# Patient Record
Sex: FEMALE | HISPANIC OR LATINO | Employment: FULL TIME | URBAN - METROPOLITAN AREA
[De-identification: names, ages, dates, MRNs, and addresses within clinical notes are randomized per-mention and may not be internally consistent; named-entity substitution may affect disease eponyms.]

---

## 2017-09-07 ENCOUNTER — HOSPITAL ENCOUNTER (EMERGENCY)
Facility: CLINIC | Age: 27
Discharge: HOME | End: 2017-09-07
Attending: EMERGENCY MEDICINE | Admitting: EMERGENCY MEDICINE
Payer: COMMERCIAL

## 2017-09-07 ENCOUNTER — APPOINTMENT (OUTPATIENT)
Dept: GENERAL RADIOLOGY | Facility: CLINIC | Age: 27
End: 2017-09-07
Attending: EMERGENCY MEDICINE
Payer: COMMERCIAL

## 2017-09-07 VITALS
SYSTOLIC BLOOD PRESSURE: 125 MMHG | DIASTOLIC BLOOD PRESSURE: 55 MMHG | HEART RATE: 87 BPM | WEIGHT: 212 LBS | HEIGHT: 57 IN | RESPIRATION RATE: 16 BRPM | BODY MASS INDEX: 45.74 KG/M2 | OXYGEN SATURATION: 98 % | TEMPERATURE: 98 F

## 2017-09-07 DIAGNOSIS — M54.2 NECK PAIN: ICD-10-CM

## 2017-09-07 PROCEDURE — 25000132 ZZH RX MED GY IP 250 OP 250 PS 637: Performed by: EMERGENCY MEDICINE

## 2017-09-07 PROCEDURE — 72040 X-RAY EXAM NECK SPINE 2-3 VW: CPT

## 2017-09-07 PROCEDURE — 99283 EMERGENCY DEPT VISIT LOW MDM: CPT | Performed by: EMERGENCY MEDICINE

## 2017-09-07 PROCEDURE — 99284 EMERGENCY DEPT VISIT MOD MDM: CPT | Mod: Z6 | Performed by: EMERGENCY MEDICINE

## 2017-09-07 RX ORDER — IBUPROFEN 600 MG/1
600 TABLET, FILM COATED ORAL ONCE
Status: COMPLETED | OUTPATIENT
Start: 2017-09-07 | End: 2017-09-07

## 2017-09-07 RX ORDER — IBUPROFEN 600 MG/1
600 TABLET, FILM COATED ORAL EVERY 6 HOURS PRN
Qty: 30 TABLET | Refills: 0 | Status: SHIPPED | OUTPATIENT
Start: 2017-09-07 | End: 2019-07-31

## 2017-09-07 RX ORDER — HYDROCODONE BITARTRATE AND ACETAMINOPHEN 5; 325 MG/1; MG/1
2 TABLET ORAL ONCE
Status: COMPLETED | OUTPATIENT
Start: 2017-09-07 | End: 2017-09-07

## 2017-09-07 RX ORDER — METHOCARBAMOL 500 MG/1
500-1000 TABLET, FILM COATED ORAL 3 TIMES DAILY PRN
Qty: 20 TABLET | Refills: 0 | Status: SHIPPED | OUTPATIENT
Start: 2017-09-07 | End: 2017-09-27

## 2017-09-07 RX ADMIN — HYDROCODONE BITARTRATE AND ACETAMINOPHEN 2 TABLET: 5; 325 TABLET ORAL at 14:47

## 2017-09-07 RX ADMIN — IBUPROFEN 600 MG: 600 TABLET ORAL at 14:47

## 2017-09-07 ASSESSMENT — ENCOUNTER SYMPTOMS
CONFUSION: 0
EYE REDNESS: 0
COLOR CHANGE: 0
SHORTNESS OF BREATH: 0
NECK PAIN: 1
ABDOMINAL PAIN: 0
BACK PAIN: 0
HEADACHES: 0
ARTHRALGIAS: 0
FEVER: 0
DIFFICULTY URINATING: 0

## 2017-09-07 NOTE — ED PROVIDER NOTES
History     Chief Complaint   Patient presents with     Neck Pain     Pt states that she was getting ready for work this am and felt neck crack. C/O pain and swelling in back of neck     HPI  Joie Callejas is a 26 year old female who presents to the Emergency Department today for neck pain. The patient states that she was ok this morning when she woke up. While she was in the shower this morning she reports that she felt a crack in her neck when she raised her hands over her head to wash her hair. After that she experienced a sharp pain on the left side of her neck. She reports that she did not make any sudden movements with her head to cause the pain. She did try some Tylenol earlier but it did not have any relief of her pain. The patient reports that her pain has progressed. She reports that she is unable to turn her head to the left now and is starting to develop pain in the right side of her neck that she blames on over compensating. The pain does not radiate. Patient has never had a pain like this before.  The patient has not received chiropractic care nor manipulation in over 4 months.  She denies any associated weakness or numbness.  She denies headache.  No throat pain.  No dental pain.  No fever.    I have reviewed the Medications, Allergies, Past Medical and Surgical History, and Social History in the [employer] system.  History reviewed. No pertinent past medical history.    History reviewed. No pertinent surgical history.    No family history on file.    Social History   Substance Use Topics     Smoking status: Never Smoker     Smokeless tobacco: Never Used     Alcohol use No       No current facility-administered medications for this encounter.      Current Outpatient Prescriptions   Medication     methocarbamol (ROBAXIN) 500 MG tablet     ibuprofen (ADVIL/MOTRIN) 600 MG tablet     pseudoePHEDrine (SUDAFED) 30 MG tablet     albuterol (ALBUTEROL) 108 (90 BASE) MCG/ACT inhaler     pseudoePHEDrine  "(SUDAFED) 30 MG tablet     guaiFENesin-codeine (ROBITUSSIN AC) 100-10 MG/5ML SOLN     HYDROcodone-acetaminophen (NORCO) 5-325 MG per tablet     calcium carbonate (OS-LAYLA 500 MG [place]. [place]) 500 MG tablet     VITAMIN E NATURAL PO      No Known Allergies    Review of Systems   Constitutional: Negative for fever.   HENT: Negative for congestion and hearing loss.    Eyes: Negative for redness and visual disturbance.   Respiratory: Negative for shortness of breath.    Cardiovascular: Negative for chest pain.   Gastrointestinal: Negative for abdominal pain.   Genitourinary: Negative for difficulty urinating.   Musculoskeletal: Positive for neck pain (Left side). Negative for arthralgias and back pain.   Skin: Negative for color change.   Neurological: Negative for headaches.   Psychiatric/Behavioral: Negative for confusion.       Physical Exam   BP: 119/49  Heart Rate: 81  Temp: 98  F (36.7  C)  Resp: 16  Height: 144.8 cm (4' 9\")  Weight: 96.2 kg (212 lb)  SpO2: 96 %  Physical Exam   Constitutional: She is oriented to person, place, and time. She appears well-developed and well-nourished.   HENT:   Head: Normocephalic and atraumatic.   Right Ear: External ear normal.   Left Ear: External ear normal.   Mouth/Throat: Oropharynx is clear and moist.   Eyes: Pupils are equal, round, and reactive to light.   Neck: Normal carotid pulses present. Muscular tenderness present. No spinous process tenderness present. Carotid bruit is not present. No rigidity. Decreased range of motion present.       Cardiovascular: Normal rate and regular rhythm.    Pulmonary/Chest: No respiratory distress.   Neurological: She is alert and oriented to person, place, and time. She has normal strength. No cranial nerve deficit or sensory deficit. Coordination normal. GCS eye subscore is 4. GCS verbal subscore is 5. GCS motor subscore is 6.   Nursing note and vitals reviewed.      ED Course   2:34 PM  The patient was seen and examined by Dr. Elder in Room " 03.     ED Course     Procedures            Critical Care time:    Results for orders placed or performed during the hospital encounter of 09/07/17 (from the past 24 hour(s))   Cervical spine XR, 2-3 views    Narrative    XR CERVICAL SPINE 2/3 VWS 9/7/2017 4:24 PM    HISTORY: Pain.    COMPARISON: None.    FINDINGS: Cervical alignment is anatomic. Vertebral body heights and  disc spaces are preserved. Prevertebral soft tissues are normal.      Impression    IMPRESSION: No acute osseous abnormality.    RAS JONES MD         Medications   HYDROcodone-acetaminophen (NORCO) 5-325 MG per tablet 2 tablet (2 tablets Oral Given 9/7/17 1447)   ibuprofen (ADVIL/MOTRIN) tablet 600 mg (600 mg Oral Given 9/7/17 1447)           Assessments & Plan (with Medical Decision Making)   26 year old in the emergency department with acute onset of left paravertebral neck pain while lifting her hands overhead to wash her hair this morning in the shower.  She has localized and palpable area of pain in her left mid lateral neck adjacent to her vertebra.  The patient did not have any forceful injury or exertion at the time of symptom onset immediately preceding.  She has not had a recent chiropractic care nor manipulation.  She has not had a headache or any abnormality on her neurologic exam.  As such, I do not suspect vertebral or carotid dissection.  The patient did have a radiograph obtained of her cervical spine which appears normal.  I suspect her symptoms are musculoskeletal in etiology.  She does not have any abnormality on her neurologic exam.  She has decreased rotation to the left on her exam.  The patient was provided ibuprofen and Norco in the emergency department with improvement in her symptoms.  She will be discharged home on ibuprofen and Robaxin.  Ice recommended.  Primary care follow-up recommended in 4-5 days.    I have reviewed the nursing notes.    I have reviewed the findings, diagnosis, plan and need for follow up with  the patient.    New Prescriptions    IBUPROFEN (ADVIL/MOTRIN) 600 MG TABLET    Take 1 tablet (600 mg) by mouth every 6 hours as needed for moderate pain    METHOCARBAMOL (ROBAXIN) 500 MG TABLET    Take 1-2 tablets (500-1,000 mg) by mouth 3 times daily as needed for muscle spasms       Final diagnoses:   Neck pain   IAsher, am serving as a trained medical scribe to document services personally performed by Jerald Elder MD, based on the provider's statements to me.   IJerald MD, was physically present and have reviewed and verified the accuracy of this note documented by Asher Alvarez.      9/7/2017   [facility], [place], EMERGENCY DEPARTMENT     Jerald Elder MD  09/07/17 2909       Jerald Elder MD  09/07/17 6765

## 2017-09-07 NOTE — ED AVS SNAPSHOT
[facility], Emergency Department    [address]    [place] [address]    Phone:  [phone]    Fax:  [phone]                                       Joie Callejas   MRN: [id number]    Department:  [facility], Emergency Department   Date of Visit:  9/7/2017           Patient Information     Date Of Birth          1990        Your diagnoses for this visit were:     Neck pain        You were seen by Jerald Elder MD.        Discharge Instructions       Take ibuprofen as needed for pain.  Take Robaxin as needed for spasm.  Ice for 20 minutes, 3-4 times per day.    Return to the ED if weakness, fever, or other concerns.    Please make an appointment to follow up with Your Primary Care Provider in 4-5 days.     Discharge References/Attachments     BACK AND NECK PAIN, GENERAL (ENGLISH)      24 Hour Appointment Hotline       To make an appointment at any [facility], call [phone] ([phone]). If you don't have a family doctor or clinic, we will help you find one. [facility] clinics are conveniently located to serve the needs of you and your family.             Review of your medicines      START taking        Dose / Directions Last dose taken    methocarbamol 500 MG tablet   Commonly known as:  ROBAXIN   Dose:  500-1000 mg   Quantity:  20 tablet        Take 1-2 tablets (500-1,000 mg) by mouth 3 times daily as needed for muscle spasms   Refills:  0          CONTINUE these medicines which may have CHANGED, or have new prescriptions. If we are uncertain of the size of tablets/capsules you have at home, strength may be listed as something that might have changed.        Dose / Directions Last dose taken    ibuprofen 600 MG tablet   Commonly known as:  ADVIL/MOTRIN   Dose:  600 mg   What changed:  when to take this   Quantity:  30 tablet        Take 1 tablet (600 mg) by mouth every 6 hours as needed for moderate pain   Refills:  0          Our records show that you are taking the  medicines listed below. If these are incorrect, please call your family doctor or clinic.        Dose / Directions Last dose taken    albuterol 108 (90 BASE) MCG/ACT Inhaler   Commonly known as:  PROAIR HFA   Dose:  2 puff   Quantity:  1 Inhaler        Inhale 2 puffs into the lungs every 4 hours as needed for shortness of breath / dyspnea   Refills:  0        calcium carbonate 1250 MG tablet   Commonly known as:  OS-LAYLA 500 mg [place]. [place]   Dose:  500 mg        Take 500 mg by mouth 2 times daily   Refills:  0        guaiFENesin-codeine 100-10 MG/5ML Soln solution   Commonly known as:  ROBITUSSIN AC   Dose:  2 tsp.   Quantity:  120 mL        Take 10 mLs by mouth every 4 hours as needed for cough   Refills:  0        HYDROcodone-acetaminophen 5-325 MG per tablet   Commonly known as:  NORCO   Dose:  1-2 tablet   Quantity:  15 tablet        Take 1-2 tablets by mouth every 4 hours as needed for moderate to severe pain   Refills:  0        * pseudoePHEDrine 30 MG tablet   Commonly known as:  SUDAFED   Dose:  60 mg   Quantity:  20 tablet        Take 2 tablets (60 mg) by mouth every 6 hours as needed for congestion   Refills:  0        * pseudoePHEDrine 30 MG tablet   Commonly known as:  SUDAFED   Dose:  60 mg   Quantity:  112 tablet        Take 2 tablets (60 mg) by mouth every 6 hours as needed for congestion   Refills:  0        VITAMIN E NATURAL PO        Refills:  0        * Notice:  This list has 2 medication(s) that are the same as other medications prescribed for you. Read the directions carefully, and ask your doctor or other care provider to review them with you.            Prescriptions were sent or printed at these locations (2 Prescriptions)                   Other Prescriptions                Printed at Department/Unit printer (2 of 2)         methocarbamol (ROBAXIN) 500 MG tablet               ibuprofen (ADVIL/MOTRIN) 600 MG tablet                Procedures and tests performed during your visit     Cervical spine  "XR, 2-3 views      Orders Needing Specimen Collection     None      Pending Results     No orders found from 2017 to 2017.            Pending Culture Results     No orders found from 2017 to 2017.            Pending Results Instructions     If you had any lab results that were not finalized at the time of your Discharge, you can call the ED Lab Result RN at [phone]. You will be contacted by this team for any positive Lab results or changes in treatment. The nurses are available 7 days a week from 10A to 6:30P.  You can leave a message 24 hours per day and they will return your call.        Thank you for choosing [facility]       Thank you for choosing [facility] for your care. Our goal is always to provide you with excellent care. Hearing back from our patients is one way we can continue to improve our services. Please take a few minutes to complete the written survey that you may receive in the mail after you visit with us. Thank you!        [employer] Information     [facility] lets you send messages to your doctor, view your test results, renew your prescriptions, schedule appointments and more. To sign up, go to [email or web address] . Click on \"Log in\" on the left side of the screen, which will take you to the Welcome page. Then click on \"Sign up Now\" on the right side of the page.     You will be asked to enter the access code listed below, as well as some personal information. Please follow the directions to create your username and password.     Your access code is: [email or web address]  Expires: 2017  5:06 PM     Your access code will  in 90 days. If you need help or a new code, please call your [facility] clinic or [phone].        Care EveryWhere ID     This is your Care EveryWhere ID. This could be used by other organizations to access your [facility] medical records  [id number]        Equal Access to Services     BRANDIE MILLS: sadie Wooten qaybta " raiza donohue ah. So [facility] [phone].    ATENCIÓN: Si habla español, tiene a perez disposición servicios gratuitos de asistencia lingüística. Llame al [phone].    We comply with applicable federal civil rights laws and [place] laws. We do not discriminate on the basis of race, color, national origin, age, disability sex, sexual orientation or gender identity.            After Visit Summary       This is your record. Keep this with you and show to your community pharmacist(s) and doctor(s) at your next visit.

## 2017-09-07 NOTE — DISCHARGE INSTRUCTIONS
Take ibuprofen as needed for pain.  Take Robaxin as needed for spasm.  Ice for 20 minutes, 3-4 times per day.    Return to the ED if weakness, fever, or other concerns.    Please make an appointment to follow up with Your Primary Care Provider in 4-5 days.

## 2017-09-07 NOTE — LETTER
To Whom it may concern:      Joie Callejas was seen in our Emergency Department today, 09/07/17. She may return to work 09/11/17.      Sincerely,      GA BANUELOS MD, MD

## 2017-09-07 NOTE — ED AVS SNAPSHOT
[facility], [place], Emergency Department    [address]    [place] [address]    Phone:  [phone]    Fax:  [phone]                                       Joie Callejas   MRN: [id number]    Department:  [facility], Emergency Department   Date of Visit:  9/7/2017           After Visit Summary Signature Page     I have received my discharge instructions, and my questions have been answered. I have discussed any challenges I see with this plan with the nurse or doctor.    ..........................................................................................................................................  Patient/Patient Representative Signature      ..........................................................................................................................................  Patient Representative Print Name and Relationship to Patient    ..................................................               ................................................  Date                                            Time    ..........................................................................................................................................  Reviewed by Signature/Title    ...................................................              ..............................................  Date                                                            Time

## 2017-09-27 ENCOUNTER — HOSPITAL ENCOUNTER (EMERGENCY)
Facility: CLINIC | Age: 27
Discharge: HOME | End: 2017-09-27
Attending: EMERGENCY MEDICINE | Admitting: EMERGENCY MEDICINE
Payer: COMMERCIAL

## 2017-09-27 VITALS
BODY MASS INDEX: 47.91 KG/M2 | HEART RATE: 76 BPM | WEIGHT: 221.38 LBS | TEMPERATURE: 98.8 F | OXYGEN SATURATION: 99 % | SYSTOLIC BLOOD PRESSURE: 109 MMHG | RESPIRATION RATE: 16 BRPM | DIASTOLIC BLOOD PRESSURE: 96 MMHG

## 2017-09-27 DIAGNOSIS — B86 SCABIES: ICD-10-CM

## 2017-09-27 PROCEDURE — 25000132 ZZH RX MED GY IP 250 OP 250 PS 637: Performed by: STUDENT IN AN ORGANIZED HEALTH CARE EDUCATION/TRAINING PROGRAM

## 2017-09-27 PROCEDURE — 99283 EMERGENCY DEPT VISIT LOW MDM: CPT | Mod: GC | Performed by: EMERGENCY MEDICINE

## 2017-09-27 PROCEDURE — 25000125 ZZHC RX 250: Performed by: STUDENT IN AN ORGANIZED HEALTH CARE EDUCATION/TRAINING PROGRAM

## 2017-09-27 PROCEDURE — 99283 EMERGENCY DEPT VISIT LOW MDM: CPT | Performed by: EMERGENCY MEDICINE

## 2017-09-27 RX ORDER — PREDNISONE 20 MG/1
TABLET ORAL
Qty: 2 TABLET | Refills: 0 | Status: SHIPPED | OUTPATIENT
Start: 2017-09-27 | End: 2017-10-04

## 2017-09-27 RX ORDER — DIPHENHYDRAMINE HCL 50 MG
50 CAPSULE ORAL ONCE
Status: COMPLETED | OUTPATIENT
Start: 2017-09-27 | End: 2017-09-27

## 2017-09-27 RX ORDER — DIPHENHYDRAMINE HCL 25 MG
50 TABLET ORAL EVERY 8 HOURS PRN
Qty: 56 TABLET | Refills: 0 | Status: SHIPPED | OUTPATIENT
Start: 2017-09-27 | End: 2019-01-10

## 2017-09-27 RX ORDER — PERMETHRIN 50 MG/G
CREAM TOPICAL ONCE
COMMUNITY
End: 2019-01-10

## 2017-09-27 RX ORDER — PREDNISONE 20 MG/1
20 TABLET ORAL ONCE
Status: COMPLETED | OUTPATIENT
Start: 2017-09-27 | End: 2017-09-27

## 2017-09-27 RX ADMIN — DIPHENHYDRAMINE HYDROCHLORIDE 50 MG: 50 CAPSULE ORAL at 16:07

## 2017-09-27 RX ADMIN — PREDNISONE 20 MG: 20 TABLET ORAL at 16:07

## 2017-09-27 ASSESSMENT — ENCOUNTER SYMPTOMS
ABDOMINAL DISTENTION: 0
EYE DISCHARGE: 0
SLEEP DISTURBANCE: 1
FEVER: 0
JOINT SWELLING: 0
SHORTNESS OF BREATH: 0
ABDOMINAL PAIN: 0
HEADACHES: 0
MYALGIAS: 0
SINUS PAIN: 0
NECK PAIN: 0
COUGH: 0
SORE THROAT: 0
EYE PAIN: 0
NECK STIFFNESS: 0
ARTHRALGIAS: 0
FATIGUE: 0
EYE ITCHING: 0

## 2017-09-27 NOTE — ED NOTES
Patient c/o itchy rash to bilateral hands and right upper thigh.  Patient reports the rash on her right thigh appears similar to the initial rash's appearance on her hands.  Patient reports she was treated with permetherin cream recently and has been taking hydroxyzine for her rash.  Rash to hands has linear appearance and is raised.

## 2017-09-27 NOTE — DISCHARGE INSTRUCTIONS
Please repeat permethrin lotion use tomorrow.  May use prednisone once daily for 2 more days if itching continues. May use benadryl as needed for itching as well.    Please make an appointment to follow up with Your Primary Care Provider in 5-7 days if not improving.        Scabies  Scabies is a skin infection. It is caused by a tiny parasitic insect, or mite, that is too small to see directly. It can be seen under a microscope, but it is usually recognized only by the rash and symptoms it causes. This can make it hard to diagnose since the signs and symptoms can be similar to other diseases.  The scabies mite tunnels under the skin. It creates a small burrow, where it leaves its eggs. These eggs villarreal and grow into adults. They then create new burrows over the next 1 to 2 weeks. The mites die in about 4 to 6 weeks. The rash and itching are caused by an allergic reaction to the scabies saliva or feces.  Scabies is highly contagious. It is spread by direct skin contact. It is easily spread by close personal contact, sexual contact, or by sharing bed linens or clothing used by an infected person.  It may take 4 to 6 weeks for symptoms to appear after being exposed. Everyone living in the house with you, as well as your sexual partners, should be treated at the same time. After the first treatment, you will no longer be contagious. You may return to work, school or .  Home care    Machine wash in hot water all sheets, towels, pillowcases, underwear, pajamas, and any other clothing you have worn lately. Use the hot cycle of a dryer or use a hot iron to sterilize.    Seal anything that is hard to wash in a plastic trash bag for 4 days. This includes coats, jackets, blankets, and bedspreads. (The insects die after 3 days off the human body.)  Medicines  Scabicides  Medicines used to treat scabies are called scabicides. These are creams that kill the scabies mites. A prescription is needed. When using these  medicines:    Always follow instructions provided by your healthcare provider and pharmacist. Also follow the printed instructions that come with the medicine.    Talk with your provider about precautions to take when using these medicines.    Use the cream on your body when your skin is cool and dry. Don t use it after a hot shower or bath.    Usually the cream is put on your whole body. This means from your chin all the way down to your toes. Scabies does not usually affect an adult s head. So cream is not needed there. For children, discuss this with your child s provider.    Leave the cream or lotion on for the recommended amount of time. This is usually 8 to 12 hours.    Don t leave cream or lotion on your skin longer than directed. Don t use more than recommended.    Clean clothes should be worn after the treatment.    If you wash your hands after using the cream, you will need to reapply the cream to your hands.    If you are breastfeeding, wash off your nipples before feeding. Then reapply the cream after breastfeeding.    For babies or infants, put mittens on their hands. This will stop them from licking the cream or lotion. It will also stop them from scratching themselves because of the itching.  Other medicines    An oral medicine called ivermectin may be prescribed for severe cases. It may also be used if you can t apply creams.    Itching may cause the most discomfort. If large areas of your skin are affected, over-the-counter antihistamines may be used to reduce itching. Or you may be given a prescription antihistamine. Some of these medicines may make you sleepy. They are best used at bedtime. Antihistamines that don t make you sleepy can be used during the day. Note: Don t use medicine that has diphenhydramine if you have glaucoma, or if you are a man who has trouble urinating due to an enlarged prostate.    If you were given antibiotics due to a bacterial infection, take them until they are  finished. It is important to finish the antibiotics even if the wound looks better. This is to make sure the infection has cleared.  Follow-up care  Follow up with your healthcare provider, or as advised. Call your provider if your symptoms don t improve after 1 week, or if new burrows or rashes appear.  When to seek medical advice  Call your healthcare provider right away if any of these occur:    Yellow-brown crusts or drainage from the sores    Other signs of infection, including increasing redness, swelling, pain, or pus    Fever of 100.4 F (38 C) or higher, or as directed by your provider  Date Last Reviewed: 8/1/2016 2000-2017 [employer]. [address] [place] [address], [place] [address]. All rights reserved. This information is not intended as a substitute for professional medical care. Always follow your healthcare professional's instructions.

## 2017-09-27 NOTE — ED PROVIDER NOTES
History     Chief Complaint   Patient presents with     Rash     Rash on bilateral palm of hands and on right hip.  Given Rx at clinic but is getting worse.     HPI  Joie Callejas is a 26 year old female who presents with a rash on her hands and right lateral thigh. Joie first noticed the rash 3 days ago - it started as red circles, and progressed to itching and burning pain. She was seen yesterday in clinic and was given permethrin cream, which she used last night, and hydroxyzine to control the itching. She states the hydroxyzine helped calm the itching for 10-15 minutes, but the itching continued after and has interfered with her sleep. She feels the rash has gotten worse - more red bumps and worsening itching/pain, and now she has small red bumps on her right leg. No one else in her home has similar symptoms, her  who shares a bed with her has no similar symptoms. She denies any fevers, no recent medication changes, no outdoor exposures, no recent insect bites, no changes in soap or detergent. She denies having symptoms like this in the past.    PERSONAL MEDICAL HISTORY  History reviewed. No pertinent past medical history.  PAST SURGICAL HISTORY  History reviewed. No pertinent surgical history.  FAMILY HISTORY  No family history on file.  SOCIAL HISTORY  Social History   Substance Use Topics     Smoking status: Never Smoker     Smokeless tobacco: Never Used     Alcohol use No     MEDICATIONS  Current Facility-Administered Medications   Medication     diphenhydrAMINE (BENADRYL) capsule 50 mg     predniSONE (DELTASONE) tablet 20 mg     Current Outpatient Prescriptions   Medication     permethrin (ELIMITE) 5 % cream     HydrOXYzine Pamoate (VISTARIL PO)     ibuprofen (ADVIL/MOTRIN) 600 MG tablet     albuterol (ALBUTEROL) 108 (90 BASE) MCG/ACT inhaler     calcium carbonate (OS-LAYLA 500 MG [place]. [place]) 500 MG tablet     VITAMIN E NATURAL PO     ALLERGIES  No Known Allergies    I have reviewed the  Medications, Allergies, Past Medical and Surgical History, and Social History in the Epic system.    Review of Systems   Constitutional: Negative for fatigue and fever.   HENT: Negative for ear pain, mouth sores, sinus pain and sore throat.    Eyes: Negative for pain, discharge and itching.   Respiratory: Negative for cough and shortness of breath.    Cardiovascular: Negative for chest pain.   Gastrointestinal: Negative for abdominal distention and abdominal pain.   Musculoskeletal: Negative for arthralgias, joint swelling, myalgias, neck pain and neck stiffness.   Skin: Positive for rash.   Allergic/Immunologic: Negative for immunocompromised state.   Neurological: Negative for headaches.   Psychiatric/Behavioral: Positive for sleep disturbance (difficulty sleeping secondary to itching).   All other systems reviewed and are negative.      Physical Exam   BP: (!) 134/92  Pulse: 84  Temp: 98.8  F (37.1  C)  Resp: 16  Weight: 100.4 kg (221 lb 6 oz)  SpO2: 97 %  Physical Exam   Constitutional: She appears well-developed and well-nourished. She appears distressed.   HENT:   Head: Normocephalic.   Nose: Nose normal.   Mouth/Throat: Oropharynx is clear and moist. No oropharyngeal exudate.   Eyes: Conjunctivae are normal. Right eye exhibits no discharge. Left eye exhibits no discharge.   Cardiovascular: Normal rate, regular rhythm and normal heart sounds.    No murmur heard.  Pulmonary/Chest: Effort normal and breath sounds normal. No respiratory distress. She has no wheezes.   Abdominal: Soft. Bowel sounds are normal. She exhibits no distension. There is no tenderness.   Musculoskeletal: She exhibits no edema or tenderness.   Skin: Rash noted. Rash is papular and nodular. Rash is not pustular, not vesicular and not urticarial. She is not diaphoretic. There is erythema.   Bilateral palms, with some interphalangeal webbing involvement - erythematous and papular, some excoriations and crusting seen. On right lateral leg has  small erythematous papules.   Nursing note and vitals reviewed.      ED Course     ED Course     Patient was very uncomfortable secondary to itching - she was given 20mg prednisone po and 50mg benadryl po. Her symptoms improved.    Procedures No procedures done.        Labs Ordered and Resulted from Time of ED Arrival Up to the Time of Departure from the ED - No data to display       Assessments & Plan (with Medical Decision Making)   Joie is a 27 yo F presenting with a rash on her bilateral palms and right lateral thigh. Patient's current symptoms and physical exam are most consistent with scabies. Other differentials include bed bugs, but it does not appear to be a typical appearing bed bug bite pattern. Unlikely hand-foot-mouth disease, as she has no lesions in or around her mouth - and lesions are itchy, which is not typical in hand-foot-mouth. While patient complains of burning, herpes zoster is considered, but rash does not follow a dermatomal pattern and is not vesicular. Rash is not typical of eczema. Patient does not have a history suggestive of contact dermatitis.     Patient's itching was well-controlled with oral benadryl and prednisone. She was discharged with a prescription for 2 days of 20mg po prednisone, and benadryl 50mg as needed for itching. Patient was instructed to continue with planned repeat treatment of permethrin tomorrow.    I have reviewed the nursing notes.    I have reviewed the findings, diagnosis, plan and need for follow up with the patient.  Corinna Kim DO  PGY1 Family Medicine Resident  Pager: [phone]      This data collected with the Resident working in the Emergency Department.  Patient was seen and evaluated by myself and I repeated the history and physical exam with the patient.  The plan of care was discussed with them.  The key portions of the note including the entire assessment and plan reflect my documentation.        New Prescriptions    DIPHENHYDRAMINE  (BENADRYL) 25 MG TABLET    Take 2 tablets (50 mg) by mouth every 8 hours as needed for itching    PREDNISONE (DELTASONE) 20 MG TABLET    1 tab daily for 3 days, then 1/2 tab daily for 4 days       Final diagnoses:   Scabies       9/27/2017   [facility], [place], EMERGENCY DEPARTMENT     Rosalia Porter MD  10/11/17 [id number]

## 2017-09-27 NOTE — ED AVS SNAPSHOT
[facility], [place], Emergency Department    [address]    [facility] [place] [address]    Phone:  [phone]    Fax:  [phone]                                       Joie Callejas   MRN: [id number]    Department:  [facility], Emergency Department   Date of Visit:  9/27/2017           After Visit Summary Signature Page     I have received my discharge instructions, and my questions have been answered. I have discussed any challenges I see with this plan with the nurse or doctor.    ..........................................................................................................................................  Patient/Patient Representative Signature      ..........................................................................................................................................  Patient Representative Print Name and Relationship to Patient    ..................................................               ................................................  Date                                            Time    ..........................................................................................................................................  Reviewed by Signature/Title    ...................................................              ..............................................  Date                                                            Time

## 2017-09-27 NOTE — LETTER
[facility], [place], EMERGENCY DEPARTMENT  [address]  [facility] [address]  Phone: [phone]  Fax: [phone]    September 27, 2017        Joie Callejas  [address]  [facility] [address]          To whom it may concern:    RE: Joie Callejas    Patient was seen and treated today at our facility. Please excuse Joie from work today (9/27/2017) and tomorrow (9/28/2017), she may return to work on Friday (9/29/2017).     Please contact me for questions or concerns.      Sincerely,        Corinna Kim, DO

## 2017-09-27 NOTE — ED AVS SNAPSHOT
[facility], Emergency Department    [address]    [place] [address]    Phone:  [phone]    Fax:  [phone]                                       Joie Callejas   MRN: [id number]    Department:  [facility], Emergency Department   Date of Visit:  9/27/2017           Patient Information     Date Of Birth          1990        Your diagnoses for this visit were:     Scabies        You were seen by Rosalia Porter MD.        Discharge Instructions       Please repeat permethrin lotion use tomorrow.  May use prednisone once daily for 2 more days if itching continues. May use benadryl as needed for itching as well.    Please make an appointment to follow up with Your Primary Care Provider in 5-7 days if not improving.        Scabies  Scabies is a skin infection. It is caused by a tiny parasitic insect, or mite, that is too small to see directly. It can be seen under a microscope, but it is usually recognized only by the rash and symptoms it causes. This can make it hard to diagnose since the signs and symptoms can be similar to other diseases.  The scabies mite tunnels under the skin. It creates a small burrow, where it leaves its eggs. These eggs villarreal and grow into adults. They then create new burrows over the next 1 to 2 weeks. The mites die in about 4 to 6 weeks. The rash and itching are caused by an allergic reaction to the scabies saliva or feces.  Scabies is highly contagious. It is spread by direct skin contact. It is easily spread by close personal contact, sexual contact, or by sharing bed linens or clothing used by an infected person.  It may take 4 to 6 weeks for symptoms to appear after being exposed. Everyone living in the house with you, as well as your sexual partners, should be treated at the same time. After the first treatment, you will no longer be contagious. You may return to work, school or .  Home care    Machine wash in hot water all sheets, towels,  pillowcases, underwear, pajamas, and any other clothing you have worn lately. Use the hot cycle of a dryer or use a hot iron to sterilize.    Seal anything that is hard to wash in a plastic trash bag for 4 days. This includes coats, jackets, blankets, and bedspreads. (The insects die after 3 days off the human body.)  Medicines  Scabicides  Medicines used to treat scabies are called scabicides. These are creams that kill the scabies mites. A prescription is needed. When using these medicines:    Always follow instructions provided by your healthcare provider and pharmacist. Also follow the printed instructions that come with the medicine.    Talk with your provider about precautions to take when using these medicines.    Use the cream on your body when your skin is cool and dry. Don t use it after a hot shower or bath.    Usually the cream is put on your whole body. This means from your chin all the way down to your toes. Scabies does not usually affect an adult s head. So cream is not needed there. For children, discuss this with your child s provider.    Leave the cream or lotion on for the recommended amount of time. This is usually 8 to 12 hours.    Don t leave cream or lotion on your skin longer than directed. Don t use more than recommended.    Clean clothes should be worn after the treatment.    If you wash your hands after using the cream, you will need to reapply the cream to your hands.    If you are breastfeeding, wash off your nipples before feeding. Then reapply the cream after breastfeeding.    For babies or infants, put mittens on their hands. This will stop them from licking the cream or lotion. It will also stop them from scratching themselves because of the itching.  Other medicines    An oral medicine called ivermectin may be prescribed for severe cases. It may also be used if you can t apply creams.    Itching may cause the most discomfort. If large areas of your skin are  affected, over-the-counter antihistamines may be used to reduce itching. Or you may be given a prescription antihistamine. Some of these medicines may make you sleepy. They are best used at bedtime. Antihistamines that don t make you sleepy can be used during the day. Note: Don t use medicine that has diphenhydramine if you have glaucoma, or if you are a man who has trouble urinating due to an enlarged prostate.    If you were given antibiotics due to a bacterial infection, take them until they are finished. It is important to finish the antibiotics even if the wound looks better. This is to make sure the infection has cleared.  Follow-up care  Follow up with your healthcare provider, or as advised. Call your provider if your symptoms don t improve after 1 week, or if new burrows or rashes appear.  When to seek medical advice  Call your healthcare provider right away if any of these occur:    Yellow-brown crusts or drainage from the sores    Other signs of infection, including increasing redness, swelling, pain, or pus    Fever of 100.4 F (38 C) or higher, or as directed by your provider  Date Last Reviewed: 8/1/2016 2000-2017 [employer]. [address] [place] [address]. All rights reserved. This information is not intended as a substitute for professional medical care. Always follow your healthcare professional's instructions.          24 Hour Appointment Hotline       To make an appointment at any [facility], call [phone] ([phone]). If you don't have a family doctor or clinic, we will help you find one. [facility] are conveniently located to serve the needs of you and your family.             Review of your medicines      START taking        Dose / Directions Last dose taken    diphenhydrAMINE 25 MG tablet   Commonly known as:  BENADRYL   Dose:  50 mg   Quantity:  56 tablet        Take 2 tablets (50 mg) by mouth every 8 hours as needed for itching   Refills:  0         predniSONE 20 MG tablet   Commonly known as:  DELTASONE   Quantity:  2 tablet        1 tab daily for 3 days, then 1/2 tab daily for 4 days   Refills:  0          Our records show that you are taking the medicines listed below. If these are incorrect, please call your family doctor or clinic.        Dose / Directions Last dose taken    albuterol 108 (90 BASE) MCG/ACT Inhaler   Commonly known as:  PROAIR HFA   Dose:  2 puff   Quantity:  1 Inhaler        Inhale 2 puffs into the lungs every 4 hours as needed for shortness of breath / dyspnea   Refills:  0        calcium carbonate 1250 MG tablet   Commonly known as:  OS-LAYLA 500 mg [place]. [place]   Dose:  500 mg        Take 500 mg by mouth 2 times daily   Refills:  0        ibuprofen 600 MG tablet   Commonly known as:  ADVIL/MOTRIN   Dose:  600 mg   Quantity:  30 tablet        Take 1 tablet (600 mg) by mouth every 6 hours as needed for moderate pain   Refills:  0        permethrin 5 % cream   Commonly known as:  ELIMITE        Apply topically once Massage into skin from head to foot.  Leave on for 8-14hrs and then wash off.  May repeat in 2 wks if needed.   Refills:  0        VISTARIL PO   Dose:  50 mg        Take 50 mg by mouth every 4 hours as needed for itching   Refills:  0        VITAMIN E NATURAL PO        Refills:  0                Prescriptions were sent or printed at these locations (2 Prescriptions)                   Other Prescriptions                Printed at Department/Unit printer (2 of 2)         predniSONE (DELTASONE) 20 MG tablet               diphenhydrAMINE (BENADRYL) 25 MG tablet                Orders Needing Specimen Collection     None      Pending Results     No orders found from 9/25/2017 to 9/28/2017.            Pending Culture Results     No orders found from 9/25/2017 to 9/28/2017.            Pending Results Instructions     If you had any lab results that were not finalized at the time of your Discharge, you can call the ED Lab Result RN at  "[phone]. You will be contacted by this team for any positive Lab results or changes in treatment. The nurses are available 7 days a week from 10A to 6:30P.  You can leave a message 24 hours per day and they will return your call.        Thank you for choosing [facility]       Thank you for choosing [facility] for your care. Our goal is always to provide you with excellent care. Hearing back from our patients is one way we can continue to improve our services. Please take a few minutes to complete the written survey that you may receive in the mail after you visit with us. Thank you!        [employer] Information     [employer] lets you send messages to your doctor, view your test results, renew your prescriptions, schedule appointments and more. To sign up, go to [email or web address] . Click on \"Log in\" on the left side of the screen, which will take you to the Welcome page. Then click on \"Sign up Now\" on the right side of the page.     You will be asked to enter the access code listed below, as well as some personal information. Please follow the directions to create your username and password.     Your access code is: [email or web address]  Expires: 2017  5:06 PM     Your access code will  in 90 days. If you need help or a new code, please call your [facility] clinic or [phone].        Care EveryWhere ID     This is your Care EveryWhere ID. This could be used by other organizations to access your [facility] medical records  [id number]        Equal Access to Services     BRANDIE LA : Hadalma peralta Somike, waaxda luqadaha, qaybta kaalmada franco, raiza gould . So [facility] [phone].    ATENCIÓN: Si habla español, tiene a perez disposición servicios gratuitos de asistencia lingüística. Llame al [phone].    We comply with applicable federal civil rights laws and [place] laws. We do not discriminate on the basis of race, color, national origin, age, disability sex, " sexual orientation or gender identity.            After Visit Summary       This is your record. Keep this with you and show to your community pharmacist(s) and doctor(s) at your next visit.

## 2017-09-28 NOTE — ED NOTES
Patient contacted 9/28/17 at 0948 r/t receiving a prescription for permetherin cream with the wrong patient's name--patient reports the prescription was taken to the [facility] in the [facility] after she was discharged and that the pharmacy contacted the ED and a prescription for the correct patient was given.  Original incorrect prescription remains with pharmacy, patient reports she received all of her prescriptions as ordered.

## 2019-01-10 ENCOUNTER — APPOINTMENT (OUTPATIENT)
Dept: ULTRASOUND IMAGING | Facility: CLINIC | Age: 29
End: 2019-01-10

## 2019-01-10 ENCOUNTER — HOSPITAL ENCOUNTER (EMERGENCY)
Facility: CLINIC | Age: 29
Discharge: HOME | End: 2019-01-10
Attending: EMERGENCY MEDICINE | Admitting: EMERGENCY MEDICINE
Payer: COMMERCIAL

## 2019-01-10 VITALS
RESPIRATION RATE: 16 BRPM | DIASTOLIC BLOOD PRESSURE: 80 MMHG | OXYGEN SATURATION: 98 % | TEMPERATURE: 98.4 F | HEART RATE: 92 BPM | SYSTOLIC BLOOD PRESSURE: 136 MMHG | BODY MASS INDEX: 51.07 KG/M2 | WEIGHT: 236 LBS

## 2019-01-10 DIAGNOSIS — N93.8 DYSFUNCTIONAL UTERINE BLEEDING: ICD-10-CM

## 2019-01-10 LAB
BASOPHILS # BLD AUTO: 0 10E9/L (ref 0–0.2)
BASOPHILS NFR BLD AUTO: 0.2 %
DIFFERENTIAL METHOD BLD: ABNORMAL
EOSINOPHIL # BLD AUTO: 0.1 10E9/L (ref 0–0.7)
EOSINOPHIL NFR BLD AUTO: 1.1 %
ERYTHROCYTE [DISTWIDTH] IN BLOOD BY AUTOMATED COUNT: 14.1 % (ref 10–15)
HCG UR QL: NEGATIVE
HCT VFR BLD AUTO: 34.3 % (ref 35–47)
HGB BLD-MCNC: 11.2 G/DL (ref 11.7–15.7)
IMM GRANULOCYTES # BLD: 0 10E9/L (ref 0–0.4)
IMM GRANULOCYTES NFR BLD: 0.4 %
INTERNAL QC OK POCT: YES
LYMPHOCYTES # BLD AUTO: 1.9 10E9/L (ref 0.8–5.3)
LYMPHOCYTES NFR BLD AUTO: 16.5 %
MCH RBC QN AUTO: 27 PG (ref 26.5–33)
MCHC RBC AUTO-ENTMCNC: 32.7 G/DL (ref 31.5–36.5)
MCV RBC AUTO: 83 FL (ref 78–100)
MONOCYTES # BLD AUTO: 0.4 10E9/L (ref 0–1.3)
MONOCYTES NFR BLD AUTO: 3.7 %
NEUTROPHILS # BLD AUTO: 8.8 10E9/L (ref 1.6–8.3)
NEUTROPHILS NFR BLD AUTO: 78.1 %
NRBC # BLD AUTO: 0 10*3/UL
NRBC BLD AUTO-RTO: 0 /100
PLATELET # BLD AUTO: 352 10E9/L (ref 150–450)
RBC # BLD AUTO: 4.15 10E12/L (ref 3.8–5.2)
TSH SERPL DL<=0.005 MIU/L-ACNC: 1.22 MU/L (ref 0.4–4)
WBC # BLD AUTO: 11.2 10E9/L (ref 4–11)

## 2019-01-10 PROCEDURE — 81025 URINE PREGNANCY TEST: CPT | Performed by: EMERGENCY MEDICINE

## 2019-01-10 PROCEDURE — 96361 HYDRATE IV INFUSION ADD-ON: CPT

## 2019-01-10 PROCEDURE — 84443 ASSAY THYROID STIM HORMONE: CPT | Performed by: EMERGENCY MEDICINE

## 2019-01-10 PROCEDURE — 25000128 H RX IP 250 OP 636: Performed by: EMERGENCY MEDICINE

## 2019-01-10 PROCEDURE — 85025 COMPLETE CBC W/AUTO DIFF WBC: CPT | Performed by: EMERGENCY MEDICINE

## 2019-01-10 PROCEDURE — 96374 THER/PROPH/DIAG INJ IV PUSH: CPT

## 2019-01-10 PROCEDURE — 99285 EMERGENCY DEPT VISIT HI MDM: CPT | Mod: Z6 | Performed by: EMERGENCY MEDICINE

## 2019-01-10 PROCEDURE — 76830 TRANSVAGINAL US NON-OB: CPT

## 2019-01-10 PROCEDURE — 99284 EMERGENCY DEPT VISIT MOD MDM: CPT | Mod: 25

## 2019-01-10 RX ORDER — KETOROLAC TROMETHAMINE 30 MG/ML
30 INJECTION, SOLUTION INTRAMUSCULAR; INTRAVENOUS ONCE
Status: COMPLETED | OUTPATIENT
Start: 2019-01-10 | End: 2019-01-10

## 2019-01-10 RX ORDER — LEVONORGESTREL/ETHIN.ESTRADIOL 0.1-0.02MG
1 TABLET ORAL DAILY
COMMUNITY
End: 2019-02-18

## 2019-01-10 RX ADMIN — KETOROLAC TROMETHAMINE 30 MG: 30 INJECTION, SOLUTION INTRAMUSCULAR at 17:29

## 2019-01-10 RX ADMIN — SODIUM CHLORIDE 500 ML: 9 INJECTION, SOLUTION INTRAVENOUS at 16:13

## 2019-01-10 ASSESSMENT — ENCOUNTER SYMPTOMS
DIARRHEA: 0
FEVER: 0
NAUSEA: 0
SHORTNESS OF BREATH: 0
PALPITATIONS: 0
VOMITING: 0
CONSTIPATION: 0
CHILLS: 0
ABDOMINAL PAIN: 0
COUGH: 0

## 2019-01-10 NOTE — ED PROVIDER NOTES
[facility] EMERGENCY DEPARTMENT ([facility])    1/10/19       History     Chief Complaint   Patient presents with     Abdominal Pain     Abdominal pain started yesterday morning. Pt has been having vaginal bleeding since after xmas, yesterday it became heavier and passed a golfball size clot.      The history is provided by the patient and medical records.     Joie Callejas is a 28 year old female who presents with constant vaginal bleeding since 12/25/18 along with abdominal pain and passage of large clot today. She has a history of PCOS with irregular periods and type II diabetes. She has irregular periods, and according to office visit on 12/5/18 she has heavy periods at baseline. Patient developed persistent vaginal bleeding ever since menses began 12/25/18. She saw her doctor at [facility] who started her on birth control x 3 month course, and instructed her to skip placebo pills. In spite of taking birth control she is continuing to have vaginal bleeding. Patient notes that yesterday she developed a new abdominal pain as well as passage of large clot yesterday. She states this was bigger than a golfball. Afterwards she developed heavier vaginal bleeding and noticed that the abdominal pain is not going away. She has an appointment in [facility] tomorrow but came here as recommended by her mother. No recent fevers, cough, cold, rhinorrhea or sore throat, no shortness of breath, chest pain, vomiting or diarrhea. No upper abdominal pain. She doesn't know if she is pregnant, has not taken a pregnancy test. No vaginal discharge. She has never had a period as heavy or as long as she has today.      Patient goes to [facility] for primary cares.     I have reviewed the Medications, Allergies, Past Medical and Surgical History, and Social History in the [employer] system.  PAST MEDICAL HISTORY:   Past Medical History:   Diagnosis Date     Diabetes (H)     Type 2       PAST SURGICAL HISTORY: History reviewed.  No pertinent surgical history.    FAMILY HISTORY: History reviewed. No pertinent family history.    SOCIAL HISTORY:   Social History     Tobacco Use     Smoking status: Never Smoker     Smokeless tobacco: Never Used   Substance Use Topics     Alcohol use: No          Medication List      ASK your doctor about these medications    ibuprofen 600 MG tablet  Commonly known as:  ADVIL/MOTRIN  Take 1 tablet (600 mg) by mouth every 6 hours as needed for moderate pain     levonorgestrel-ethinyl estradiol 0.1-20 MG-MCG tablet  Commonly known as:  AVIANE/ALESSE/LESSINA     metFORMIN 500 MG tablet  Commonly known as:  GLUCOPHAGE             No Known Allergies   Review of Systems   Constitutional: Negative for chills and fever.   HENT: Negative for rhinorrhea and sore throat.    Respiratory: Negative for cough and shortness of breath.    Cardiovascular: Negative for chest pain and palpitations.   Gastrointestinal: Negative for abdominal pain, constipation, diarrhea, nausea and vomiting.   Genitourinary: Positive for menstrual problem, pelvic pain and vaginal bleeding.   All other systems reviewed and are negative.      Physical Exam   BP: 129/72  Pulse: 105  Temp: 95.9  F (35.5  C)  Resp: 16  Weight: 107 kg (236 lb)  SpO2: 98 %      Physical Exam   Constitutional: She is oriented to person, place, and time. She appears well-developed and well-nourished. No distress.   Morbidly obese, alert, cooperative, NAD   HENT:   Head: Normocephalic and atraumatic.   Eyes: Conjunctivae and EOM are normal. Pupils are equal, round, and reactive to light.   Cardiovascular: Normal rate, regular rhythm, normal heart sounds and intact distal pulses.   Pulmonary/Chest: Effort normal and breath sounds normal. No respiratory distress. She has no wheezes. She has no rales.   Abdominal: Soft. Bowel sounds are normal. She exhibits no distension. There is no tenderness.   Obese, soft, nontender to palpation   Genitourinary:   Genitourinary Comments:  Speculum exam reveals blood in vault consistent with a heavy menstrual period. No significant clots noted at this time. Cervix without lesions.    Musculoskeletal: She exhibits no edema.   Neurological: She is alert and oriented to person, place, and time.   Skin: Skin is warm and dry. She is not diaphoretic.   Psychiatric: She has a normal mood and affect.   Nursing note and vitals reviewed.      ED Course        Procedures             Critical Care time:  none             Results for orders placed or performed during the hospital encounter of 01/10/19 (from the past 24 hour(s))   CBC with platelets differential   Result Value Ref Range    WBC 11.2 (H) 4.0 - 11.0 10e9/L    RBC Count 4.15 3.8 - 5.2 10e12/L    Hemoglobin 11.2 (L) 11.7 - 15.7 g/dL    Hematocrit 34.3 (L) 35.0 - 47.0 %    MCV 83 78 - 100 fl    MCH 27.0 26.5 - 33.0 pg    MCHC 32.7 31.5 - 36.5 g/dL    RDW 14.1 10.0 - 15.0 %    Platelet Count 352 150 - 450 10e9/L    Diff Method Automated Method     % Neutrophils 78.1 %    % Lymphocytes 16.5 %    % Monocytes 3.7 %    % Eosinophils 1.1 %    % Basophils 0.2 %    % Immature Granulocytes 0.4 %    Nucleated RBCs 0 0 /100    Absolute Neutrophil 8.8 (H) 1.6 - 8.3 10e9/L    Absolute Lymphocytes 1.9 0.8 - 5.3 10e9/L    Absolute Monocytes 0.4 0.0 - 1.3 10e9/L    Absolute Eosinophils 0.1 0.0 - 0.7 10e9/L    Absolute Basophils 0.0 0.0 - 0.2 10e9/L    Abs Immature Granulocytes 0.0 0 - 0.4 10e9/L    Absolute Nucleated RBC 0.0    TSH with free T4 reflex   Result Value Ref Range    TSH 1.22 0.40 - 4.00 mU/L   hCG qual urine POCT   Result Value Ref Range    HCG Qual Urine Negative neg    Internal QC OK Yes    US Pel W/Trans*    Narrative    US PELVIC COMPLETE WITH TRANSVAGINAL 1/10/2019 4:46 PM     HISTORY: Menometrorrhagia, severe cramping, evaluate for mass  lesion/uterine fibroid or evidence of significantly thickened  endometrium.    FINDINGS: Transvaginal images were performed to better evaluate the  patient's uterus,  ovaries and endometrial stripe.    The uterus is normal in size measuring 9.6 x 4.6 x 5.3 cm. No fibroids  are evident. Endometrial stripe measures 26 mm and is abnormally  thickened and heterogeneous for patient's age. No associated color  Doppler flow within the endometrium. Multiple nabothian cysts are  present. The right ovary is normal measuring 2.5 x 1.8 x 2.4 cm. The  left ovary is not visualized. No adnexal masses are present. No free  pelvic fluid is present.      Impression    IMPRESSION: Thickened heterogeneous-appearing endometrial stripe.  Findings could reflect intrauterine blood products. No associated  abnormal color Doppler flow. Right ovary is unremarkable. Left ovary  not visualized.    RADHA GERARD MD     Medications   ketorolac (TORADOL) injection 30 mg (30 mg Intravenous Given 1/10/19 1729)         Assessments & Plan (with Medical Decision Making)     This is a 28 old female who presents to the emergency department today with complaints of vaginal bleeding.  It sounds as if patient has had dysfunctional uterine bleeding/menometrorrhagia intermittently since August according to prior notes.  Since [place] Day, she has had ongoing vaginal bleeding which has gotten heavier in the past 1-2 days.  She was passing clots yesterday which is atypical for her.  She was concerned about these symptoms. She is apparently on oral contraceptive pack that was started approximately 1 month ago by her primary clinic.  I reviewed the chart and I see that on December 5th, she saw her primary clinic through [facility] and was started on Lutera tablets, she was advised to skip the placebo week and continue taking them for 3 months.      Vital signs here in the emergency department are blood pressure 129/72 with a heart rate of 105.  She does not appear to be in any acute distress. Pelvic exam shows blood consistent with a heavy period.    Differential diagnosis could include ongoing dysfunctional uterine bleeding.   It is possible that there is a structural abnormality such as a fibroid though I think this is less likely.  Pregnancy/miscarriage would be possible though again I think very unlikely if she has been on oral contraceptives regularly.  We did do a UPT here in the emergency department and this is negative.  CBC today is remarkable for a hemoglobin of 11.2, not overly concerning.  Recent CBC available in care everywhere was from December 5, 2018 at which point hemoglobin was 12.8.  Looking back at her records she tends to vary between the 11 range to the 13 range.  TSH is within normal limits at 1.22.  We did elect to do a pelvic ultrasound which shows thickened heterogeneous appearing endometrial stripe which is measured at 26 mm.  There is no associated abnormal color Doppler flow.  Right ovary was unremarkable, left ovary not visualized.    Although she is young, given the degree and duration of her bleeding in the context of a very significantly thickened endometrial stripe, still need to consider the possibility of uterine malignancy.  This would require further workup by gynecology and likely an endometrial biopsy.    I advised that the patient follow-up with a gynecologist.  I did give her the phone number to two different women's clinics here through [employer], she can certainly choose to go to whichever one she can get into soonest.  I did briefly discuss the case with the gynecology resident.  She recommends putting the patient on a taper of her birth control pills.  She recommends that the patient take 2-3 of her birth control pills daily until the bleeding stops, then go down to 1 pill daily to finish out the pack.  She will hopefully be able to follow-up with gynecology before she finishes her current pack.  This was explained to the patient and she verbalizes understanding.    This part of the document was transcribed by Adriana Regalado, Medical Scribe.      I have reviewed the nursing notes.    I have  reviewed the findings, diagnosis, plan and need for follow up with the patient.       Medication List      There are no discharge medications for this visit.         Final diagnoses:   Dysfunctional uterine bleeding     Adriana BRITT, am serving as a trained medical scribe to document services personally performed by Laquita Oconnell MD based on the provider's statements to me on January 10, 2019.  This document has been checked and approved by the attending provider.    Laquita BRITT MD, was physically present and have reviewed and verified the accuracy of this note documented by Adriana Regalado, medical scribe.     1/10/2019   [facility], [place], EMERGENCY DEPARTMENT     Laquita Oconnell MD  01/11/19 0037

## 2019-01-10 NOTE — DISCHARGE INSTRUCTIONS
You have been seen in the emergency department today for vaginal bleeding.  Your hemoglobin today is 11.2 which is just slightly below normal.  Your ultrasound shows that the lining of the uterus is thicker than normal.  You need to follow-up with a gynecology clinic.  There are further tests they can do to try to determine why you are having such heavy bleeding.  We recommend that you call to make an appointment as soon as possible.  In the meantime, you can take 2-3 of your birth control pill tablets daily until the bleeding stops and then go down to 1 pill/day to finish the pack.  You should see gynecology before your pack runs out.  Be aware that in the first few days after starting an increased dose of 2-3 tablets daily, you may have heavier bleeding.  This is going to be normal, as your body sheds that thick lining of the uterus.  It should rapidly reduce the bleeding as the lining is shed.  Follow-up as directed.    Please make an appointment to follow up with   OB/Gyn[facility] (phone: [phone])     Or    OB/Gyn--[facility] (phone: [phone]) as soon as possible.

## 2019-01-10 NOTE — ED AVS SNAPSHOT
[facility], [place], Emergency Department  [address]  [place] [address]  Phone:  [phone]  Fax:  [phone]                                    Joie Callejas   MRN: [id number]    Department:  [facility], Emergency Department   Date of Visit:  1/10/2019           After Visit Summary Signature Page    I have received my discharge instructions, and my questions have been answered. I have discussed any challenges I see with this plan with the nurse or doctor.    ..........................................................................................................................................  Patient/Patient Representative Signature      ..........................................................................................................................................  Patient Representative Print Name and Relationship to Patient    ..................................................               ................................................  Date                                   Time    ..........................................................................................................................................  Reviewed by Signature/Title    ...................................................              ..............................................  Date                                               Time          22EPIC Rev 08/18

## 2019-01-11 ASSESSMENT — ENCOUNTER SYMPTOMS
SORE THROAT: 0
RHINORRHEA: 0

## 2019-02-18 ENCOUNTER — APPOINTMENT (OUTPATIENT)
Dept: LAB | Facility: CLINIC | Age: 29
End: 2019-02-18
Attending: OBSTETRICS & GYNECOLOGY
Payer: COMMERCIAL

## 2019-02-18 ENCOUNTER — TELEPHONE (OUTPATIENT)
Dept: OBGYN | Facility: CLINIC | Age: 29
End: 2019-02-18

## 2019-02-18 ENCOUNTER — OFFICE VISIT (OUTPATIENT)
Dept: OBGYN | Facility: CLINIC | Age: 29
End: 2019-02-18
Attending: OBSTETRICS & GYNECOLOGY
Payer: COMMERCIAL

## 2019-02-18 VITALS
HEIGHT: 57 IN | HEART RATE: 76 BPM | DIASTOLIC BLOOD PRESSURE: 87 MMHG | BODY MASS INDEX: 50.01 KG/M2 | SYSTOLIC BLOOD PRESSURE: 143 MMHG | WEIGHT: 231.8 LBS

## 2019-02-18 DIAGNOSIS — N93.9 ABNORMAL UTERINE BLEEDING: Primary | ICD-10-CM

## 2019-02-18 DIAGNOSIS — Z30.430 ENCOUNTER FOR INSERTION OF INTRAUTERINE CONTRACEPTIVE DEVICE: ICD-10-CM

## 2019-02-18 DIAGNOSIS — D50.0 IRON DEFICIENCY ANEMIA DUE TO CHRONIC BLOOD LOSS: ICD-10-CM

## 2019-02-18 LAB
ERYTHROCYTE [DISTWIDTH] IN BLOOD BY AUTOMATED COUNT: 13.3 % (ref 10–15)
HCT VFR BLD AUTO: 35.5 % (ref 35–47)
HGB BLD-MCNC: 11 G/DL (ref 11.7–15.7)
MCH RBC QN AUTO: 24.9 PG (ref 26.5–33)
MCHC RBC AUTO-ENTMCNC: 31 G/DL (ref 31.5–36.5)
MCV RBC AUTO: 80 FL (ref 78–100)
PLATELET # BLD AUTO: 421 10E9/L (ref 150–450)
RBC # BLD AUTO: 4.42 10E12/L (ref 3.8–5.2)
WBC # BLD AUTO: 9.1 10E9/L (ref 4–11)

## 2019-02-18 PROCEDURE — 58100 BIOPSY OF UTERUS LINING: CPT | Mod: ZF | Performed by: OBSTETRICS & GYNECOLOGY

## 2019-02-18 PROCEDURE — 58300 INSERT INTRAUTERINE DEVICE: CPT | Mod: ZF | Performed by: OBSTETRICS & GYNECOLOGY

## 2019-02-18 PROCEDURE — 85027 COMPLETE CBC AUTOMATED: CPT | Performed by: OBSTETRICS & GYNECOLOGY

## 2019-02-18 PROCEDURE — 25000125 ZZHC RX 250: Mod: ZF

## 2019-02-18 PROCEDURE — 88305 TISSUE EXAM BY PATHOLOGIST: CPT | Performed by: OBSTETRICS & GYNECOLOGY

## 2019-02-18 PROCEDURE — 36415 COLL VENOUS BLD VENIPUNCTURE: CPT | Performed by: OBSTETRICS & GYNECOLOGY

## 2019-02-18 RX ORDER — MEDROXYPROGESTERONE ACETATE 10 MG
10 TABLET ORAL 2 TIMES DAILY
Qty: 60 TABLET | Refills: 0 | Status: SHIPPED | OUTPATIENT
Start: 2019-02-18 | End: 2019-04-08

## 2019-02-18 RX ORDER — FERROUS GLUCONATE 324(38)MG
324 TABLET ORAL
COMMUNITY
End: 2019-04-13

## 2019-02-18 ASSESSMENT — MIFFLIN-ST. JEOR: SCORE: 1660.32

## 2019-02-18 NOTE — PROGRESS NOTES
"Progress Note    SUBJECTIVE:  Joie Yadav is an 28 year old, , who is here for complaints of abnormal uterine bleeding. She has a history of DM2 treated with metformin and PCOS. She reports having gone 2 years without a period (approx Aug 2016-Aug 2018), until Aug 2018, at which point she experienced what she believed to be a period. This bleeding lasted for three weeks, before stopping for two days, before resuming again for another three weeks. She has been experiencing this pattern of three weeks of bleeding with two day breaks since then, continuing through today. In 2018 she was started on Lutera for the bleeding by her PCP. On 1/10/19, she visited the ED for the bleeding, where she had a negative UPT, and a TSH WNL. Of note, she had a US showing a thickened heterogenous appearing endometrial stripe of 26 mm and a Hgb of 11.2, which appears to be within her normal ranges. The ED started an Ortho Novum BID taper, then maintenance with continuous OCs, which she stopped taking 1 week ago (approx 19). She endorses continued bleeding today, as well as RLQ abdominal pain that is 2/10 today, but has been 10/10 as recently as two days ago. This pain started when her bleeding began this past August. It is intermittent, improves with naproxen, worsens with movement, is described as \"sharp\" and occasionally radiates to her right side. Reports lightheadedness, last experienced last week, fatigue, subjective fever last experienced 2 weeks ago, and palpitations. Denies LOC or syncope. Denies easy bruising or bleeding, apart from her uterine bleeding.      MEDICAL HISTORY:    Current Outpatient Medications on File Prior to Visit:  ferrous gluconate (FERGON) 324 (38 Fe) MG tablet Take 324 mg by mouth daily (with breakfast)   ibuprofen (ADVIL/MOTRIN) 600 MG tablet Take 1 tablet (600 mg) by mouth every 6 hours as needed for moderate pain   levonorgestrel-ethinyl estradiol (AVIANE/ALESSE/LESSINA) " "0.1-20 MG-MCG tablet Take 1 tablet by mouth daily   metFORMIN (GLUCOPHAGE) 500 MG tablet Take 500 mg by mouth 4 times daily (with meals and nightly)     No current facility-administered medications on file prior to visit.   No Known Allergies    There is no immunization history on file for this patient.    Obstetric History       T0      L0     SAB0   TAB0   Ectopic0   Multiple0   Live Births0      Past Medical History:   Diagnosis Date     Diabetes (H)     Type 2     History reviewed. No pertinent surgical history.  No family history on file.     Social History     Socioeconomic History     Marital status: Single     Spouse name: Not on file   Tobacco Use     Smoking status: Never Smoker     Smokeless tobacco: Never Used   Substance and Sexual Activity     Alcohol use: No     Drug use: No     Sexual activity: Yes     Partners: Male     Birth control/protection: None      ROS: 10 point ROS neg other than the symptoms noted above in the HPI.    SCREENING HISTORY:  Pap:  History of abnormal Pap smear: NO - age 21-29 PAP every 3 years recommended  Last PAP 10/31/18 NIL ([facility])    PHYSICAL EXAM:  Blood pressure 143/87, pulse 76, height 1.448 m (4' 9\"), weight 105.1 kg (231 lb 12.8 oz), not currently breastfeeding. Body mass index is 50.16 kg/m .  General - Well-nourished, pleasant female in no acute distress.  Lungs - clear to auscultation bilaterally  Heart - regular rate and rhythm without murmur  Abdomen - soft, nontender, nondistended    Pelvic Exam:  EG/BUS: Normal genital architecture without lesions, erythema or abnormal secretions Bartholin's, Urethra, [place]hton's normal   Vagina: moist, pink, rugae with creamy, white and odorless secretions  Cervix: pink, moist, closed, without lesion or CMT  Uterus: anteverted, midposition, and small, smooth, firm, mobile w/o pain  Adnexa: within normal limits and no masses, nodularity, tenderness       Endometrial Biopsy    Time Out - " "\"Pause for the Cause\"  Just before the procedure begins, through verbal and active participation of team members, verify:                      Initials   Patient Name MBD     Patient  MBD   Procedure to be performed MBD                                                                                                                                      Indication: abnormal uterine bleeding    Pregnancy test:  negative    Consent: Risks, benefits of treatment, and no treatment were discussed.  Patient's questions were elicited and answered.  and Written consent signed and scanned into medical record.    Using a medium Graves speculum, the cervix was visualized. The cervix was prepped with Betadine.  A single tooth tenaculum was applied to the anterior lip of the cervix. The endometrial pipelle was advanced through the cervix without difficulty and a sample collected. No additional pass was made.     EBL: 5mL    Complications:  none  Pathology: EMB sample was sent to pathology.  Tolerance of Procedure:  Patient did tolerate the procedure well.    Will notify patient of results.      IUD Insertion:  CONSULT:    Was a consent obtained?  Yes    Subjective: Joie Yadav is a 28 year old  presents for IUD and desires Mirena type IUD.    Patient has been given the opportunity to ask questions about all forms of birth control, including all options appropriate for Joie Yadav. Discussed that no method of birth control, except abstinence is 100% effective against pregnancy or sexually transmitted infection.     Joie Yadav understands she may have the IUD removed at any time. IUD should be removed by a health care provider.    The entire insertion procedure was reviewed with the patient, including care after placement.    No allergy to betadine or shellfish.     HCG Qual Urine   Date Value Ref Range Status   01/10/2019 Negative neg Final     PROCEDURE NOTE: -- IUD Insertion    Reason for " Insertion: abnormal uterine bleeding    Under sterile technique, cervix was visualized with speculum and prepped with Betadine solution swab x 3. Tenaculum was already in place after EMB for stability. The uterus was gently straightened and sounded to 8.0 cm. IUD prepared for placement, and IUD inserted according to 's instructions without difficulty or significant resitance, and deployed at the fundus. The strings were visualized and trimmed to 4.0 cm from the external os. Tenaculum was removed and hemostasis noted. Speculum removed.  Patient tolerated procedure well.    Lot # [id number]  Exp: 6/2021    EBL: minimal    Complications: none    Given 's handouts, including when to have IUD removed, list of danger s/sx, side effects and follow up recommended. Encouraged condom use for prevention of STD. Back up contraception advised for 7 days if progestin method. Advised to call for any fever, for prolonged or severe pain or bleeding, abnormal vaginal discharge, or unable to palpate strings. She was advised to use pain medications (ibuprofen) as needed for mild to moderate pain. Advised to follow-up in clinic in 4-6 weeks for IUD string check    ASSESSMENT:  Encounter Diagnoses   Name Primary?     Iron deficiency anemia due to chronic blood loss Yes     Encounter for insertion of intrauterine contraceptive device      Abnormal uterine bleeding        PLAN:   Orders Placed This Encounter   Procedures     Endometrial Biopsy without Cervical Dilation [81695]     INSERTION INTRAUTERINE DEVICE     CBC with Platelets     Surgical pathology exam [[id number]]     hCG qual urine POCT [POC7]     Orders Placed This Encounter   Medications     ferrous gluconate (FERGON) 324 (38 Fe) MG tablet     Sig: Take 324 mg by mouth daily (with breakfast)     levonorgestrel (MIRENA) 20 MCG/24HR IUD 20 mcg     medroxyPROGESTERone (PROVERA) 10 MG tablet     Sig: Take 1 tablet (10 mg) by mouth 2 times daily     Dispense:  60  tablet     Refill:  0     AUB -   Long discussion with patient about her dx of PCO and anovulatory bleeding. Discussed importance of keeping uterine lining thin/stabilized with hormonal control. Discussed OCs likely not best option given PMH significant for obesity (BMI 50) and diabetes, also with elevated BP today (although has not been dx'd with HTN at this time). Discussed options for progestin therapy (POPs, Depo, Nexplanon, Mirena IUD). She would like to try Mirena which was successfully inserted today. EMB done today as even though young age, multiple risk factors for endometrial hyperplasia or carcinoma. Also discussed benefits of weight loss/exercise in improving ovulation and bleeding pattern and reducing/potentially reversing effects of metabolic syndrome and risks. Also discussed risks of obesity and diabetes in pregnancy and need for optimizing her health and diabetic control prior to conception.     Plan for provera 10mg po BID x 30 days to help with immediate cessation of AUB and give Mirena IUD time to thin endometrial lining. RTC 4-6 weeks for string check and to f/u with AUB.     Anemia -  Will check CBC with platelets today. Continue po iron therapy. Hopeful that we can get her chronic bleeding to cease which will allow slow build of her iron stores/Hgb in next few months. If hgb today <8, will offer to bring her in for transfusion. Vitals today appear stable.     Selvin Degroot, MS3    Patient was seen with student who acted as my scribe and was present for learning. I personally assessed, examined and made medical decisions reflected in the documentation. Any necessary edits to the note have been made by myself. I personally performed the procedures as noted above.     **Total face to face time spent with Joie Yadav was 45 minutes, with >50% spent in counseling regarding PCO, anovulatory bleeding, risks and potential treatment options**    Jessica Storey MD  2/18/2019

## 2019-02-18 NOTE — LETTER
"2019       RE: Joie Yadav  [address]  [facility] [address]     Dear Colleague,    Thank you for referring your patient, Joie Yadav, to the [facility] at [facility]. Please see a copy of my visit note below.    Progress Note    SUBJECTIVE:  Joie Yadav is an 28 year old, , who is here for complaints of abnormal uterine bleeding. She has a history of DM2 treated with metformin and PCOS. She reports having gone 2 years without a period (approx Aug 2016-Aug 2018), until Aug 2018, at which point she experienced what she believed to be a period. This bleeding lasted for three weeks, before stopping for two days, before resuming again for another three weeks. She has been experiencing this pattern of three weeks of bleeding with two day breaks since then, continuing through today. In 2018 she was started on Lutera for the bleeding by her PCP. On 1/10/19, she visited the ED for the bleeding, where she had a negative UPT, and a TSH WNL. Of note, she had a US showing a thickened heterogenous appearing endometrial stripe of 26 mm and a Hgb of 11.2, which appears to be within her normal ranges. The ED started an Ortho Novum BID taper, then maintenance with continuous OCs, which she stopped taking 1 week ago (approx 19). She endorses continued bleeding today, as well as RLQ abdominal pain that is 2/10 today, but has been 10/10 as recently as two days ago. This pain started when her bleeding began this past August. It is intermittent, improves with naproxen, worsens with movement, is described as \"sharp\" and occasionally radiates to her right side. Reports lightheadedness, last experienced last week, fatigue, subjective fever last experienced 2 weeks ago, and palpitations. Denies LOC or syncope. Denies easy bruising or bleeding, apart from her uterine bleeding.      MEDICAL HISTORY:    Current Outpatient " "Medications on File Prior to Visit:  ferrous gluconate (FERGON) 324 (38 Fe) MG tablet Take 324 mg by mouth daily (with breakfast)   ibuprofen (ADVIL/MOTRIN) 600 MG tablet Take 1 tablet (600 mg) by mouth every 6 hours as needed for moderate pain   levonorgestrel-ethinyl estradiol (AVIANE/ALESSE/LESSINA) 0.1-20 MG-MCG tablet Take 1 tablet by mouth daily   metFORMIN (GLUCOPHAGE) 500 MG tablet Take 500 mg by mouth 4 times daily (with meals and nightly)     No current facility-administered medications on file prior to visit.   No Known Allergies    There is no immunization history on file for this patient.    Obstetric History       T0      L0     SAB0   TAB0   Ectopic0   Multiple0   Live Births0      Past Medical History:   Diagnosis Date     Diabetes (H)     Type 2     History reviewed. No pertinent surgical history.  No family history on file.     Social History     Socioeconomic History     Marital status: Single     Spouse name: Not on file   Tobacco Use     Smoking status: Never Smoker     Smokeless tobacco: Never Used   Substance and Sexual Activity     Alcohol use: No     Drug use: No     Sexual activity: Yes     Partners: Male     Birth control/protection: None      ROS: 10 point ROS neg other than the symptoms noted above in the HPI.    SCREENING HISTORY:  Pap:  History of abnormal Pap smear: NO - age 21-29 PAP every 3 years recommended  Last PAP 10/31/18 NIL ([facility])    PHYSICAL EXAM:  Blood pressure 143/87, pulse 76, height 1.448 m (4' 9\"), weight 105.1 kg (231 lb 12.8 oz), not currently breastfeeding. Body mass index is 50.16 kg/m .  General - Well-nourished, pleasant female in no acute distress.  Lungs - clear to auscultation bilaterally  Heart - regular rate and rhythm without murmur  Abdomen - soft, nontender, nondistended    Pelvic Exam:  EG/BUS: Normal genital architecture without lesions, erythema or abnormal secretions Bartholin's, Urethra, [place]'s " "normal   Vagina: moist, pink, rugae with creamy, white and odorless secretions  Cervix: pink, moist, closed, without lesion or CMT  Uterus: anteverted, midposition, and small, smooth, firm, mobile w/o pain  Adnexa: within normal limits and no masses, nodularity, tenderness       Endometrial Biopsy    Time Out - \"Pause for the Cause\"  Just before the procedure begins, through verbal and active participation of team members, verify:                      Initials   Patient Name MBD     Patient  MBD   Procedure to be performed MBD                                                                                                                                      Indication: abnormal uterine bleeding    Pregnancy test:  negative    Consent: Risks, benefits of treatment, and no treatment were discussed.  Patient's questions were elicited and answered.  and Written consent signed and scanned into medical record.    Using a medium Graves speculum, the cervix was visualized. The cervix was prepped with Betadine.  A single tooth tenaculum was applied to the anterior lip of the cervix. The endometrial pipelle was advanced through the cervix without difficulty and a sample collected. No additional pass was made.     EBL: 5mL    Complications:  none  Pathology: EMB sample was sent to pathology.  Tolerance of Procedure:  Patient did tolerate the procedure well.    Will notify patient of results.      IUD Insertion:  CONSULT:    Was a consent obtained?  Yes    Subjective: Joie Yadav is a 28 year old  presents for IUD and desires Mirena type IUD.    Patient has been given the opportunity to ask questions about all forms of birth control, including all options appropriate for Joie Yadav. Discussed that no method of birth control, except abstinence is 100% effective against pregnancy or sexually transmitted infection.     Joie Yadav understands she may have the IUD removed at any time. IUD should " be removed by a health care provider.    The entire insertion procedure was reviewed with the patient, including care after placement.    No allergy to betadine or shellfish.     HCG Qual Urine   Date Value Ref Range Status   01/10/2019 Negative neg Final     PROCEDURE NOTE: -- IUD Insertion    Reason for Insertion: abnormal uterine bleeding    Under sterile technique, cervix was visualized with speculum and prepped with Betadine solution swab x 3. Tenaculum was already in place after EMB for stability. The uterus was gently straightened and sounded to 8.0 cm. IUD prepared for placement, and IUD inserted according to 's instructions without difficulty or significant resitance, and deployed at the fundus. The strings were visualized and trimmed to 4.0 cm from the external os. Tenaculum was removed and hemostasis noted. Speculum removed.  Patient tolerated procedure well.    Lot # [id number]  Exp: 6/2021    EBL: minimal    Complications: none    Given 's handouts, including when to have IUD removed, list of danger s/sx, side effects and follow up recommended. Encouraged condom use for prevention of STD. Back up contraception advised for 7 days if progestin method. Advised to call for any fever, for prolonged or severe pain or bleeding, abnormal vaginal discharge, or unable to palpate strings. She was advised to use pain medications (ibuprofen) as needed for mild to moderate pain. Advised to follow-up in clinic in 4-6 weeks for IUD string check    ASSESSMENT:  Encounter Diagnoses   Name Primary?     Iron deficiency anemia due to chronic blood loss Yes     Encounter for insertion of intrauterine contraceptive device      Abnormal uterine bleeding        PLAN:   Orders Placed This Encounter   Procedures     Endometrial Biopsy without Cervical Dilation [21857]     INSERTION INTRAUTERINE DEVICE     CBC with Platelets     Surgical pathology exam [[id number]]     hCG qual urine POCT [POC7]     Orders Placed  This Encounter   Medications     ferrous gluconate (FERGON) 324 (38 Fe) MG tablet     Sig: Take 324 mg by mouth daily (with breakfast)     levonorgestrel (MIRENA) 20 MCG/24HR IUD 20 mcg     medroxyPROGESTERone (PROVERA) 10 MG tablet     Sig: Take 1 tablet (10 mg) by mouth 2 times daily     Dispense:  60 tablet     Refill:  0     AUB -   Long discussion with patient about her dx of PCO and anovulatory bleeding. Discussed importance of keeping uterine lining thin/stabilized with hormonal control. Discussed OCs likely not best option given PMH significant for obesity (BMI 50) and diabetes, also with elevated BP today (although has not been dx'd with HTN at this time). Discussed options for progestin therapy (POPs, Depo, Nexplanon, Mirena IUD). She would like to try Mirena which was successfully inserted today. EMB done today as even though young age, multiple risk factors for endometrial hyperplasia or carcinoma. Also discussed benefits of weight loss/exercise in improving ovulation and bleeding pattern and reducing/potentially reversing effects of metabolic syndrome and risks. Also discussed risks of obesity and diabetes in pregnancy and need for optimizing her health and diabetic control prior to conception.     Plan for provera 10mg po BID x 30 days to help with immediate cessation of AUB and give Mirena IUD time to thin endometrial lining. RTC 4-6 weeks for string check and to f/u with AUB.     Anemia -  Will check CBC with platelets today. Continue po iron therapy. Hopeful that we can get her chronic bleeding to cease which will allow slow build of her iron stores/Hgb in next few months. If hgb today <8, will offer to bring her in for transfusion. Vitals today appear stable.     Selvin Degroot, MS3    Patient was seen with student who acted as my scribe and was present for learning. I personally assessed, examined and made medical decisions reflected in the documentation. Any necessary edits to the note have  been made by myself. I personally performed the procedures as noted above.     **Total face to face time spent with Joie Yadav was 45 minutes, with >50% spent in counseling regarding PCO, anovulatory bleeding, risks and potential treatment options**    Jessica Storey MD  2/18/2019

## 2019-02-18 NOTE — RESULT ENCOUNTER NOTE
Blood counts are stable, would just recommend continuing iron for now. Please contact patient and let her know.  Jessica Storey MD  2/18/2019

## 2019-02-21 ENCOUNTER — TELEPHONE (OUTPATIENT)
Dept: OBGYN | Facility: CLINIC | Age: 29
End: 2019-02-21

## 2019-02-21 NOTE — TELEPHONE ENCOUNTER
Patient called to report a sting from IUD hanging out of her vagina past the vulva. IUD was placed on Monday 2/18 and patient noticed the string as of Tuesday 2/19.    Pt denies pain or poking sensation. Advised pt come to clinic for IUD check. Pt scheduled with Kelley Fajardo on 2/22/19

## 2019-02-21 NOTE — PROGRESS NOTES
"SUBJECTIVE:  Joie Yadav is a 28 yr old female, , who presents to clinic today for an IUD string check. She has a history of obesity, DM2 treated with metformin and PCOS.    Mirena IUD was placed after endometrial biopsy on 2019. On 19 she noticed a string extending out of her vagina.  This fell out of her vagina on its own.  She has not had any pain or bleeding since IUD insertion.  She is taking Provera BID daily, as prescribed by Dr. Storey.  She has not had intercourse since insertion. She has not tried to feel internally for the exam.   Otherwise pleased with this contraceptive method.    OBJECTIVE:  /85   Pulse 94   Ht 1.448 m (4' 9\")   Wt 104.3 kg (230 lb)   BMI 49.77 kg/m    General: pleasant female in no acute distress  Psych: normal mentation, well oriented  Pelvic exam: normal vagina and vulva, vaginal discharge described as clear, red tinged without odor, normal cervix without lesions or tenderness, IUD strings (2 present) extend 4cm from the external cervical os; uterus normal size anteverted, adenxa normal in size without tenderness.    ASSESSMENT & PLAN:  Joie is a 28 yr old female who presents to clinic for an IUD string check after noting and \"IUD string\" fall out of her vagina 1 day after IUD insertion.  IUD string check was normal today. There are two strings present and they are the same length that was noted in the insertion procedure note. Joie is not having any concerning symptoms.  Counseled Joie that what she may have noticed is a dark hair or it could have been a portion of the IUD string that was cut off after insertion. Counseled Joie to return to clinic at 6 weeks post-insertion for routine IUD string check.    Joie expressed understanding and agreement with the plan for care.      Kelley Fajardo, DNP, APRN, WHNP      "

## 2019-02-22 ENCOUNTER — OFFICE VISIT (OUTPATIENT)
Dept: OBGYN | Facility: CLINIC | Age: 29
End: 2019-02-22
Attending: NURSE PRACTITIONER
Payer: COMMERCIAL

## 2019-02-22 VITALS
DIASTOLIC BLOOD PRESSURE: 85 MMHG | WEIGHT: 230 LBS | HEIGHT: 57 IN | HEART RATE: 94 BPM | SYSTOLIC BLOOD PRESSURE: 130 MMHG | BODY MASS INDEX: 49.62 KG/M2

## 2019-02-22 DIAGNOSIS — Z30.431 IUD CHECK UP: Primary | ICD-10-CM

## 2019-02-22 PROCEDURE — G0463 HOSPITAL OUTPT CLINIC VISIT: HCPCS | Mod: ZF

## 2019-02-22 ASSESSMENT — MIFFLIN-ST. JEOR: SCORE: 1652.15

## 2019-02-22 NOTE — LETTER
"2019       RE: Joie Yadav  [address] [facility] [address]     Dear Colleague,    Thank you for referring your patient, Joie Yadav, to the [facility] at [facility]. Please see a copy of my visit note below.    SUBJECTIVE:  Joie Yadva is a 28 yr old female, , who presents to clinic today for an IUD string check. She has a history of obesity, DM2 treated with metformin and PCOS.    Mirena IUD was placed after endometrial biopsy on 2019. On 19 she noticed a string extending out of her vagina.  This fell out of her vagina on its own.  She has not had any pain or bleeding since IUD insertion.  She is taking Provera BID daily, as prescribed by Dr. Storey.  She has not had intercourse since insertion. She has not tried to feel internally for the exam.   Otherwise pleased with this contraceptive method.    OBJECTIVE:  /85   Pulse 94   Ht 1.448 m (4' 9\")   Wt 104.3 kg (230 lb)   BMI 49.77 kg/m     General: pleasant female in no acute distress  Psych: normal mentation, well oriented  Pelvic exam: normal vagina and vulva, vaginal discharge described as clear, red tinged without odor, normal cervix without lesions or tenderness, IUD strings (2 present) extend 4cm from the external cervical os; uterus normal size anteverted, adenxa normal in size without tenderness.    ASSESSMENT & PLAN:  Joie is a 28 yr old female who presents to clinic for an IUD string check after noting and \"IUD string\" fall out of her vagina 1 day after IUD insertion.  IUD string check was normal today. There are two strings present and they are the same length that was noted in the insertion procedure note. Joie is not having any concerning symptoms.  Counseled Joie that what she may have noticed is a dark hair or it could have been a portion of the IUD string that was cut off after insertion. Counseled Joie to return to clinic at " 6 weeks post-insertion for routine IUD string check.    Joie expressed understanding and agreement with the plan for care.      Kelley Fajardo, DNP, APRN, WHNP

## 2019-03-02 LAB — COPATH REPORT: NORMAL

## 2019-03-05 ENCOUNTER — TELEPHONE (OUTPATIENT)
Dept: OBGYN | Facility: CLINIC | Age: 29
End: 2019-03-05

## 2019-03-05 NOTE — TELEPHONE ENCOUNTER
Left message re: pathology results. Left instructions to call me back at [phone] to discuss follow-up plan for abnormal pathology.    Jessica Storey MD  3/5/2019  10:01 AM

## 2019-04-08 ENCOUNTER — OFFICE VISIT (OUTPATIENT)
Dept: OBGYN | Facility: CLINIC | Age: 29
End: 2019-04-08
Attending: OBSTETRICS & GYNECOLOGY
Payer: COMMERCIAL

## 2019-04-08 VITALS
HEIGHT: 57 IN | SYSTOLIC BLOOD PRESSURE: 139 MMHG | DIASTOLIC BLOOD PRESSURE: 82 MMHG | WEIGHT: 231.6 LBS | BODY MASS INDEX: 49.96 KG/M2 | HEART RATE: 101 BPM

## 2019-04-08 DIAGNOSIS — E28.2 PCOS (POLYCYSTIC OVARIAN SYNDROME): ICD-10-CM

## 2019-04-08 DIAGNOSIS — N85.01 ENDOMETRIAL HYPERPLASIA WITHOUT ATYPIA, SIMPLE: ICD-10-CM

## 2019-04-08 ASSESSMENT — MIFFLIN-ST. JEOR: SCORE: 1659.41

## 2019-04-08 NOTE — PROGRESS NOTES
"GYN VISIT  SUBJECTIVE   Joie Yadav is a 28 year old  here for IUD string check status post Mirena insertion on 19.  She reports feeling well. Has some off and on cramping but usually only lasts a few minutes. Has had only two episodes of spotting since insertion, denies heavy bleeding. No problems with intercourse, no foul-smelling vaginal discharge.       Review of Systems  ROS: focused ROS neg other than the symptoms noted above in the HPI.      OBJECTIVE   /82   Pulse 101   Ht 1.448 m (4' 9\")   Wt 105.1 kg (231 lb 9.6 oz)   BMI 50.12 kg/m    General:  Alert, no distress   Head:  Normocephalic, without obvious abnormality   Abdomen:  Soft, non-tender, obese, non-distended   Pelvic: -nefg  -vagina normal without discharge  -cervix normal in appearance, IUD strings 4cm long, no masses  -uterus mobile, NT  -no adnexal masses, NT     Pathology:   Copath Report   Date Value Ref Range Status   2019   Final    Patient Name: JOIE BENNETT  MR#: [id number]  Specimen #: [id number]  Collected: 2019  Received: 2019  Reported: 3/2/2019 00:15  Ordering Phy(s): MAE TAYLOR    For improved result formatting, select 'View Enhanced Report Format' under   Linked Documents section.    SPECIMEN(S):  Endometrial biopsy    FINAL DIAGNOSIS:  Endometrium, biopsy:  -Endometrial hyperplasia with abundant squamous morules, without atypia  -Chronic endometritis  -Negative for atypia or malignancy    I have personally reviewed all specimens and/or slides, including the   listed special stains, and used them  with my medical judgement to determine or confirm the final diagnosis.    Electronically signed out by:    Marcia Matt M.D., [facility]    CLINICAL HISTORY:  The patient is a 28-year-old woman with type 2 diabetes (on metformin) and   a history of polycystic ovary  syndrome.  She has a history of oligomenorrhea, but more recently has had   menometrorrhagia and anemia.  " "She  has been given oral contraceptives beginning in December in an attempt to   control the abnormal uterine  bleeding.  Procedure: endometrial biopsy, placement of Mirena IUD.    GROSS:  The specimen is received in formalin with proper patient identification,   labeled \"endometrium EM BX\".  The  specimen consists of multiple segments of tan-brown soft tissue   aggregating to 3.0 x 2.5 x 0.4 cm.  It is  wrapped and entirely submitted in cassette A1. (Dictated by: Turpti URIAS [facility] 2019 08:45 AM)    MICROSCOPIC:  Microscopic examination is performed.    The technical component of this testing was completed at the [facility], with the professional component performed   at the [facility], [address] [place] [phone] ([phone])    CPT Codes:  A: 22383-KZ0    COLLECTION SITE:  Client: [facility]  Location: [facility] ()       ]    ASSESSMENT   Joie Yadav is a 28 year old , for IUD string check.    PLAN   Discussed endometrial hyperplasia without atypia on pathology and recommendation for repeat EMB in August, as we will have no way of assessing her menstrual bleeding pattern due to Mirena. Discussed Mirena is recommended therapy for hyperplasia in young reproductive age women, but iterated importance of f/u with biopsy to make sure regression of hyperplasia. Patient verbalized understanding.     Jessica Storey MD  2019    "

## 2019-04-08 NOTE — LETTER
"2019     RE: Joie Yadav  [address] [facility] [address]     Dear Colleague,    Thank you for referring your patient, Joie Yadav, to the [facility] at [facility]. Please see a copy of my visit note below.    GYN VISIT  SUBJECTIVE   Joie Yadav is a 28 year old  here for IUD string check status post Mirena insertion on 19.  She reports feeling well. Has some off and on cramping but usually only lasts a few minutes. Has had only two episodes of spotting since insertion, denies heavy bleeding. No problems with intercourse, no foul-smelling vaginal discharge.       Review of Systems  ROS: focused ROS neg other than the symptoms noted above in the HPI.      OBJECTIVE   /82   Pulse 101   Ht 1.448 m (4' 9\")   Wt 105.1 kg (231 lb 9.6 oz)   BMI 50.12 kg/m     General:  Alert, no distress   Head:  Normocephalic, without obvious abnormality   Abdomen:  Soft, non-tender, obese, non-distended   Pelvic: -nefg  -vagina normal without discharge  -cervix normal in appearance, IUD strings 4cm long, no masses  -uterus mobile, NT  -no adnexal masses, NT     Pathology:   Copath Report   Date Value Ref Range Status   2019   Final    Patient Name: JOIE BENNETT  MR#: [id number]  Specimen #: [id number]  Collected: 2019  Received: 2019  Reported: 3/2/2019 00:15  Ordering Phy(s): MAE TAYLOR    For improved result formatting, select 'View Enhanced Report Format' under   Linked Documents section.    SPECIMEN(S):  Endometrial biopsy    FINAL DIAGNOSIS:  Endometrium, biopsy:  -Endometrial hyperplasia with abundant squamous morules, without atypia  -Chronic endometritis  -Negative for atypia or malignancy    I have personally reviewed all specimens and/or slides, including the   listed special stains, and used them  with my medical judgement to determine or confirm the final " "diagnosis.    Electronically signed out by:    Marcia Matt M.D., [facility]    CLINICAL HISTORY:  The patient is a 28-year-old woman with type 2 diabetes (on metformin) and   a history of polycystic ovary  syndrome.  She has a history of oligomenorrhea, but more recently has had   menometrorrhagia and anemia.  She  has been given oral contraceptives beginning in December in an attempt to   control the abnormal uterine  bleeding.  Procedure: endometrial biopsy, placement of Mirena IUD.    GROSS:  The specimen is received in formalin with proper patient identification,   labeled \"endometrium EM BX\".  The  specimen consists of multiple segments of tan-brown soft tissue   aggregating to 3.0 x 2.5 x 0.4 cm.  It is  wrapped and entirely submitted in cassette A1. (Dictated by: Trupti URIAS [facility] 2019 08:45 AM)    MICROSCOPIC:  Microscopic examination is performed.    The technical component of this testing was completed at the [facility], with the professional component performed   at the [facility], [address] [place] [phone] ([phone])    CPT Codes:  A: 28596-LE8    COLLECTION SITE:  [facility]  Location: [facility] ()       ]    ASSESSMENT   Joie Yadav is a 28 year old , for IUD string check.    PLAN   Discussed endometrial hyperplasia without atypia on pathology and recommendation for repeat EMB in August, as we will have no way of assessing her menstrual bleeding pattern due to Mirena. Discussed Mirena is recommended therapy for hyperplasia in young reproductive age women, but iterated importance of f/u with biopsy to make sure regression of hyperplasia. Patient verbalized understanding.     Jessica Storey MD  2019    "

## 2019-04-13 ENCOUNTER — HOSPITAL ENCOUNTER (EMERGENCY)
Facility: CLINIC | Age: 29
Discharge: HOME | End: 2019-04-14
Attending: EMERGENCY MEDICINE | Admitting: EMERGENCY MEDICINE
Payer: COMMERCIAL

## 2019-04-13 DIAGNOSIS — N20.0 KIDNEY STONE: ICD-10-CM

## 2019-04-13 DIAGNOSIS — R10.9 FLANK PAIN: ICD-10-CM

## 2019-04-13 DIAGNOSIS — N13.2 HYDRONEPHROSIS WITH RENAL AND URETERAL CALCULUS OBSTRUCTION: ICD-10-CM

## 2019-04-13 PROCEDURE — 99285 EMERGENCY DEPT VISIT HI MDM: CPT | Mod: 25

## 2019-04-13 PROCEDURE — 99285 EMERGENCY DEPT VISIT HI MDM: CPT | Mod: Z6 | Performed by: EMERGENCY MEDICINE

## 2019-04-13 PROCEDURE — 84703 CHORIONIC GONADOTROPIN ASSAY: CPT | Performed by: EMERGENCY MEDICINE

## 2019-04-13 PROCEDURE — 96375 TX/PRO/DX INJ NEW DRUG ADDON: CPT

## 2019-04-13 PROCEDURE — 25000128 H RX IP 250 OP 636: Performed by: EMERGENCY MEDICINE

## 2019-04-13 PROCEDURE — 80053 COMPREHEN METABOLIC PANEL: CPT | Performed by: EMERGENCY MEDICINE

## 2019-04-13 PROCEDURE — 81001 URINALYSIS AUTO W/SCOPE: CPT | Performed by: EMERGENCY MEDICINE

## 2019-04-13 PROCEDURE — 96374 THER/PROPH/DIAG INJ IV PUSH: CPT

## 2019-04-13 PROCEDURE — 85025 COMPLETE CBC W/AUTO DIFF WBC: CPT | Performed by: EMERGENCY MEDICINE

## 2019-04-13 RX ORDER — MORPHINE SULFATE 4 MG/ML
4 INJECTION, SOLUTION INTRAMUSCULAR; INTRAVENOUS ONCE
Status: COMPLETED | OUTPATIENT
Start: 2019-04-13 | End: 2019-04-13

## 2019-04-13 RX ORDER — ONDANSETRON 2 MG/ML
4 INJECTION INTRAMUSCULAR; INTRAVENOUS ONCE
Status: COMPLETED | OUTPATIENT
Start: 2019-04-13 | End: 2019-04-13

## 2019-04-13 RX ADMIN — ONDANSETRON 4 MG: 2 INJECTION INTRAMUSCULAR; INTRAVENOUS at 23:48

## 2019-04-13 RX ADMIN — MORPHINE SULFATE 4 MG: 4 INJECTION, SOLUTION INTRAMUSCULAR; INTRAVENOUS at 23:49

## 2019-04-13 ASSESSMENT — ENCOUNTER SYMPTOMS: FLANK PAIN: 1

## 2019-04-13 ASSESSMENT — MIFFLIN-ST. JEOR: SCORE: 1656.69

## 2019-04-13 NOTE — LETTER
04/14/19      To Whom it may concern:    The spouse of Camilo Peterson was in our Emergency Department today, 04/14/19. with a patient who needed their assistance.  Please excuse them from work/school.      Sincerely,

## 2019-04-13 NOTE — ED AVS SNAPSHOT
[facility], [place], Emergency Department  [address]  [facility] [place] [address]  Phone:  [phone]  Fax:  [phone]                                    Joie Yadav   MRN: [id number]    Department:  [facility], Emergency Department   Date of Visit:  4/13/2019           After Visit Summary Signature Page    I have received my discharge instructions, and my questions have been answered. I have discussed any challenges I see with this plan with the nurse or doctor.    ..........................................................................................................................................  Patient/Patient Representative Signature      ..........................................................................................................................................  Patient Representative Print Name and Relationship to Patient    ..................................................               ................................................  Date                                   Time    ..........................................................................................................................................  Reviewed by Signature/Title    ...................................................              ..............................................  Date                                               Time          22EPIC Rev 08/18

## 2019-04-14 ENCOUNTER — APPOINTMENT (OUTPATIENT)
Dept: CT IMAGING | Facility: CLINIC | Age: 29
End: 2019-04-14
Attending: EMERGENCY MEDICINE
Payer: COMMERCIAL

## 2019-04-14 VITALS
RESPIRATION RATE: 16 BRPM | OXYGEN SATURATION: 99 % | TEMPERATURE: 97.9 F | DIASTOLIC BLOOD PRESSURE: 71 MMHG | HEIGHT: 57 IN | HEART RATE: 82 BPM | SYSTOLIC BLOOD PRESSURE: 115 MMHG | BODY MASS INDEX: 49.84 KG/M2 | WEIGHT: 231 LBS

## 2019-04-14 LAB
ALBUMIN SERPL-MCNC: 3.4 G/DL (ref 3.4–5)
ALBUMIN UR-MCNC: 30 MG/DL
ALP SERPL-CCNC: 130 U/L (ref 40–150)
ALT SERPL W P-5'-P-CCNC: 32 U/L (ref 0–50)
ANION GAP SERPL CALCULATED.3IONS-SCNC: 8 MMOL/L (ref 3–14)
APPEARANCE UR: ABNORMAL
AST SERPL W P-5'-P-CCNC: 17 U/L (ref 0–45)
BASOPHILS # BLD AUTO: 0 10E9/L (ref 0–0.2)
BASOPHILS NFR BLD AUTO: 0.3 %
BILIRUB SERPL-MCNC: 0.2 MG/DL (ref 0.2–1.3)
BILIRUB UR QL STRIP: NEGATIVE
BUN SERPL-MCNC: 16 MG/DL (ref 7–30)
CALCIUM SERPL-MCNC: 8.5 MG/DL (ref 8.5–10.1)
CAOX CRY #/AREA URNS HPF: ABNORMAL /HPF
CHLORIDE SERPL-SCNC: 107 MMOL/L (ref 94–109)
CO2 SERPL-SCNC: 25 MMOL/L (ref 20–32)
COLOR UR AUTO: YELLOW
CREAT SERPL-MCNC: 0.75 MG/DL (ref 0.52–1.04)
DIFFERENTIAL METHOD BLD: ABNORMAL
EOSINOPHIL # BLD AUTO: 0.2 10E9/L (ref 0–0.7)
EOSINOPHIL NFR BLD AUTO: 1.7 %
ERYTHROCYTE [DISTWIDTH] IN BLOOD BY AUTOMATED COUNT: 14.3 % (ref 10–15)
GFR SERPL CREATININE-BSD FRML MDRD: >90 ML/MIN/{1.73_M2}
GLUCOSE SERPL-MCNC: 197 MG/DL (ref 70–99)
GLUCOSE UR STRIP-MCNC: NEGATIVE MG/DL
HCG SERPL QL: NEGATIVE
HCT VFR BLD AUTO: 34.6 % (ref 35–47)
HGB BLD-MCNC: 10.7 G/DL (ref 11.7–15.7)
HGB UR QL STRIP: ABNORMAL
IMM GRANULOCYTES # BLD: 0 10E9/L (ref 0–0.4)
IMM GRANULOCYTES NFR BLD: 0.3 %
KETONES UR STRIP-MCNC: NEGATIVE MG/DL
LEUKOCYTE ESTERASE UR QL STRIP: NEGATIVE
LYMPHOCYTES # BLD AUTO: 3.2 10E9/L (ref 0.8–5.3)
LYMPHOCYTES NFR BLD AUTO: 27.4 %
MCH RBC QN AUTO: 23.5 PG (ref 26.5–33)
MCHC RBC AUTO-ENTMCNC: 30.9 G/DL (ref 31.5–36.5)
MCV RBC AUTO: 76 FL (ref 78–100)
MONOCYTES # BLD AUTO: 0.7 10E9/L (ref 0–1.3)
MONOCYTES NFR BLD AUTO: 5.7 %
MUCOUS THREADS #/AREA URNS LPF: PRESENT /LPF
NEUTROPHILS # BLD AUTO: 7.5 10E9/L (ref 1.6–8.3)
NEUTROPHILS NFR BLD AUTO: 64.6 %
NITRATE UR QL: NEGATIVE
NRBC # BLD AUTO: 0 10*3/UL
NRBC BLD AUTO-RTO: 0 /100
PH UR STRIP: 6 PH (ref 5–7)
PLATELET # BLD AUTO: 406 10E9/L (ref 150–450)
POTASSIUM SERPL-SCNC: 3.5 MMOL/L (ref 3.4–5.3)
PROT SERPL-MCNC: 7.8 G/DL (ref 6.8–8.8)
RBC # BLD AUTO: 4.56 10E12/L (ref 3.8–5.2)
RBC #/AREA URNS AUTO: 133 /HPF (ref 0–2)
SODIUM SERPL-SCNC: 140 MMOL/L (ref 133–144)
SOURCE: ABNORMAL
SP GR UR STRIP: 1.02 (ref 1–1.03)
SQUAMOUS #/AREA URNS AUTO: 1 /HPF (ref 0–1)
UROBILINOGEN UR STRIP-MCNC: NORMAL MG/DL (ref 0–2)
WBC # BLD AUTO: 11.7 10E9/L (ref 4–11)
WBC #/AREA URNS AUTO: 2 /HPF (ref 0–5)

## 2019-04-14 PROCEDURE — 25000132 ZZH RX MED GY IP 250 OP 250 PS 637: Performed by: EMERGENCY MEDICINE

## 2019-04-14 PROCEDURE — 96375 TX/PRO/DX INJ NEW DRUG ADDON: CPT

## 2019-04-14 PROCEDURE — 74176 CT ABD & PELVIS W/O CONTRAST: CPT

## 2019-04-14 PROCEDURE — 25000128 H RX IP 250 OP 636: Performed by: EMERGENCY MEDICINE

## 2019-04-14 RX ORDER — TAMSULOSIN HYDROCHLORIDE 0.4 MG/1
0.4 CAPSULE ORAL ONCE
Status: COMPLETED | OUTPATIENT
Start: 2019-04-14 | End: 2019-04-14

## 2019-04-14 RX ORDER — KETOROLAC TROMETHAMINE 15 MG/ML
10 INJECTION, SOLUTION INTRAMUSCULAR; INTRAVENOUS ONCE
Status: COMPLETED | OUTPATIENT
Start: 2019-04-14 | End: 2019-04-14

## 2019-04-14 RX ORDER — TAMSULOSIN HYDROCHLORIDE 0.4 MG/1
0.4 CAPSULE ORAL DAILY
Qty: 10 CAPSULE | Refills: 0 | Status: SHIPPED | OUTPATIENT
Start: 2019-04-14 | End: 2019-05-17

## 2019-04-14 RX ORDER — ACETAMINOPHEN AND CODEINE PHOSPHATE 300; 30 MG/1; MG/1
1-2 TABLET ORAL EVERY 6 HOURS PRN
Qty: 12 TABLET | Refills: 0 | Status: SHIPPED | OUTPATIENT
Start: 2019-04-14 | End: 2019-07-31

## 2019-04-14 RX ADMIN — TAMSULOSIN HYDROCHLORIDE 0.4 MG: 0.4 CAPSULE ORAL at 01:58

## 2019-04-14 RX ADMIN — KETOROLAC TROMETHAMINE 10 MG: 15 INJECTION, SOLUTION INTRAMUSCULAR; INTRAVENOUS at 01:35

## 2019-04-14 NOTE — ED PROVIDER NOTES
[facility] EMERGENCY DEPARTMENT ([facility])    4/13/19       History     Chief Complaint   Patient presents with     Back Pain     onset about 0900 upon waking; worsened about 8 pm; had vaginal spotting around 1500; denies pregnancy; pain is in left flank wrapping around to left abdomen; denies voiding problems or BM problems     The history is provided by the patient and medical records.   Back Pain     Joie Yadav is a 28 year old female who has a PMHx of T2 DM, and PCOS who presents to the Emergency Department for evaluation of left flank pain.  Patient reports the onset of abdominal cramping at about 0900 upon waking up.  She states 2000 she started to experience left flank pain she has had vaginal spotting since around 1500.  She denies the possibility of pregnancy.  Currently here in the ED, she states that the pain in her left flank is wrapping around to her left abdomen.  She denies urinary symptoms or other GI symptoms.  When asked about medical history the patient reports that she has diabetes and that she has had an IUD placed for endometrial hyperplasia.    I have reviewed the Medications, Allergies, Past Medical and Surgical History, and Social History in the [employer] system.    Past Medical History:   Diagnosis Date     Diabetes (H)     Type 2     Endometrial hyperplasia without atypia, simple 4/8/2019     PCOS (polycystic ovarian syndrome)        No past surgical history on file.    No family history on file.    Social History     Tobacco Use     Smoking status: Never Smoker     Smokeless tobacco: Never Used   Substance Use Topics     Alcohol use: No       Current Facility-Administered Medications   Medication     ketorolac (TORADOL) injection 10 mg     levonorgestrel (MIRENA) 20 MCG/24HR IUD 20 mcg     tamsulosin (FLOMAX) capsule 0.4 mg     Current Outpatient Medications   Medication     acetaminophen-codeine (TYLENOL WITH CODEINE #3) 300-30 MG per tablet     ibuprofen (ADVIL/MOTRIN)  "600 MG tablet     metFORMIN (GLUCOPHAGE) 500 MG tablet     tamsulosin (FLOMAX) 0.4 MG capsule      No Known Allergies     Review of Systems   Genitourinary: Positive for flank pain (left flank radiating to left abd).   All other systems reviewed and are negative.    Physical Exam   BP: 122/68  Pulse: 95  Temp: 97.3  F (36.3  C)  Resp: 16  Height: 144.8 cm (4' 9\")  Weight: 104.8 kg (231 lb)  SpO2: 99 %      Physical Exam   Constitutional: No distress.   HENT:   Head: Atraumatic.   Mouth/Throat: Oropharynx is clear and moist. No oropharyngeal exudate.   Eyes: Pupils are equal, round, and reactive to light. No scleral icterus.   Cardiovascular: Normal heart sounds and intact distal pulses.   Pulmonary/Chest: Breath sounds normal. No respiratory distress.   Abdominal: Soft. Normal appearance and bowel sounds are normal. There is tenderness in the left lower quadrant. There is CVA tenderness (left).   Patient has tenderness to palpation over the left flank and lower left quadrant but is otherwise benign.   Musculoskeletal: She exhibits no edema or tenderness.   Skin: Skin is warm. No rash noted. She is not diaphoretic.   Nursing note and vitals reviewed.      ED Course   11:19 PM  The patient was seen and examined by Remigio Mckeon MD in Room ED02.        Based on history physical examination presentation I suspect she had a kidney stone I plan to evaluate for such and will disposition to home whenever the workup is been completed    Procedures  None       Critical Care time:  none             Labs Ordered and Resulted from Time of ED Arrival Up to the Time of Departure from the ED   CBC WITH PLATELETS DIFFERENTIAL - Abnormal; Notable for the following components:       Result Value    WBC 11.7 (*)     Hemoglobin 10.7 (*)     Hematocrit 34.6 (*)     MCV 76 (*)     MCH 23.5 (*)     MCHC 30.9 (*)     All other components within normal limits   COMPREHENSIVE METABOLIC PANEL - Abnormal; Notable for the following components: "    Glucose 197 (*)     All other components within normal limits   UA MACROSCOPIC WITH REFLEX TO MICRO AND CULTURE - Abnormal; Notable for the following components:    Blood Urine Moderate (*)     Protein Albumin Urine 30 (*)     RBC Urine 133 (*)     Mucous Urine Present (*)     Calcium Oxalate Many (*)     All other components within normal limits   GLUCOSE MONITOR NURSING POCT   HCG QUALITATIVE   PERIPHERAL IV CATHETER            Assessments & Plan (with Medical Decision Making)   This is a 28-year-old female that comes in for evaluation of flank pain physical examination is reassuring and is suspicious for kidney stone.  Patient is not gravid and urinalysis showed some hematuria a CT scan does show a kidney stone that should pass with no problem based on the history physical examination I plan to send home with follow-up    I have reviewed the nursing notes.    I have reviewed the findings, diagnosis, plan and need for follow up with the patient.       Medication List      Started    acetaminophen-codeine 300-30 MG per tablet  Commonly known as:  TYLENOL WITH CODEINE #3  1-2 tablets, Oral, EVERY 6 HOURS PRN     tamsulosin 0.4 MG capsule  Commonly known as:  FLOMAX  0.4 mg, Oral, DAILY            Final diagnoses:   Kidney stone   Flank pain     I, Horacio Murrieta, am serving as a trained medical scribe to document services personally performed by Remigio Mckeon MD, based on the provider's statements to me.   IRemigio MD, was physically present and have reviewed and verified the accuracy of this note documented by Horacio Murrieta.     4/13/2019   [facility], EMERGENCY DEPARTMENT     Remigio Mckeon MD  04/14/19 0124       Remigio Mckeon MD  04/14/19 0124

## 2019-04-14 NOTE — DISCHARGE INSTRUCTIONS
Please make sure to follow-up with your primary care doctor for further evaluation of this and also follow with your to make sure you the kidney stone has passed in that you are getting better.  If fevers nausea vomiting worse symptoms or if any concerns develop please return to emergency department as soon as possible      Please make an appointment to follow up with Urology Clinic (phone: [phone]) as soon as possible even if entirely better.

## 2019-04-20 ENCOUNTER — PRE VISIT (OUTPATIENT)
Dept: UROLOGY | Facility: CLINIC | Age: 29
End: 2019-04-20

## 2019-04-20 NOTE — TELEPHONE ENCOUNTER
MEDICAL RECORDS REQUEST   [facility]  Urology Clinic  [address] [place] [address]  PHONE: [phone]  Fax: [phone]        FUTURE VISIT INFORMATION                                                   Joie Yadav : 1990 scheduled for future visit at [facility]    APPOINTMENT INFORMATION:    Date: 2019    Provider:  PEREZ BENOIT    Reason for Visit/Diagnosis: STONES    REFERRAL INFORMATION:    Referring provider:  EMERGENCY DEPARTMENT    Specialty: ED    Referring providers clinic:  [facility]    Clinic contact number:  [phone];    RECORDS REQUESTED FOR VISIT                                                     NOTES  STATUS/DETAILS   OFFICE NOTE from referring provider  yes   OFFICE NOTE from other specialist  no   DISCHARGE SUMMARY from hospital  yes   DISCHARGE REPORT from the ER  yes   OPERATIVE REPORT  no   MEDICATION LIST  yes   KIDNEY STONE     CT STONE  yes   IMAGING (IMAGES & REPORT)  yes   KUB  in process       PRE-VISIT CHECKLIST      Record collection complete Yes   Appointment appropriately scheduled           (right time/right provider) Yes   MyChart activation If no, please explain IN PROCESS   Questionnaire complete If no, please explain IN PROCESS     Completed by: Luciana Garcia

## 2019-05-07 ENCOUNTER — PRE VISIT (OUTPATIENT)
Dept: UROLOGY | Facility: CLINIC | Age: 29
End: 2019-05-07

## 2019-05-13 NOTE — PROGRESS NOTES
Urology Clinic    Israel Ceron MD  Date of Service: 2019     Name: Joie Yadav  MRN: [id number]  Age: 28 year old  : 1990  Referring provider: Referred Self     Assessment and Plan:  1. 0.5 cm left ureterovesical junction stone causing mild hydronephrosis (CT scan from 2019)    Patient's new onset RIGHT-flank symptoms are possibly due to new RIGHT-sided stone, or referred pain from previous LEFT-sided stone.  Patient is adequately managing symptoms with tylenol and does not require additional pain regimen.   -Follow standard kidney-stone prevention regimen (see below)   -Continue tylenol for pain management   -CT scan w/o contrast for follow up imaging   -F/U with Dr. Ceron     Standard recommendations on kidney stone prevention were provided.  These include to maintain fluid intake of 3 liters per day or more with a goal of making 2 or more liters of urine per day.  If alcoholic or caffeinated beverages are consumed then he needs to drink water along with these beverages to maintain hydration.  A few ounces of lemon juice concentrate a day diluted in water can help prevent stones.  He should limit his intake of red meat, salt, and salty processed foods.  He should maintain calcium intake in his diet through continued consumption of dairy products etc.  He should limit foods that are high in oxalate such as spinach, sweet potatoes, dark chocolate, soy products, and some nuts such as peanuts.    ---------------------------------------------------------------------------------------------------------------------    Chief Complaint:   New patient consultation for UPJ obstruction/stones    HPI:   Joie Yadav  is a 28 year old female with a PMHx of T2 DM and PCOS here for new consultation of UPJ obstruction/stones. The patient was seen in the ER on 2019 with left flank pain.     At that time, the patient reported the onset of abdominal cramping at about 0900 upon  "waking up.  She reported left flank pain she has had vaginal spotting since around 1500.  The pain in her left flank was wrapping around to her left abdomen in the ER.  She denies urinary symptoms or other GI symptoms.    Urinalysis showed some hematuria. A CT scan showed a kidney stone, which was noted to likely pass without intervention.  Today the patient reported RIGHT flank pain, with onset in the past week.  The pain was most severe yesterday night, but was still significantly less intense than when she was seen in the ER.  She endorses the pain being \"sharp\" and similar to the pain she experienced before on the LEFT side.  The pain she is currently experiencing is being adequately managed with acetaminophen.  She denies hematuria, fevers, and chills.  She does endorse occasional dysuria since being in the ED, and states that her urine was \"darker\" three days ago.        Stone Risk Factors:  Family History: Denies  Chronic Diarrhea: Denies  History of Bowel Disease: Denies  Gout or hyperuricosuria: Denies  Primary Hyperoxaluria: Denies  Limited Fluid Intake: Denies  Significant Intestinal Surgery: Denies    Review of Systems:   Pertinent items are noted in HPI or as below, remainder of complete ROS is negative.      Active Medications:     Current Outpatient Medications:      acetaminophen-codeine (TYLENOL WITH CODEINE #3) 300-30 MG per tablet, Take 1-2 tablets by mouth every 6 hours as needed for pain, Disp: 12 tablet, Rfl: 0     ibuprofen (ADVIL/MOTRIN) 600 MG tablet, Take 1 tablet (600 mg) by mouth every 6 hours as needed for moderate pain, Disp: 30 tablet, Rfl: 0     metFORMIN (GLUCOPHAGE) 500 MG tablet, Take 500 mg by mouth 4 times daily (with meals and nightly), Disp: , Rfl:      naproxen (NAPROSYN) 500 MG tablet, Take 500 mg by mouth, Disp: , Rfl:     Current Facility-Administered Medications:      levonorgestrel (MIRENA) 20 MCG/24HR IUD 20 mcg, 1 each, Intrauterine, Once, Jessica Storey MD    " "  Allergies:   The patient reports no known allergies.     Past Medical History:  Type 2 Diabetes  Endometrial hyperlplasia without atypia   Polycystic ovarian syndrome  Vitiligo     Past Surgical History:  The patient does not have any pertinent past surgical history.    Family History:   Diabetes; Father  Liver disease; father  Diabetes mother     Social History:   The patient denies smoking   The patient denies drug use     Physical Exam:   PHYSICAL EXAM  /81   Pulse 78   Ht 1.448 m (4' 9\")   Wt 104.8 kg (231 lb)   BMI 49.99 kg/m    Constitutional: Alert, no acute distress  Psychiatric: Normal mood and affect  Head: Normocephalic. No masses, lesions, tenderness or abnormalities  Neck: Neck supple. No adenopathy. Thyroid symmetric, normal size  ENT: Oropharynx clear.  Back: No spinal tenderness.  No costovertebral angle tenderness  Cardiovascular: RRR. No murmurs, clicks gallops or rub  Respiratory: Good diaphragmatic excursion. Lungs clear  Gastrointestinal: Abdomen soft, non-tender. No masses, organomegaly. No distension or rigidity  Skin: no suspicious lesions or rashes on abdomen  Extremities: No lower extremity edema.   Neurologic: Cranial nerves grossly intact.  Equal strength and sensation on bilateral extremities.   : Deferred    Imaging:   I have personally reviewed the results of the below imaging studies. The results were discussed with the patient.     Results for orders placed or performed during the hospital encounter of 04/13/19   Abd/pelvis CT no contrast - Stone Protocol    Narrative    CT ABDOMEN/PELVIS WITHOUT CONTRAST   4/14/2019 12:35 AM     HISTORY: Flank pain, recurrent stone disease suspected, left side.    TECHNIQUE: Noncontrast CT abdomen and pelvis was performed. Radiation  dose for this scan was reduced using automated exposure control,  adjustment of the mA and/or kV according to patient size, or iterative  reconstruction technique.    COMPARISON: " None.    FINDINGS:    Abdomen: There is mild dilatation of the left renal collecting system  and ureter into the pelvis where there is a 0.5 cm ureterovesical  junction stone. There are no other urinary stones on either side.    The lung bases are unremarkable. The heart size is normal. Evaluation  of the solid abdominal organs is limited by the lack of intravenous  contrast. The liver, spleen, gallbladder, pancreas and adrenal glands  are normal in appearance. There is no abdominal or pelvic lymph node  enlargement.    Pelvis: There is an intrauterine device in the uterus. Dominant left  ovarian follicle measuring 2.7 cm. Bowel appears grossly normal  without obstruction. The appendix is normal. No free intraperitoneal  gas or fluid.      Impression    IMPRESSION: There is a 0.5 cm left ureterovesical junction stone  causing mild hydronephrosis.    ROSE GUERRA MD        Laboratory:   I reviewed all applicable laboratory and pathology data and went over findings with patient  Significant for     Lab Results   Component Value Date    CR 0.75 04/13/2019     CBC RESULTS:  Recent Labs   Lab Test 04/13/19  2341 02/18/19  1120 01/10/19  1602   WBC 11.7* 9.1 11.2*   HGB 10.7* 11.0* 11.2*    421 352     BMP RESULTS:  Recent Labs   Lab Test 04/13/19  2341      POTASSIUM 3.5   CHLORIDE 107   CO2 25   ANIONGAP 8   *   BUN 16   CR 0.75   GFRESTIMATED >90   GFRESTBLACK >90   LAYLA 8.5     UA RESULTS:   Recent Labs   Lab Test 04/13/19  2341   SG 1.023   URINEPH 6.0   NITRITE Negative   RBCU 133*   WBCU 2     PSA RESULTS:   No results found for: PSA      Scribe Disclosure:  IJohn, am serving as a scribe to document services personally performed by Israel Ceron MD at this visit, based upon the provider's statements to me. All documentation has been reviewed by the aforementioned provider prior to being entered into the official medical record.     BALWINDER Howe served as scribe for this  visit.  I documented the history and physical exam performed by Dr. ABBI Ceron.    John Del Angel and Abhay Howe served as the scribe for this patient's visit and documented my history and physical exam.  I performed the history and physical exam.  I have edited and agree with the note.  ABBI Ceron MD          Patient Care Team:  [facility] as PCP - General Ceron, Israel Penny MD as MD (Urology)  Brooke Adame RN as Registered Nurse (Oncology)  SELF, REFERRED    Copy to patient  REYES MISHRA  [address] [facility] [address]    Answers for HPI/ROS submitted by the patient on 5/17/2019   General Symptoms: Yes  Skin Symptoms: Yes  HENT Symptoms: No  EYE SYMPTOMS: Yes  HEART SYMPTOMS: Yes  LUNG SYMPTOMS: No  INTESTINAL SYMPTOMS: Yes  URINARY SYMPTOMS: Yes  GYNECOLOGIC SYMPTOMS: Yes  BREAST SYMPTOMS: No  SKELETAL SYMPTOMS: Yes  BLOOD SYMPTOMS: No  NERVOUS SYSTEM SYMPTOMS: Yes  MENTAL HEALTH SYMPTOMS: No  Fever: No  Loss of appetite: No  Weight loss: No  Weight gain: No  Fatigue: Yes  Night sweats: Yes  Chills: No  Increased stress: No  Excessive hunger: No  Excessive thirst: Yes  Feeling hot or cold when others believe the temperature is normal: Yes  Loss of height: No  Post-operative complications: No  Surgical site pain: No  Hallucinations: No  Change in or Loss of Energy: Yes  Hyperactivity: No  Confusion: No  Changes in hair: Yes  Changes in moles/birth marks: No  Itching: No  Rashes: No  Changes in nails: No  Acne: No  Hair in places you don't want it: No  Change in facial hair: No  Warts: No  Non-healing sores: No  Scarring: No  Flaking of skin: No  Color changes of hands/feet in cold : No  Sun sensitivity: Yes  Skin thickening: No  Eye pain: Yes  Vision loss: No  Dry eyes: No  Watery eyes: No  Eye bulging: No  Double vision: No  Flashing of lights: No  Spots: No  Floaters: No  Redness: No  Crossed eyes: No  Tunnel Vision: No  Yellowing of  eyes: No  Eye irritation: No  Chest pain or pressure: No  Fast or irregular heartbeat: No  Pain in legs with walking: No  Trouble breathing while lying down: No  Fingers or toes appear blue: No  High blood pressure: No  Low blood pressure: No  Fainting: No  Murmurs: No  Pacemaker: No  Varicose veins: No  Edema or swelling: No  Wake up at night with shortness of breath: No  Light-headedness: Yes  Exercise intolerance: No  Heart burn or indigestion: No  Nausea: Yes  Vomiting: No  Abdominal pain: No  Bloating: No  Constipation: No  Diarrhea: No  Blood in stool: No  Black stools: No  Rectal or Anal pain: No  Fecal incontinence: No  Yellowing of skin or eyes: No  Vomit with blood: No  Change in stools: No  Trouble holding urine or incontinence: No  Pain or burning: No  Trouble starting or stopping: Yes  Increased frequency of urination: Yes  Blood in urine: No  Decreased frequency of urination: No  Frequent nighttime urination: Yes  Flank pain: No  Difficulty emptying bladder: No  Back pain: Yes  Muscle aches: No  Neck pain: Yes  Swollen joints: No  Joint pain: No  Bone pain: No  Muscle cramps: No  Muscle weakness: No  Joint stiffness: No  Bone fracture: No  Trouble with coordination: No  Dizziness or trouble with balance: Yes  Fainting or black-out spells: No  Memory loss: No  Headache: Yes  Seizures: No  Speech problems: No  Tingling: Yes  Tremor: No  Weakness: Yes  Difficulty walking: No  Paralysis: No  Numbness: Yes  Bleeding or spotting between periods: No  Heavy or painful periods: No  Irregular periods: No  Vaginal discharge: No  Hot flashes: No  Vaginal dryness: No  Genital ulcers: No  Reduced libido: No  Painful intercourse: No  Difficulty with sexual arousal: No  Post-menopausal bleeding: No    Pelon BRITT MS4, saw this patient in conjunction with the attending physician Dr. Ceron.

## 2019-05-17 ENCOUNTER — OFFICE VISIT (OUTPATIENT)
Dept: UROLOGY | Facility: CLINIC | Age: 29
End: 2019-05-17
Payer: COMMERCIAL

## 2019-05-17 VITALS
HEIGHT: 57 IN | WEIGHT: 231 LBS | DIASTOLIC BLOOD PRESSURE: 81 MMHG | HEART RATE: 78 BPM | BODY MASS INDEX: 49.84 KG/M2 | SYSTOLIC BLOOD PRESSURE: 118 MMHG

## 2019-05-17 DIAGNOSIS — N20.0 KIDNEY STONE: Primary | ICD-10-CM

## 2019-05-17 PROBLEM — M54.2 NECK PAIN: Status: ACTIVE | Noted: 2019-01-30

## 2019-05-17 PROBLEM — D72.829 LEUKOCYTOSIS: Status: ACTIVE | Noted: 2019-02-12

## 2019-05-17 PROBLEM — L30.9 DERMATITIS: Status: ACTIVE | Noted: 2017-10-02

## 2019-05-17 RX ORDER — NAPROXEN 500 MG/1
500 TABLET ORAL
COMMUNITY
Start: 2019-01-30 | End: 2019-07-31

## 2019-05-17 RX ORDER — LEVONORGESTREL/ETHIN.ESTRADIOL 0.1-0.02MG
1 TABLET ORAL
COMMUNITY
End: 2019-05-17

## 2019-05-17 RX ORDER — FERROUS GLUCONATE 324(38)MG
324 TABLET ORAL
COMMUNITY
Start: 2019-01-30 | End: 2019-05-17

## 2019-05-17 ASSESSMENT — ENCOUNTER SYMPTOMS
DYSURIA: 0
SPEECH CHANGE: 0
SLEEP DISTURBANCES DUE TO BREATHING: 0
NECK PAIN: 1
EYE WATERING: 0
EYE REDNESS: 0
HEARTBURN: 0
MEMORY LOSS: 0
LEG PAIN: 0
VOMITING: 0
BLOOD IN STOOL: 0
POOR WOUND HEALING: 0
FLANK PAIN: 0
ABDOMINAL PAIN: 0
SEIZURES: 0
TINGLING: 1
DIFFICULTY URINATING: 0
LOSS OF CONSCIOUSNESS: 0
DECREASED APPETITE: 0
RECTAL PAIN: 0
POLYPHAGIA: 0
STIFFNESS: 0
DIZZINESS: 1
MUSCLE CRAMPS: 0
JOINT SWELLING: 0
MYALGIAS: 0
HYPOTENSION: 0
HOT FLASHES: 0
WEIGHT LOSS: 0
LIGHT-HEADEDNESS: 1
ALTERED TEMPERATURE REGULATION: 1
BOWEL INCONTINENCE: 0
SKIN CHANGES: 0
FATIGUE: 1
TREMORS: 0
DECREASED LIBIDO: 0
DIARRHEA: 0
DOUBLE VISION: 0
ORTHOPNEA: 0
NAIL CHANGES: 0
CHILLS: 0
HEMATURIA: 0
NAUSEA: 1
HYPERTENSION: 0
ARTHRALGIAS: 0
HEADACHES: 1
PARALYSIS: 0
FEVER: 0
EXERCISE INTOLERANCE: 0
NIGHT SWEATS: 1
JAUNDICE: 0
INCREASED ENERGY: 1
PALPITATIONS: 0
POLYDIPSIA: 1
WEIGHT GAIN: 0
SYNCOPE: 0
HALLUCINATIONS: 0
MUSCLE WEAKNESS: 0
CONSTIPATION: 0
NUMBNESS: 1
WEAKNESS: 1
BACK PAIN: 1
EYE IRRITATION: 0
DISTURBANCES IN COORDINATION: 0
BLOATING: 0
EYE PAIN: 1

## 2019-05-17 ASSESSMENT — MIFFLIN-ST. JEOR: SCORE: 1656.69

## 2019-05-17 ASSESSMENT — PAIN SCALES - GENERAL: PAINLEVEL: NO PAIN (0)

## 2019-05-17 NOTE — LETTER
2019       RE: Joie Yadav  [address] [facility] [address]     Dear Colleague,    Thank you for referring your patient, Joie Yadav, to the [facility] UROLOGY AND INST FOR PROSTATE AND UROLOGIC CANCERS at [facility]. Please see a copy of my visit note below.      Urology Clinic    Israel Ceron MD  Date of Service: 2019     Name: Joie Yadav  MRN: [id number]  Age: 28 year old  : 1990  Referring provider: Referred Self     Assessment and Plan:  1. 0.5 cm left ureterovesical junction stone causing mild hydronephrosis (CT scan from 2019)    Patient's new onset RIGHT-flank symptoms are possibly due to new RIGHT-sided stone, or referred pain from previous LEFT-sided stone.  Patient is adequately managing symptoms with tylenol and does not require additional pain regimen.   -Follow standard kidney-stone prevention regimen (see below)   -Continue tylenol for pain management   -CT scan w/o contrast for follow up imaging   -F/U with Dr. Ceron     Standard recommendations on kidney stone prevention were provided.  These include to maintain fluid intake of 3 liters per day or more with a goal of making 2 or more liters of urine per day.  If alcoholic or caffeinated beverages are consumed then he needs to drink water along with these beverages to maintain hydration.  A few ounces of lemon juice concentrate a day diluted in water can help prevent stones.  He should limit his intake of red meat, salt, and salty processed foods.  He should maintain calcium intake in his diet through continued consumption of dairy products etc.  He should limit foods that are high in oxalate such as spinach, sweet potatoes, dark chocolate, soy products, and some nuts such as peanuts.    ---------------------------------------------------------------------------------------------------------------------    Chief Complaint:   New patient  "consultation for UPJ obstruction/stones    HPI:   Joie Yadav  is a 28 year old female with a PMHx of T2 DM and PCOS  here for new consultation of UPJ obstruction/stones. The patient was seen in the ER on 03/13/2019 with left flank pain.     At that time, the patient reported the onset of abdominal cramping at about 0900 upon waking up.  She  reported left flank pain she has had vaginal spotting since around 1500.   The pain in her left flank was wrapping around to her left abdomen in the ER.  She denies urinary symptoms or other GI symptoms.    Urinalysis showed some hematuria. A CT scan showed a kidney stone, which was noted to likely pass without intervention.  Today the patient reported RIGHT flank pain, with onset in the past week.  The pain was most severe yesterday night, but was still significantly less intense than when she was seen in the ER.  She endorses the pain being \"sharp\" and similar to the pain she experienced before on the LEFT side.  The pain she is currently experiencing is being adequately managed with acetaminophen.  She denies hematuria, fevers, and chills.  She does endorse occasional dysuria since being in the ED, and states that her urine was \"darker\" three days ago.        Stone Risk Factors:  Family History: Denies  Chronic Diarrhea: Denies  History of Bowel Disease: Denies  Gout or hyperuricosuria: Denies  Primary Hyperoxaluria: Denies  Limited Fluid Intake: Denies  Significant Intestinal Surgery: Denies    Review of Systems:   Pertinent items are noted in HPI or as below, remainder of complete ROS is negative.      Active Medications:     Current Outpatient Medications:      acetaminophen-codeine (TYLENOL WITH CODEINE #3) 300-30 MG per tablet, Take 1-2 tablets by mouth every 6 hours as needed for pain, Disp: 12 tablet, Rfl: 0     ibuprofen (ADVIL/MOTRIN) 600 MG tablet, Take 1 tablet (600 mg) by mouth every 6 hours as needed for moderate pain, Disp: 30 tablet, Rfl: 0     " "metFORMIN (GLUCOPHAGE) 500 MG tablet, Take 500 mg by mouth 4 times daily (with meals and nightly), Disp: , Rfl:      naproxen (NAPROSYN) 500 MG tablet, Take 500 mg by mouth, Disp: , Rfl:     Current Facility-Administered Medications:      levonorgestrel (MIRENA) 20 MCG/24HR IUD 20 mcg, 1 each, Intrauterine, Once, Jessica Storey MD      Allergies:   The patient reports no known allergies.     Past Medical History:  Type 2 Diabetes  Endometrial hyperlplasia without atypia   Polycystic ovarian syndrome  Vitiligo     Past Surgical History:  The patient does not have any pertinent past surgical history.    Family History:   Diabetes; Father  Liver disease; father  Diabetes mother     Social History:   The patient denies smoking   The patient denies drug use     Physical Exam:   PHYSICAL EXAM  /81   Pulse 78   Ht 1.448 m (4' 9\")   Wt 104.8 kg (231 lb)   BMI 49.99 kg/m     Constitutional: Alert, no acute distress  Psychiatric: Normal mood and affect  Head: Normocephalic. No masses, lesions, tenderness or abnormalities  Neck: Neck supple. No adenopathy. Thyroid symmetric, normal size  ENT: Oropharynx clear.  Back: No spinal tenderness.  No costovertebral angle tenderness  Cardiovascular: RRR. No murmurs, clicks gallops or rub  Respiratory: Good diaphragmatic excursion. Lungs clear  Gastrointestinal: Abdomen soft, non-tender. No masses, organomegaly. No distension or rigidity  Skin: no suspicious lesions or rashes on abdomen  Extremities: No lower extremity edema.   Neurologic: Cranial nerves grossly intact.  Equal strength and sensation on bilateral extremities.   : Deferred    Imaging:   I have personally reviewed the results of the below imaging studies. The results were discussed with the patient.     Results for orders placed or performed during the hospital encounter of 04/13/19   Abd/pelvis CT no contrast - Stone Protocol    Narrative    CT ABDOMEN/PELVIS WITHOUT CONTRAST   4/14/2019 12:35 AM "     HISTORY: Flank pain, recurrent stone disease suspected, left side.    TECHNIQUE: Noncontrast CT abdomen and pelvis was performed. Radiation  dose for this scan was reduced using automated exposure control,  adjustment of the mA and/or kV according to patient size, or iterative  reconstruction technique.    COMPARISON: None.    FINDINGS:    Abdomen: There is mild dilatation of the left renal collecting system  and ureter into the pelvis where there is a 0.5 cm ureterovesical  junction stone. There are no other urinary stones on either side.    The lung bases are unremarkable. The heart size is normal. Evaluation  of the solid abdominal organs is limited by the lack of intravenous  contrast. The liver, spleen, gallbladder, pancreas and adrenal glands  are normal in appearance. There is no abdominal or pelvic lymph node  enlargement.    Pelvis: There is an intrauterine device in the uterus. Dominant left  ovarian follicle measuring 2.7 cm. Bowel appears grossly normal  without obstruction. The appendix is normal. No free intraperitoneal  gas or fluid.      Impression    IMPRESSION: There is a 0.5 cm left ureterovesical junction stone  causing mild hydronephrosis.    ROSE GUERRA MD        Laboratory:   I reviewed all applicable laboratory and pathology data and went over findings with patient  Significant for     Lab Results   Component Value Date    CR 0.75 04/13/2019     CBC RESULTS:  Recent Labs   Lab Test 04/13/19  2341 02/18/19  1120 01/10/19  1602   WBC 11.7* 9.1 11.2*   HGB 10.7* 11.0* 11.2*    421 352     BMP RESULTS:  Recent Labs   Lab Test 04/13/19  2341      POTASSIUM 3.5   CHLORIDE 107   CO2 25   ANIONGAP 8   *   BUN 16   CR 0.75   GFRESTIMATED >90   GFRESTBLACK >90   LAYLA 8.5     UA RESULTS:   Recent Labs   Lab Test 04/13/19  2341   SG 1.023   URINEPH 6.0   NITRITE Negative   RBCU 133*   WBCU 2     PSA RESULTS:   No results found for: PSA      Scribe Disclosure:  John BRITT  Mer, am serving as a scribe to document services personally performed by Israel Ceron MD at this visit, based upon the provider's statements to me. All documentation has been reviewed by the aforementioned provider prior to being entered into the official medical record.     I Abhay Howe served as scribe for this visit.  I documented the history and physical exam performed by Dr. ABBI Ceron.    John Del Angel and Abhay Howe served as the scribe for this patient's visit and documented my history and physical exam.  I performed the history and physical exam.  I have edited and agree with the note.  ABBI Ceron MD          Patient Care Team:  [facility] as PCP - Brooke Alcazar, RN as Registered Nurse (Oncology)      Copy to patient  REYES MISHRA  [address] [facility] [address]    Answers for HPI/ROS submitted by the patient on 5/17/2019   General Symptoms: Yes  Skin Symptoms: Yes  HENT Symptoms: No  EYE SYMPTOMS: Yes  HEART SYMPTOMS: Yes  LUNG SYMPTOMS: No  INTESTINAL SYMPTOMS: Yes  URINARY SYMPTOMS: Yes  GYNECOLOGIC SYMPTOMS: Yes  BREAST SYMPTOMS: No  SKELETAL SYMPTOMS: Yes  BLOOD SYMPTOMS: No  NERVOUS SYSTEM SYMPTOMS: Yes  MENTAL HEALTH SYMPTOMS: No  Fever: No  Loss of appetite: No  Weight loss: No  Weight gain: No  Fatigue: Yes  Night sweats: Yes  Chills: No  Increased stress: No  Excessive hunger: No  Excessive thirst: Yes  Feeling hot or cold when others believe the temperature is normal: Yes  Loss of height: No  Post-operative complications: No  Surgical site pain: No  Hallucinations: No  Change in or Loss of Energy: Yes  Hyperactivity: No  Confusion: No  Changes in hair: Yes  Changes in moles/birth marks: No  Itching: No  Rashes: No  Changes in nails: No  Acne: No  Hair in places you don't want it: No  Change in facial hair: No  Warts: No  Non-healing sores: No  Scarring: No  Flaking of skin: No  Color changes of  hands/feet in cold : No  Sun sensitivity: Yes  Skin thickening: No  Eye pain: Yes  Vision loss: No  Dry eyes: No  Watery eyes: No  Eye bulging: No  Double vision: No  Flashing of lights: No  Spots: No  Floaters: No  Redness: No  Crossed eyes: No  Tunnel Vision: No  Yellowing of eyes: No  Eye irritation: No  Chest pain or pressure: No  Fast or irregular heartbeat: No  Pain in legs with walking: No  Trouble breathing while lying down: No  Fingers or toes appear blue: No  High blood pressure: No  Low blood pressure: No  Fainting: No  Murmurs: No  Pacemaker: No  Varicose veins: No  Edema or swelling: No  Wake up at night with shortness of breath: No  Light-headedness: Yes  Exercise intolerance: No  Heart burn or indigestion: No  Nausea: Yes  Vomiting: No  Abdominal pain: No  Bloating: No  Constipation: No  Diarrhea: No  Blood in stool: No  Black stools: No  Rectal or Anal pain: No  Fecal incontinence: No  Yellowing of skin or eyes: No  Vomit with blood: No  Change in stools: No  Trouble holding urine or incontinence: No  Pain or burning: No  Trouble starting or stopping: Yes  Increased frequency of urination: Yes  Blood in urine: No  Decreased frequency of urination: No  Frequent nighttime urination: Yes  Flank pain: No  Difficulty emptying bladder: No  Back pain: Yes  Muscle aches: No  Neck pain: Yes  Swollen joints: No  Joint pain: No  Bone pain: No  Muscle cramps: No  Muscle weakness: No  Joint stiffness: No  Bone fracture: No  Trouble with coordination: No  Dizziness or trouble with balance: Yes  Fainting or black-out spells: No  Memory loss: No  Headache: Yes  Seizures: No  Speech problems: No  Tingling: Yes  Tremor: No  Weakness: Yes  Difficulty walking: No  Paralysis: No  Numbness: Yes  Bleeding or spotting between periods: No  Heavy or painful periods: No  Irregular periods: No  Vaginal discharge: No  Hot flashes: No  Vaginal dryness: No  Genital ulcers: No  Reduced libido: No  Painful intercourse:  No  Difficulty with sexual arousal: No  Post-menopausal bleeding: No    I, Pelon Howe MS4, saw this patient in conjunction with the attending physician Dr. Ceron.      Again, thank you for allowing me to participate in the care of your patient.      Sincerely,    Israel Ceron MD

## 2019-05-17 NOTE — PATIENT INSTRUCTIONS
Schedule appointment for CT scan.  Dr. Ceron will notify you of the results and what your follow up should be.    It was a pleasure meeting with you today.  Thank you for allowing me and my team the privilege of caring for you today.  YOU are the reason we are here, and I truly hope we provided you with the excellent service you deserve.  Please let us know if there is anything else we can do for you so that we can be sure you are leaving completely satisfied with your care experience.        MARTHA Hardin

## 2019-05-17 NOTE — NURSING NOTE
Chief Complaint   Patient presents with     Consult     New patient consultation for UPJ obstruction/stones     Nieves Gr, A

## 2019-05-25 ENCOUNTER — ANCILLARY PROCEDURE (OUTPATIENT)
Dept: CT IMAGING | Facility: CLINIC | Age: 29
End: 2019-05-25
Attending: UROLOGY
Payer: COMMERCIAL

## 2019-05-25 DIAGNOSIS — N20.0 KIDNEY STONE: ICD-10-CM

## 2019-06-16 PROBLEM — N20.0 KIDNEY STONE: Status: ACTIVE | Noted: 2019-06-16

## 2019-07-31 ENCOUNTER — APPOINTMENT (OUTPATIENT)
Dept: ULTRASOUND IMAGING | Facility: CLINIC | Age: 29
End: 2019-07-31
Attending: FAMILY MEDICINE
Payer: COMMERCIAL

## 2019-07-31 ENCOUNTER — APPOINTMENT (OUTPATIENT)
Dept: CT IMAGING | Facility: CLINIC | Age: 29
End: 2019-07-31
Attending: FAMILY MEDICINE
Payer: COMMERCIAL

## 2019-07-31 ENCOUNTER — HOSPITAL ENCOUNTER (EMERGENCY)
Facility: CLINIC | Age: 29
Discharge: HOME | End: 2019-07-31
Attending: FAMILY MEDICINE | Admitting: FAMILY MEDICINE
Payer: COMMERCIAL

## 2019-07-31 VITALS
OXYGEN SATURATION: 99 % | TEMPERATURE: 98.3 F | RESPIRATION RATE: 18 BRPM | HEART RATE: 83 BPM | SYSTOLIC BLOOD PRESSURE: 100 MMHG | DIASTOLIC BLOOD PRESSURE: 53 MMHG

## 2019-07-31 DIAGNOSIS — C80.1: ICD-10-CM

## 2019-07-31 DIAGNOSIS — R10.9 RIGHT FLANK PAIN: ICD-10-CM

## 2019-07-31 LAB
ALBUMIN SERPL-MCNC: 3.3 G/DL (ref 3.4–5)
ALBUMIN UR-MCNC: 10 MG/DL
ALP SERPL-CCNC: 131 U/L (ref 40–150)
ALT SERPL W P-5'-P-CCNC: 29 U/L (ref 0–50)
ANION GAP SERPL CALCULATED.3IONS-SCNC: 8 MMOL/L (ref 3–14)
APPEARANCE UR: CLEAR
AST SERPL W P-5'-P-CCNC: 17 U/L (ref 0–45)
BACTERIA #/AREA URNS HPF: ABNORMAL /HPF
BASOPHILS # BLD AUTO: 0 10E9/L (ref 0–0.2)
BASOPHILS NFR BLD AUTO: 0.3 %
BILIRUB SERPL-MCNC: 0.2 MG/DL (ref 0.2–1.3)
BILIRUB UR QL STRIP: NEGATIVE
BUN SERPL-MCNC: 16 MG/DL (ref 7–30)
CALCIUM SERPL-MCNC: 8.5 MG/DL (ref 8.5–10.1)
CHLORIDE SERPL-SCNC: 108 MMOL/L (ref 94–109)
CO2 SERPL-SCNC: 24 MMOL/L (ref 20–32)
COLOR UR AUTO: ABNORMAL
CREAT SERPL-MCNC: 0.69 MG/DL (ref 0.52–1.04)
DIFFERENTIAL METHOD BLD: ABNORMAL
EOSINOPHIL # BLD AUTO: 0.2 10E9/L (ref 0–0.7)
EOSINOPHIL NFR BLD AUTO: 1.4 %
ERYTHROCYTE [DISTWIDTH] IN BLOOD BY AUTOMATED COUNT: 16 % (ref 10–15)
GFR SERPL CREATININE-BSD FRML MDRD: >90 ML/MIN/{1.73_M2}
GLUCOSE SERPL-MCNC: 154 MG/DL (ref 70–99)
GLUCOSE UR STRIP-MCNC: NEGATIVE MG/DL
HCG UR QL: NEGATIVE
HCT VFR BLD AUTO: 35.4 % (ref 35–47)
HGB BLD-MCNC: 11 G/DL (ref 11.7–15.7)
HGB UR QL STRIP: NEGATIVE
IMM GRANULOCYTES # BLD: 0 10E9/L (ref 0–0.4)
IMM GRANULOCYTES NFR BLD: 0.3 %
INTERNAL QC OK POCT: YES
KETONES UR STRIP-MCNC: NEGATIVE MG/DL
LEUKOCYTE ESTERASE UR QL STRIP: NEGATIVE
LYMPHOCYTES # BLD AUTO: 2.2 10E9/L (ref 0.8–5.3)
LYMPHOCYTES NFR BLD AUTO: 20.4 %
MCH RBC QN AUTO: 22.6 PG (ref 26.5–33)
MCHC RBC AUTO-ENTMCNC: 31.1 G/DL (ref 31.5–36.5)
MCV RBC AUTO: 73 FL (ref 78–100)
MONOCYTES # BLD AUTO: 0.6 10E9/L (ref 0–1.3)
MONOCYTES NFR BLD AUTO: 5.7 %
MUCOUS THREADS #/AREA URNS LPF: PRESENT /LPF
NEUTROPHILS # BLD AUTO: 7.6 10E9/L (ref 1.6–8.3)
NEUTROPHILS NFR BLD AUTO: 71.9 %
NITRATE UR QL: NEGATIVE
NRBC # BLD AUTO: 0 10*3/UL
NRBC BLD AUTO-RTO: 0 /100
PH UR STRIP: 6.5 PH (ref 5–7)
PLATELET # BLD AUTO: 360 10E9/L (ref 150–450)
POTASSIUM SERPL-SCNC: 4 MMOL/L (ref 3.4–5.3)
PROT SERPL-MCNC: 8.2 G/DL (ref 6.8–8.8)
RBC # BLD AUTO: 4.87 10E12/L (ref 3.8–5.2)
RBC #/AREA URNS AUTO: <1 /HPF (ref 0–2)
SODIUM SERPL-SCNC: 140 MMOL/L (ref 133–144)
SOURCE: ABNORMAL
SP GR UR STRIP: 1.01 (ref 1–1.03)
SQUAMOUS #/AREA URNS AUTO: 1 /HPF (ref 0–1)
UROBILINOGEN UR STRIP-MCNC: NORMAL MG/DL (ref 0–2)
WBC # BLD AUTO: 10.6 10E9/L (ref 4–11)
WBC #/AREA URNS AUTO: 1 /HPF (ref 0–5)

## 2019-07-31 PROCEDURE — 81001 URINALYSIS AUTO W/SCOPE: CPT | Performed by: FAMILY MEDICINE

## 2019-07-31 PROCEDURE — 96376 TX/PRO/DX INJ SAME DRUG ADON: CPT

## 2019-07-31 PROCEDURE — 87086 URINE CULTURE/COLONY COUNT: CPT | Performed by: FAMILY MEDICINE

## 2019-07-31 PROCEDURE — 93976 VASCULAR STUDY: CPT

## 2019-07-31 PROCEDURE — 80053 COMPREHEN METABOLIC PANEL: CPT | Performed by: FAMILY MEDICINE

## 2019-07-31 PROCEDURE — 25800030 ZZH RX IP 258 OP 636: Performed by: FAMILY MEDICINE

## 2019-07-31 PROCEDURE — 96374 THER/PROPH/DIAG INJ IV PUSH: CPT

## 2019-07-31 PROCEDURE — 99284 EMERGENCY DEPT VISIT MOD MDM: CPT | Mod: Z6 | Performed by: FAMILY MEDICINE

## 2019-07-31 PROCEDURE — 25000128 H RX IP 250 OP 636: Performed by: FAMILY MEDICINE

## 2019-07-31 PROCEDURE — 81025 URINE PREGNANCY TEST: CPT | Performed by: FAMILY MEDICINE

## 2019-07-31 PROCEDURE — 96361 HYDRATE IV INFUSION ADD-ON: CPT

## 2019-07-31 PROCEDURE — 85025 COMPLETE CBC W/AUTO DIFF WBC: CPT | Performed by: FAMILY MEDICINE

## 2019-07-31 PROCEDURE — 99284 EMERGENCY DEPT VISIT MOD MDM: CPT | Mod: 25

## 2019-07-31 PROCEDURE — 74176 CT ABD & PELVIS W/O CONTRAST: CPT

## 2019-07-31 RX ORDER — IBUPROFEN 200 MG
600 TABLET ORAL EVERY 6 HOURS PRN
Qty: 30 TABLET | Refills: 0 | Status: ON HOLD | OUTPATIENT
Start: 2019-07-31 | End: 2020-04-10

## 2019-07-31 RX ORDER — SODIUM CHLORIDE 9 MG/ML
1000 INJECTION, SOLUTION INTRAVENOUS CONTINUOUS
Status: DISCONTINUED | OUTPATIENT
Start: 2019-07-31 | End: 2019-07-31 | Stop reason: HOSPADM

## 2019-07-31 RX ORDER — KETOROLAC TROMETHAMINE 15 MG/ML
15 INJECTION, SOLUTION INTRAMUSCULAR; INTRAVENOUS ONCE
Status: COMPLETED | OUTPATIENT
Start: 2019-07-31 | End: 2019-07-31

## 2019-07-31 RX ORDER — ONDANSETRON 2 MG/ML
4 INJECTION INTRAMUSCULAR; INTRAVENOUS EVERY 30 MIN PRN
Status: DISCONTINUED | OUTPATIENT
Start: 2019-07-31 | End: 2019-07-31 | Stop reason: HOSPADM

## 2019-07-31 RX ADMIN — KETOROLAC TROMETHAMINE 15 MG: 15 INJECTION, SOLUTION INTRAMUSCULAR; INTRAVENOUS at 10:41

## 2019-07-31 RX ADMIN — SODIUM CHLORIDE 1000 ML: 9 INJECTION, SOLUTION INTRAVENOUS at 09:00

## 2019-07-31 RX ADMIN — KETOROLAC TROMETHAMINE 15 MG: 15 INJECTION, SOLUTION INTRAMUSCULAR; INTRAVENOUS at 09:02

## 2019-07-31 RX ADMIN — SODIUM CHLORIDE 1000 ML: 9 INJECTION, SOLUTION INTRAVENOUS at 10:33

## 2019-07-31 NOTE — LETTER
July 31, 2019      To Whom It May Concern:      Joie Yadav was seen in our Emergency Department today, 07/31/19.  I expect her condition to improve over the next 1-2 days.  She may return to work/school when improved.    Sincerely,        Elliot Medrano MD

## 2019-07-31 NOTE — DISCHARGE INSTRUCTIONS
Thank you for choosing [facility].     Please closely monitor for further symptoms. Return to the Emergency Department if you develop any new or worsening signs or symptoms.    If you received any opiate pain medications or sedatives during your visit, please do not drive for at least 8 hours.     Labs, cultures or final xray interpretations may still need to be reviewed.  We will call you if your plan of care needs to be changed.    Please follow up with your scheduled gynecology appointment with respect to ultrasound findings of adenoma malignum today.

## 2019-07-31 NOTE — ED AVS SNAPSHOT
[facility], [place], Emergency Department  [address]  [place] [place] [address]  Phone:  [phone]  Fax:  [phone]                                    Joie Yadav   MRN: [id number]    Department:  [facility], Emergency Department   Date of Visit:  7/31/2019           After Visit Summary Signature Page    I have received my discharge instructions, and my questions have been answered. I have discussed any challenges I see with this plan with the nurse or doctor.    ..........................................................................................................................................  Patient/Patient Representative Signature      ..........................................................................................................................................  Patient Representative Print Name and Relationship to Patient    ..................................................               ................................................  Date                                   Time    ..........................................................................................................................................  Reviewed by Signature/Title    ...................................................              ..............................................  Date                                               Time          22EPIC Rev 08/18

## 2019-07-31 NOTE — ED PROVIDER NOTES
History     Chief Complaint   Patient presents with     Flank Pain     right sided abd pain for 1 week. radiates to the front. worsening pain     HPI  Joie Yadav is a 28 year old female who resents with right-sided flank and abdominal pain.  Patient states a week ago she developed a sharp, rather severe pain on the right flank area.  She took some ibuprofen and the pain seemed to go away.  Late yesterday the pain recurred.  It is now constant, sharp, and radiates from the flank to the front of her abdomen.  She has experienced nausea and increased urinary frequency as well.  2 episodes of diarrhea yesterday.  No vomiting, no fever.  She has a Mirena IUD and has not had a menstrual period since February.  No vaginal discharge.  She does have a prior history of kidney stones and states the pain is quite similar to an episode she had last March that was attributed to kidney stones.    I have reviewed the Medications, Allergies, Past Medical and Surgical History, and Social History in the Epic system.    Review of Systems  All other systems were reviewed and are negative    Physical Exam   BP: 126/68  Pulse: 83  Temp: 97.7  F (36.5  C)  Resp: 16  SpO2: 99 %      Physical Exam   Constitutional: No distress.   HENT:   Head: Atraumatic.   Mouth/Throat: Oropharynx is clear and moist. No oropharyngeal exudate.   Eyes: Pupils are equal, round, and reactive to light. No scleral icterus.   Cardiovascular: Normal heart sounds and intact distal pulses.   Pulmonary/Chest: Breath sounds normal. No respiratory distress.   Abdominal: Soft. Bowel sounds are normal. There is tenderness in the right lower quadrant. There is CVA tenderness (Right, moderate). There is no rigidity, no rebound and no guarding.   Musculoskeletal: She exhibits no edema or tenderness.   Skin: Skin is warm. No rash noted. She is not diaphoretic.       ED Course        Procedures         Pelvic ultrasound results:   IMPRESSION:  1. Normal-sized right  ovary without dominant cyst. Blood flow  identified in the right ovary.  2. Intrauterine device in good position.  3. Clustered cystic lesion in the cervix is concerning for adenoma  malignum. Recommend gynecologic consultation.      Critical Care time:  none          Labs Ordered and Resulted from Time of ED Arrival Up to the Time of Departure from the ED   ROUTINE UA WITH MICROSCOPIC REFLEX TO CULTURE - Abnormal; Notable for the following components:       Result Value    Protein Albumin Urine 10 (*)     Bacteria Urine Few (*)     Mucous Urine Present (*)     All other components within normal limits   CBC WITH PLATELETS DIFFERENTIAL - Abnormal; Notable for the following components:    Hemoglobin 11.0 (*)     MCV 73 (*)     MCH 22.6 (*)     MCHC 31.1 (*)     RDW 16.0 (*)     All other components within normal limits   COMPREHENSIVE METABOLIC PANEL - Abnormal; Notable for the following components:    Glucose 154 (*)     Albumin 3.3 (*)     All other components within normal limits   HCG QUAL URINE POCT - Normal   URINE CULTURE AEROBIC BACTERIAL            Assessments & Plan (with Medical Decision Making)   28-year-old woman who has a prior history of kidney stones is now presenting with right flank and mid abdominal sharp constant pain.  My initial differential diagnosis includes recurrent obstructing kidney stone, pyelonephritis, ovarian cyst or torsion, ovarian cyst rupture, ectopic pregnancy, appendicitis, diverticulitis, colitis or gastroenteritis, musculoskeletal pain.  On exam her vital signs are unremarkable.  She appears uncomfortable.  She has right CVA tenderness which extends into the right flank and lower quadrant of the abdomen but no peritoneal signs.  There is no indication of musculoskeletal pain and no bony midline tenderness of the spine.  No pelvic tenderness.  Neurovascular examination of her lower extremities is normal.  The remainder of her physical exam is normal.  Urinalysis is actually  unremarkable.  Other labs also within normal limits.  CT scan normal.  10 used to complain of some pain primarily suprapubic.  Pelvic ultrasound was obtained, with no torsion but there are findings in the cervix as noted .  GYN was consulted regarding this finding and came to the ED and spoke with the patient.  The patient at this time has a plan to follow-up with her own gynecologist on Monday and after discussing with our OB/GYN today, decided to have  further evaluation through her own OB/GYN.  She has a benign serial abdominal exam, and is remarkably symptomatically improved.  No definite etiology of her pain was present.  Based on the clinical findings and the entire clinical scenario, the patient appears stable at this time to be treated symptomatically, and to follow-up as an outpatient for any further evaluation and treatment.  Discussed expected course, need for follow up, and indications for return with the patient.  See discharge instructions.    I have reviewed the nursing notes.    I have reviewed the findings, diagnosis, plan and need for follow up with the patient.       Medication List      Modified    ibuprofen 200 MG tablet  Commonly known as:  ADVIL/MOTRIN  600 mg, Oral, EVERY 6 HOURS PRN  What changed:      medication strength    reasons to take this            Final diagnoses:   Right flank pain   Adenoma malignum (H)       7/31/2019   [facility], [place], EMERGENCY DEPARTMENT     Elliot Medrano MD  07/31/19 [id number]

## 2019-08-01 LAB
BACTERIA SPEC CULT: NORMAL
BACTERIA SPEC CULT: NORMAL
SPECIMEN SOURCE: NORMAL

## 2019-08-01 NOTE — RESULT ENCOUNTER NOTE
Final urine culture report is NEGATIVE per [facility] Lab Result protocol.    If NEGATIVE result, no change in treatment, per [facility] Lab Result protocol.

## 2019-08-06 ENCOUNTER — OFFICE VISIT (OUTPATIENT)
Dept: OBGYN | Facility: CLINIC | Age: 29
End: 2019-08-06
Payer: COMMERCIAL

## 2019-08-06 VITALS
DIASTOLIC BLOOD PRESSURE: 80 MMHG | SYSTOLIC BLOOD PRESSURE: 120 MMHG | HEIGHT: 57 IN | WEIGHT: 235.8 LBS | BODY MASS INDEX: 50.87 KG/M2 | HEART RATE: 87 BPM

## 2019-08-06 DIAGNOSIS — N88.8 CYST OF CERVIX: ICD-10-CM

## 2019-08-06 DIAGNOSIS — N85.01 ENDOMETRIAL HYPERPLASIA WITHOUT ATYPIA, SIMPLE: ICD-10-CM

## 2019-08-06 DIAGNOSIS — E66.813 CLASS 3 SEVERE OBESITY WITH BODY MASS INDEX (BMI) OF 45.0 TO 49.9 IN ADULT, UNSPECIFIED OBESITY TYPE, UNSPECIFIED WHETHER SERIOUS COMORBIDITY PRESENT (H): ICD-10-CM

## 2019-08-06 DIAGNOSIS — E28.2 PCOS (POLYCYSTIC OVARIAN SYNDROME): Primary | ICD-10-CM

## 2019-08-06 DIAGNOSIS — E66.01 CLASS 3 SEVERE OBESITY WITH BODY MASS INDEX (BMI) OF 45.0 TO 49.9 IN ADULT, UNSPECIFIED OBESITY TYPE, UNSPECIFIED WHETHER SERIOUS COMORBIDITY PRESENT (H): ICD-10-CM

## 2019-08-06 PROCEDURE — 88305 TISSUE EXAM BY PATHOLOGIST: CPT | Performed by: OBSTETRICS & GYNECOLOGY

## 2019-08-06 PROCEDURE — 57505 ENDOCERVICAL CURETTAGE: CPT | Mod: ZF | Performed by: STUDENT IN AN ORGANIZED HEALTH CARE EDUCATION/TRAINING PROGRAM

## 2019-08-06 PROCEDURE — 58100 BIOPSY OF UTERUS LINING: CPT | Mod: ZF | Performed by: STUDENT IN AN ORGANIZED HEALTH CARE EDUCATION/TRAINING PROGRAM

## 2019-08-06 PROCEDURE — G0463 HOSPITAL OUTPT CLINIC VISIT: HCPCS | Mod: ZF

## 2019-08-06 ASSESSMENT — ANXIETY QUESTIONNAIRES
2. NOT BEING ABLE TO STOP OR CONTROL WORRYING: SEVERAL DAYS
GAD7 TOTAL SCORE: 3
3. WORRYING TOO MUCH ABOUT DIFFERENT THINGS: NOT AT ALL
6. BECOMING EASILY ANNOYED OR IRRITABLE: SEVERAL DAYS
1. FEELING NERVOUS, ANXIOUS, OR ON EDGE: SEVERAL DAYS
7. FEELING AFRAID AS IF SOMETHING AWFUL MIGHT HAPPEN: NOT AT ALL
5. BEING SO RESTLESS THAT IT IS HARD TO SIT STILL: NOT AT ALL

## 2019-08-06 ASSESSMENT — PATIENT HEALTH QUESTIONNAIRE - PHQ9: 5. POOR APPETITE OR OVEREATING: NOT AT ALL

## 2019-08-06 ASSESSMENT — MIFFLIN-ST. JEOR: SCORE: 1678.46

## 2019-08-06 NOTE — PROGRESS NOTES
"Gynecology [facility] Clinic Note    S: 27yo with obesity, DM2, PCOS, and h/o endometrial hyperplasia without atypia s/p 30d oral provera & Mirena IUD placement in Feb 2019.  She was seen in the ED 7/31 with R flank pain, pelvic US with the below findings:    \"FINDINGS: Uterus 7.6 x 5.2 x 3.9 cm in size. Endometrial stripe 0.9  cm. IUD identified in the endometrial cavity. There is a focal  collection of clustered cysts in the cervix approximately 2.2 x 1.6 x  2.0 cm in size concerning for adenoma malignum.\"    Thus she is here for f/u due to hyperplasia and discussion of US report. She reports essentially no vaginal bleeding, just ocassional spotting. Denies vaginal discharge, no postcoital bleeding. Denies bloating, abd pain  She does still note R flank pain still that wraps around her right side. Has not noted hematuria, this pain is 5/5, constant, and hurts a little more with movement. She does not have any dysuria.      OB/Gyn Hx:  P0  Pap - NIL 2015, 2018. Reports no abnormal  AUB/hyperplasia as detailed above  Sexually active w/ 1 male partner. Desires fertility and children in the future     PMH: DM2, obesity, PCOS  PSH: none  Meds: ibuprofen, Mirena IUD, metformin  All: NKDA    SocHx: Works as , lives with fialejandrina, Mom Dad and sister. Denies tobacco, etOH, drug use.    Fam Hx: Both parents have DM2. No fam h/o uterine, ovarian, breast or colorectal cancers.    O:  /80 (BP Location: Left arm, Patient Position: Chair)   Pulse 87   Ht 1.448 m (4' 9\")   Wt 107 kg (235 lb 12.8 oz)   BMI 51.03 kg/m     Gen: NAD, morbidly obese  Abd: obese, mild tenderness of RLQ  Back: Right CVA tenderness  Pelvic: Normal external genitalia with normal hair distribution. Physiologic discharge. Cervix small and normal size and appearance.     US (7/31/19):  Uterus 7.6 x 5.2 x 3.9 cm in size. Endometrial stripe 0.9  cm. IUD identified in the endometrial cavity. There is a focal  collection of clustered cysts in the cervix " "approximately 2.2 x 1.6 x  2.0 cm in size concerning for adenoma malignum.    US (1/10/19):  The uterus is normal in size measuring 9.6 x 4.6 x 5.3 cm. No fibroids  are evident. Endometrial stripe measures 26 mm and is abnormally  thickened and heterogeneous for patient's age. No associated color  Doppler flow within the endometrium. Multiple nabothian cysts are  present. The right ovary is normal measuring 2.5 x 1.8 x 2.4 cm. The  left ovary is not visualized. No adnexal masses are present. No free  pelvic fluid is present.    Reviewed above pelvic US and cystic cervical lesion appears similar in size    A/P:  27 yo P0 with h/o endometrial hyperplasia without atypia s/p 30d oral provera 10mg BID + Mirena IUD placement in Feb 2019. Discussed US findings with cystic appearance more complex than typical nabothian cysts (benign). Thus, recommend sampling with endocervical curettage, which she is amendable to completing today.  - EmBx w/ endocervical curettage for sampling of the noted cystic area of cervix done, see procedure note below  - Pelvic MRI ordered based on case series showing adenoma malignum with increased cervical cyst wall irregularity/rough/granular appearance on MRI w/ contrast (Rea et a. MRI of endocervical glandular disorders: three cases of a deep nabothian cyst and three cases of a minimal-deviation adenocarcinoma. (2004). [facility], 22;1333-7.)  - Will discuss case with GynOnc and determine if any other testing should be performed, referral to Gyn Onc  - Patient prefers phone call with results - ok to defer discussion to follow up appt w/ Gyn Onc    D/w Dr Jason Jarquin MD PGY4  08/06/19     Endometrial & Endocervical Biopsy    Menstrual History:  Menstrual History 9/27/2017 1/10/2019 7/31/2019   LAST MENSTRUAL PERIOD 3/8/2017 12/25/2018 2/28/2019     Time Out - \"Pause for the Cause\"  Just before the procedure begins, through verbal and active participation of team " members, verify:                      Initials   Patient Name SMB   Patient  SMB   Procedure to be performed SMB                                                                                                                                      Indication: H/o endometrial hyperplasia without atypia and complex cyst of cervix    Pregnancy test: not done as IUD in place    Consent: Risks, benefits of treatment, and no treatment were discussed.  Patient's questions were elicited and answered. , Written consent signed and scanned into medical record. and Patient received and verbalized understanding of discharge instructions    Using a long Graves speculum, the cervix was visualized. The cervix was prepped with Betadine.  A single tooth tenaculum was applied to the anterior lip of the cervix. The endometrial pipelle was advanced through the cervix without difficulty and a sample collected. No additional pass was made. Kevorkian curette was used for endocervical sampling circumferentially. The tenaculum was removed from the cervix and the tenaculum site was hemostatic.    EBL: 5mL    Complications:  none  Pathology: EMB sample and endocervical sample were sent to pathology.  Tolerance of Procedure:  Patient did tolerate the procedure well.     Patient was instructed to call if she experiences any heavy bleeding, severe cramping, or abnormal vaginal discharge.  May take ibuprofen 400-800 mg PO TID PRN or naproxen 500 mg PO BID for cramping.    Will notify patient of results via phone    Jean Jarquin MD PGY4    Dr Gomez present for entire procedure    The Patient was seen in Resident Continuity Clinic by JEAN JARQUIN.  I reviewed the history & exam. Assessment and plan were jointly made. I was present for EMB above.     Wendy Gomez MD

## 2019-08-06 NOTE — PATIENT INSTRUCTIONS
You were seen for follow up of endometrial hyperplasia and abnormal uterine bleeding. Your uterine lining was sampled today.  You also had findings of a complex cyst of your cervix during US done in the ED on 7/31/19. We discussed given the complex appearance, want to be sure this is not cancer. Therefore, took a biopsy from inside your cervix today, will order an MRI and refer you to Gyn Oncology.  You will called to schedule the MRI and the Gyn Oncology appointment.  You will be called with results from the biospies taken today.    Nothing in the vagina for 3 days, no tampons, douching or intercourse. Call if pain is not controlled by ibuprofen and tylenol, fever/chills, or bleeding more than a pad an hour.    If you continued to have left flank pain in a few days, call to make an appointment with your primary care provider.

## 2019-08-07 ASSESSMENT — ANXIETY QUESTIONNAIRES: GAD7 TOTAL SCORE: 3

## 2019-08-08 LAB — COPATH REPORT: NORMAL

## 2019-08-17 ENCOUNTER — TELEPHONE (OUTPATIENT)
Dept: OBGYN | Facility: CLINIC | Age: 29
End: 2019-08-17

## 2019-08-17 NOTE — TELEPHONE ENCOUNTER
Called patient to review negative endometrial and endocervical biopsies. Advised patient to follow up in clinic with Dr. Jarquin to further discuss follow up plan such as MRI or gyn onc referral.  All questions answered. Pt to call clinic on Monday to set up appt.    Amy Schumer, MD  Ob/Gyn PGY-3  08/17/19 3:40 PM

## 2019-09-02 NOTE — PROGRESS NOTES
Gynecology [facility] Clinic Note    S: 27yo with obesity, DM2, PCOS, and h/o endometrial hyperplasia without atypia s/p 30d oral provera & Mirena IUD placement in Feb 2019, now WITH atypia. She reports no issues, continued amenorrhea.    History of AUB/hyperplasia:  1/10/19: ED visit for AUB, constant bleeding  US:  The uterus is normal in size measuring 9.6 x 4.6 x 5.3 cm. No fibroids  are evident. Endometrial stripe measures 26 mm and is abnormally  thickened and heterogeneous for patient's age. No associated color  Doppler flow within the endometrium. Multiple nabothian cysts are  present. The right ovary is normal measuring 2.5 x 1.8 x 2.4 cm. The  left ovary is not visualized. No adnexal masses are present. No free  pelvic fluid is present.    2/18/19 [facility] clinic appt w/ EmBx and Mirena IUD placement:  Endometrium, biopsy:   -Endometrial hyperplasia with abundant squamous morules, without atypia   -Chronic endometritis   -Negative for atypia or malignancy     7/31/19 ED visit for R flank pain (this resolved)  US:  Uterus 7.6 x 5.2 x 3.9 cm in size. Endometrial stripe 0.9  cm. IUD identified in the endometrial cavity. There is a focal  collection of clustered cysts in the cervix approximately 2.2 x 1.6 x  2.0 cm in size concerning for adenoma malignum.    8/6/19 seen at [facility] for f/u EmBx and discussion of US findings  A. ENDOMETRIAL BIOPSY:   -Focal residual atypical endometrial hyperplasia with squamous metaplasia   -Endometrium with decidualized stroma and inactive glands consistent with   progesterone use     B. ENDOCERVICAL CURETTINGS:   - Endocervical tissue with inflammatory and reactive change   ________________________________________________    OB/Gyn Hx:  P0  Pap - NIL 2015, 2018. Reports no abnormal  AUB/hyperplasia as detailed above  Sexually active w/ 1 male partner. Desires fertility and children in the future     PMH: DM2, obesity, PCOS  PSH: none  Meds: ibuprofen, Mirena IUD, metformin  All:  "NKDA    SocHx: Works as , lives with romina, Mom Dad and sister. Denies tobacco, etOH, drug use.    Fam Hx: Both parents have DM2. No fam h/o uterine, ovarian, breast or colorectal cancers.    O:  /80 (BP Location: Left arm, Patient Position: Chair)   Pulse 88   Ht 1.448 m (4' 9\")   Wt 107.2 kg (236 lb 6.4 oz)   Breastfeeding? No   BMI 51.16 kg/m       Gen: NAD, morbidly obese    A/P:  29 yo P0 with h/o endometrial hyperplasia without atypia s/p 30d oral provera 10mg BID + Mirena IUD placement in Feb 2019-- now with focal endometrial hyperplasia WITH atypia. She also has complex cervical cysts on US, cannot rule out adenoma malignum    #Endometrial hyperplasia with atypia:  - Fertility highly desired. However, did discuss 40% risk of endometrial cancer present when biopsy shows CAH. Given [facility] handout of CAH: [email or web address]  - Given progression of pathology (vs missed on sampling in Feb 2019) recommend addition of oral progesterone - megace 80mg BID.   - Also, scheduled appointment with Gyn Oncology for consultation for Tues Sept 17th at 8am with Dr Rizo (also to address below problem)  - Pending recommendations by GynOnc, would plan return in 3mo for repeat biopsy vs D&C for complete sampling and IUD replacement    #Complex cervical cysts:  - Negative ECC does not rule out adenoma malignum per lit review  - Discussed with GynOnc fellow who recommended referral prior to MRI > scheduled Tues Sept 17th at 8am with Dr Rizo    Staffed with Dr Peter Jarquin MD PGY4  9/3/19    The Patient was seen in Resident Continuity Clinic by JEAN JARQUIN.  I reviewed the history & exam. Assessment and plan were jointly made.    Joselyn Green MD      "

## 2019-09-03 ENCOUNTER — OFFICE VISIT (OUTPATIENT)
Dept: OBGYN | Facility: CLINIC | Age: 29
End: 2019-09-03
Attending: STUDENT IN AN ORGANIZED HEALTH CARE EDUCATION/TRAINING PROGRAM
Payer: COMMERCIAL

## 2019-09-03 VITALS
BODY MASS INDEX: 51 KG/M2 | DIASTOLIC BLOOD PRESSURE: 80 MMHG | WEIGHT: 236.4 LBS | HEART RATE: 88 BPM | SYSTOLIC BLOOD PRESSURE: 117 MMHG | HEIGHT: 57 IN

## 2019-09-03 DIAGNOSIS — N85.02 COMPLEX ATYPICAL ENDOMETRIAL HYPERPLASIA: Primary | ICD-10-CM

## 2019-09-03 PROCEDURE — G0463 HOSPITAL OUTPT CLINIC VISIT: HCPCS | Mod: ZF

## 2019-09-03 RX ORDER — MEGESTROL ACETATE 40 MG/1
80 TABLET ORAL 2 TIMES DAILY
Qty: 120 TABLET | Refills: 3 | Status: SHIPPED | OUTPATIENT
Start: 2019-09-03 | End: 2019-09-17

## 2019-09-03 ASSESSMENT — PATIENT HEALTH QUESTIONNAIRE - PHQ9
SUM OF ALL RESPONSES TO PHQ QUESTIONS 1-9: 4
5. POOR APPETITE OR OVEREATING: SEVERAL DAYS

## 2019-09-03 ASSESSMENT — ANXIETY QUESTIONNAIRES
6. BECOMING EASILY ANNOYED OR IRRITABLE: SEVERAL DAYS
1. FEELING NERVOUS, ANXIOUS, OR ON EDGE: SEVERAL DAYS
7. FEELING AFRAID AS IF SOMETHING AWFUL MIGHT HAPPEN: NOT AT ALL
GAD7 TOTAL SCORE: 5
2. NOT BEING ABLE TO STOP OR CONTROL WORRYING: SEVERAL DAYS
5. BEING SO RESTLESS THAT IT IS HARD TO SIT STILL: NOT AT ALL
3. WORRYING TOO MUCH ABOUT DIFFERENT THINGS: SEVERAL DAYS

## 2019-09-03 ASSESSMENT — PAIN SCALES - GENERAL: PAINLEVEL: NO PAIN (0)

## 2019-09-03 ASSESSMENT — MIFFLIN-ST. JEOR: SCORE: 1681.18

## 2019-09-03 NOTE — PATIENT INSTRUCTIONS
Please follow up with Gynecologic Oncology on Thursday September 17th at 8am to discuss cervical cysts and endometrial hyperplasia with atypia.    If you have any questions or concerns, please call the clinic at [phone].

## 2019-09-03 NOTE — LETTER
9/3/2019       RE: Joie Yadav  [address] [address]     Dear Colleague,    Thank you for referring your patient, Joie Yadav, to the [facility] at [facility]. Please see a copy of my visit note below.    Gynecology [facility] Clinic Note    S: 27yo with obesity, DM2, PCOS, and h/o endometrial hyperplasia without atypia s/p 30d oral provera & Mirena IUD placement in Feb 2019, now WITH atypia. She reports no issues, continued amenorrhea.    History of AUB/hyperplasia:  1/10/19: ED visit for AUB, constant bleeding  US:  The uterus is normal in size measuring 9.6 x 4.6 x 5.3 cm. No fibroids  are evident. Endometrial stripe measures 26 mm and is abnormally  thickened and heterogeneous for patient's age. No associated color  Doppler flow within the endometrium. Multiple nabothian cysts are  present. The right ovary is normal measuring 2.5 x 1.8 x 2.4 cm. The  left ovary is not visualized. No adnexal masses are present. No free  pelvic fluid is present.    2/18/19 [facility] clinic appt w/ EmBx and Mirena IUD placement:  Endometrium, biopsy:   -Endometrial hyperplasia with abundant squamous morules, without atypia   -Chronic endometritis   -Negative for atypia or malignancy     7/31/19 ED visit for R flank pain (this resolved)  US:  Uterus 7.6 x 5.2 x 3.9 cm in size. Endometrial stripe 0.9  cm. IUD identified in the endometrial cavity. There is a focal  collection of clustered cysts in the cervix approximately 2.2 x 1.6 x  2.0 cm in size concerning for adenoma malignum.    8/6/19 seen at [facility] for f/u EmBx and discussion of US findings  A. ENDOMETRIAL BIOPSY:   -Focal residual atypical endometrial hyperplasia with squamous metaplasia   -Endometrium with decidualized stroma and inactive glands consistent with   progesterone use     B. ENDOCERVICAL CURETTINGS:   - Endocervical tissue with inflammatory and reactive change  "  ________________________________________________    OB/Gyn Hx:  P0  Pap - NIL 2015, 2018. Reports no abnormal  AUB/hyperplasia as detailed above  Sexually active w/ 1 male partner. Desires fertility and children in the future     PMH: DM2, obesity, PCOS  PSH: none  Meds: ibuprofen, Mirena IUD, metformin  All: NKDA    SocHx: Works as , lives with fiance, Mom Dad and sister. Denies tobacco, etOH, drug use.    Fam Hx: Both parents have DM2. No fam h/o uterine, ovarian, breast or colorectal cancers.    O:  /80 (BP Location: Left arm, Patient Position: Chair)   Pulse 88   Ht 1.448 m (4' 9\")   Wt 107.2 kg (236 lb 6.4 oz)   Breastfeeding? No   BMI 51.16 kg/m        Gen: NAD, morbidly obese    A/P:  27 yo P0 with h/o endometrial hyperplasia without atypia s/p 30d oral provera 10mg BID + Mirena IUD placement in Feb 2019-- now with focal endometrial hyperplasia WITH atypia. She also has complex cervical cysts on US, cannot rule out adenoma malignum    #Endometrial hyperplasia with atypia:  - Fertility highly desired. However, did discuss 40% risk of endometrial cancer present when biopsy shows CAH. Given [facility] handout of CAH: [email or web address]=print  - Given progression of pathology (vs missed on sampling in Feb 2019) recommend addition of oral progesterone - megace 80mg BID.   - Also, scheduled appointment with Gyn Oncology for consultation for Tues Sept 17th at 8am with Dr Rizo (also to address below problem)  - Pending recommendations by GynOnc, would plan return in 3mo for repeat biopsy vs D&C for complete sampling and IUD replacement    #Complex cervical cysts:  - Negative ECC does not rule out adenoma malignum per lit review  - Discussed with GynOnc fellow who recommended referral prior to MRI > scheduled Tues Sept 17th at 8am with Dr Rizo    Staffed with Dr Peter Jarquin MD PGY4  9/3/19    The " Patient was seen in Resident Continuity Clinic by JEAN JARQUIN.  I reviewed the history & exam. Assessment and plan were jointly made.    Joselyn Green MD        Again, thank you for allowing me to participate in the care of your patient.      Sincerely,    Jean Jarquin MD

## 2019-09-03 NOTE — NURSING NOTE
Chief Complaint   Patient presents with     Follow Up     Follow up AUB       See LIZ Pineda 9/3/2019

## 2019-09-03 NOTE — TELEPHONE ENCOUNTER
ONCOLOGY INTAKE: Records Information      APPT INFORMATION:  Referring provider:  Dr. Vandana Alejo MD  Referring provider s clinic:  [facility] [place] [facility]  Reason for visit/diagnosis:  Cyst of Cervix, endometrial hyperplasia  Has patient been notified of appointment date and time?: yes, per referring provider    RECORDS INFORMATION:  Were the records received with the referral (via [employer])? No, internal referral    Has patient been seen for any external appt for this diagnosis? no    If yes, where? na    Has patient had any imaging or procedures outside of [place] for this condition? no      If Yes, where? na    ADDITIONAL INFORMATION:  na

## 2019-09-04 ASSESSMENT — ANXIETY QUESTIONNAIRES: GAD7 TOTAL SCORE: 5

## 2019-09-04 NOTE — TELEPHONE ENCOUNTER
RECORDS STATUS - ALL OTHER DIAGNOSIS      RECORDS RECEIVED FROM: [facility]   DATE RECEIVED: Internal Referral (No outside records)   NOTES STATUS DETAILS   OFFICE NOTE from referring provider     OFFICE NOTE from medical oncologist     DISCHARGE SUMMARY from hospital     DISCHARGE REPORT from the ER     OPERATIVE REPORT     MEDICATION LIST     CLINICAL TRIAL TREATMENTS TO DATE     LABS     PATHOLOGY REPORTS     ANYTHING RELATED TO DIAGNOSIS     GENONOMIC TESTING     TYPE:     IMAGING (NEED IMAGES & REPORT)     CT SCANS     MRI     MAMMO     ULTRASOUND     PET

## 2019-09-09 ENCOUNTER — TELEPHONE (OUTPATIENT)
Dept: OBGYN | Facility: CLINIC | Age: 29
End: 2019-09-09

## 2019-09-09 NOTE — TELEPHONE ENCOUNTER
Prior Authorization Retail Medication Request    Medication/Dose: Megestrol  ICD code (if different than what is on RX):    Previously Tried and Failed:    Rationale:   Insurance Name:   Insurance ID:       Pharmacy Information (if different than what is on RX)  Name:    Phone:

## 2019-09-11 NOTE — TELEPHONE ENCOUNTER
Prior Authorization Retail Medication Request    Medication/Dose: megestrol acetate 40 mg tablets  Take two tablets by mouth twice a day  ICD code (if different than what is on RX):  N85.2 complex atypical endometrial hyperplasia  Previously Tried and Failed:  Biopsies  Follow up with gyn oncology clinic  Rationale:  Patient has endometrial hyperplasia-  Thinking about pregnancy in future and this is recommended treatment    Insurance Name:  [facility]  Insurance ID:  [id number]      Pharmacy Information (if different than what is on RX)  Name:  [place]  Phone:  [phone]

## 2019-09-11 NOTE — TELEPHONE ENCOUNTER
Central Prior Authorization Team   Phone: [phone]    PA Initiation    Medication: megestrol (MEGACE) 40 MG tablet  Insurance Company: [employer] - Phone [phone] Fax [phone]  Pharmacy Filling the Rx: [place], [place] - [address]  Filling Pharmacy Phone: [phone]  Filling Pharmacy Fax:    Start Date: 9/11/2019

## 2019-09-12 NOTE — TELEPHONE ENCOUNTER
Called insurance to get P/A denial reasons and the denial letter faxed to me.         PRIOR AUTHORIZATION DENIED    Medication: megestrol (MEGACE) 40 MG tablet - P/A DENIED    Denial Date: 9/11/2019    Denial Rational: Patient's condition is not an approved indication.    Appeal Information: fax coming with details

## 2019-09-17 ENCOUNTER — ONCOLOGY VISIT (OUTPATIENT)
Dept: ONCOLOGY | Facility: CLINIC | Age: 29
End: 2019-09-17
Attending: OBSTETRICS & GYNECOLOGY
Payer: COMMERCIAL

## 2019-09-17 ENCOUNTER — PRE VISIT (OUTPATIENT)
Dept: ONCOLOGY | Facility: CLINIC | Age: 29
End: 2019-09-17

## 2019-09-17 VITALS
HEART RATE: 89 BPM | WEIGHT: 236 LBS | BODY MASS INDEX: 50.92 KG/M2 | HEIGHT: 57 IN | TEMPERATURE: 98.2 F | DIASTOLIC BLOOD PRESSURE: 63 MMHG | SYSTOLIC BLOOD PRESSURE: 101 MMHG | RESPIRATION RATE: 14 BRPM | OXYGEN SATURATION: 99 %

## 2019-09-17 DIAGNOSIS — N85.02 COMPLEX ATYPICAL ENDOMETRIAL HYPERPLASIA: Primary | ICD-10-CM

## 2019-09-17 PROCEDURE — G0463 HOSPITAL OUTPT CLINIC VISIT: HCPCS | Mod: ZF

## 2019-09-17 PROCEDURE — 99205 OFFICE O/P NEW HI 60 MIN: CPT | Mod: ZP | Performed by: OBSTETRICS & GYNECOLOGY

## 2019-09-17 ASSESSMENT — PAIN SCALES - GENERAL: PAINLEVEL: NO PAIN (0)

## 2019-09-17 ASSESSMENT — MIFFLIN-ST. JEOR: SCORE: 1679.49

## 2019-09-17 NOTE — NURSING NOTE
"Oncology Rooming Note    September 17, 2019 7:52 AM   Joie Yadav is a 28 year old female who presents for:    Chief Complaint   Patient presents with     Oncology Clinic Visit     New Patient Visit for Cervical Cyst     Initial Vitals: /63 (BP Location: Right arm, Patient Position: Chair, Cuff Size: Adult Large)   Pulse 89   Temp 98.2  F (36.8  C) (Oral)   Resp 14   Ht 1.448 m (4' 9.01\")   Wt 107 kg (236 lb)   LMP  (LMP Unknown)   SpO2 99%   Breastfeeding? No   BMI 51.06 kg/m   Estimated body mass index is 51.06 kg/m  as calculated from the following:    Height as of this encounter: 1.448 m (4' 9.01\").    Weight as of this encounter: 107 kg (236 lb). Body surface area is 2.07 meters squared.  No Pain (0) Comment: Data Unavailable   No LMP recorded (lmp unknown). (Menstrual status: IUD).  Allergies reviewed: Yes  Medications reviewed: Yes    Medications: Medication refills not needed today.  Pharmacy name entered into [facility]:    [facility] [place], [place] - [address] [place] [address] [id number] [place], [place] - [address] [place] [address] AT [address] [place] [address]    Clinical concerns: Patient here with a family member for support.  She is wondering what her treatment options are if she has cancer.   Darrius was notified.      Tiffanie Nuñez, RN, MSN              "

## 2019-09-17 NOTE — LETTER
"2019       RE: Joie Yadav  [address]  [facility] [address]     Dear Colleague,    Thank you for referring your patient, Joie Yadav, to the [facility]. Please see a copy of my visit note below.    GYN Oncology New Patient Consult    Referring provider:    Vandana Jarquin MD  [facility]  [address]  [place] [address]   RE: Joie Yadav  : 1990  GEMMA: 2019      CC: endometrial hyperplasia, atypical appearing cervix    HPI: Ms Joie Yadav is a 28 year old year old female who presents for consultation regarding endometrial hyperplasia and atypical appearing cervix. She is here today with her fiance    Treatment History  Long history of PCOS  ~2018: Irregular heavy periods. Tried OCPs without help.   19: Endometrial biopsy with endometrial hyperplasia. Mirena IUD placed   19: Followup biopsy. Focal residual hyperplasia. (complex  Atypical) ECC inflammatory. Started on Provera 80 BID. Only took them for 3 days due to insurance coverage.       Today, she feels well. Still has IUD in.   No GI/ changes. Occasional cramping  Has struggled with her weight \"forever\". Lowest was 150, then eventually gained up to ~250. Now is usually around 230. Does see a nutritionist and DM specialist. Did see one physician at [facility] for potential bariatric surgery, but did not have a good experience. Cannot remember when she had normal monthly cycles.   Has been trying to get pregnant for ~ 3 years (prior to Mirena placement) and was unable  Vitiligo has worsening in the last 1-2 years     Review of Systems:  Systemic:No weight changes.    Skin : No skin changes or new lesions.   Eye : No changes in vision.   Pulmonary: No cough or SOB.   Cardiovascular: No CP or palpitations  Gastrointestinal : No diarrhea, constipation, abdominal pain. Bowel habits normal.   Genitourinary: No dysuria, urgency or " "bleeding  Psychiatric: No depression or anxiety  Hematologic : No palpable lymph nodes.   Endocrine : No hot flashes. No heat/cold intolerance.      Neurological: No headaches, no numbness.     Past Medical History:   Diagnosis Date     Diabetes (H)     Type 2     Endometrial hyperplasia without atypia, simple 2019     Obesity      PCOS (polycystic ovarian syndrome)      Vitiligo        Past Surgical History:   Procedure Laterality Date     NO HISTORY OF SURGERY          OBGYN history and Health Maintenance:    Last Pap Smear: , 2018  NILM per report  Last Mammogram: Had one in the past, normal  Last Colonoscopy: never    Current Outpatient Medications   Medication Sig Dispense Refill     ibuprofen (ADVIL/MOTRIN) 200 MG tablet Take 3 tablets (600 mg) by mouth every 6 hours as needed for mild pain 30 tablet 0     metFORMIN (GLUCOPHAGE) 500 MG tablet Take 500 mg by mouth 4 times daily (with meals and nightly)            No Known Allergies     Social History:  Social History     Tobacco Use     Smoking status: Never Smoker     Smokeless tobacco: Never Used   Substance Use Topics     Alcohol use: No     Work:  at [employer]   Ethnicity identification:   Preferred language: English  Lives at home with: Fiance, Mom, Dad and Sister    Family History:   The patient's family history is notable for:   No cancers in first degree relatives  DM on both sides of her family    Physical Exam:     /63 (BP Location: Right arm, Patient Position: Chair, Cuff Size: Adult Large)   Pulse 89   Temp 98.2  F (36.8  C) (Oral)   Resp 14   Ht 1.448 m (4' 9.01\")   Wt 107 kg (236 lb)   LMP  (LMP Unknown)   SpO2 99%   Breastfeeding? No   BMI 51.06 kg/m     Body mass index is 51.06 kg/m .    General: Alert and oriented, no acute distress  Skin: Multiple areas of vitiligo on her trunk/back/legs. Darkened area of skin behind the neck  (acanthosis nigricans)  Psych: Mood stable. Linear speech, " appropriate affect  Cardiovascular: RRR, no murmors  Pulmonary: Lungs clear . Normal respiratory effort  GI: No distention. No masses. No hernia.   Lymph: No enlarge lymph nodes in neck or groin  : Normal external genitalia. Cervix small, nulliparous, no cysts or abnormalities seen. IUD string in place.  Uterus and adenexa not palpated due to habitus    TVUS 7/31/19:   IMPRESSION:  1. Normal-sized right ovary without dominant cyst. Blood flow  identified in the right ovary.  2. Intrauterine device in good position.  3. Clustered cystic lesion in the cervix is concerning for adenoma  malignum. Recommend gynecologic consultation. Differential diagnosis  includes clustered nabothian cysts.     ERICA FLORES MD    Patient Name: REYES BENNETT   MR#: [id number]   Specimen #: [id number]   Collected: 8/6/2019   Received: 8/6/2019   Reported: 8/8/2019 16:15   Ordering Phy(s): KIM TORRES     For improved result formatting, select 'View Enhanced Report Format' under    Linked Documents section.     SPECIMEN(S):   A: Endometrial biopsy   B: Endocervical curettings     FINAL DIAGNOSIS:     A. ENDOMETRIAL BIOPSY:   -Focal residual atypical endometrial hyperplasia with squamous metaplasia   -Endometrium with decidualized stroma and inactive glands consistent with   progesterone use     B. ENDOCERVICAL CURETTINGS:   - Endocervical tissue with inflammatory and reactive change     I have personally reviewed all specimens and/or slides, including the   listed special stains, and used them   with my medical judgement to determine or confirm the final diagnosis.     Electronically signed out by:     Gracy Browne M.D., PhD, [facility]     CLINICAL HISTORY:     A 28-year-old obese female with, DM2, PCOS, and h/o endometrial   hyperplasia without atypia s/p 30d oral   provera &Mirena IUD placement in Feb 2019. A Pelvic ultrasound   demonstrated a focal collection of clustered   cysts in the cervix approximately  "2.2 x 1.6 x 2.0 cm in size concerning   for adenoma malignum.\"     GROSS:   A:  The specimen is received in formalin with proper patient   identification, labeled \"EMB\".  The specimen   consists of multiple segments of tan soft tissue aggregating to 3.0 x 2.2   x 0.4 cm.  It is filtered, wrapped   and entirely submitted in cassette A1.     B:  The specimen is received in formalin on a brush tip with proper   patient identification, labeled \"ECC\".   The specimen consists of multiple segments of tan- white gelatinous soft   tissue aggregating to 2.5 x 2.2 x 0.3   cm.  It is filtered, wrapped and entirely submitted in cassette B1.   (Dictated by: Trupti URIAS [facility]   8/7/2019 08:41 AM)     MICROSCOPIC:   Microscopic examination is performed.     [facility], with the professional component performed    at the [facility], [address] [place] [phone] ([phone])     CPT Codes:   A: 84234-KV4   B: 27758-SB4     COLLECTION SITE:   Client[facility]   Location: [facility] ()     Assessment:    Joie Yadav is a 28 year old woman with a new diagnosis of endometrial hyperplasia, abnormal cervix      Plan:     1.)   Endometrial hyperplasia: I am doubtful that this is true progression, although I have reached out to our surg path to review and compare these biopsies. Average time to regression is ~12 months. Given her morbid obesity, I am concerned about effects of systemic progestins as well    -Continue Mirena IUD  -Recommend resampling q 3 months. If no regression by 1 year, would readdress potential hysterectomy if still CAH/EIN. Ok to see OBGYN for repeat sampling     2.) Abnormal cervix: US images reviewed and there is a ~2cm new area of cysts in her cervix. Most likely nabothian " cysts, cannot rule out adenoma malignum. Exam not consistent with adenoma malignum. We discussion options including MRI and conization. We also discussion followup TVUS in 3 months. Although a conization would provide more info, I am worried about the effects on her fertility. She will likely already have a high risk pregnancy and a conization could worsen this.     -Will present at tumor conf tomorrow with radiology and determine if MRI necessary  -Likely TVUS followup in 3 months. If worsening cysts, consider CKC at this time. OK to see OBGYN although I am happy to help with CKC    3.) Obesity/metabolic syndrome: Discussed at length that PCOS, DM, and obesity are all related and have put her at risk for endometrial hyperplasia (as well as infertility).  We discussed the challenge of weight loss in her situation. Discussed that she likely is very sensitive to carbohydrates and that she has many signs and symptoms already of insulin resistence. She is seeing a nutritionist and DM educator in [facility]. I am recommending consideration of bariatric surgery which she declines at this time.       2.) Genetic risk factors were assessed: no indication for referral            A total of 55 minutes was spent with the patient, 50 minutes of which were spent in counseling the patient and/or treatment planning.    Екаетрина Rizo MD    Department of Ob/Gyn and Women's Health  Division of Gynecologic Oncology  [facility]  [phone]          Again, thank you for allowing me to participate in the care of your patient.      Sincerely,    Екатерина Rizo MD

## 2019-09-17 NOTE — PROGRESS NOTES
"GYN Oncology New Patient Consult    Referring provider:    Vandana Jarquin MD  [address] [address]   RE: Joie Yadav  : 1990  GEMMA: 2019      CC: endometrial hyperplasia, atypical appearing cervix    HPI: Ms Joie Yadav is a 28 year old year old female who presents for consultation regarding endometrial hyperplasia and atypical appearing cervix. She is here today with her fiance    Treatment History  Long history of PCOS  ~2018: Irregular heavy periods. Tried OCPs without help.   19: Endometrial biopsy with endometrial hyperplasia. Mirena IUD placed   19: Followup biopsy. Focal residual hyperplasia. (complex  Atypical) ECC inflammatory. Started on Provera 80 BID. Only took them for 3 days due to insurance coverage.       Today, she feels well. Still has IUD in.   No GI/ changes. Occasional cramping  Has struggled with her weight \"forever\". Lowest was 150, then eventually gained up to ~250. Now is usually around 230. Does see a nutritionist and DM specialist. Did see one physician at [facility] for potential bariatric surgery, but did not have a good experience. Cannot remember when she had normal monthly cycles.   Has been trying to get pregnant for ~ 3 years (prior to Mirena placement) and was unable  Vitiligo has worsening in the last 1-2 years     Review of Systems:  Systemic:No weight changes.    Skin : No skin changes or new lesions.   Eye : No changes in vision.   Pulmonary: No cough or SOB.   Cardiovascular: No CP or palpitations  Gastrointestinal : No diarrhea, constipation, abdominal pain. Bowel habits normal.   Genitourinary: No dysuria, urgency or bleeding  Psychiatric: No depression or anxiety  Hematologic : No palpable lymph nodes.   Endocrine : No hot flashes. No heat/cold intolerance.      Neurological: No headaches, no numbness.     Past Medical History:   Diagnosis Date     Diabetes (H)     Type 2     " "Endometrial hyperplasia without atypia, simple 2019     Obesity      PCOS (polycystic ovarian syndrome)      Vitiligo        Past Surgical History:   Procedure Laterality Date     NO HISTORY OF SURGERY          OBGYN history and Health Maintenance:    Last Pap Smear: ,   NILM per report  Last Mammogram: Had one in the past, normal  Last Colonoscopy: never    Current Outpatient Medications   Medication Sig Dispense Refill     ibuprofen (ADVIL/MOTRIN) 200 MG tablet Take 3 tablets (600 mg) by mouth every 6 hours as needed for mild pain 30 tablet 0     metFORMIN (GLUCOPHAGE) 500 MG tablet Take 500 mg by mouth 4 times daily (with meals and nightly)            No Known Allergies     Social History:  Social History     Tobacco Use     Smoking status: Never Smoker     Smokeless tobacco: Never Used   Substance Use Topics     Alcohol use: No     Work:  at [employer] on [employer]   Ethnicity identification:   Preferred language: English  Lives at home with: Fiance, Mom, Dad and Sister    Family History:   The patient's family history is notable for:   No cancers in first degree relatives  DM on both sides of her family    Physical Exam:     /63 (BP Location: Right arm, Patient Position: Chair, Cuff Size: Adult Large)   Pulse 89   Temp 98.2  F (36.8  C) (Oral)   Resp 14   Ht 1.448 m (4' 9.01\")   Wt 107 kg (236 lb)   LMP  (LMP Unknown)   SpO2 99%   Breastfeeding? No   BMI 51.06 kg/m    Body mass index is 51.06 kg/m .    General: Alert and oriented, no acute distress  Skin: Multiple areas of vitiligo on her trunk/back/legs. Darkened area of skin behind the neck  (acanthosis nigricans)  Psych: Mood stable. Linear speech, appropriate affect  Cardiovascular: RRR, no murmors  Pulmonary: Lungs clear . Normal respiratory effort  GI: No distention. No masses. No hernia.   Lymph: No enlarge lymph nodes in neck or groin  : Normal external genitalia. Cervix small, nulliparous, no cysts or " "abnormalities seen. IUD string in place.  Uterus and adenexa not palpated due to habitus    TVUS 7/31/19:   IMPRESSION:  1. Normal-sized right ovary without dominant cyst. Blood flow  identified in the right ovary.  2. Intrauterine device in good position.  3. Clustered cystic lesion in the cervix is concerning for adenoma  malignum. Recommend gynecologic consultation. Differential diagnosis  includes clustered nabothian cysts.     ERICA FLORES MD    Patient Name: REYES BENNETT   MR#: [id number]   Specimen #: [id number]   Collected: 8/6/2019   Received: 8/6/2019   Reported: 8/8/2019 16:15   Ordering Phy(s): KIM TORRES     For improved result formatting, select 'View Enhanced Report Format' under    Linked Documents section.     SPECIMEN(S):   A: Endometrial biopsy   B: Endocervical curettings     FINAL DIAGNOSIS:     A. ENDOMETRIAL BIOPSY:   -Focal residual atypical endometrial hyperplasia with squamous metaplasia   -Endometrium with decidualized stroma and inactive glands consistent with   progesterone use     B. ENDOCERVICAL CURETTINGS:   - Endocervical tissue with inflammatory and reactive change     I have personally reviewed all specimens and/or slides, including the   listed special stains, and used them   with my medical judgement to determine or confirm the final diagnosis.     Electronically signed out by:     Gracy Browne M.D., PhD, [facility]     CLINICAL HISTORY:     A 28-year-old obese female with, DM2, PCOS, and h/o endometrial   hyperplasia without atypia s/p 30d oral   provera &Mirena IUD placement in Feb 2019. A Pelvic ultrasound   demonstrated a focal collection of clustered   cysts in the cervix approximately 2.2 x 1.6 x 2.0 cm in size concerning   for adenoma malignum.\"     GROSS:   A:  The specimen is received in formalin with proper patient   identification, labeled \"EMB\".  The specimen   consists of multiple segments of tan soft tissue aggregating to 3.0 x 2.2   x 0.4 " "cm.  It is filtered, wrapped   and entirely submitted in cassette A1.     B:  The specimen is received in formalin on a brush tip with proper   patient identification, labeled \"ECC\".   The specimen consists of multiple segments of tan- white gelatinous soft   tissue aggregating to 2.5 x 2.2 x 0.3   cm.  It is filtered, wrapped and entirely submitted in cassette B1.   (Dictated by: Trupti URIAS [facility]   8/7/2019 08:41 AM)     MICROSCOPIC:   Microscopic examination is performed.     The technical component of this testing was completed at the [facility], with the professional component performed    at the [facility], [address] [place] [phone] ([phone])     CPT Codes:   A: 47551-NO0   B: 09235-RC6     COLLECTION SITE:   [facility]   [facility]     Assessment:    Joie Yadav is a 28 year old woman with a new diagnosis of endometrial hyperplasia, abnormal cervix      Plan:     1.)   Endometrial hyperplasia: I am doubtful that this is true progression, although I have reached out to our surg path to review and compare these biopsies. Average time to regression is ~12 months. Given her morbid obesity, I am concerned about effects of systemic progestins as well    -Continue Mirena IUD  -Recommend resampling q 3 months. If no regression by 1 year, would readdress potential hysterectomy if still CAH/EIN. Ok to see OBGYN for repeat sampling     2.) Abnormal cervix: US images reviewed and there is a ~2cm new area of cysts in her cervix. Most likely nabothian cysts, cannot rule out adenoma malignum. Exam not consistent with adenoma malignum. We discussion options including MRI and conization. We also discussion followup TVUS in 3 months. Although a conization would provide more info, I am worried about the effects on her " fertility. She will likely already have a high risk pregnancy and a conization could worsen this.     -Will present at tumor conf tomorrow with radiology and determine if MRI necessary  -Likely TVUS followup in 3 months. If worsening cysts, consider CKC at this time. OK to see OBGYN although I am happy to help with CKC    3.) Obesity/metabolic syndrome: Discussed at length that PCOS, DM, and obesity are all related and have put her at risk for endometrial hyperplasia (as well as infertility).  We discussed the challenge of weight loss in her situation. Discussed that she likely is very sensitive to carbohydrates and that she has many signs and symptoms already of insulin resistence. She is seeing a nutritionist and DM educator in [facility]. I am recommending consideration of bariatric surgery which she declines at this time.       2.) Genetic risk factors were assessed: no indication for referral            A total of 55 minutes was spent with the patient, 50 minutes of which were spent in counseling the patient and/or treatment planning.    Екатерина Rizo MD    Department of Ob/Gyn and Women's Health  Division of Gynecologic Oncology  [facility]  [phone]

## 2019-10-01 ENCOUNTER — TELEPHONE (OUTPATIENT)
Dept: OBGYN | Facility: CLINIC | Age: 29
End: 2019-10-01

## 2019-10-01 NOTE — TELEPHONE ENCOUNTER
Received callback from Joie - advised she needs to schedule appointment for endometrial biopsy in early November - she agreed with plan and was forwarded to  at the end of the call.    She asked about the medication - megestrol. Informed her that the PA was denied by her insurance but nurse would forward it to Dr. Green to see if there is something else can be ordered. She agreed with plan.    Forwarding to Dr. Green.

## 2019-10-01 NOTE — TELEPHONE ENCOUNTER
----- Message from Osmani Beniteznick sent at 10/1/2019  1:50 PM CDT -----  Regarding: confusion on what to schedule PT for  Contact: [phone]  PT stated she needs to schedule a biopsy but I read in her last office notes to schedule a follow up in December but I did not see anything about a biopsy.  Please review and follow up with the PT with clarification on what she should be scheduled for.    Thank you,  Osmani    Call Center

## 2019-10-01 NOTE — TELEPHONE ENCOUNTER
"Nurse reviewed patient chart and called patient but reached voicemail. Pt needs endometrial biopsy in early November (Recommend sampling Q 3 months, last embx in early August). Can give to  to schedule when she returns call.     Per Gyn Onc:    \" Endometrial hyperplasia: .....   -Continue Mirena IUD  -Recommend resampling q 3 months. If no regression by 1 year, would readdress potential hysterectomy if still CAH/EIN. Ok to see OBGYN for repeat sampling\"       "

## 2019-10-02 NOTE — TELEPHONE ENCOUNTER
Reviewed notes from Dr. Rizo. Plan to just continue with Mirena IUD at this time, no additional progesterone.     Joselyn Green MD

## 2019-10-02 NOTE — TELEPHONE ENCOUNTER
Nurse called patient to inform that per Dr. Green's review of Dr. Rizo's plan, no need for additional progesterone and the megestrol does not need appeal or alternative. OK to continue Mirena IUD.     Left detailed message on patient voicemail, advised to call triage if any further questions

## 2019-11-07 ENCOUNTER — TELEPHONE (OUTPATIENT)
Dept: OBGYN | Facility: CLINIC | Age: 29
End: 2019-11-07

## 2019-11-07 DIAGNOSIS — N88.8 CYST OF CERVIX: Primary | ICD-10-CM

## 2019-11-07 NOTE — PROGRESS NOTES
[facility] Follow Up    Ms Diogo Yadav is a 28 G0 with complex atypical hyperplasia of the endometrium and complex cervical cyst. She has hada Mirena IUD in plance since 2/2019 and is here for surveillance endometrial biopsy and repeat US for cervical cyst. She is s/p gyn onc consult 9/2019 who recommended this follow up plan.  She reports spotting, not heavy bleeding. No complaints. Lost 2#s since September.    History of AUB/hyperplasia:  1/10/19: ED visit for AUB, constant bleeding  US:  The uterus is normal in size measuring 9.6 x 4.6 x 5.3 cm. No fibroids  are evident. Endometrial stripe measures 26 mm and is abnormally  thickened and heterogeneous for patient's age. No associated color  Doppler flow within the endometrium. Multiple nabothian cysts are  present. The right ovary is normal measuring 2.5 x 1.8 x 2.4 cm. The  left ovary is not visualized. No adnexal masses are present. No free  pelvic fluid is present.     2/18/19 [facility] clinic appt w/ EmBx and Mirena IUD placement:  Endometrium, biopsy:   -Endometrial hyperplasia with abundant squamous morules, without atypia   -Chronic endometritis   -Negative for atypia or malignancy      7/31/19 ED visit for R flank pain (this resolved)  US:  Uterus 7.6 x 5.2 x 3.9 cm in size. Endometrial stripe 0.9  cm. IUD identified in the endometrial cavity. There is a focal  collection of clustered cysts in the cervix approximately 2.2 x 1.6 x  2.0 cm in size concerning for adenoma malignum.     8/6/19 seen at [facility] for f/u EmBx and discussion of US findings  Progesterone BID started, took for 3d due to insurance issue.    A. ENDOMETRIAL BIOPSY:   -Focal residual atypical endometrial hyperplasia with squamous metaplasia   -Endometrium with decidualized stroma and inactive glands consistent with   progesterone use     B. ENDOCERVICAL CURETTINGS:   - Endocervical tissue with inflammatory and reactive change     9/17/19 GynOnc Consult:  Recommendations: No additional progesterone  "indicated. Cont Mirena IUD. Repeat US and EmBx in 3mo > If cysts worsen recommend CKC. If endometrial hyperplasia persistent for 1 year s/p Mirena (I.e. 2020) recommend addressing definitive hysterectomy  ________________________________________________       O:  Wt 106.1 kg (234 lb)   Breastfeeding? No   BMI 50.62 kg/m       Gen: NAD  Abd: soft, nontender  Pelvic: normal external genitalia with vitiligo. Vagina mucosa well rugated with physiologic discharge. Cervix without lesions, IUD string visualized.    US (19) - Formal read not yet read but reviewed with Dr Godwin - cyst no measures 2.51x1.68x2.27cm.    A/P:  Ms Diogo Yadav is a 28 G0 with complex atypical hyperplasia of the endometrium s/p Mirena IUD placement in 2019 - persistent on last EmBx 3mo ago, and asymptomatic complex cervical cyst.    #Complex atypical endometrial hyperplasia:  - EmBX done as per procedure note. Patient prefers phone call with results, ok to leave VM.  - Repeat EmBx in 3mo if no carcinoma - if persistent CAH/EIN at that time, as per GynOnc recs will discuss definitive treatment via hysterectomy    #Cervical cysts:  Minimal growth, may just be stable.  Repeat imaging in 3mo at time of next EMBx  HPV collected today  If continued growth refer to GynOnc for CKC    Discussed with Dr Tato Jarquin MD     Endometrial Biopsy    Menstrual History:  Menstrual History 2017 1/10/2019 2019   LAST MENSTRUAL PERIOD 3/8/2017 2018 2019       Time Out - \"Pause for the Cause\"  Just before the procedure begins, through verbal and active participation of team members, verify:                      Initials   Patient Name SMB   Patient  SMB   Procedure to be performed SMB                                                                                                                                      Indication: CAH/EIN    Consent: Risks, benefits of treatment, and no treatment were discussed.  " Patient's questions were elicited and answered.  and Written consent signed and scanned into medical record.    Using a medium Graves speculum, the cervix was visualized. Anterior lip grasped with tenaculum to bring into view. HPV collected intracervically. The cervix was prepped with Betadine. The endometrial pipelle was advanced through the cervix without difficulty and a sample collected. No additional pass was made.  The tenaculum was removed from the cervix and the tenaculum site was hemostatic.    EBL: 1mL    Complications:  none  Pathology: EMB sample was sent to pathology.  Tolerance of Procedure:  Patient did tolerate the procedure well. She received 600mg ibuprofen following the procedure.     Patient was instructed to call if she experiences any heavy bleeding, severe cramping, or abnormal vaginal discharge.  May take ibuprofen 400-800 mg PO TID PRN or naproxen 500 mg PO BID for cramping.    Will notify patient of results via PHONE, can leave VM.    Vandana Jarquin MD        I have seen and examined the patient with the resident. I have reviewed, edited, and agree with the note.   I supervised the biopsy.   Renate Godwin MD

## 2019-11-07 NOTE — TELEPHONE ENCOUNTER
Received call from Dr. Jarquin that she would like patient to have pelvic US before appointment tomorrow. Patient agreeable. She will come early for ultrasound tomorrow before her appointment.

## 2019-11-08 ENCOUNTER — ANCILLARY PROCEDURE (OUTPATIENT)
Dept: ULTRASOUND IMAGING | Facility: CLINIC | Age: 29
End: 2019-11-08
Attending: OBSTETRICS & GYNECOLOGY
Payer: COMMERCIAL

## 2019-11-08 ENCOUNTER — OFFICE VISIT (OUTPATIENT)
Dept: OBGYN | Facility: CLINIC | Age: 29
End: 2019-11-08
Payer: COMMERCIAL

## 2019-11-08 VITALS — WEIGHT: 234 LBS | BODY MASS INDEX: 50.62 KG/M2

## 2019-11-08 DIAGNOSIS — N88.8 CYST OF CERVIX: ICD-10-CM

## 2019-11-08 DIAGNOSIS — N85.02 COMPLEX ATYPICAL ENDOMETRIAL HYPERPLASIA: Primary | ICD-10-CM

## 2019-11-08 PROCEDURE — 87624 HPV HI-RISK TYP POOLED RSLT: CPT | Performed by: OBSTETRICS & GYNECOLOGY

## 2019-11-08 PROCEDURE — 88305 TISSUE EXAM BY PATHOLOGIST: CPT | Performed by: OBSTETRICS & GYNECOLOGY

## 2019-11-08 PROCEDURE — 58100 BIOPSY OF UTERUS LINING: CPT | Mod: ZF | Performed by: STUDENT IN AN ORGANIZED HEALTH CARE EDUCATION/TRAINING PROGRAM

## 2019-11-08 PROCEDURE — 76830 TRANSVAGINAL US NON-OB: CPT

## 2019-11-08 ASSESSMENT — PAIN SCALES - GENERAL: PAINLEVEL: NO PAIN (0)

## 2019-11-08 NOTE — PATIENT INSTRUCTIONS
You had an endometrial biopsy done and pelvic US.  Your pelvic US shows slight enlargement of the cervical cysts. Please schedule a repeat US in 3 months  You will be called with results of the endometrial biopsy and HPV test.  Nothing in the vagina for 3 days.   Use tylenol and ibuprofen as needed for cramping pain    Call if you have pain not controled, nausea and vomiting or fever and chills.

## 2019-11-12 LAB
FINAL DIAGNOSIS: NORMAL
HPV HR 12 DNA CVX QL NAA+PROBE: NEGATIVE
HPV16 DNA SPEC QL NAA+PROBE: NEGATIVE
HPV18 DNA SPEC QL NAA+PROBE: NEGATIVE
SPECIMEN DESCRIPTION: NORMAL
SPECIMEN SOURCE CVX/VAG CYTO: NORMAL

## 2019-11-13 LAB — COPATH REPORT: NORMAL

## 2020-02-14 ENCOUNTER — OFFICE VISIT (OUTPATIENT)
Dept: OBGYN | Facility: CLINIC | Age: 30
End: 2020-02-14
Attending: STUDENT IN AN ORGANIZED HEALTH CARE EDUCATION/TRAINING PROGRAM
Payer: COMMERCIAL

## 2020-02-14 ENCOUNTER — ANCILLARY PROCEDURE (OUTPATIENT)
Dept: ULTRASOUND IMAGING | Facility: CLINIC | Age: 30
End: 2020-02-14
Attending: OBSTETRICS & GYNECOLOGY
Payer: COMMERCIAL

## 2020-02-14 VITALS
BODY MASS INDEX: 50.7 KG/M2 | SYSTOLIC BLOOD PRESSURE: 126 MMHG | HEIGHT: 57 IN | DIASTOLIC BLOOD PRESSURE: 86 MMHG | WEIGHT: 235 LBS | HEART RATE: 105 BPM

## 2020-02-14 DIAGNOSIS — N89.8 VAGINAL DISCHARGE: ICD-10-CM

## 2020-02-14 DIAGNOSIS — N91.3 PRIMARY OLIGOMENORRHEA: ICD-10-CM

## 2020-02-14 DIAGNOSIS — N88.8 CYST OF CERVIX: ICD-10-CM

## 2020-02-14 DIAGNOSIS — N85.02 ENDOMETRIAL HYPERPLASIA WITH ATYPIA: Primary | ICD-10-CM

## 2020-02-14 DIAGNOSIS — B37.31 YEAST VAGINITIS: ICD-10-CM

## 2020-02-14 LAB
CLUE CELLS: NEGATIVE
TRICHOMONAS (WET PREP): NEGATIVE
YEAST (WET PREP): POSITIVE

## 2020-02-14 PROCEDURE — 87210 SMEAR WET MOUNT SALINE/INK: CPT | Mod: ZF | Performed by: STUDENT IN AN ORGANIZED HEALTH CARE EDUCATION/TRAINING PROGRAM

## 2020-02-14 PROCEDURE — 76830 TRANSVAGINAL US NON-OB: CPT

## 2020-02-14 PROCEDURE — G0463 HOSPITAL OUTPT CLINIC VISIT: HCPCS | Mod: ZF

## 2020-02-14 RX ORDER — FLUCONAZOLE 150 MG/1
TABLET ORAL
Qty: 2 TABLET | Refills: 0 | Status: SHIPPED | OUTPATIENT
Start: 2020-02-14 | End: 2020-02-18

## 2020-02-14 ASSESSMENT — ANXIETY QUESTIONNAIRES
GAD7 TOTAL SCORE: 8
5. BEING SO RESTLESS THAT IT IS HARD TO SIT STILL: NOT AT ALL
2. NOT BEING ABLE TO STOP OR CONTROL WORRYING: SEVERAL DAYS
6. BECOMING EASILY ANNOYED OR IRRITABLE: SEVERAL DAYS
1. FEELING NERVOUS, ANXIOUS, OR ON EDGE: SEVERAL DAYS
7. FEELING AFRAID AS IF SOMETHING AWFUL MIGHT HAPPEN: SEVERAL DAYS
3. WORRYING TOO MUCH ABOUT DIFFERENT THINGS: MORE THAN HALF THE DAYS

## 2020-02-14 ASSESSMENT — MIFFLIN-ST. JEOR: SCORE: 1669.83

## 2020-02-14 ASSESSMENT — PATIENT HEALTH QUESTIONNAIRE - PHQ9: 5. POOR APPETITE OR OVEREATING: MORE THAN HALF THE DAYS

## 2020-02-14 NOTE — LETTER
2/14/2020       RE: Joie Yadav  [address] [id number]     Dear Colleague,    Thank you for referring your patient, Joie Yadav, to the [facility] at [facility]. Please see a copy of my visit note below.    [facility] Follow Up    Ms Diogo Yaadv is a 28 G0 with complex atypical hyperplasia of the endometrium and complex cervical cyst. She has had a Mirena IUD in place since 2/2019 and is here for surveillance endometrial biopsy and repeat US for cervical cyst. She is s/p gyn onc consult 9/2019 who recommended this follow up plan.  She reports rare spotting, not heavy bleeding. No complaints. Weight stable.    History of AUB/hyperplasia:  1/10/19: ED visit for AUB, constant bleeding  US:  The uterus is normal in size measuring 9.6 x 4.6 x 5.3 cm. No fibroids  are evident. Endometrial stripe measures 26 mm and is abnormally  thickened and heterogeneous for patient's age. No associated color  Doppler flow within the endometrium. Multiple nabothian cysts are  present. The right ovary is normal measuring 2.5 x 1.8 x 2.4 cm. The  left ovary is not visualized. No adnexal masses are present. No free  pelvic fluid is present.     2/18/19 [facility] clinic appt w/ EmBx and Mirena IUD placement:  Endometrium, biopsy:   -Endometrial hyperplasia with abundant squamous morules, without atypia   -Chronic endometritis   -Negative for atypia or malignancy      7/31/19 ED visit for R flank pain (this resolved)  US:  Uterus 7.6 x 5.2 x 3.9 cm in size. Endometrial stripe 0.9  cm. IUD identified in the endometrial cavity. There is a focal  collection of clustered cysts in the cervix approximately 2.2 x 1.6 x  2.0 cm in size concerning for adenoma malignum.     8/6/19 seen at [facility] for f/u EmBx and discussion of US findings  Progesterone BID started, took for 3d due to insurance issue.    A. ENDOMETRIAL BIOPSY:   -Focal residual atypical endometrial  "hyperplasia with squamous metaplasia   -Endometrium with decidualized stroma and inactive glands consistent with   progesterone use     B. ENDOCERVICAL CURETTINGS:   - Endocervical tissue with inflammatory and reactive change     9/17/19 GynOnc Consult:  Recommendations: No additional progesterone indicated. Cont Mirena IUD. Repeat US and EmBx in 3mo > If cysts worsen recommend CKC. If endometrial hyperplasia persistent for 1 year s/p Mirena (I.e. Feb 2020) recommend addressing definitive hysterectomy    11/8/2019:  US -  Uterine findings:              Presence: Visible Size: Normal 5.3 x 5.2 x 4.0 cm.  Endometrium = 10.0 mm. IUD in place              Cx length = 30.7 mm. Multiple cystic areas without increased blood flow.   EmBx - Inactive endometrial glands and decidualized stroma, consistent with exogenous progesterone effect   ________________________________________________       O:  /86 (BP Location: Right arm, Patient Position: Chair)   Pulse 105   Ht 1.448 m (4' 9\")   Wt 106.6 kg (235 lb)   BMI 50.85 kg/m        Gen: NAD  Abd: soft, nontender  Pelvic: normal external genitalia with vitiligo of inner thighs. Vagina mucosa erythematous, extremely sensitive with thick white discharge adherent to the mucosal walls. Cervix without lesions, IUD string visualized.    US (2/14/2020): Uterine findings:              Presence: Visible Size: Normal 5.3 x 4.5 x 3.9  cm.  Endometrium = 8.1 mm. IUD appears in place.              Cx length = 26.3 mm. Multiple cystic areas without increased blood flow measuring 2.3 x 2.1 x 1.5cm, similar as previously seen on 11/08/19.    A/P:  Ms Diogo Yadav is a 29 G0 with complex atypical hyperplasia of the endometrium s/p Mirena IUD placement in 2/2019 - persistent CAH/EIN in 8/2019, resolved on last biopsy (11/2019), and asymptomatic complex cervical cyst.    #Complex atypical endometrial hyperplasia:  - Unable to complete EMBx today given severe yeast vaginitis. Will return " to clinic in 1-2 weeks for repeat attempt at biopsy.        - if CAH/EIN on this biopsy, as per GynOnc recs will discuss definitive treatment via hysterectomy        - if negative for CAH/EIN, discussed would be safe to try to conceive. Concern for ability to conceive without assisted reproduction given irregular menses baseline, briefly discussed. Also discussed importance of weight loss to both prevent recurrence of CAH and improve chances of successful pregnancy. Discussed referral to weight loss clinic as option short of bariatric surgery, which she had previously discussed with Dr Rizo but was not interested in. She does endorse increase walking and trying to make diet changes recently  - Did not discuss today but will be important to discuss criteria for this at our clinic include BMI<40. Many [facility] clinics may have similar requirements.     #Oligomenorrhea/Infertility:  FSH, LH, E2, prolactin, TSH, AMH as not done previously    #Cervical cysts:  Stable on US today, will cease monitoring  HPV negative    Discussed with Dr Kearney  Follow up appt in 1-2 weeks for EMBx and discuss hormone lab results    Jean Jarquin MD     The Patient was seen in [facility] by JEAN JARQUIN.  I reviewed the history & exam.  I assisted and attempted the endometrial biopsy with Dr. Jarquin.  Assessment and plan were jointly made.    Anmnarie Kearney MD

## 2020-02-14 NOTE — PROGRESS NOTES
[facility] Follow Up    Ms Diogo Yadav is a 28 G0 with complex atypical hyperplasia of the endometrium and complex cervical cyst. She has had a Mirena IUD in place since 2/2019 and is here for surveillance endometrial biopsy and repeat US for cervical cyst. She is s/p gyn onc consult 9/2019 who recommended this follow up plan.  She reports rare spotting, not heavy bleeding. No complaints. Weight stable.    History of AUB/hyperplasia:  1/10/19: ED visit for AUB, constant bleeding  US:  The uterus is normal in size measuring 9.6 x 4.6 x 5.3 cm. No fibroids  are evident. Endometrial stripe measures 26 mm and is abnormally  thickened and heterogeneous for patient's age. No associated color  Doppler flow within the endometrium. Multiple nabothian cysts are  present. The right ovary is normal measuring 2.5 x 1.8 x 2.4 cm. The  left ovary is not visualized. No adnexal masses are present. No free  pelvic fluid is present.     2/18/19 [facility] clinic appt w/ EmBx and Mirena IUD placement:  Endometrium, biopsy:   -Endometrial hyperplasia with abundant squamous morules, without atypia   -Chronic endometritis   -Negative for atypia or malignancy      7/31/19 ED visit for R flank pain (this resolved)  US:  Uterus 7.6 x 5.2 x 3.9 cm in size. Endometrial stripe 0.9  cm. IUD identified in the endometrial cavity. There is a focal  collection of clustered cysts in the cervix approximately 2.2 x 1.6 x  2.0 cm in size concerning for adenoma malignum.     8/6/19 seen at [facility] for f/u EmBx and discussion of US findings  Progesterone BID started, took for 3d due to insurance issue.    A. ENDOMETRIAL BIOPSY:   -Focal residual atypical endometrial hyperplasia with squamous metaplasia   -Endometrium with decidualized stroma and inactive glands consistent with   progesterone use     B. ENDOCERVICAL CURETTINGS:   - Endocervical tissue with inflammatory and reactive change     9/17/19 GynOnc Consult:  Recommendations: No additional progesterone indicated.  "Cont Mirena IUD. Repeat US and EmBx in 3mo > If cysts worsen recommend CKC. If endometrial hyperplasia persistent for 1 year s/p Mirena (I.e. Feb 2020) recommend addressing definitive hysterectomy    11/8/2019:  US -  Uterine findings:              Presence: Visible Size: Normal 5.3 x 5.2 x 4.0 cm.  Endometrium = 10.0 mm. IUD in place              Cx length = 30.7 mm. Multiple cystic areas without increased blood flow.   EmBx - Inactive endometrial glands and decidualized stroma, consistent with exogenous progesterone effect   ________________________________________________       O:  /86 (BP Location: Right arm, Patient Position: Chair)   Pulse 105   Ht 1.448 m (4' 9\")   Wt 106.6 kg (235 lb)   BMI 50.85 kg/m       Gen: NAD  Abd: soft, nontender  Pelvic: normal external genitalia with vitiligo of inner thighs. Vagina mucosa erythematous, extremely sensitive with thick white discharge adherent to the mucosal walls. Cervix without lesions, IUD string visualized.    US (2/14/2020): Uterine findings:              Presence: Visible Size: Normal 5.3 x 4.5 x 3.9  cm.  Endometrium = 8.1 mm. IUD appears in place.              Cx length = 26.3 mm. Multiple cystic areas without increased blood flow measuring 2.3 x 2.1 x 1.5cm, similar as previously seen on 11/08/19.    A/P:  Ms Diogo Yadav is a 29 G0 with complex atypical hyperplasia of the endometrium s/p Mirena IUD placement in 2/2019 - persistent CAH/EIN in 8/2019, resolved on last biopsy (11/2019), and asymptomatic complex cervical cyst.    #Complex atypical endometrial hyperplasia:  - Unable to complete EMBx today given severe yeast vaginitis. Will return to clinic in 1-2 weeks for repeat attempt at biopsy.        - if CAH/EIN on this biopsy, as per GynOnc recs will discuss definitive treatment via hysterectomy        - if negative for CAH/EIN, discussed would be safe to try to conceive. Concern for ability to conceive without assisted reproduction given " irregular menses baseline, briefly discussed. Also discussed importance of weight loss to both prevent recurrence of CAH and improve chances of successful pregnancy. Discussed referral to weight loss clinic as option short of bariatric surgery, which she had previously discussed with Dr Rizo but was not interested in. She does endorse increase walking and trying to make diet changes recently  - Did not discuss today but will be important to discuss criteria for this at our clinic include BMI<40. Many [facility] clinics may have similar requirements.     #Oligomenorrhea/Infertility:  FSH, LH, E2, prolactin, TSH, AMH as not done previously    #Cervical cysts:  Stable on US today, will cease monitoring  HPV negative    Discussed with Dr Kearney  Follow up appt in 1-2 weeks for EMBx and discuss hormone lab results    Jean Jarquin MD     The Patient was seen in [facility] by JEAN JARQUIN.  I reviewed the history & exam.  I assisted and attempted the endometrial biopsy with Dr. Jarqiun.  Assessment and plan were jointly made.    Annmarie Kearney MD

## 2020-02-14 NOTE — PATIENT INSTRUCTIONS
You have a yeast infection - medication was sent to [facility] on [address].  Schedule appointment to attempt endometrial biopsy once infection resolved. Dr Jarquin is in clinic Tues 2/18 or any other day/time that works for you.  Go to lab to have infertility labs drawn.  Consider weight loss clinic, I would be happy to provide referral.  Follow up with your primary care provider to ensure adequate control on diabetes as this can predispose to yeast infections.

## 2020-02-15 ENCOUNTER — APPOINTMENT (OUTPATIENT)
Dept: LAB | Facility: CLINIC | Age: 30
End: 2020-02-15
Attending: STUDENT IN AN ORGANIZED HEALTH CARE EDUCATION/TRAINING PROGRAM
Payer: COMMERCIAL

## 2020-02-15 ASSESSMENT — ANXIETY QUESTIONNAIRES: GAD7 TOTAL SCORE: 8

## 2020-02-17 NOTE — PROGRESS NOTES
[facility] Follow Up    Ms Diogo Yadav is a 28 G0 with complex atypical hyperplasia of the endometrium. She has had a Mirena IUD in place since 2/2019 and is here for surveillance endometrial biopsy. She is s/p gyn onc consult 9/2019 who recommended this follow up plan.  She was seen 2/14 and unable to complete bx due to yeast vaginitis, s/p diflucan and here for bx today. Lower abdominal pain and vaginal irritation now much improved and ready to try biopsy again.    History of AUB/hyperplasia:  1/10/19: ED visit for AUB, constant bleeding  US:  The uterus is normal in size measuring 9.6 x 4.6 x 5.3 cm. No fibroids  are evident. Endometrial stripe measures 26 mm and is abnormally  thickened and heterogeneous for patient's age. No associated color  Doppler flow within the endometrium. Multiple nabothian cysts are  present. The right ovary is normal measuring 2.5 x 1.8 x 2.4 cm. The  left ovary is not visualized. No adnexal masses are present. No free  pelvic fluid is present.     2/18/19 [facility] clinic appt w/ EmBx and Mirena IUD placement:  Endometrium, biopsy:   -Endometrial hyperplasia with abundant squamous morules, without atypia   -Chronic endometritis   -Negative for atypia or malignancy      7/31/19 ED visit for R flank pain (this resolved)  US:  Uterus 7.6 x 5.2 x 3.9 cm in size. Endometrial stripe 0.9  cm. IUD identified in the endometrial cavity. There is a focal  collection of clustered cysts in the cervix approximately 2.2 x 1.6 x  2.0 cm in size concerning for adenoma malignum.     8/6/19 seen at [facility] for f/u EmBx and discussion of US findings  Progesterone BID started, took for 3d due to insurance issue.    A. ENDOMETRIAL BIOPSY:   -Focal residual atypical endometrial hyperplasia with squamous metaplasia   -Endometrium with decidualized stroma and inactive glands consistent with   progesterone use     B. ENDOCERVICAL CURETTINGS:   - Endocervical tissue with inflammatory and reactive change     9/17/19 GynOnc  Consult:  Recommendations: No additional progesterone indicated. Cont Mirena IUD. Repeat US and EmBx in 3mo > If cysts worsen recommend CKC. If endometrial hyperplasia persistent for 1 year s/p Mirena (I.e. Feb 2020) recommend addressing definitive hysterectomy    11/8/2019:  US -  Uterine findings:              Presence: Visible Size: Normal 5.3 x 5.2 x 4.0 cm.  Endometrium = 10.0 mm. IUD in place              Cx length = 30.7 mm. Multiple cystic areas without increased blood flow.   EmBx - Inactive endometrial glands and decidualized stroma, consistent with exogenous progesterone effect     2/14/2020:  US (2/14/2020): Uterine findings:              Presence: Visible Size: Normal 5.3 x 4.5 x 3.9  cm.  Endometrium = 8.1 mm. IUD appears in place.              Cx length = 26.3 mm. Multiple cystic areas without increased blood flow measuring 2.3 x 2.1 x 1.5cm, similar as previously seen on 11/08/19.  Unable to complete EMBx due to severe yeast vaginitis. Will return for bx.  Discussed weight loss in general and referral to weight mgmt clinic.  ________________________________________________       O:  /84 (BP Location: Left arm, Patient Position: Chair)   Pulse 72   Wt 106.3 kg (234 lb 6.4 oz)   Breastfeeding No   BMI 50.72 kg/m       Gen: NAD  Abd: soft, nontender  Pelvic: normal external genitalia with vitiligo of inner thighs. Vagina mucosa erythematous, extremely sensitive with thick white discharge adherent to the mucosal walls. Cervix without lesions, IUD string visualized.      A/P:  Ms Diogo Yadav is a 29 G0 with complex atypical hyperplasia of the endometrium s/p Mirena IUD placement in 2/2019 - persistent CAH/EIN in 8/2019, resolved on last biopsy (11/2019), and stable asymptomatic complex cervical cyst - here for repeat endometrial biopsy.    #Complex atypical endometrial hyperplasia:  - EmBx done today, see note below        - if CAH/EIN on this biopsy, as per GynOnc recs will discuss definitive  "treatment via hysterectomy        - if negative for CAH/EIN, discussed would be safe to try to conceive. Concern for ability to conceive without assisted reproduction given irregular menses baseline and morbid obesity discuss at last visit.  - With next visit if EmBx negative, plan to discuss criteria for ovulation induction at our clinic include BMI<40 and that many [facility] clinics may have similar requirements, re-discuss weight loss, and general monitoring while trying to conceive    #Oligomenorrhea/Infertility:  FSH, LH, E2, prolactin, TSH, AMH drawn today    #Complex cervical cyst: Stable, likely benign. No further follow up imaging    Discussed with Dr Gomez  Follow up appt in 2-4 weeks to discuss EmBx and lab results   May follow up with Dr Jarquin when back in clinic OR with other provider, her preference. She should be called with EmBx results regardless and appropriate follow up arranged if positive for atypia or cancer    Vandana Jarquin MD     Endometrial Biopsy    Menstrual History:  Menstrual History 2017 1/10/2019 2019   LAST MENSTRUAL PERIOD 3/8/2017 2018 2019       Time Out - \"Pause for the Cause\"  Just before the procedure begins, through verbal and active participation of team members, verify:                      Initials   Patient Name SMB   Patient  SMB   Procedure to be performed SMB                                                                                                                                      Indication: CAH-EIN  Pregnancy test:  negative    Consent: Risks, benefits of treatment, and no treatment were discussed.  Patient's questions were elicited and answered.  and Written consent signed and scanned into medical record.    Using a large Graves speculum, the cervix was visualized. The cervix was prepped with Betadine.  A single tooth tenaculum was applied to the anterior lip of the cervix. The endometrial pipelle was advanced through the cervix " without difficulty and a sample collected. One additional pass was made.  The tenaculum was removed from the cervix and the tenaculum site made hemostatic with Silver Nitrate sticks .    EBL: 5mL    Complications:  none  Pathology: EMB sample was sent to pathology.  Tolerance of Procedure:  Patient did tolerate the procedure well.     Patient was instructed to call if she experiences any heavy bleeding, severe cramping, or abnormal vaginal discharge.  May take ibuprofen 400-800 mg PO TID PRN or naproxen 500 mg PO BID for cramping.    She was given 650mg tylenol after the procedure.    Will notify patient of results.     Jean Jarquin MD   02/18/20 1:46 PM     The Patient was seen in [facility] by JEAN JARQUIN.  I reviewed the history & exam. Assessment and plan were jointly made.  I was present for Endometrial biopsy procedure.     Wendy Gomez MD

## 2020-02-18 ENCOUNTER — OFFICE VISIT (OUTPATIENT)
Dept: OBGYN | Facility: CLINIC | Age: 30
End: 2020-02-18
Attending: STUDENT IN AN ORGANIZED HEALTH CARE EDUCATION/TRAINING PROGRAM
Payer: COMMERCIAL

## 2020-02-18 VITALS
HEART RATE: 72 BPM | SYSTOLIC BLOOD PRESSURE: 128 MMHG | WEIGHT: 234.4 LBS | BODY MASS INDEX: 50.72 KG/M2 | DIASTOLIC BLOOD PRESSURE: 84 MMHG

## 2020-02-18 DIAGNOSIS — N85.02 COMPLEX ATYPICAL ENDOMETRIAL HYPERPLASIA: Primary | ICD-10-CM

## 2020-02-18 DIAGNOSIS — N85.02 COMPLEX ATYPICAL ENDOMETRIAL HYPERPLASIA: ICD-10-CM

## 2020-02-18 LAB
ESTRADIOL SERPL-MCNC: 31 PG/ML
FSH SERPL-ACNC: 3.6 IU/L
LH SERPL-ACNC: 6.5 IU/L
PROLACTIN SERPL-MCNC: 12 UG/L (ref 3–27)
TSH SERPL DL<=0.005 MIU/L-ACNC: 2.35 MU/L (ref 0.4–4)

## 2020-02-18 PROCEDURE — G0463 HOSPITAL OUTPT CLINIC VISIT: HCPCS | Mod: ZF

## 2020-02-18 PROCEDURE — 36415 COLL VENOUS BLD VENIPUNCTURE: CPT | Performed by: STUDENT IN AN ORGANIZED HEALTH CARE EDUCATION/TRAINING PROGRAM

## 2020-02-18 PROCEDURE — 82670 ASSAY OF TOTAL ESTRADIOL: CPT | Performed by: STUDENT IN AN ORGANIZED HEALTH CARE EDUCATION/TRAINING PROGRAM

## 2020-02-18 PROCEDURE — 58100 BIOPSY OF UTERUS LINING: CPT | Mod: ZF | Performed by: STUDENT IN AN ORGANIZED HEALTH CARE EDUCATION/TRAINING PROGRAM

## 2020-02-18 PROCEDURE — 84146 ASSAY OF PROLACTIN: CPT | Performed by: STUDENT IN AN ORGANIZED HEALTH CARE EDUCATION/TRAINING PROGRAM

## 2020-02-18 PROCEDURE — 83002 ASSAY OF GONADOTROPIN (LH): CPT | Performed by: STUDENT IN AN ORGANIZED HEALTH CARE EDUCATION/TRAINING PROGRAM

## 2020-02-18 PROCEDURE — 88305 TISSUE EXAM BY PATHOLOGIST: CPT | Performed by: STUDENT IN AN ORGANIZED HEALTH CARE EDUCATION/TRAINING PROGRAM

## 2020-02-18 PROCEDURE — 83001 ASSAY OF GONADOTROPIN (FSH): CPT | Performed by: STUDENT IN AN ORGANIZED HEALTH CARE EDUCATION/TRAINING PROGRAM

## 2020-02-18 PROCEDURE — 84443 ASSAY THYROID STIM HORMONE: CPT | Performed by: STUDENT IN AN ORGANIZED HEALTH CARE EDUCATION/TRAINING PROGRAM

## 2020-02-18 PROCEDURE — 83520 IMMUNOASSAY QUANT NOS NONAB: CPT | Performed by: STUDENT IN AN ORGANIZED HEALTH CARE EDUCATION/TRAINING PROGRAM

## 2020-02-18 NOTE — PATIENT INSTRUCTIONS
Endometrial biopsy was done today. Please make an appointment in 1-3 weeks to follow up your results as well as your serum fertility lab tests.

## 2020-02-18 NOTE — LETTER
2020    Joie Yadav  [address] [id number]  : 1990  MRN: [id number]    To Whom it May Concern,    Ms Diogo Yadav should be excused from work -2020 for medical reasons  Please call [phone] if any questions or concerns.    Thank you for your understanding!    Sincerely,           Vandana Jarquin MD   20

## 2020-02-18 NOTE — NURSING NOTE
Chief Complaint   Patient presents with     Minor Procedure     EMBX       See LIZ Pineda 2/18/2020

## 2020-02-18 NOTE — LETTER
2/18/2020       RE: Joie Yadav  [address] [facility] [address]     Dear Colleague,    Thank you for referring your patient, Joie Yadav, to the [facility] at [facility]. Please see a copy of my visit note below.    [facility] Follow Up    Ms Diogo Yadav is a 28 G0 with complex atypical hyperplasia of the endometrium. She has had a Mirena IUD in place since 2/2019 and is here for surveillance endometrial biopsy. She is s/p gyn onc consult 9/2019 who recommended this follow up plan.  She was seen 2/14 and unable to complete bx due to yeast vaginitis, s/p diflucan and here for bx today. Lower abdominal pain and vaginal irritation now much improved and ready to try biopsy again.    History of AUB/hyperplasia:  1/10/19: ED visit for AUB, constant bleeding  US:  The uterus is normal in size measuring 9.6 x 4.6 x 5.3 cm. No fibroids  are evident. Endometrial stripe measures 26 mm and is abnormally  thickened and heterogeneous for patient's age. No associated color  Doppler flow within the endometrium. Multiple nabothian cysts are  present. The right ovary is normal measuring 2.5 x 1.8 x 2.4 cm. The  left ovary is not visualized. No adnexal masses are present. No free  pelvic fluid is present.     2/18/19 [facility] clinic appt w/ EmBx and Mirena IUD placement:  Endometrium, biopsy:   -Endometrial hyperplasia with abundant squamous morules, without atypia   -Chronic endometritis   -Negative for atypia or malignancy      7/31/19 ED visit for R flank pain (this resolved)  US:  Uterus 7.6 x 5.2 x 3.9 cm in size. Endometrial stripe 0.9  cm. IUD identified in the endometrial cavity. There is a focal  collection of clustered cysts in the cervix approximately 2.2 x 1.6 x  2.0 cm in size concerning for adenoma malignum.     8/6/19 seen at [facility] for f/u EmBx and discussion of US findings  Progesterone BID started, took for 3d due to insurance issue.    TAMMIE  ENDOMETRIAL BIOPSY:   -Focal residual atypical endometrial hyperplasia with squamous metaplasia   -Endometrium with decidualized stroma and inactive glands consistent with   progesterone use     B. ENDOCERVICAL CURETTINGS:   - Endocervical tissue with inflammatory and reactive change     9/17/19 GynOnc Consult:  Recommendations: No additional progesterone indicated. Cont Mirena IUD. Repeat US and EmBx in 3mo > If cysts worsen recommend CKC. If endometrial hyperplasia persistent for 1 year s/p Mirena (I.e. Feb 2020) recommend addressing definitive hysterectomy    11/8/2019:  US -  Uterine findings:              Presence: Visible Size: Normal 5.3 x 5.2 x 4.0 cm.  Endometrium = 10.0 mm. IUD in place              Cx length = 30.7 mm. Multiple cystic areas without increased blood flow.   EmBx - Inactive endometrial glands and decidualized stroma, consistent with exogenous progesterone effect     2/14/2020:  US (2/14/2020): Uterine findings:              Presence: Visible Size: Normal 5.3 x 4.5 x 3.9  cm.  Endometrium = 8.1 mm. IUD appears in place.              Cx length = 26.3 mm. Multiple cystic areas without increased blood flow measuring 2.3 x 2.1 x 1.5cm, similar as previously seen on 11/08/19.  Unable to complete EMBx due to severe yeast vaginitis. Will return for bx.  Discussed weight loss in general and referral to weight mgmt clinic.  ________________________________________________       O:  /84 (BP Location: Left arm, Patient Position: Chair)   Pulse 72   Wt 106.3 kg (234 lb 6.4 oz)   Breastfeeding No   BMI 50.72 kg/m        Gen: NAD  Abd: soft, nontender  Pelvic: normal external genitalia with vitiligo of inner thighs. Vagina mucosa erythematous, extremely sensitive with thick white discharge adherent to the mucosal walls. Cervix without lesions, IUD string visualized.      A/P:  Ms Diogo Yadav is a 29 G0 with complex atypical hyperplasia of the endometrium s/p Mirena IUD placement in 2/2019 -  "persistent CAH/EIN in 2019, resolved on last biopsy (2019), and stable asymptomatic complex cervical cyst - here for repeat endometrial biopsy.    #Complex atypical endometrial hyperplasia:  - EmBx done today, see note below        - if CAH/EIN on this biopsy, as per GynOnc recs will discuss definitive treatment via hysterectomy        - if negative for CAH/EIN, discussed would be safe to try to conceive. Concern for ability to conceive without assisted reproduction given irregular menses baseline and morbid obesity discuss at last visit.  - With next visit if EmBx negative, plan to discuss criteria for ovulation induction at our clinic include BMI<40 and that many MAG[facility] clinics may have similar requirements, re-discuss weight loss, and general monitoring while trying to conceive    #Oligomenorrhea/Infertility:  FSH, LH, E2, prolactin, TSH, AMH drawn today    #Complex cervical cyst: Stable, likely benign. No further follow up imaging    Discussed with Dr Gomez  Follow up appt in 2-4 weeks to discuss EmBx and lab results   May follow up with Dr Jarquin when back in clinic OR with other provider, her preference. She should be called with EmBx results regardless and appropriate follow up arranged if positive for atypia or cancer    Vandana Jarquin MD     Endometrial Biopsy    Menstrual History:  Menstrual History 2017 1/10/2019 2019   LAST MENSTRUAL PERIOD 3/8/2017 2018 2019       Time Out - \"Pause for the Cause\"  Just before the procedure begins, through verbal and active participation of team members, verify:                      Initials   Patient Name SMB   Patient  SMB   Procedure to be performed SMB                                                                                                                                      Indication: CAH-EIN  Pregnancy test:  negative    Consent: Risks, benefits of treatment, and no treatment were discussed.  Patient's questions were " elicited and answered.  and Written consent signed and scanned into medical record.    Using a large Graves speculum, the cervix was visualized. The cervix was prepped with Betadine.  A single tooth tenaculum was applied to the anterior lip of the cervix. The endometrial pipelle was advanced through the cervix without difficulty and a sample collected. One additional pass was made.  The tenaculum was removed from the cervix and the tenaculum site made hemostatic with Silver Nitrate sticks .    EBL: 5mL    Complications:  none  Pathology: EMB sample was sent to pathology.  Tolerance of Procedure:  Patient did tolerate the procedure well.     Patient was instructed to call if she experiences any heavy bleeding, severe cramping, or abnormal vaginal discharge.  May take ibuprofen 400-800 mg PO TID PRN or naproxen 500 mg PO BID for cramping.    She was given 650mg tylenol after the procedure.    Will notify patient of results.     Jean Jarquin MD   02/18/20 1:46 PM     The Patient was seen in [facility] by JEAN JARQUIN.  I reviewed the history & exam. Assessment and plan were jointly made.  I was present for Endometrial biopsy procedure.     Wendy Gomez MD        Again, thank you for allowing me to participate in the care of your patient.      Sincerely,    Jean Jarquin MD

## 2020-02-20 LAB
COPATH REPORT: NORMAL
MIS SERPL-MCNC: 6.94 NG/ML (ref 0.4–16.02)

## 2020-02-29 ENCOUNTER — HOSPITAL ENCOUNTER (EMERGENCY)
Facility: CLINIC | Age: 30
Discharge: HOME | End: 2020-03-01
Attending: EMERGENCY MEDICINE | Admitting: EMERGENCY MEDICINE
Payer: COMMERCIAL

## 2020-02-29 ENCOUNTER — APPOINTMENT (OUTPATIENT)
Dept: CT IMAGING | Facility: CLINIC | Age: 30
End: 2020-02-29
Attending: EMERGENCY MEDICINE
Payer: COMMERCIAL

## 2020-02-29 DIAGNOSIS — R10.31 ABDOMINAL PAIN, RIGHT LOWER QUADRANT: ICD-10-CM

## 2020-02-29 LAB
ALBUMIN SERPL-MCNC: 3.4 G/DL (ref 3.4–5)
ALBUMIN UR-MCNC: NEGATIVE MG/DL
ALP SERPL-CCNC: 129 U/L (ref 40–150)
ALT SERPL W P-5'-P-CCNC: 25 U/L (ref 0–50)
ANION GAP SERPL CALCULATED.3IONS-SCNC: 7 MMOL/L (ref 3–14)
APPEARANCE UR: CLEAR
AST SERPL W P-5'-P-CCNC: 13 U/L (ref 0–45)
BILIRUB SERPL-MCNC: 0.2 MG/DL (ref 0.2–1.3)
BILIRUB UR QL STRIP: NEGATIVE
BUN SERPL-MCNC: 13 MG/DL (ref 7–30)
CALCIUM SERPL-MCNC: 8.7 MG/DL (ref 8.5–10.1)
CHLORIDE SERPL-SCNC: 105 MMOL/L (ref 94–109)
CO2 SERPL-SCNC: 27 MMOL/L (ref 20–32)
COLOR UR AUTO: NORMAL
CREAT SERPL-MCNC: 0.72 MG/DL (ref 0.52–1.04)
GFR SERPL CREATININE-BSD FRML MDRD: >90 ML/MIN/{1.73_M2}
GLUCOSE SERPL-MCNC: 181 MG/DL (ref 70–99)
GLUCOSE UR STRIP-MCNC: NEGATIVE MG/DL
HCG UR QL: NEGATIVE
HGB UR QL STRIP: NEGATIVE
KETONES UR STRIP-MCNC: NEGATIVE MG/DL
LEUKOCYTE ESTERASE UR QL STRIP: NEGATIVE
LIPASE SERPL-CCNC: 143 U/L (ref 73–393)
NITRATE UR QL: NEGATIVE
PH UR STRIP: 6.5 PH (ref 5–7)
POTASSIUM SERPL-SCNC: 3.9 MMOL/L (ref 3.4–5.3)
PROT SERPL-MCNC: 7.5 G/DL (ref 6.8–8.8)
SODIUM SERPL-SCNC: 139 MMOL/L (ref 133–144)
SOURCE: NORMAL
SP GR UR STRIP: 1.01 (ref 1–1.03)
UROBILINOGEN UR STRIP-MCNC: NORMAL MG/DL (ref 0–2)

## 2020-02-29 PROCEDURE — 25000128 H RX IP 250 OP 636: Performed by: EMERGENCY MEDICINE

## 2020-02-29 PROCEDURE — 83690 ASSAY OF LIPASE: CPT | Performed by: EMERGENCY MEDICINE

## 2020-02-29 PROCEDURE — 81003 URINALYSIS AUTO W/O SCOPE: CPT | Performed by: EMERGENCY MEDICINE

## 2020-02-29 PROCEDURE — 85025 COMPLETE CBC W/AUTO DIFF WBC: CPT | Performed by: EMERGENCY MEDICINE

## 2020-02-29 PROCEDURE — 74176 CT ABD & PELVIS W/O CONTRAST: CPT

## 2020-02-29 PROCEDURE — 96374 THER/PROPH/DIAG INJ IV PUSH: CPT

## 2020-02-29 PROCEDURE — 80053 COMPREHEN METABOLIC PANEL: CPT | Performed by: EMERGENCY MEDICINE

## 2020-02-29 PROCEDURE — 99285 EMERGENCY DEPT VISIT HI MDM: CPT | Mod: 25

## 2020-02-29 PROCEDURE — 99285 EMERGENCY DEPT VISIT HI MDM: CPT | Mod: Z6 | Performed by: EMERGENCY MEDICINE

## 2020-02-29 PROCEDURE — 81025 URINE PREGNANCY TEST: CPT | Performed by: EMERGENCY MEDICINE

## 2020-02-29 RX ORDER — MORPHINE SULFATE 4 MG/ML
4 INJECTION, SOLUTION INTRAMUSCULAR; INTRAVENOUS
Status: COMPLETED | OUTPATIENT
Start: 2020-02-29 | End: 2020-02-29

## 2020-02-29 RX ADMIN — MORPHINE SULFATE 4 MG: 4 INJECTION INTRAVENOUS at 23:04

## 2020-02-29 ASSESSMENT — ENCOUNTER SYMPTOMS
ABDOMINAL PAIN: 1
HEADACHES: 0
ABDOMINAL DISTENTION: 0
ARTHRALGIAS: 0
FATIGUE: 0
PALPITATIONS: 0
NAUSEA: 0
CHILLS: 0
DIARRHEA: 0
NECK PAIN: 0
COUGH: 0
BLOOD IN STOOL: 0
MYALGIAS: 0
SHORTNESS OF BREATH: 0
COLOR CHANGE: 0
DYSURIA: 0
CONSTIPATION: 0
CONFUSION: 0
WEAKNESS: 0
SORE THROAT: 0
BACK PAIN: 1
FEVER: 0
HEMATURIA: 0
DIFFICULTY URINATING: 0
EYE PAIN: 0
DIZZINESS: 0
FREQUENCY: 1
VOMITING: 0
CHEST TIGHTNESS: 0

## 2020-02-29 NOTE — ED AVS SNAPSHOT
[facility], [place], Emergency Department  [address]  [facility] [address]  Phone:  [phone]  Fax:  [phone]                                    Joie Yadav   MRN: [id number]    Department:  [facility], Emergency Department   Date of Visit:  2/29/2020           After Visit Summary Signature Page    I have received my discharge instructions, and my questions have been answered. I have discussed any challenges I see with this plan with the nurse or doctor.    ..........................................................................................................................................  Patient/Patient Representative Signature      ..........................................................................................................................................  Patient Representative Print Name and Relationship to Patient    ..................................................               ................................................  Date                                   Time    ..........................................................................................................................................  Reviewed by Signature/Title    ...................................................              ..............................................  Date                                               Time          22EPIC Rev 08/18

## 2020-02-29 NOTE — LETTER
March 1, 2020      To Whom It May Concern:      Joie Yadav was seen in our Emergency Department today, 03/01/20.   Her significant other was also present.  He should be excused from work today.    Sincerely,        Jose Miguel Sim, DO

## 2020-03-01 VITALS
DIASTOLIC BLOOD PRESSURE: 67 MMHG | BODY MASS INDEX: 51.5 KG/M2 | TEMPERATURE: 98 F | RESPIRATION RATE: 16 BRPM | WEIGHT: 238 LBS | SYSTOLIC BLOOD PRESSURE: 118 MMHG | OXYGEN SATURATION: 98 % | HEART RATE: 82 BPM

## 2020-03-01 LAB
BASOPHILS # BLD AUTO: 0.1 10E9/L (ref 0–0.2)
BASOPHILS NFR BLD AUTO: 1 %
DIFFERENTIAL METHOD BLD: ABNORMAL
EOSINOPHIL # BLD AUTO: 0.1 10E9/L (ref 0–0.7)
EOSINOPHIL NFR BLD AUTO: 1 %
ERYTHROCYTE [DISTWIDTH] IN BLOOD BY AUTOMATED COUNT: 15.9 % (ref 10–15)
HCT VFR BLD AUTO: 36.4 % (ref 35–47)
HGB BLD-MCNC: 11.3 G/DL (ref 11.7–15.7)
LYMPHOCYTES # BLD AUTO: 3.5 10E9/L (ref 0.8–5.3)
LYMPHOCYTES NFR BLD AUTO: 30 %
MCH RBC QN AUTO: 24.2 PG (ref 26.5–33)
MCHC RBC AUTO-ENTMCNC: 31 G/DL (ref 31.5–36.5)
MCV RBC AUTO: 78 FL (ref 78–100)
METAMYELOCYTES # BLD: 0.1 10E9/L
METAMYELOCYTES NFR BLD MANUAL: 1 %
MONOCYTES # BLD AUTO: 0.7 10E9/L (ref 0–1.3)
MONOCYTES NFR BLD AUTO: 6 %
NEUTROPHILS # BLD AUTO: 7.1 10E9/L (ref 1.6–8.3)
NEUTROPHILS NFR BLD AUTO: 61 %
PLATELET # BLD AUTO: 353 10E9/L (ref 150–450)
RBC # BLD AUTO: 4.66 10E12/L (ref 3.8–5.2)
WBC # BLD AUTO: 11.6 10E9/L (ref 4–11)

## 2020-03-01 RX ORDER — KETOROLAC TROMETHAMINE 10 MG/1
10 TABLET, FILM COATED ORAL EVERY 6 HOURS PRN
Qty: 16 TABLET | Refills: 0 | Status: ON HOLD | OUTPATIENT
Start: 2020-03-01 | End: 2020-04-10

## 2020-03-01 NOTE — DISCHARGE INSTRUCTIONS
We were unable to identify the exact cause of your abdominal pain at this time.  No signs of any serious illness.  Could be related to your known history of gynecological issues including endocervical mass and possible endometriosis.  You should continue to follow-up with your OB/GYN.  I provided the number to our OB/GYN clinic if needed.        Please make an appointment to follow up with OB/Gyn--[facility] (phone: [phone]) as soon as possible for follow up.

## 2020-03-01 NOTE — ED PROVIDER NOTES
"    [facility] [place] EMERGENCY DEPARTMENT ([facility])     February 29, 2020    History     Chief Complaint   Patient presents with     Abdominal Pain     per pt \"ovarian bx was done 2/18 bec I was bleeding 6 months straight & MD wants to r/o cancer\" Pain on the RLQ getting worse      HPI  Joie Yadav is a 29 year old female with a history of PCOS and new diagnosis of endometrial hyperplasia and complex cervical cyst (concerning for adenoma malignum; following with Dr. Rizo of the Gyn-Onc service) who presents to the ED for evaluation of right lower quadrant abdominal pain. Patient reports she has had right lower quadrant abdominal pain for 1.5 years, and she had a uterine biopsy on 2/18/20 to determine if she has endometriosis. She states she had pain after the biopsy that resolved. However, she reports the pain came back yesterday and has been progressively worsening. Patient states her current pain feels similar to her previous pain that she is being evaluated for, but the pain is much worse today. She reports the pain is radiating down into her pelvis as well as into her back. She states she has been experiencing urinary frequency. Patient denies difficulty urinating or hematuria. She also denies chest pain, shortness of breath, rash, nausea, vomiting, diarrhea, constipation, blood in her stool, vaginal bleeding or discharge. She denies previous history of abdominal surgeries.     Patient notes she has an appointment scheduled on 3/6/20 to discuss the results of her biopsy that was done on 2/18/20.    PAST MEDICAL HISTORY  Past Medical History:   Diagnosis Date     Diabetes (H)     Type 2     Endometrial hyperplasia without atypia, simple 4/8/2019     Obesity      PCOS (polycystic ovarian syndrome)      Vitiligo      PAST SURGICAL HISTORY  Past Surgical History:   Procedure Laterality Date     NO HISTORY OF SURGERY       FAMILY HISTORY  No family history on file.  SOCIAL HISTORY  Social History "     Tobacco Use     Smoking status: Never Smoker     Smokeless tobacco: Never Used   Substance Use Topics     Alcohol use: No     MEDICATIONS  Current Facility-Administered Medications   Medication     levonorgestrel (MIRENA) 20 MCG/24HR IUD 20 mcg     Current Outpatient Medications   Medication     Acetaminophen (TYLENOL PO)     ibuprofen (ADVIL/MOTRIN) 200 MG tablet     ketorolac (TORADOL) 10 MG tablet     metFORMIN (GLUCOPHAGE) 500 MG tablet     ALLERGIES  No Known Allergies    I have reviewed the Medications, Allergies, Past Medical and Surgical History, and Social History in the Epic system.    Review of Systems   Constitutional: Negative for chills, fatigue and fever.   HENT: Negative for congestion and sore throat.    Eyes: Negative for pain and visual disturbance.   Respiratory: Negative for cough, chest tightness and shortness of breath.    Cardiovascular: Negative for chest pain and palpitations.   Gastrointestinal: Positive for abdominal pain (RLQ). Negative for abdominal distention, blood in stool, constipation, diarrhea, nausea and vomiting.   Genitourinary: Positive for frequency and pelvic pain. Negative for difficulty urinating, dysuria, hematuria, urgency, vaginal bleeding and vaginal discharge.   Musculoskeletal: Positive for back pain. Negative for arthralgias, myalgias and neck pain.   Skin: Negative for color change and rash.   Neurological: Negative for dizziness, weakness and headaches.   Psychiatric/Behavioral: Negative for confusion.       Physical Exam   BP: 131/67  Pulse: 81  Temp: 95.7  F (35.4  C)  Resp: 16  Weight: 108 kg (238 lb)  SpO2: 99 %      Physical Exam  Vitals signs and nursing note reviewed.   Constitutional:       General: She is not in acute distress.     Appearance: Normal appearance. She is not ill-appearing or toxic-appearing.   HENT:      Head: Normocephalic and atraumatic.      Nose: Nose normal.      Mouth/Throat:      Mouth: Mucous membranes are moist.   Eyes:       Pupils: Pupils are equal, round, and reactive to light.   Neck:      Musculoskeletal: Normal range of motion. No neck rigidity.   Cardiovascular:      Rate and Rhythm: Normal rate.      Pulses: Normal pulses.      Heart sounds: Normal heart sounds.   Pulmonary:      Effort: Pulmonary effort is normal. No respiratory distress.      Breath sounds: Normal breath sounds.   Abdominal:      General: Abdomen is flat. There is no distension.      Tenderness: There is abdominal tenderness. There is right CVA tenderness. There is no left CVA tenderness.      Comments: Moderate tenderness palpation in the right lower quadrant and right flank area.   Musculoskeletal: Normal range of motion.         General: No swelling or deformity.   Skin:     General: Skin is warm.      Capillary Refill: Capillary refill takes less than 2 seconds.   Neurological:      Mental Status: She is alert and oriented to person, place, and time.   Psychiatric:         Mood and Affect: Mood normal.         ED Course     Medications   morphine (PF) injection 4 mg (4 mg Intravenous Given 2/29/20 2304)       Results for orders placed or performed during the hospital encounter of 02/29/20 (from the past 24 hour(s))   Comprehensive metabolic panel   Result Value Ref Range    Sodium 139 133 - 144 mmol/L    Potassium 3.9 3.4 - 5.3 mmol/L    Chloride 105 94 - 109 mmol/L    Carbon Dioxide 27 20 - 32 mmol/L    Anion Gap 7 3 - 14 mmol/L    Glucose 181 (H) 70 - 99 mg/dL    Urea Nitrogen 13 7 - 30 mg/dL    Creatinine 0.72 0.52 - 1.04 mg/dL    GFR Estimate >90 >60 mL/min/[1.73_m2]    GFR Estimate If Black >90 >60 mL/min/[1.73_m2]    Calcium 8.7 8.5 - 10.1 mg/dL    Bilirubin Total 0.2 0.2 - 1.3 mg/dL    Albumin 3.4 3.4 - 5.0 g/dL    Protein Total 7.5 6.8 - 8.8 g/dL    Alkaline Phosphatase 129 40 - 150 U/L    ALT 25 0 - 50 U/L    AST 13 0 - 45 U/L   CBC with platelets differential   Result Value Ref Range    WBC 11.6 (H) 4.0 - 11.0 10e9/L    RBC Count 4.66 3.8 - 5.2  10e12/L    Hemoglobin 11.3 (L) 11.7 - 15.7 g/dL    Hematocrit 36.4 35.0 - 47.0 %    MCV 78 78 - 100 fl    MCH 24.2 (L) 26.5 - 33.0 pg    MCHC 31.0 (L) 31.5 - 36.5 g/dL    RDW 15.9 (H) 10.0 - 15.0 %    Platelet Count 353 150 - 450 10e9/L    Diff Method Automated Method    Lipase   Result Value Ref Range    Lipase 143 73 - 393 U/L   UA reflex to Microscopic and Culture   Result Value Ref Range    Color Urine Straw     Appearance Urine Clear     Glucose Urine Negative NEG^Negative mg/dL    Bilirubin Urine Negative NEG^Negative    Ketones Urine Negative NEG^Negative mg/dL    Specific Gravity Urine 1.007 1.003 - 1.035    Blood Urine Negative NEG^Negative    pH Urine 6.5 5.0 - 7.0 pH    Protein Albumin Urine Negative NEG^Negative mg/dL    Urobilinogen mg/dL Normal 0.0 - 2.0 mg/dL    Nitrite Urine Negative NEG^Negative    Leukocyte Esterase Urine Negative NEG^Negative    Source Midstream Urine    HCG qualitative urine (UPT)   Result Value Ref Range    HCG Qual Urine Negative NEG^Negative   CT Abdomen Pelvis w/o Contrast    Narrative    EXAM: CT ABDOMEN PELVIS W/O CONTRAST  LOCATION: [facility]  DATE/TIME: 2/29/2020 11:08 PM    INDICATION: Right lower quadrant and flank pain.  COMPARISON: None.  TECHNIQUE: CT scan of the abdomen and pelvis was performed without IV contrast. Multiplanar reformats were obtained. Dose reduction techniques were used.  CONTRAST: None.    FINDINGS:   LOWER CHEST: Normal.    HEPATOBILIARY: Opaque density in the gallbladder which could be sludge, stones, dense bile. No gallbladder distention or pericholecystic inflammation.    PANCREAS: Normal.    SPLEEN: Normal.    ADRENAL GLANDS: Normal.    KIDNEYS/BLADDER: Tiny nonobstructing stone at the lower pole right kidney measuring 2 mm. No ureteral stones, bladder stones or hydronephrosis.    BOWEL: No bowel obstruction. No significant appendiceal enlargement or adjacent inflammation.    LYMPH NODES: Multiple small mesenteric lymph nodes  right lower quadrant.    VASCULATURE: Unremarkable.    PELVIC ORGANS: Uterus is present with IUD in place. No suspicious adnexal masses.    OTHER: Evaluation of the solid abdominal organs is limited by lack of contrast. No ascites or free air.    MUSCULOSKELETAL: Normal.      Impression    IMPRESSION:   1.  No urinary stones or hydronephrosis.  2.  No other acute findings within limits of a noncontrast exam.  3.  Gallbladder density which may be due to sludge, stones or dense bile.            Procedures                    Assessments & Plan (with Medical Decision Making)   Presents for evaluation of right-sided suprapubic pain radiating into the right flank right back right pelvic area.  Patient has had this same pain for several months been worked up multiple times.  Going diagnosis at this time is endometriosis.  Pain worsened over the past several days, so she wanted reassessment.    On arrival, patient is in pain.  She otherwise appears nontoxic.  She has normal vital signs.  On physical exam, she has mild tenderness in the right lower quadrant as well as some right-sided CVA tenderness.    Differential diagnosis includes UTI, pyelonephritis, ovarian cyst, ureterolithiasis, appendicitis.  Unlikely ovarian torsion due to normal ultrasound 2 weeks ago while she was having similar symptoms.  Plan for CT abdomen pelvis without contrast, CBC, CMP, lipase, urinalysis, pain medication.    Laboratory work-up is completely unremarkable.  Urinalysis is normal.  CT scan negative for ureterolithiasis, no signs of appendicitis although this is unlikely based on history and exam.  Symptoms likely consistent with patient's previous history of right lower quadrant pain, thought at this time to be endometriosis.  Patient will be referred back to her OB/GYN for continued follow-up for her chronic issue.  Was placed on ketorolac for symptomatic relief.    I have reviewed the nursing notes.    I have reviewed the findings,  diagnosis, plan and need for follow up with the patient.    Discharge Medication List as of 3/1/2020 12:21 AM          Final diagnoses:   Abdominal pain, right lower quadrant     IRaisa, am serving as a trained medical scribe to document services personally performed by Jose Miguel Sim DO, based on the provider's statements to me.      IJose Miguel DO, was physically present and have reviewed and verified the accuracy of this note documented by Raisa Kurtz.     2/29/2020   [facility], EMERGENCY DEPARTMENT     Jose Miguel Sim DO  03/01/20 0058

## 2020-03-02 ENCOUNTER — HEALTH MAINTENANCE LETTER (OUTPATIENT)
Age: 30
End: 2020-03-02

## 2020-03-03 ENCOUNTER — OFFICE VISIT (OUTPATIENT)
Dept: OBGYN | Facility: CLINIC | Age: 30
End: 2020-03-03
Payer: COMMERCIAL

## 2020-03-03 VITALS
BODY MASS INDEX: 51.34 KG/M2 | DIASTOLIC BLOOD PRESSURE: 83 MMHG | SYSTOLIC BLOOD PRESSURE: 130 MMHG | WEIGHT: 238 LBS | HEART RATE: 76 BPM | HEIGHT: 57 IN

## 2020-03-03 DIAGNOSIS — Z87.42 HISTORY OF ENDOMETRIAL HYPERPLASIA: ICD-10-CM

## 2020-03-03 DIAGNOSIS — R10.31 RIGHT LOWER QUADRANT PAIN: Primary | ICD-10-CM

## 2020-03-03 LAB
BASOPHILS # BLD AUTO: 0 10E9/L (ref 0–0.2)
BASOPHILS NFR BLD AUTO: 0.4 %
DIFFERENTIAL METHOD BLD: ABNORMAL
EOSINOPHIL # BLD AUTO: 0.2 10E9/L (ref 0–0.7)
EOSINOPHIL NFR BLD AUTO: 1.5 %
ERYTHROCYTE [DISTWIDTH] IN BLOOD BY AUTOMATED COUNT: 15.7 % (ref 10–15)
HCT VFR BLD AUTO: 35.7 % (ref 35–47)
HGB BLD-MCNC: 11.4 G/DL (ref 11.7–15.7)
IMM GRANULOCYTES # BLD: 0 10E9/L (ref 0–0.4)
IMM GRANULOCYTES NFR BLD: 0.1 %
LYMPHOCYTES # BLD AUTO: 3.1 10E9/L (ref 0.8–5.3)
LYMPHOCYTES NFR BLD AUTO: 31.3 %
MCH RBC QN AUTO: 24.6 PG (ref 26.5–33)
MCHC RBC AUTO-ENTMCNC: 31.9 G/DL (ref 31.5–36.5)
MCV RBC AUTO: 77 FL (ref 78–100)
MONOCYTES # BLD AUTO: 0.6 10E9/L (ref 0–1.3)
MONOCYTES NFR BLD AUTO: 6.4 %
NEUTROPHILS # BLD AUTO: 6 10E9/L (ref 1.6–8.3)
NEUTROPHILS NFR BLD AUTO: 60.3 %
NRBC # BLD AUTO: 0 10*3/UL
NRBC BLD AUTO-RTO: 0 /100
PLATELET # BLD AUTO: 341 10E9/L (ref 150–450)
RBC # BLD AUTO: 4.63 10E12/L (ref 3.8–5.2)
WBC # BLD AUTO: 9.9 10E9/L (ref 4–11)

## 2020-03-03 PROCEDURE — 85025 COMPLETE CBC W/AUTO DIFF WBC: CPT | Performed by: OBSTETRICS & GYNECOLOGY

## 2020-03-03 PROCEDURE — 36415 COLL VENOUS BLD VENIPUNCTURE: CPT | Performed by: OBSTETRICS & GYNECOLOGY

## 2020-03-03 PROCEDURE — G0463 HOSPITAL OUTPT CLINIC VISIT: HCPCS

## 2020-03-03 ASSESSMENT — PAIN SCALES - GENERAL: PAINLEVEL: NO PAIN (0)

## 2020-03-03 ASSESSMENT — ANXIETY QUESTIONNAIRES
3. WORRYING TOO MUCH ABOUT DIFFERENT THINGS: SEVERAL DAYS
2. NOT BEING ABLE TO STOP OR CONTROL WORRYING: SEVERAL DAYS
6. BECOMING EASILY ANNOYED OR IRRITABLE: SEVERAL DAYS
1. FEELING NERVOUS, ANXIOUS, OR ON EDGE: SEVERAL DAYS
GAD7 TOTAL SCORE: 6
5. BEING SO RESTLESS THAT IT IS HARD TO SIT STILL: NOT AT ALL
7. FEELING AFRAID AS IF SOMETHING AWFUL MIGHT HAPPEN: SEVERAL DAYS

## 2020-03-03 ASSESSMENT — PATIENT HEALTH QUESTIONNAIRE - PHQ9
5. POOR APPETITE OR OVEREATING: SEVERAL DAYS
SUM OF ALL RESPONSES TO PHQ QUESTIONS 1-9: 4

## 2020-03-03 ASSESSMENT — MIFFLIN-ST. JEOR: SCORE: 1683.44

## 2020-03-03 NOTE — PROGRESS NOTES
[facility]   Progress Note  3/3/2020    CC: RLQ pain    HPI:  Joie Yadav is a 29 year old  with history of PCOS and complex endometrial hyperplasia with atypia currently being managed with Mirena IUD. Endometrial hyperplasia history as detailed below.     She was seen in the emergency department on 2020 with RLQ pain. Lab work including UA, CBC, CMP was overall unremarkable. CT abdomen and pelvis w/o contrast was negative for acute findings. No adnexal masses. Most recent pelvic ultrasound was 2019 with stable complex cervical cyst and without adnexal masses or other concerning finding.     Today she reports ongoing constant RLQ pain with radiation to her back. She reports that the pain is slightly worse than when she was seen in the ED. Reports that pain is worse with sitting for prolonged periods. She reports that nothing has improved the pain. She has been taking tylenol and ibuprofen prior to ED visit which was not helpful. She was discharged from the ED with ketorolac which was only minimally helpful. Reports that the pain ins sharp and stabbing in nature. Reports that the pain has interfered with her life at times. Reports regular daily bowel movements which are unchanged. No constipation, diarrhea, blood in stool, mucous in stool. She denies dysuria, urinary urgency. Reports urinary frequency. No change in vaginal discharge. Denies fevers, chills, SOB, chest pain. Denies similar pain in the past.     Reports irregular spotting with Mirena IUD in place. Most recently on Friday. She denies clear association between pain and episodes of spotting.     She is sexually active with a male partner. Denies dyspareunia or sexual dysfunction. No concern for STIs. No history of STIs.     She reports that she has been avoiding junk food and walking more for exercise. She desires pregnancy in the future.     ROS otherwise negative as per  HPI    ----------------------------------------------------------------------------------------  History of AUB/hyperplasia:  1/10/19: ED visit for AUB, constant bleeding  US:  The uterus is normal in size measuring 9.6 x 4.6 x 5.3 cm. No fibroids  are evident. Endometrial stripe measures 26 mm and is abnormally  thickened and heterogeneous for patient's age. No associated color  Doppler flow within the endometrium. Multiple nabothian cysts are  present. The right ovary is normal measuring 2.5 x 1.8 x 2.4 cm. The  left ovary is not visualized. No adnexal masses are present. No free  pelvic fluid is present.     2/18/19 [facility] clinic appt w/ EmBx and Mirena IUD placement:  Endometrium, biopsy:   -Endometrial hyperplasia with abundant squamous morules, without atypia   -Chronic endometritis   -Negative for atypia or malignancy      7/31/19 ED visit for R flank pain (this resolved)  US:  Uterus 7.6 x 5.2 x 3.9 cm in size. Endometrial stripe 0.9  cm. IUD identified in the endometrial cavity. There is a focal  collection of clustered cysts in the cervix approximately 2.2 x 1.6 x  2.0 cm in size concerning for adenoma malignum.     8/6/19 seen at [facility] for f/u EmBx and discussion of US findings  Progesterone BID started, took for 3d due to insurance issue.     A. ENDOMETRIAL BIOPSY:   -Focal residual atypical endometrial hyperplasia with squamous metaplasia   -Endometrium with decidualized stroma and inactive glands consistent with   progesterone use     B. ENDOCERVICAL CURETTINGS:   - Endocervical tissue with inflammatory and reactive change      9/17/19 GynOnc Consult:  Recommendations: No additional progesterone indicated. Cont Mirena IUD. Repeat US and EmBx in 3mo > If cysts worsen recommend CKC. If endometrial hyperplasia persistent for 1 year s/p Mirena (I.e. Feb 2020) recommend addressing definitive hysterectomy     11/8/2019:  US -  Uterine findings:              Presence: Visible Size: Normal 5.3 x 5.2 x 4.0 cm.  " Endometrium = 10.0 mm. IUD in place              Cx length = 30.7 mm. Multiple cystic areas without increased blood flow.   EmBx - Inactive endometrial glands and decidualized stroma, consistent with exogenous progesterone effect      2/14/2020:  US (2/14/2020): Uterine findings:              Presence: Visible Size: Normal 5.3 x 4.5 x 3.9  cm.  Endometrium = 8.1 mm. IUD appears in place.              Cx length = 26.3 mm. Multiple cystic areas without increased blood flow measuring 2.3 x 2.1 x 1.5cm, similar as previously seen on 11/08/19.  Unable to complete EMBx due to severe yeast vaginitis. Will return for bx.  Discussed weight loss in general and referral to weight mgmt clinic.    2/18/2020:  EMBx:  - Inactive glands and decidualized stroma, consistent with exogenous   progesterone effect   ----------------------------------------------------------------------------------------    Current Outpatient Medications:      Acetaminophen (TYLENOL PO), , Disp: , Rfl:      ibuprofen (ADVIL/MOTRIN) 200 MG tablet, Take 3 tablets (600 mg) by mouth every 6 hours as needed for mild pain, Disp: 30 tablet, Rfl: 0     ketorolac (TORADOL) 10 MG tablet, Take 1 tablet (10 mg) by mouth every 6 hours as needed for moderate pain, Disp: 16 tablet, Rfl: 0     metFORMIN (GLUCOPHAGE) 500 MG tablet, Take 500 mg by mouth 4 times daily (with meals and nightly), Disp: , Rfl:     Current Facility-Administered Medications:      levonorgestrel (MIRENA) 20 MCG/24HR IUD 20 mcg, 1 each, Intrauterine, Once, Jessica Storey MD     O:  /83   Pulse 76   Ht 1.448 m (4' 9\")   Wt 108 kg (238 lb)   BMI 51.50 kg/m    Gen: NAD, non-toxic appearing   Pulm: breathing comfortably on room air  MSK: moving all extremities equally  Abd: obese, soft, tender to palpation in the RLQ at level of waist band, no rebound tenderness, no appreciable guarding, vitiligo present but no other rashes or skin changes   Back: no CVA tenderness   Psych/Neuro: affect " appropriate    Labs:   - CBC ordered     A/P:  Joie Yadav is a 29 year old  female presenting with constant RLQ abdominal pain since  without significant associated symptoms.     # Right lower quadrant pain   Suspect musculoskeletal etiology. CT abdomen and pelvis without contrast negative 2020. Mild leukocytosis of 11.6 without left shift in ED , and repeat CBC today without leukocytosis so low concern for inflammatory or infectious etiology of pain. No prior abdominal surgeries to raise concern for hernia.   - PT referral provided  - Discussed application of heat, abdominal massage, and wearing looser fitting clothing/adjusting positioning of waist band as possible to prevent increased irritation of that area   - Recommend TVUS if patient does not have improvement in her pain within the next week though low suspicion for GYN source overall   - Patient scheduled to see Dr. Kelley Lugo later this week and will follow up on her pain at that time     # PCOS  # History of complex atypical hyperplasia of the endometrium   # Desire for future fertility   Patient is scheduled for formal discussion of fertility considerations with Dr. Kelley Lugo on Friday.     Patient has been followed with serial endometrial biopsies negative for endometrial hyperplasia or atypia. Stable cervical cystic lesions likely benign. Does not require hysterectomy. Would be appropriate to try for conception from a gyn oncology standpoint at this point. However, patient will require medical optimization prior to attempting to conceive. Would recommend Mirena IUD remain in place for endometrial protection until patient has addressed morbid obesity.    Discussed recent unremarkable fertility labs including FSH, AMH, E2, lutropin, TSH, prolactin. Discussed that the patient's history of anovulation in the setting of PCOS would be a limiting factor in pursuing conception. Discussed that there are medications  which can be used to stimulate ovulation. Counseled regarding increased risk of multiple gestation with use of these medications particularly in patients with PCOS.  Discussed that the patient would not be a candidate for ovulation induction with letrozole given her BMI>40 per S clinic criteria. Discussed that at her current height her goal weight would be 185 lbs to reach a BMI of 40. Discussed that weight loss would decrease risk of future pregnancy and likely assist with menstrual cycle regulation/improve likelihood of ovulatory cycles. Patient reports that she has eliminated junk foods and started to walk more for exercise. Dr. Rizo previously recommended bariatric surgery which patient declined- this was not readdressed today.     Patient discussed with Dr. Christel Schaffer MD  Ob/Gyn Resident, PGY-1  03/03/20    The Patient was seen in Resident Continuity Clinic by SUSANNA SCHAFFER.  I reviewed the history & exam. Assessment and plan were jointly made.    Annmarie Kearney MD

## 2020-03-04 ASSESSMENT — ANXIETY QUESTIONNAIRES: GAD7 TOTAL SCORE: 6

## 2020-03-20 ENCOUNTER — OFFICE VISIT (OUTPATIENT)
Dept: OBGYN | Facility: CLINIC | Age: 30
End: 2020-03-20
Attending: OBSTETRICS & GYNECOLOGY
Payer: COMMERCIAL

## 2020-03-20 DIAGNOSIS — N85.02 COMPLEX ATYPICAL ENDOMETRIAL HYPERPLASIA: Primary | ICD-10-CM

## 2020-03-20 NOTE — LETTER
"3/20/2020       RE: Joie Yadav  [address] [id number]     Dear Colleague,    Thank you for referring your patient, Joie Yadav, to the [facility] at [facility]. Please see a copy of my visit note below.    Joie Yadav is a 29 year old female who is being evaluated via a billable telephone visit.      The patient has been notified of following:     \"This telephone visit will be conducted via a call between you and your physician/provider. We have found that certain health care needs can be provided without the need for a physical exam.  This service lets us provide the care you need with a short phone conversation.  If a prescription is necessary we can send it directly to your pharmacy.  If lab work is needed we can place an order for that and you can then stop by our lab to have the test done at a later time.    If during the course of the call the physician/provider feels a telephone visit is not appropriate, you will not be charged for this service.\"     Pt wanted to discuss her endo bx result from 2/2020.  This had already been discussed by provider.  She also wants to know about medication for ovulation induction.    HISTORY:  Pt with hx of complex atypical hyperplasia by bx 2/2019. Mirena IUD placed 2/2019 (see gyn onc consult 9/2019: resampling q 3 months for 1 year; also please see consult re: abnormal cervix with concern for adenoma malignum; recommended TVUS in 3 months.  Felt to most likely be nabothian cysts; but if worsening consider CKC. U/S showed multiple cystic areas similar to U/S on 11/2019; pt advised of this and recommended f/u in 1 year)  8/2019: endo bx with focal complex atypical hyperplasia.  Started on provera 80 bid but only took for 3 days due to insurance.     Endometrial bx performed on 2/18/2020:  FINAL DIAGNOSIS:   ENDOMETRIAL BIOPSY:   - Inactive glands and decidualized stroma, " consistent with exogenous   progesterone effect     Pt notified of bx results on 2/21/2020    Today she states : She notes bleeding just spotting 1x/month        A/P:1) follow up complex atypical hyperplasia with Mirena placement 2/2019; with resolution of hyperplasia on endo bx 2/2020  Per up to date:   If regression to normal endometrium occurs within 12 months, it is reasonable to repeat one to two biopsies after regression to confirm the absence of EH or concomitant carcinoma . If normal menses resume, no further biopsies may be needed. However, patients who have subsequent abnormal bleeding need repeat sampling.    As far as the question of abnormal finding of her cervix it appears that follow-up I would ultrasound was consistent with nabothian cysts use fetal heart visit    As far as her concern for being on any medication that would make her ovulate I told her that the longer the Mirena IUD could be in place the better.  At the time that she wishes to pursue a pregnancy is the time that change of medication should be discussed.    I advised that she be seen again 8/2020.    T.c. lasting 10 minutes.        Again, thank you for allowing me to participate in the care of your patient.      Sincerely,    Murphy Rae MD

## 2020-03-20 NOTE — PROGRESS NOTES
"Joie Yadav is a 29 year old female who is being evaluated via a billable telephone visit.      The patient has been notified of following:     \"This telephone visit will be conducted via a call between you and your physician/provider. We have found that certain health care needs can be provided without the need for a physical exam.  This service lets us provide the care you need with a short phone conversation.  If a prescription is necessary we can send it directly to your pharmacy.  If lab work is needed we can place an order for that and you can then stop by our lab to have the test done at a later time.    If during the course of the call the physician/provider feels a telephone visit is not appropriate, you will not be charged for this service.\"     Pt wanted to discuss her endo bx result from 2/2020.  This had already been discussed by provider.  She also wants to know about medication for ovulation induction.    HISTORY:  Pt with hx of complex atypical hyperplasia by bx 2/2019. Mirena IUD placed 2/2019 (see gyn onc consult 9/2019: resampling q 3 months for 1 year; also please see consult re: abnormal cervix with concern for adenoma malignum; recommended TVUS in 3 months.  Felt to most likely be nabothian cysts; but if worsening consider CKC. U/S showed multiple cystic areas similar to U/S on 11/2019; pt advised of this and recommended f/u in 1 year)  8/2019: endo bx with focal complex atypical hyperplasia.  Started on provera 80 bid but only took for 3 days due to insurance.     Endometrial bx performed on 2/18/2020:  FINAL DIAGNOSIS:   ENDOMETRIAL BIOPSY:   - Inactive glands and decidualized stroma, consistent with exogenous   progesterone effect     Pt notified of bx results on 2/21/2020    Today she states : She notes bleeding just spotting 1x/month        A/P:1) follow up complex atypical hyperplasia with Mirena placement 2/2019; with resolution of hyperplasia on endo bx 2/2020  Per up to date: "   If regression to normal endometrium occurs within 12 months, it is reasonable to repeat one to two biopsies after regression to confirm the absence of EH or concomitant carcinoma . If normal menses resume, no further biopsies may be needed. However, patients who have subsequent abnormal bleeding need repeat sampling.    As far as the question of abnormal finding of her cervix it appears that follow-up I would ultrasound was consistent with duc cysts use fetal heart visit    As far as her concern for being on any medication that would make her ovulate I told her that the longer the Mirena IUD could be in place the better.  At the time that she wishes to pursue a pregnancy is the time that change of medication should be discussed.    I advised that she be seen again 8/2020.    T.c. lasting 10 minutes.

## 2020-04-10 ENCOUNTER — APPOINTMENT (OUTPATIENT)
Dept: ULTRASOUND IMAGING | Facility: CLINIC | Age: 30
End: 2020-04-10
Attending: EMERGENCY MEDICINE
Payer: COMMERCIAL

## 2020-04-10 ENCOUNTER — HOSPITAL ENCOUNTER (INPATIENT)
Facility: CLINIC | Age: 30
LOS: 2 days | Discharge: HOME | End: 2020-04-12
Attending: EMERGENCY MEDICINE | Admitting: FAMILY MEDICINE
Payer: COMMERCIAL

## 2020-04-10 DIAGNOSIS — K81.0 ACUTE CHOLECYSTITIS: ICD-10-CM

## 2020-04-10 DIAGNOSIS — R10.13 ABDOMINAL PAIN, EPIGASTRIC: ICD-10-CM

## 2020-04-10 DIAGNOSIS — Z90.49 S/P CHOLECYSTECTOMY: Primary | ICD-10-CM

## 2020-04-10 PROBLEM — R10.11 RUQ PAIN: Status: ACTIVE | Noted: 2020-04-10

## 2020-04-10 PROBLEM — E11.9 CONTROLLED TYPE 2 DIABETES MELLITUS WITHOUT COMPLICATION, WITHOUT LONG-TERM CURRENT USE OF INSULIN (H): Status: ACTIVE | Noted: 2018-10-22

## 2020-04-10 LAB
ALBUMIN SERPL-MCNC: 4 G/DL (ref 3.4–5)
ALBUMIN UR-MCNC: NEGATIVE MG/DL
ALP SERPL-CCNC: 126 U/L (ref 40–150)
ALT SERPL W P-5'-P-CCNC: 37 U/L (ref 0–50)
ANION GAP SERPL CALCULATED.3IONS-SCNC: 5 MMOL/L (ref 3–14)
APPEARANCE UR: CLEAR
AST SERPL W P-5'-P-CCNC: 18 U/L (ref 0–45)
BACTERIA #/AREA URNS HPF: ABNORMAL /HPF
BASOPHILS # BLD AUTO: 0.1 10E9/L (ref 0–0.2)
BASOPHILS NFR BLD AUTO: 0.4 %
BILIRUB SERPL-MCNC: 0.3 MG/DL (ref 0.2–1.3)
BILIRUB UR QL STRIP: NEGATIVE
BUN SERPL-MCNC: 16 MG/DL (ref 7–30)
CALCIUM SERPL-MCNC: 9.5 MG/DL (ref 8.5–10.1)
CHLORIDE SERPL-SCNC: 104 MMOL/L (ref 94–109)
CO2 SERPL-SCNC: 28 MMOL/L (ref 20–32)
COLOR UR AUTO: ABNORMAL
CREAT SERPL-MCNC: 0.67 MG/DL (ref 0.52–1.04)
DIFFERENTIAL METHOD BLD: ABNORMAL
EOSINOPHIL # BLD AUTO: 0.2 10E9/L (ref 0–0.7)
EOSINOPHIL NFR BLD AUTO: 1.4 %
ERYTHROCYTE [DISTWIDTH] IN BLOOD BY AUTOMATED COUNT: 15.8 % (ref 10–15)
GFR SERPL CREATININE-BSD FRML MDRD: >90 ML/MIN/{1.73_M2}
GLUCOSE BLDC GLUCOMTR-MCNC: 110 MG/DL (ref 70–99)
GLUCOSE BLDC GLUCOMTR-MCNC: 115 MG/DL (ref 70–99)
GLUCOSE BLDC GLUCOMTR-MCNC: 117 MG/DL (ref 70–99)
GLUCOSE SERPL-MCNC: 134 MG/DL (ref 70–99)
GLUCOSE UR STRIP-MCNC: NEGATIVE MG/DL
HBA1C MFR BLD: 6.7 % (ref 0–5.6)
HCG UR QL: NEGATIVE
HCT VFR BLD AUTO: 39.7 % (ref 35–47)
HGB BLD-MCNC: 12.8 G/DL (ref 11.7–15.7)
HGB UR QL STRIP: NEGATIVE
IMM GRANULOCYTES # BLD: 0 10E9/L (ref 0–0.4)
IMM GRANULOCYTES NFR BLD: 0.2 %
INR PPP: 1.05 (ref 0.86–1.14)
KETONES UR STRIP-MCNC: NEGATIVE MG/DL
LEUKOCYTE ESTERASE UR QL STRIP: ABNORMAL
LIPASE SERPL-CCNC: 93 U/L (ref 73–393)
LYMPHOCYTES # BLD AUTO: 2.9 10E9/L (ref 0.8–5.3)
LYMPHOCYTES NFR BLD AUTO: 23.2 %
MCH RBC QN AUTO: 25.3 PG (ref 26.5–33)
MCHC RBC AUTO-ENTMCNC: 32.2 G/DL (ref 31.5–36.5)
MCV RBC AUTO: 79 FL (ref 78–100)
MONOCYTES # BLD AUTO: 0.7 10E9/L (ref 0–1.3)
MONOCYTES NFR BLD AUTO: 5.8 %
NEUTROPHILS # BLD AUTO: 8.7 10E9/L (ref 1.6–8.3)
NEUTROPHILS NFR BLD AUTO: 69 %
NITRATE UR QL: NEGATIVE
NRBC # BLD AUTO: 0 10*3/UL
NRBC BLD AUTO-RTO: 0 /100
PH UR STRIP: 6 PH (ref 5–7)
PLATELET # BLD AUTO: 364 10E9/L (ref 150–450)
POTASSIUM SERPL-SCNC: 3.6 MMOL/L (ref 3.4–5.3)
PROT SERPL-MCNC: 8.9 G/DL (ref 6.8–8.8)
RBC # BLD AUTO: 5.05 10E12/L (ref 3.8–5.2)
RBC #/AREA URNS AUTO: <1 /HPF (ref 0–2)
SODIUM SERPL-SCNC: 137 MMOL/L (ref 133–144)
SOURCE: ABNORMAL
SP GR UR STRIP: 1.01 (ref 1–1.03)
SQUAMOUS #/AREA URNS AUTO: 8 /HPF (ref 0–1)
UROBILINOGEN UR STRIP-MCNC: NORMAL MG/DL (ref 0–2)
WBC # BLD AUTO: 12.6 10E9/L (ref 4–11)
WBC #/AREA URNS AUTO: 3 /HPF (ref 0–5)

## 2020-04-10 PROCEDURE — 81025 URINE PREGNANCY TEST: CPT | Performed by: EMERGENCY MEDICINE

## 2020-04-10 PROCEDURE — 85610 PROTHROMBIN TIME: CPT | Performed by: EMERGENCY MEDICINE

## 2020-04-10 PROCEDURE — 25800030 ZZH RX IP 258 OP 636: Performed by: STUDENT IN AN ORGANIZED HEALTH CARE EDUCATION/TRAINING PROGRAM

## 2020-04-10 PROCEDURE — 25800030 ZZH RX IP 258 OP 636: Performed by: EMERGENCY MEDICINE

## 2020-04-10 PROCEDURE — 80053 COMPREHEN METABOLIC PANEL: CPT | Performed by: EMERGENCY MEDICINE

## 2020-04-10 PROCEDURE — 25000128 H RX IP 250 OP 636: Performed by: EMERGENCY MEDICINE

## 2020-04-10 PROCEDURE — 99285 EMERGENCY DEPT VISIT HI MDM: CPT | Mod: 25

## 2020-04-10 PROCEDURE — 96361 HYDRATE IV INFUSION ADD-ON: CPT

## 2020-04-10 PROCEDURE — 76705 ECHO EXAM OF ABDOMEN: CPT

## 2020-04-10 PROCEDURE — 85610 PROTHROMBIN TIME: CPT | Performed by: STUDENT IN AN ORGANIZED HEALTH CARE EDUCATION/TRAINING PROGRAM

## 2020-04-10 PROCEDURE — 99284 EMERGENCY DEPT VISIT MOD MDM: CPT | Mod: Z6 | Performed by: EMERGENCY MEDICINE

## 2020-04-10 PROCEDURE — 85025 COMPLETE CBC W/AUTO DIFF WBC: CPT | Performed by: EMERGENCY MEDICINE

## 2020-04-10 PROCEDURE — 83690 ASSAY OF LIPASE: CPT | Performed by: EMERGENCY MEDICINE

## 2020-04-10 PROCEDURE — 25000128 H RX IP 250 OP 636: Performed by: STUDENT IN AN ORGANIZED HEALTH CARE EDUCATION/TRAINING PROGRAM

## 2020-04-10 PROCEDURE — 83036 HEMOGLOBIN GLYCOSYLATED A1C: CPT | Performed by: EMERGENCY MEDICINE

## 2020-04-10 PROCEDURE — 00000146 ZZHCL STATISTIC GLUCOSE BY METER IP

## 2020-04-10 PROCEDURE — 96374 THER/PROPH/DIAG INJ IV PUSH: CPT

## 2020-04-10 PROCEDURE — 81001 URINALYSIS AUTO W/SCOPE: CPT | Performed by: EMERGENCY MEDICINE

## 2020-04-10 PROCEDURE — 96375 TX/PRO/DX INJ NEW DRUG ADDON: CPT

## 2020-04-10 PROCEDURE — 12000001 ZZH R&B MED SURG/OB UMMC

## 2020-04-10 RX ORDER — HYDROMORPHONE HCL/0.9% NACL/PF 0.2MG/0.2
0.2 SYRINGE (ML) INTRAVENOUS
Status: DISCONTINUED | OUTPATIENT
Start: 2020-04-10 | End: 2020-04-11

## 2020-04-10 RX ORDER — ACETAMINOPHEN 325 MG/1
325-650 TABLET ORAL EVERY 6 HOURS PRN
Status: ON HOLD | COMMUNITY
End: 2020-04-12

## 2020-04-10 RX ORDER — HYDROMORPHONE HCL/0.9% NACL/PF 0.2MG/0.2
0.2 SYRINGE (ML) INTRAVENOUS
Status: DISCONTINUED | OUTPATIENT
Start: 2020-04-10 | End: 2020-04-10

## 2020-04-10 RX ORDER — ACETAMINOPHEN 325 MG/1
975 TABLET ORAL EVERY 6 HOURS PRN
Status: DISCONTINUED | OUTPATIENT
Start: 2020-04-10 | End: 2020-04-11

## 2020-04-10 RX ORDER — LIDOCAINE 40 MG/G
CREAM TOPICAL
Status: DISCONTINUED | OUTPATIENT
Start: 2020-04-10 | End: 2020-04-12 | Stop reason: HOSPADM

## 2020-04-10 RX ORDER — NALOXONE HYDROCHLORIDE 0.4 MG/ML
.1-.4 INJECTION, SOLUTION INTRAMUSCULAR; INTRAVENOUS; SUBCUTANEOUS
Status: DISCONTINUED | OUTPATIENT
Start: 2020-04-10 | End: 2020-04-11

## 2020-04-10 RX ORDER — SODIUM CHLORIDE 9 MG/ML
1000 INJECTION, SOLUTION INTRAVENOUS CONTINUOUS
Status: DISCONTINUED | OUTPATIENT
Start: 2020-04-10 | End: 2020-04-10

## 2020-04-10 RX ORDER — ONDANSETRON 2 MG/ML
4 INJECTION INTRAMUSCULAR; INTRAVENOUS EVERY 30 MIN PRN
Status: DISCONTINUED | OUTPATIENT
Start: 2020-04-10 | End: 2020-04-10

## 2020-04-10 RX ORDER — SODIUM CHLORIDE, SODIUM LACTATE, POTASSIUM CHLORIDE, CALCIUM CHLORIDE 600; 310; 30; 20 MG/100ML; MG/100ML; MG/100ML; MG/100ML
INJECTION, SOLUTION INTRAVENOUS CONTINUOUS
Status: DISCONTINUED | OUTPATIENT
Start: 2020-04-10 | End: 2020-04-12

## 2020-04-10 RX ORDER — ONDANSETRON 4 MG/1
4 TABLET, ORALLY DISINTEGRATING ORAL EVERY 6 HOURS PRN
Status: DISCONTINUED | OUTPATIENT
Start: 2020-04-10 | End: 2020-04-12 | Stop reason: HOSPADM

## 2020-04-10 RX ORDER — IBUPROFEN 200 MG
200 TABLET ORAL EVERY 6 HOURS PRN
COMMUNITY
End: 2024-01-09

## 2020-04-10 RX ORDER — DEXTROSE MONOHYDRATE 25 G/50ML
25-50 INJECTION, SOLUTION INTRAVENOUS
Status: DISCONTINUED | OUTPATIENT
Start: 2020-04-10 | End: 2020-04-12 | Stop reason: HOSPADM

## 2020-04-10 RX ORDER — NICOTINE POLACRILEX 4 MG
15-30 LOZENGE BUCCAL
Status: DISCONTINUED | OUTPATIENT
Start: 2020-04-10 | End: 2020-04-12 | Stop reason: HOSPADM

## 2020-04-10 RX ORDER — MORPHINE SULFATE 4 MG/ML
4 INJECTION, SOLUTION INTRAMUSCULAR; INTRAVENOUS
Status: DISCONTINUED | OUTPATIENT
Start: 2020-04-10 | End: 2020-04-10

## 2020-04-10 RX ORDER — ONDANSETRON 2 MG/ML
4 INJECTION INTRAMUSCULAR; INTRAVENOUS EVERY 6 HOURS PRN
Status: DISCONTINUED | OUTPATIENT
Start: 2020-04-10 | End: 2020-04-12 | Stop reason: HOSPADM

## 2020-04-10 RX ADMIN — ONDANSETRON 4 MG: 2 INJECTION INTRAMUSCULAR; INTRAVENOUS at 20:44

## 2020-04-10 RX ADMIN — Medication 0.2 MG: at 18:16

## 2020-04-10 RX ADMIN — Medication 0.2 MG: at 20:40

## 2020-04-10 RX ADMIN — MORPHINE SULFATE 4 MG: 4 INJECTION INTRAVENOUS at 08:09

## 2020-04-10 RX ADMIN — HYDROMORPHONE HYDROCHLORIDE 1 MG: 1 INJECTION, SOLUTION INTRAMUSCULAR; INTRAVENOUS; SUBCUTANEOUS at 09:32

## 2020-04-10 RX ADMIN — ONDANSETRON 4 MG: 2 INJECTION INTRAMUSCULAR; INTRAVENOUS at 08:09

## 2020-04-10 RX ADMIN — Medication 0.2 MG: at 22:58

## 2020-04-10 RX ADMIN — Medication 0.2 MG: at 15:22

## 2020-04-10 RX ADMIN — SODIUM CHLORIDE 1000 ML: 9 INJECTION, SOLUTION INTRAVENOUS at 07:45

## 2020-04-10 RX ADMIN — ONDANSETRON 4 MG: 2 INJECTION INTRAMUSCULAR; INTRAVENOUS at 14:18

## 2020-04-10 RX ADMIN — SODIUM CHLORIDE, POTASSIUM CHLORIDE, SODIUM LACTATE AND CALCIUM CHLORIDE: 600; 310; 30; 20 INJECTION, SOLUTION INTRAVENOUS at 13:27

## 2020-04-10 RX ADMIN — SODIUM CHLORIDE 1000 ML: 9 INJECTION, SOLUTION INTRAVENOUS at 08:56

## 2020-04-10 ASSESSMENT — ENCOUNTER SYMPTOMS
CONFUSION: 0
DIARRHEA: 1
CHILLS: 0
HEADACHES: 1
SHORTNESS OF BREATH: 0
NAUSEA: 1
CHEST TIGHTNESS: 0
FEVER: 0
BLOOD IN STOOL: 0
ABDOMINAL PAIN: 1
FATIGUE: 1
DIZZINESS: 0
PALPITATIONS: 0
DIARRHEA: 0
DIFFICULTY URINATING: 0
COUGH: 0
HEMATURIA: 0
CONSTIPATION: 0
VOMITING: 0
DIAPHORESIS: 0
DYSURIA: 0

## 2020-04-10 ASSESSMENT — ACTIVITIES OF DAILY LIVING (ADL)
ADLS_ACUITY_SCORE: 11
ADLS_ACUITY_SCORE: 11

## 2020-04-10 ASSESSMENT — PAIN DESCRIPTION - DESCRIPTORS: DESCRIPTORS: OTHER (COMMENT)

## 2020-04-10 ASSESSMENT — MIFFLIN-ST. JEOR: SCORE: 1669.83

## 2020-04-10 NOTE — ED NOTES
Lab was called, previous RN did draw blood and was sent to lab. It was confirmed that I could use this blood for labs.

## 2020-04-10 NOTE — ED NOTES
EMT arrived and report was given to both paramedics. They had no further questions or concerns. Pt left with stable vital signs.

## 2020-04-10 NOTE — PLAN OF CARE
Patient transferred from the [facility] ED. Admitted with right upper abdominal pain, ultrasound shows a stone in the gall bladder, has intermittent pain and nausea. Zofran given with some relief. Surgery consulted for possible ERCP. Patient is a diabetic, blood sugar this afternoon was 117. Has been NPO today.

## 2020-04-10 NOTE — ED TRIAGE NOTES
Pt c/o severe abdominal pain in the right upper quadrant. She states this has been happening since Monday.

## 2020-04-10 NOTE — PHARMACY-ADMISSION MEDICATION HISTORY
Admission medication history interview status for the 4/10/2020 admission is complete. See Epic admission navigator for allergy information, pharmacy, prior to admission medications and immunization status.     Medication history interview sources:  Patient    Changes made to PTA medication list (reason)  Added: None  Deleted: Ketorolac - therapy complete  Changed: Acetaminophen - added 325mg strength     Additional medication history information (including reliability of information, actions taken by pharmacist):  -Patient is a good historian of her medications  -Patient reports taking 1 metformin tablet with each meal and nightly to equal 4 tablets/day    Prior to Admission medications    Medication Sig Last Dose Taking? Auth Provider   acetaminophen (TYLENOL) 325 MG tablet Take 325-650 mg by mouth every 6 hours as needed for mild pain 4/9/2020 at Unknown time Yes Unknown, Entered By History   ibuprofen (ADVIL/MOTRIN) 200 MG tablet Take 200 mg by mouth every 6 hours as needed for mild pain Past Week at Unknown time Yes Unknown, Entered By History   metFORMIN (GLUCOPHAGE) 500 MG tablet Take 500 mg by mouth 4 times daily (with meals and nightly) 4/9/2020 at Unknown time Yes Reported, Patient     Medication history completed by:   Kelli Carolina, Pharm D  April 10, 2020

## 2020-04-10 NOTE — ED NOTES
Handoff report to Juan David.  Informed of course of ED stay and plan of care.  Juan David verbalized understanding.

## 2020-04-10 NOTE — ED NOTES
[facility], [place]   ED Nurse to Floor Handoff     Joie Yadav is a 29 year old female who speaks English and lives with family members,  in a home  They arrived in the ED by car from emergency room    ED Chief Complaint: Abdominal Pain (RUQ pain started 5 days ago, worsening after eating, pt reported nausea, no vomiting and diarrhea)    ED Dx;   Final diagnoses:   Abdominal pain, epigastric         Needed?: No    Allergies: No Known Allergies.  Past Medical Hx:   Past Medical History:   Diagnosis Date     Diabetes (H)     Type 2     Endometrial hyperplasia without atypia, simple 4/8/2019     Obesity      PCOS (polycystic ovarian syndrome)      Vitiligo       Baseline Mental status: WDL  Current Mental Status changes: at basesline    Infection present or suspected this encounter: no  Sepsis suspected: No  Isolation type: No active isolations     Activity level - Baseline/Home:  Independent  Activity Level - Current:   Independent    Bariatric equipment needed?: No    In the ED these meds were given:   Medications   0.9% sodium chloride BOLUS (0 mLs Intravenous Stopped 4/10/20 0854)     Followed by   sodium chloride 0.9% infusion (1,000 mLs Intravenous New Bag 4/10/20 0856)   morphine (PF) injection 4 mg (4 mg Intravenous Given 4/10/20 0809)   ondansetron (ZOFRAN) injection 4 mg (4 mg Intravenous Given 4/10/20 0809)   HYDROmorphone (DILAUDID) injection 1 mg (1 mg Intravenous Given 4/10/20 0932)       Drips running?  No    Home pump  No    Current LDAs  Peripheral IV 04/10/20 Right Upper arm (Active)   Site Assessment WDL 04/10/20 0644   Line Status Saline locked 04/10/20 0644   Phlebitis Scale 0-->no symptoms 04/10/20 0644   Number of days: 0       Labs results:   Labs Ordered and Resulted from Time of ED Arrival Up to the Time of Departure from the ED   UA MACROSCOPIC WITH REFLEX TO MICRO AND CULTURE - Abnormal; Notable for the following components:       Result Value  "   Leukocyte Esterase Urine Trace (*)     Bacteria Urine Few (*)     Squamous Epithelial /HPF Urine 8 (*)     All other components within normal limits   CBC WITH PLATELETS DIFFERENTIAL - Abnormal; Notable for the following components:    WBC 12.6 (*)     MCH 25.3 (*)     RDW 15.8 (*)     Absolute Neutrophil 8.7 (*)     All other components within normal limits   COMPREHENSIVE METABOLIC PANEL - Abnormal; Notable for the following components:    Glucose 134 (*)     Protein Total 8.9 (*)     All other components within normal limits   HCG QUALITATIVE URINE   LIPASE   PERIPHERAL IV CATHETER       Imaging Studies:   Recent Results (from the past 24 hour(s))   US Abdomen Limited (RUQ)    Narrative    ULTRASOUND ABDOMEN LIMITED 4/10/2020 8:40 AM    CLINICAL HISTORY: Abdomen pain right upper quadrant.    TECHNIQUE: Limited abdominal ultrasound.    COMPARISON: CT abdomen and pelvis 2/29/2020.    FINDINGS:  GALLBLADDER: Solitary stone at the gallbladder neck. Negative  sonographic Pinto sign. Gallbladder wall is 0.3 cm.    BILE DUCTS: There is no biliary dilatation. The common duct measures 9  mm.    LIVER: Normal where seen. Limited visualization.    RIGHT KIDNEY: No hydronephrosis.    PANCREAS: The visualized portions of the pancreas are normal.    No ascites.      Impression    IMPRESSION:  Solitary gallstone at the gallbladder neck. Negative  sonographic Pinto sign. The common duct is mildly dilated measuring 9  mm.       Recent vital signs:   BP (!) 150/84   Pulse 74   Temp 96.1  F (35.6  C) (Oral)   Resp 18   Ht 1.448 m (4' 9\")   Wt 106.6 kg (235 lb)   SpO2 100%   BMI 50.85 kg/m      [place] Coma Scale Score: 15 (04/10/20 0548)       Cardiac Rhythm: Normal Sinus  Pt needs tele? No  Skin/wound Issues: None    Code Status: Full Code    Pain control: good    Nausea control: good    Abnormal labs/tests/findings requiring intervention:     Family present during ED course? No   Family Comments/Social Situation " comments:     Tasks needing completion: None    Juan David Augustin, RN  [facility] --   [phone] [place] ED  [phone] [facility] ED

## 2020-04-10 NOTE — ED PROVIDER NOTES
History     Chief Complaint   Patient presents with     Abdominal Pain     RUQ pain started 5 days ago, worsening after eating, pt reported nausea, no vomiting and diarrhea     HPI  Joie Yadav is a 29 year old female with a history of PCOS and new diagnosis of endometrial hyperplasia and complex cervical cyst (concerning for adenoma malignum; following with Dr. Rizo of the Gyn-Onc service) who presents to a chief complaint of right upper quadrant pain.  Pain seems worse after she eats.  Is been going on for approximately the last 5 days and worsening.  She denies any fevers or chills.  Nausea is present but no vomiting.  She also reports loose stools.  No previous history of surgeries.  No known history of gallbladder disease.  No previous history of pancreatitis.  She has history of type 2 diabetes as well as PCOS.    I have reviewed the Medications, Allergies, Past Medical and Surgical History, and Social History in the [employer] system.  PAST MEDICAL HISTORY:   Past Medical History:   Diagnosis Date     Diabetes (H)     Type 2     Endometrial hyperplasia without atypia, simple 4/8/2019     Obesity      PCOS (polycystic ovarian syndrome)      Vitiligo        PAST SURGICAL HISTORY:   Past Surgical History:   Procedure Laterality Date     NO HISTORY OF SURGERY         Past medical history, past surgical history, medications, and allergies were reviewed with the patient. Additional pertinent items: None    FAMILY HISTORY: History reviewed. No pertinent family history.    SOCIAL HISTORY:   Social History     Tobacco Use     Smoking status: Never Smoker     Smokeless tobacco: Never Used   Substance Use Topics     Alcohol use: No     Social history was reviewed with the patient. Additional pertinent items: None      Patient's Medications   New Prescriptions    No medications on file   Previous Medications    ACETAMINOPHEN (TYLENOL PO)        IBUPROFEN (ADVIL/MOTRIN) 200 MG TABLET    Take 3 tablets (600 mg) by  "mouth every 6 hours as needed for mild pain    KETOROLAC (TORADOL) 10 MG TABLET    Take 1 tablet (10 mg) by mouth every 6 hours as needed for moderate pain    METFORMIN (GLUCOPHAGE) 500 MG TABLET    Take 500 mg by mouth 4 times daily (with meals and nightly)   Modified Medications    No medications on file   Discontinued Medications    No medications on file        No Known Allergies     Review of Systems   Constitutional: Negative for chills, diaphoresis and fever.   HENT: Negative for congestion.    Eyes: Negative for visual disturbance.   Respiratory: Negative for cough, chest tightness and shortness of breath.    Cardiovascular: Negative for chest pain and palpitations.   Gastrointestinal: Positive for abdominal pain, diarrhea and nausea. Negative for vomiting.   Genitourinary: Negative for difficulty urinating and dysuria.   Neurological: Negative for dizziness and syncope.   Psychiatric/Behavioral: Negative for behavioral problems and suicidal ideas.     Physical Exam   BP: (!) 150/84  Pulse: 74  Temp: 96.1  F (35.6  C)  Resp: 18  Height: 144.8 cm (4' 9\")  Weight: 106.6 kg (235 lb)  SpO2: 100 %      Physical Exam  Constitutional:       General: She is not in acute distress.     Appearance: She is not diaphoretic.   HENT:      Head: Normocephalic.      Mouth/Throat:      Pharynx: No oropharyngeal exudate.   Eyes:      Extraocular Movements: Extraocular movements intact.   Neck:      Musculoskeletal: Neck supple.   Cardiovascular:      Rate and Rhythm: Normal rate and regular rhythm.      Heart sounds: Normal heart sounds.   Pulmonary:      Effort: No respiratory distress.      Breath sounds: Normal breath sounds.   Abdominal:      General: There is no distension.      Palpations: Abdomen is soft.      Tenderness: There is abdominal tenderness in the right upper quadrant and epigastric area.   Musculoskeletal:         General: No deformity.   Skin:     General: Skin is warm and dry.   Neurological:      Mental " Status: She is alert.      Comments: alert   Psychiatric:         Behavior: Behavior normal.         ED Course        Procedures           Results for orders placed or performed during the hospital encounter of 04/10/20 (from the past 24 hour(s))   CBC with platelets differential   Result Value Ref Range    WBC 12.6 (H) 4.0 - 11.0 10e9/L    RBC Count 5.05 3.8 - 5.2 10e12/L    Hemoglobin 12.8 11.7 - 15.7 g/dL    Hematocrit 39.7 35.0 - 47.0 %    MCV 79 78 - 100 fl    MCH 25.3 (L) 26.5 - 33.0 pg    MCHC 32.2 31.5 - 36.5 g/dL    RDW 15.8 (H) 10.0 - 15.0 %    Platelet Count 364 150 - 450 10e9/L    Diff Method Automated Method     % Neutrophils 69.0 %    % Lymphocytes 23.2 %    % Monocytes 5.8 %    % Eosinophils 1.4 %    % Basophils 0.4 %    % Immature Granulocytes 0.2 %    Nucleated RBCs 0 0 /100    Absolute Neutrophil 8.7 (H) 1.6 - 8.3 10e9/L    Absolute Lymphocytes 2.9 0.8 - 5.3 10e9/L    Absolute Monocytes 0.7 0.0 - 1.3 10e9/L    Absolute Eosinophils 0.2 0.0 - 0.7 10e9/L    Absolute Basophils 0.1 0.0 - 0.2 10e9/L    Abs Immature Granulocytes 0.0 0 - 0.4 10e9/L    Absolute Nucleated RBC 0.0    Comprehensive metabolic panel   Result Value Ref Range    Sodium 137 133 - 144 mmol/L    Potassium 3.6 3.4 - 5.3 mmol/L    Chloride 104 94 - 109 mmol/L    Carbon Dioxide 28 20 - 32 mmol/L    Anion Gap 5 3 - 14 mmol/L    Glucose 134 (H) 70 - 99 mg/dL    Urea Nitrogen 16 7 - 30 mg/dL    Creatinine 0.67 0.52 - 1.04 mg/dL    GFR Estimate >90 >60 mL/min/[1.73_m2]    GFR Estimate If Black >90 >60 mL/min/[1.73_m2]    Calcium 9.5 8.5 - 10.1 mg/dL    Bilirubin Total 0.3 0.2 - 1.3 mg/dL    Albumin 4.0 3.4 - 5.0 g/dL    Protein Total 8.9 (H) 6.8 - 8.8 g/dL    Alkaline Phosphatase 126 40 - 150 U/L    ALT 37 0 - 50 U/L    AST 18 0 - 45 U/L   Lipase   Result Value Ref Range    Lipase 93 73 - 393 U/L   UA reflex to Microscopic and Culture    Specimen: Urine Midstream; Midstream Urine   Result Value Ref Range    Color Urine Light Yellow      Appearance Urine Clear     Glucose Urine Negative NEG^Negative mg/dL    Bilirubin Urine Negative NEG^Negative    Ketones Urine Negative NEG^Negative mg/dL    Specific Gravity Urine 1.011 1.003 - 1.035    Blood Urine Negative NEG^Negative    pH Urine 6.0 5.0 - 7.0 pH    Protein Albumin Urine Negative NEG^Negative mg/dL    Urobilinogen mg/dL Normal 0.0 - 2.0 mg/dL    Nitrite Urine Negative NEG^Negative    Leukocyte Esterase Urine Trace (A) NEG^Negative    Source Midstream Urine     RBC Urine <1 0 - 2 /HPF    WBC Urine 3 0 - 5 /HPF    Bacteria Urine Few (A) NEG^Negative /HPF    Squamous Epithelial /HPF Urine 8 (H) 0 - 1 /HPF   HCG qualitative urine (UPT)   Result Value Ref Range    HCG Qual Urine Negative NEG^Negative   US Abdomen Limited (RUQ)    Narrative    ULTRASOUND ABDOMEN LIMITED 4/10/2020 8:40 AM    CLINICAL HISTORY: Abdomen pain right upper quadrant.    TECHNIQUE: Limited abdominal ultrasound.    COMPARISON: CT abdomen and pelvis 2/29/2020.    FINDINGS:  GALLBLADDER: Solitary stone at the gallbladder neck. Negative  sonographic Pinto sign. Gallbladder wall is 0.3 cm.    BILE DUCTS: There is no biliary dilatation. The common duct measures 9  mm.    LIVER: Normal where seen. Limited visualization.    RIGHT KIDNEY: No hydronephrosis.    PANCREAS: The visualized portions of the pancreas are normal.    No ascites.      Impression    IMPRESSION:  Solitary gallstone at the gallbladder neck. Negative  sonographic Pinto sign. The common duct is mildly dilated measuring 9  mm.     Medications   0.9% sodium chloride BOLUS (0 mLs Intravenous Stopped 4/10/20 0854)     Followed by   sodium chloride 0.9% infusion (1,000 mLs Intravenous New Bag 4/10/20 0856)   morphine (PF) injection 4 mg (4 mg Intravenous Given 4/10/20 0809)   ondansetron (ZOFRAN) injection 4 mg (4 mg Intravenous Given 4/10/20 0809)   HYDROmorphone (DILAUDID) injection 1 mg (1 mg Intravenous Given 4/10/20 0932)      Results for orders placed or performed  during the hospital encounter of 04/10/20   US Abdomen Limited (RUQ)     Status: None (Preliminary result)    Narrative    ULTRASOUND ABDOMEN LIMITED 4/10/2020 8:40 AM    CLINICAL HISTORY: Abdomen pain right upper quadrant.    TECHNIQUE: Limited abdominal ultrasound.    COMPARISON: CT abdomen and pelvis 2/29/2020.    FINDINGS:  GALLBLADDER: Solitary stone at the gallbladder neck. Negative  sonographic Pinto sign. Gallbladder wall is 0.3 cm.    BILE DUCTS: There is no biliary dilatation. The common duct measures 9  mm.    LIVER: Normal where seen. Limited visualization.    RIGHT KIDNEY: No hydronephrosis.    PANCREAS: The visualized portions of the pancreas are normal.    No ascites.      Impression    IMPRESSION:  Solitary gallstone at the gallbladder neck. Negative  sonographic Pinto sign. The common duct is mildly dilated measuring 9  mm.   UA reflex to Microscopic and Culture     Status: Abnormal    Specimen: Urine Midstream; Midstream Urine   Result Value Ref Range    Color Urine Light Yellow     Appearance Urine Clear     Glucose Urine Negative NEG^Negative mg/dL    Bilirubin Urine Negative NEG^Negative    Ketones Urine Negative NEG^Negative mg/dL    Specific Gravity Urine 1.011 1.003 - 1.035    Blood Urine Negative NEG^Negative    pH Urine 6.0 5.0 - 7.0 pH    Protein Albumin Urine Negative NEG^Negative mg/dL    Urobilinogen mg/dL Normal 0.0 - 2.0 mg/dL    Nitrite Urine Negative NEG^Negative    Leukocyte Esterase Urine Trace (A) NEG^Negative    Source Midstream Urine     RBC Urine <1 0 - 2 /HPF    WBC Urine 3 0 - 5 /HPF    Bacteria Urine Few (A) NEG^Negative /HPF    Squamous Epithelial /HPF Urine 8 (H) 0 - 1 /HPF   HCG qualitative urine (UPT)     Status: None   Result Value Ref Range    HCG Qual Urine Negative NEG^Negative   CBC with platelets differential     Status: Abnormal   Result Value Ref Range    WBC 12.6 (H) 4.0 - 11.0 10e9/L    RBC Count 5.05 3.8 - 5.2 10e12/L    Hemoglobin 12.8 11.7 - 15.7 g/dL     Hematocrit 39.7 35.0 - 47.0 %    MCV 79 78 - 100 fl    MCH 25.3 (L) 26.5 - 33.0 pg    MCHC 32.2 31.5 - 36.5 g/dL    RDW 15.8 (H) 10.0 - 15.0 %    Platelet Count 364 150 - 450 10e9/L    Diff Method Automated Method     % Neutrophils 69.0 %    % Lymphocytes 23.2 %    % Monocytes 5.8 %    % Eosinophils 1.4 %    % Basophils 0.4 %    % Immature Granulocytes 0.2 %    Nucleated RBCs 0 0 /100    Absolute Neutrophil 8.7 (H) 1.6 - 8.3 10e9/L    Absolute Lymphocytes 2.9 0.8 - 5.3 10e9/L    Absolute Monocytes 0.7 0.0 - 1.3 10e9/L    Absolute Eosinophils 0.2 0.0 - 0.7 10e9/L    Absolute Basophils 0.1 0.0 - 0.2 10e9/L    Abs Immature Granulocytes 0.0 0 - 0.4 10e9/L    Absolute Nucleated RBC 0.0    Comprehensive metabolic panel     Status: Abnormal   Result Value Ref Range    Sodium 137 133 - 144 mmol/L    Potassium 3.6 3.4 - 5.3 mmol/L    Chloride 104 94 - 109 mmol/L    Carbon Dioxide 28 20 - 32 mmol/L    Anion Gap 5 3 - 14 mmol/L    Glucose 134 (H) 70 - 99 mg/dL    Urea Nitrogen 16 7 - 30 mg/dL    Creatinine 0.67 0.52 - 1.04 mg/dL    GFR Estimate >90 >60 mL/min/[1.73_m2]    GFR Estimate If Black >90 >60 mL/min/[1.73_m2]    Calcium 9.5 8.5 - 10.1 mg/dL    Bilirubin Total 0.3 0.2 - 1.3 mg/dL    Albumin 4.0 3.4 - 5.0 g/dL    Protein Total 8.9 (H) 6.8 - 8.8 g/dL    Alkaline Phosphatase 126 40 - 150 U/L    ALT 37 0 - 50 U/L    AST 18 0 - 45 U/L   Lipase     Status: None   Result Value Ref Range    Lipase 93 73 - 393 U/L            Assessments & Plan (with Medical Decision Making)   21-year-old female presents to us with a chief complaint of abdominal pain.  Differential includes but not limited to biliary colic, choledocholithiasis, pancreatitis, gastritis, viral illness, cholecystitis.  Labs are unremarkable.  UA is clear patient is not pregnant.  Ultrasound shows a large stone in the gallbladder neck and possible dilation of the common bile duct.  Patient's pain was difficult to control.  Is unclear whether or not this is simply  represents biliary colic or may represent some degree of choledocholithiasis given the dilated common bile duct.  We [facility] the [facility] consultation with GI and further work-up.    I have reviewed the nursing notes.    I have reviewed the findings, diagnosis, plan and need for follow up with the patient.    New Prescriptions    No medications on file       Final diagnoses:   Abdominal pain, epigastric       4/10/2020   [facility], [place], EMERGENCY DEPARTMENT     Ariel Mitchell,   04/10/20 0951

## 2020-04-11 ENCOUNTER — ANESTHESIA (OUTPATIENT)
Dept: SURGERY | Facility: CLINIC | Age: 30
End: 2020-04-11
Payer: COMMERCIAL

## 2020-04-11 ENCOUNTER — ANESTHESIA EVENT (OUTPATIENT)
Dept: SURGERY | Facility: CLINIC | Age: 30
End: 2020-04-11
Payer: COMMERCIAL

## 2020-04-11 PROBLEM — K81.0 ACUTE CHOLECYSTITIS: Status: ACTIVE | Noted: 2020-04-10

## 2020-04-11 LAB
ALBUMIN SERPL-MCNC: 3.1 G/DL (ref 3.4–5)
ALP SERPL-CCNC: 90 U/L (ref 40–150)
ALT SERPL W P-5'-P-CCNC: 28 U/L (ref 0–50)
ANION GAP SERPL CALCULATED.3IONS-SCNC: 7 MMOL/L (ref 3–14)
AST SERPL W P-5'-P-CCNC: 14 U/L (ref 0–45)
BASOPHILS # BLD AUTO: 0 10E9/L (ref 0–0.2)
BASOPHILS NFR BLD AUTO: 0.3 %
BILIRUB SERPL-MCNC: 0.7 MG/DL (ref 0.2–1.3)
BUN SERPL-MCNC: 8 MG/DL (ref 7–30)
CALCIUM SERPL-MCNC: 8.6 MG/DL (ref 8.5–10.1)
CHLORIDE SERPL-SCNC: 104 MMOL/L (ref 94–109)
CO2 SERPL-SCNC: 26 MMOL/L (ref 20–32)
CREAT SERPL-MCNC: 0.66 MG/DL (ref 0.52–1.04)
DIFFERENTIAL METHOD BLD: ABNORMAL
EOSINOPHIL # BLD AUTO: 0.1 10E9/L (ref 0–0.7)
EOSINOPHIL NFR BLD AUTO: 0.7 %
ERYTHROCYTE [DISTWIDTH] IN BLOOD BY AUTOMATED COUNT: 15.9 % (ref 10–15)
GFR SERPL CREATININE-BSD FRML MDRD: >90 ML/MIN/{1.73_M2}
GLUCOSE BLDC GLUCOMTR-MCNC: 117 MG/DL (ref 70–99)
GLUCOSE BLDC GLUCOMTR-MCNC: 120 MG/DL (ref 70–99)
GLUCOSE BLDC GLUCOMTR-MCNC: 149 MG/DL (ref 70–99)
GLUCOSE BLDC GLUCOMTR-MCNC: 161 MG/DL (ref 70–99)
GLUCOSE BLDC GLUCOMTR-MCNC: 167 MG/DL (ref 70–99)
GLUCOSE SERPL-MCNC: 132 MG/DL (ref 70–99)
HCT VFR BLD AUTO: 36.1 % (ref 35–47)
HGB BLD-MCNC: 11.4 G/DL (ref 11.7–15.7)
IMM GRANULOCYTES # BLD: 0.1 10E9/L (ref 0–0.4)
IMM GRANULOCYTES NFR BLD: 0.7 %
LYMPHOCYTES # BLD AUTO: 2.2 10E9/L (ref 0.8–5.3)
LYMPHOCYTES NFR BLD AUTO: 18.4 %
MCH RBC QN AUTO: 24.9 PG (ref 26.5–33)
MCHC RBC AUTO-ENTMCNC: 31.6 G/DL (ref 31.5–36.5)
MCV RBC AUTO: 79 FL (ref 78–100)
MONOCYTES # BLD AUTO: 0.8 10E9/L (ref 0–1.3)
MONOCYTES NFR BLD AUTO: 6.6 %
NEUTROPHILS # BLD AUTO: 8.7 10E9/L (ref 1.6–8.3)
NEUTROPHILS NFR BLD AUTO: 73.3 %
NRBC # BLD AUTO: 0 10*3/UL
NRBC BLD AUTO-RTO: 0 /100
PLATELET # BLD AUTO: 333 10E9/L (ref 150–450)
POTASSIUM SERPL-SCNC: 3.3 MMOL/L (ref 3.4–5.3)
POTASSIUM SERPL-SCNC: 4.1 MMOL/L (ref 3.4–5.3)
PROT SERPL-MCNC: 7.4 G/DL (ref 6.8–8.8)
RBC # BLD AUTO: 4.57 10E12/L (ref 3.8–5.2)
SODIUM SERPL-SCNC: 137 MMOL/L (ref 133–144)
WBC # BLD AUTO: 11.9 10E9/L (ref 4–11)

## 2020-04-11 PROCEDURE — 00000146 ZZHCL STATISTIC GLUCOSE BY METER IP

## 2020-04-11 PROCEDURE — 25000125 ZZHC RX 250: Performed by: SURGERY

## 2020-04-11 PROCEDURE — 25000125 ZZHC RX 250: Performed by: STUDENT IN AN ORGANIZED HEALTH CARE EDUCATION/TRAINING PROGRAM

## 2020-04-11 PROCEDURE — 25000128 H RX IP 250 OP 636: Performed by: STUDENT IN AN ORGANIZED HEALTH CARE EDUCATION/TRAINING PROGRAM

## 2020-04-11 PROCEDURE — 25000132 ZZH RX MED GY IP 250 OP 250 PS 637: Performed by: STUDENT IN AN ORGANIZED HEALTH CARE EDUCATION/TRAINING PROGRAM

## 2020-04-11 PROCEDURE — 36000059 ZZH SURGERY LEVEL 3 EA 15 ADDTL MIN UMMC: Performed by: SURGERY

## 2020-04-11 PROCEDURE — 36000057 ZZH SURGERY LEVEL 3 1ST 30 MIN - UMMC: Performed by: SURGERY

## 2020-04-11 PROCEDURE — 85025 COMPLETE CBC W/AUTO DIFF WBC: CPT | Performed by: STUDENT IN AN ORGANIZED HEALTH CARE EDUCATION/TRAINING PROGRAM

## 2020-04-11 PROCEDURE — 36415 COLL VENOUS BLD VENIPUNCTURE: CPT | Performed by: STUDENT IN AN ORGANIZED HEALTH CARE EDUCATION/TRAINING PROGRAM

## 2020-04-11 PROCEDURE — 37000008 ZZH ANESTHESIA TECHNICAL FEE, 1ST 30 MIN: Performed by: SURGERY

## 2020-04-11 PROCEDURE — 88304 TISSUE EXAM BY PATHOLOGIST: CPT | Performed by: SURGERY

## 2020-04-11 PROCEDURE — 12000001 ZZH R&B MED SURG/OB UMMC

## 2020-04-11 PROCEDURE — 71000014 ZZH RECOVERY PHASE 1 LEVEL 2 FIRST HR: Performed by: SURGERY

## 2020-04-11 PROCEDURE — 25000566 ZZH SEVOFLURANE, EA 15 MIN: Performed by: SURGERY

## 2020-04-11 PROCEDURE — 84132 ASSAY OF SERUM POTASSIUM: CPT | Performed by: FAMILY MEDICINE

## 2020-04-11 PROCEDURE — 80053 COMPREHEN METABOLIC PANEL: CPT | Performed by: STUDENT IN AN ORGANIZED HEALTH CARE EDUCATION/TRAINING PROGRAM

## 2020-04-11 PROCEDURE — 25800030 ZZH RX IP 258 OP 636: Performed by: STUDENT IN AN ORGANIZED HEALTH CARE EDUCATION/TRAINING PROGRAM

## 2020-04-11 PROCEDURE — 40000170 ZZH STATISTIC PRE-PROCEDURE ASSESSMENT II: Performed by: SURGERY

## 2020-04-11 PROCEDURE — 27210794 ZZH OR GENERAL SUPPLY STERILE: Performed by: SURGERY

## 2020-04-11 PROCEDURE — 37000009 ZZH ANESTHESIA TECHNICAL FEE, EACH ADDTL 15 MIN: Performed by: SURGERY

## 2020-04-11 PROCEDURE — 0FT40ZZ RESECTION OF GALLBLADDER, OPEN APPROACH: ICD-10-PCS | Performed by: SURGERY

## 2020-04-11 PROCEDURE — 36415 COLL VENOUS BLD VENIPUNCTURE: CPT | Performed by: FAMILY MEDICINE

## 2020-04-11 PROCEDURE — 25000131 ZZH RX MED GY IP 250 OP 636 PS 637: Performed by: STUDENT IN AN ORGANIZED HEALTH CARE EDUCATION/TRAINING PROGRAM

## 2020-04-11 RX ORDER — PROPOFOL 10 MG/ML
INJECTION, EMULSION INTRAVENOUS PRN
Status: DISCONTINUED | OUTPATIENT
Start: 2020-04-11 | End: 2020-04-11

## 2020-04-11 RX ORDER — FENTANYL CITRATE 50 UG/ML
25-50 INJECTION, SOLUTION INTRAMUSCULAR; INTRAVENOUS EVERY 5 MIN PRN
Status: DISCONTINUED | OUTPATIENT
Start: 2020-04-11 | End: 2020-04-11 | Stop reason: HOSPADM

## 2020-04-11 RX ORDER — ACETAMINOPHEN 325 MG/1
650 TABLET ORAL EVERY 6 HOURS PRN
Status: DISCONTINUED | OUTPATIENT
Start: 2020-04-11 | End: 2020-04-11

## 2020-04-11 RX ORDER — POTASSIUM CHLORIDE 29.8 MG/ML
20 INJECTION INTRAVENOUS
Status: DISCONTINUED | OUTPATIENT
Start: 2020-04-11 | End: 2020-04-12 | Stop reason: HOSPADM

## 2020-04-11 RX ORDER — HYDROMORPHONE HYDROCHLORIDE 1 MG/ML
.3-.5 INJECTION, SOLUTION INTRAMUSCULAR; INTRAVENOUS; SUBCUTANEOUS EVERY 5 MIN PRN
Status: DISCONTINUED | OUTPATIENT
Start: 2020-04-11 | End: 2020-04-11 | Stop reason: HOSPADM

## 2020-04-11 RX ORDER — SODIUM CHLORIDE, SODIUM LACTATE, POTASSIUM CHLORIDE, CALCIUM CHLORIDE 600; 310; 30; 20 MG/100ML; MG/100ML; MG/100ML; MG/100ML
INJECTION, SOLUTION INTRAVENOUS CONTINUOUS PRN
Status: DISCONTINUED | OUTPATIENT
Start: 2020-04-11 | End: 2020-04-11

## 2020-04-11 RX ORDER — ACETAMINOPHEN 325 MG/1
975 TABLET ORAL ONCE
Status: COMPLETED | OUTPATIENT
Start: 2020-04-11 | End: 2020-04-11

## 2020-04-11 RX ORDER — NALOXONE HYDROCHLORIDE 0.4 MG/ML
.1-.4 INJECTION, SOLUTION INTRAMUSCULAR; INTRAVENOUS; SUBCUTANEOUS
Status: DISCONTINUED | OUTPATIENT
Start: 2020-04-11 | End: 2020-04-12 | Stop reason: HOSPADM

## 2020-04-11 RX ORDER — SODIUM CHLORIDE, SODIUM LACTATE, POTASSIUM CHLORIDE, CALCIUM CHLORIDE 600; 310; 30; 20 MG/100ML; MG/100ML; MG/100ML; MG/100ML
INJECTION, SOLUTION INTRAVENOUS CONTINUOUS
Status: DISCONTINUED | OUTPATIENT
Start: 2020-04-11 | End: 2020-04-11 | Stop reason: HOSPADM

## 2020-04-11 RX ORDER — ONDANSETRON 4 MG/1
4 TABLET, ORALLY DISINTEGRATING ORAL EVERY 30 MIN PRN
Status: DISCONTINUED | OUTPATIENT
Start: 2020-04-11 | End: 2020-04-11 | Stop reason: HOSPADM

## 2020-04-11 RX ORDER — CEFAZOLIN SODIUM 2 G/100ML
2 INJECTION, SOLUTION INTRAVENOUS
Status: COMPLETED | OUTPATIENT
Start: 2020-04-11 | End: 2020-04-11

## 2020-04-11 RX ORDER — ACETAMINOPHEN 325 MG/1
650 TABLET ORAL EVERY 6 HOURS
Status: DISCONTINUED | OUTPATIENT
Start: 2020-04-11 | End: 2020-04-12 | Stop reason: HOSPADM

## 2020-04-11 RX ORDER — ONDANSETRON 2 MG/ML
INJECTION INTRAMUSCULAR; INTRAVENOUS PRN
Status: DISCONTINUED | OUTPATIENT
Start: 2020-04-11 | End: 2020-04-11

## 2020-04-11 RX ORDER — NALOXONE HYDROCHLORIDE 0.4 MG/ML
.1-.4 INJECTION, SOLUTION INTRAMUSCULAR; INTRAVENOUS; SUBCUTANEOUS
Status: ACTIVE | OUTPATIENT
Start: 2020-04-11 | End: 2020-04-12

## 2020-04-11 RX ORDER — BUPIVACAINE HYDROCHLORIDE AND EPINEPHRINE 2.5; 5 MG/ML; UG/ML
INJECTION, SOLUTION INFILTRATION; PERINEURAL PRN
Status: DISCONTINUED | OUTPATIENT
Start: 2020-04-11 | End: 2020-04-11 | Stop reason: HOSPADM

## 2020-04-11 RX ORDER — OXYCODONE HYDROCHLORIDE 5 MG/1
5 TABLET ORAL EVERY 4 HOURS PRN
Status: DISCONTINUED | OUTPATIENT
Start: 2020-04-11 | End: 2020-04-12

## 2020-04-11 RX ORDER — POTASSIUM CHLORIDE 750 MG/1
20-40 TABLET, EXTENDED RELEASE ORAL
Status: DISCONTINUED | OUTPATIENT
Start: 2020-04-11 | End: 2020-04-12 | Stop reason: HOSPADM

## 2020-04-11 RX ORDER — POTASSIUM CL/LIDO/0.9 % NACL 10MEQ/0.1L
10 INTRAVENOUS SOLUTION, PIGGYBACK (ML) INTRAVENOUS
Status: DISCONTINUED | OUTPATIENT
Start: 2020-04-11 | End: 2020-04-12 | Stop reason: HOSPADM

## 2020-04-11 RX ORDER — ONDANSETRON 2 MG/ML
4 INJECTION INTRAMUSCULAR; INTRAVENOUS EVERY 30 MIN PRN
Status: DISCONTINUED | OUTPATIENT
Start: 2020-04-11 | End: 2020-04-11 | Stop reason: HOSPADM

## 2020-04-11 RX ORDER — CEFAZOLIN SODIUM 1 G/3ML
1 INJECTION, POWDER, FOR SOLUTION INTRAMUSCULAR; INTRAVENOUS SEE ADMIN INSTRUCTIONS
Status: DISCONTINUED | OUTPATIENT
Start: 2020-04-11 | End: 2020-04-11 | Stop reason: HOSPADM

## 2020-04-11 RX ORDER — FENTANYL CITRATE 50 UG/ML
INJECTION, SOLUTION INTRAMUSCULAR; INTRAVENOUS PRN
Status: DISCONTINUED | OUTPATIENT
Start: 2020-04-11 | End: 2020-04-11

## 2020-04-11 RX ORDER — POTASSIUM CHLORIDE 7.45 MG/ML
10 INJECTION INTRAVENOUS
Status: DISCONTINUED | OUTPATIENT
Start: 2020-04-11 | End: 2020-04-12 | Stop reason: HOSPADM

## 2020-04-11 RX ORDER — DEXAMETHASONE SODIUM PHOSPHATE 4 MG/ML
INJECTION, SOLUTION INTRA-ARTICULAR; INTRALESIONAL; INTRAMUSCULAR; INTRAVENOUS; SOFT TISSUE PRN
Status: DISCONTINUED | OUTPATIENT
Start: 2020-04-11 | End: 2020-04-11

## 2020-04-11 RX ORDER — POTASSIUM CHLORIDE 1.5 G/1.58G
20-40 POWDER, FOR SOLUTION ORAL
Status: DISCONTINUED | OUTPATIENT
Start: 2020-04-11 | End: 2020-04-12 | Stop reason: HOSPADM

## 2020-04-11 RX ORDER — LIDOCAINE HYDROCHLORIDE 20 MG/ML
INJECTION, SOLUTION INFILTRATION; PERINEURAL PRN
Status: DISCONTINUED | OUTPATIENT
Start: 2020-04-11 | End: 2020-04-11

## 2020-04-11 RX ORDER — LIDOCAINE 40 MG/G
CREAM TOPICAL
Status: DISCONTINUED | OUTPATIENT
Start: 2020-04-11 | End: 2020-04-12 | Stop reason: HOSPADM

## 2020-04-11 RX ORDER — OXYCODONE HYDROCHLORIDE 5 MG/1
5 TABLET ORAL EVERY 4 HOURS PRN
Status: DISCONTINUED | OUTPATIENT
Start: 2020-04-11 | End: 2020-04-11

## 2020-04-11 RX ADMIN — SODIUM CHLORIDE, POTASSIUM CHLORIDE, SODIUM LACTATE AND CALCIUM CHLORIDE: 600; 310; 30; 20 INJECTION, SOLUTION INTRAVENOUS at 08:23

## 2020-04-11 RX ADMIN — ACETAMINOPHEN 650 MG: 325 TABLET, FILM COATED ORAL at 20:04

## 2020-04-11 RX ADMIN — Medication 0.2 MG: at 12:26

## 2020-04-11 RX ADMIN — ACETAMINOPHEN 975 MG: 325 TABLET, FILM COATED ORAL at 07:50

## 2020-04-11 RX ADMIN — FENTANYL CITRATE 100 MCG: 50 INJECTION, SOLUTION INTRAMUSCULAR; INTRAVENOUS at 08:30

## 2020-04-11 RX ADMIN — HYDROMORPHONE HYDROCHLORIDE 0.5 MG: 1 INJECTION, SOLUTION INTRAMUSCULAR; INTRAVENOUS; SUBCUTANEOUS at 10:23

## 2020-04-11 RX ADMIN — OXYCODONE HYDROCHLORIDE 5 MG: 5 TABLET ORAL at 20:04

## 2020-04-11 RX ADMIN — Medication 0.2 MG: at 02:22

## 2020-04-11 RX ADMIN — Medication 0.2 MG: at 04:33

## 2020-04-11 RX ADMIN — ACETAMINOPHEN 650 MG: 325 TABLET, FILM COATED ORAL at 14:14

## 2020-04-11 RX ADMIN — SODIUM CHLORIDE, POTASSIUM CHLORIDE, SODIUM LACTATE AND CALCIUM CHLORIDE: 600; 310; 30; 20 INJECTION, SOLUTION INTRAVENOUS at 13:52

## 2020-04-11 RX ADMIN — PHENYLEPHRINE HYDROCHLORIDE 100 MCG: 10 INJECTION INTRAVENOUS at 08:43

## 2020-04-11 RX ADMIN — SODIUM CHLORIDE, POTASSIUM CHLORIDE, SODIUM LACTATE AND CALCIUM CHLORIDE: 600; 310; 30; 20 INJECTION, SOLUTION INTRAVENOUS at 23:33

## 2020-04-11 RX ADMIN — FENTANYL CITRATE 50 MCG: 50 INJECTION, SOLUTION INTRAMUSCULAR; INTRAVENOUS at 09:18

## 2020-04-11 RX ADMIN — CEFAZOLIN SODIUM 2 G: 2 INJECTION, SOLUTION INTRAVENOUS at 08:37

## 2020-04-11 RX ADMIN — PHENYLEPHRINE HYDROCHLORIDE 100 MCG: 10 INJECTION INTRAVENOUS at 08:49

## 2020-04-11 RX ADMIN — SODIUM CHLORIDE, POTASSIUM CHLORIDE, SODIUM LACTATE AND CALCIUM CHLORIDE: 600; 310; 30; 20 INJECTION, SOLUTION INTRAVENOUS at 00:33

## 2020-04-11 RX ADMIN — ONDANSETRON 4 MG: 2 INJECTION INTRAMUSCULAR; INTRAVENOUS at 10:28

## 2020-04-11 RX ADMIN — HYDROMORPHONE HYDROCHLORIDE 0.5 MG: 1 INJECTION, SOLUTION INTRAMUSCULAR; INTRAVENOUS; SUBCUTANEOUS at 11:34

## 2020-04-11 RX ADMIN — SUGAMMADEX 200 MG: 100 INJECTION, SOLUTION INTRAVENOUS at 10:32

## 2020-04-11 RX ADMIN — POTASSIUM CHLORIDE 40 MEQ: 750 TABLET, EXTENDED RELEASE ORAL at 14:14

## 2020-04-11 RX ADMIN — DEXAMETHASONE SODIUM PHOSPHATE 8 MG: 4 INJECTION, SOLUTION INTRA-ARTICULAR; INTRALESIONAL; INTRAMUSCULAR; INTRAVENOUS; SOFT TISSUE at 08:38

## 2020-04-11 RX ADMIN — OXYCODONE HYDROCHLORIDE 5 MG: 5 TABLET ORAL at 15:08

## 2020-04-11 RX ADMIN — FENTANYL CITRATE 50 MCG: 50 INJECTION, SOLUTION INTRAMUSCULAR; INTRAVENOUS at 09:57

## 2020-04-11 RX ADMIN — POTASSIUM CHLORIDE 20 MEQ: 750 TABLET, EXTENDED RELEASE ORAL at 16:07

## 2020-04-11 RX ADMIN — INSULIN ASPART 1 UNITS: 100 INJECTION, SOLUTION INTRAVENOUS; SUBCUTANEOUS at 12:40

## 2020-04-11 RX ADMIN — Medication 0.2 MG: at 06:38

## 2020-04-11 RX ADMIN — LIDOCAINE HYDROCHLORIDE 80 MG: 20 INJECTION, SOLUTION INFILTRATION; PERINEURAL at 08:30

## 2020-04-11 RX ADMIN — PROPOFOL 160 MG: 10 INJECTION, EMULSION INTRAVENOUS at 08:30

## 2020-04-11 RX ADMIN — MIDAZOLAM 2 MG: 1 INJECTION INTRAMUSCULAR; INTRAVENOUS at 08:22

## 2020-04-11 ASSESSMENT — PAIN DESCRIPTION - DESCRIPTORS
DESCRIPTORS: SORE
DESCRIPTORS: ACHING;SORE
DESCRIPTORS: SORE
DESCRIPTORS: ACHING
DESCRIPTORS: ACHING;SORE

## 2020-04-11 ASSESSMENT — ACTIVITIES OF DAILY LIVING (ADL)
ADLS_ACUITY_SCORE: 11

## 2020-04-11 NOTE — OP NOTE
[facility], [place]    Operative Note    Pre-operative diagnosis: Acute cholecystitis   Post-operative diagnosis Acute calculous cholecystitis   Procedure: Procedure(s):  CHOLECYSTECTOMY, LAPAROSCOPIC   Surgeon: Surgeon(s) and Role:     * Norris Reid MD - Primary     * Sly Jacob MD - Resident - Assisting   Anesthesia: General    Estimated blood loss: 200 ml   Drains: None   Specimens: ID Type Source Tests Collected by Time Destination   A : Gallbladder and contents Tissue Gallbladder and Contents SURGICAL PATHOLOGY EXAM Norris Reid MD 4/11/2020  9:28 AM       Findings: moderate-severe inflammaton of the gallbladdder, solitary neck tone making grasping challenging, significant pericholecystic edema.     Complications: None.   Implants: None.       OPERATIVE INDICATIONS:  Joie Yadav is a 29 year old female with a history of RUQabdominal pain associated with cholelithiasis on right upper quadrant ultrasound.      After understanding the risks and benefits of proceeding with surgery, the patient has an indication for laparoscopic cholecystectomy and consented to undergo surgery.    I reviewed the risks of surgery with Joie Yadav.    These include, but are not limited to, death, myocardial infarction, pneumonia, urinary tract infection, deep venous thrombosis with or without pulmonary embolus, abdominal infection from bowel injury or abscess, bowel obstruction, wound infection, and bleeding.    More specific risks related to laparoscopic cholecystectomy include bile duct injury (3/1000), bile leak (10/1000), retained common bile duct stone (10/1000), postcholecystectomy diarrhea (1-2%) and these complications may require additional treatment.    OPERATIVE DETAILS:      The patient was brought to the operating room and prepared in a routine fashion.  A timeout was performed prior to surgery and documented by the nursing team.     Under the benefits  of general anesthesia, a left upper quadrant Veress needle was inserted and pneumoperitoneum was established using carbon dioxide gas to a maximum pressure of 15 mmHg.  A total of 4 ports were placed and the laparoscope was utilized from the umbilical port.     The patient was moved into a steep reverse Trendelenberg position.    Several adhesions to the gallbladder were taken down with a combination of blunt and sharp dissection.     The gallbladder was grasped at its fundus and retracted cephalad.  It was also grasped at its infundibulum and retracted laterally.       Findings related to the gallbladder are as noted above.     A complete dissection starting on the gallbladder, identifying the cystic artery on the surface of the gallbladder, was performed.  The cystic artery in this manner was  away from the gallbladder and the infundibulum of the gallbladder and the junction of gallbladder and cystic duct was clearly and completely identified with blunt dissection.The cystic artery was fully skeletonized and observed entering the gallbladder directly.  All of this dissection was performed on the gallbladder wall and clearly above the triangle of Calot.    Due to oozing in the operatinve field, I elected to clip the cystic artery early just where it was entering gallbladder. This was done with 2 stay and 1 go clips.     Next, the cystic duct was dissected to obtain the crtical view of safety, in which the hepatocystic plate was visible.      The cystic duct were clipped securely and divided between clips. The gallbladder was then removed out of its fossa using electrocautery.  It was placed into an Endocatch bag and removed from the abdomen. This necessitated some dilation of the 12mm port site.     Next, the gallbladder fossa was irrigated with a lartge aliquot of saline as there was spillage, and the saline was aspirated. Hemostasis was ensured by cuaterizing the gallbladder fossa. A smallpiece of  surgicel was placed in the fossa.     Complete hemostasis was achieved.     The umbilical incision was very cephalad and felt very tight. It was not closed.     The skin was closed using 4-0 monocryl suture and skin glue were applied.    Wounds were infiltrated with 20cc mix of one percent lidocaine and quarter percent marcaine with epi.     I was present for all critical components of the operation and all needle and sponge counts were correct x2 at the end of the procedure.    Norris Reid MD

## 2020-04-11 NOTE — ANESTHESIA CARE TRANSFER NOTE
Patient: Joie Yadav    Procedure(s):  CHOLECYSTECTOMY, LAPAROSCOPIC    Diagnosis: Acute cholecystitis [K81.0]  Diagnosis Additional Information: No value filed.    Anesthesia Type:   General     Note:  Airway :Face Mask  Patient transferred to:PACU  Comments: VSS. Breathing spontaneously at a regular rate with adequate tidal volumes and maintaining O2 sats on 6L. Denies nausea, endorses 10/10 pain. No apparent complications from anesthesia.     Brittany Gutierrez MD [facility] [place] [phone]  Anesthesia CA-1  Handoff Report: Identifed the Patient, Identified the Reponsible Provider, Reviewed the pertinent medical history, Discussed the surgical course, Reviewed Intra-OP anesthesia mangement and issues during anesthesia, Set expectations for post-procedure period and Allowed opportunity for questions and acknowledgement of understanding      Vitals: (Last set prior to Anesthesia Care Transfer)    CRNA VITALS  4/11/2020 1023 - 4/11/2020 1105      4/11/2020             Pulse:  96    SpO2:  100 %                Electronically Signed By: Brittany Gutierrez MD  April 11, 2020  11:05 AM

## 2020-04-11 NOTE — CONSULTS
General Surgery Consult Note     Patient name: Joie Yadav  Date of Service: 4/10/2020  Reason for Consult: acute cholecystitis   Requesting Physician: Dr. Robbins      Assessment/Plan:   29 F with history of biliary colic now appears to have developed acute cholecystitis.  With dilated CBD however normal LFTs therefor unlikely choledocholithiasis.    - Recheck LFTs in am, if bili remains normal will proceed with cholecystectomy without IOC, scheduled for tomorrow morning  - Risks discussed including bleeding, infection, injury to colon, liver, biliary system, risks associated with anesthesia, and written consent obtained from patient  - OK for clears tonight, NPO at midnight    Seen and discussed with staff, Dr. Reid.  Plan communicated to primary team.    Adrianne Luna  General Surgery PGY3    ------------------------------------------------------------------------  HPI:   29 F with PMH DM and endometriosis who presents with 4 days of RUQ pain, found to have cholelithiasis.  She states over the past few months she has had intermittent RUQ pains, typically after a meal.  On Monday the pain did not resolve as normal, persisted for two days and then somewhat improved however Wednesday the pain returned and persists.  She endorses some nausea, no vomiting.  Chills but no measured fevers.  She denies history of yellowing of her skin or eyes, dark urine, or light colored stools.  She has never had surgery.    PMH:   Past Medical History:   Diagnosis Date     Diabetes (H)     Type 2     Endometrial hyperplasia without atypia, simple 4/8/2019     Obesity      PCOS (polycystic ovarian syndrome)      Vitiligo      PSH:   Past Surgical History:   Procedure Laterality Date     NO HISTORY OF SURGERY       Meds:   Facility-Administered Medications Prior to Admission   Medication Dose Route Frequency Provider Last Rate Last Dose     levonorgestrel (MIRENA) 20 MCG/24HR IUD 20 mcg  1 each Intrauterine Once Jessica Storey MD          Medications Prior to Admission   Medication Sig Dispense Refill Last Dose     acetaminophen (TYLENOL) 325 MG tablet Take 325-650 mg by mouth every 6 hours as needed for mild pain   4/9/2020 at Unknown time     ibuprofen (ADVIL/MOTRIN) 200 MG tablet Take 200 mg by mouth every 6 hours as needed for mild pain   Past Week at Unknown time     metFORMIN (GLUCOPHAGE) 500 MG tablet Take 500 mg by mouth 4 times daily (with meals and nightly)   4/9/2020 at Unknown time     Allergies: No Known Allergies  FamHx: History reviewed. No pertinent family history.  SocHx:   Social History     Socioeconomic History     Marital status: Single     Spouse name: Not on file     Number of children: Not on file     Years of education: Not on file     Highest education level: Not on file   Occupational History     Not on file   Social Needs     Financial resource strain: Not on file     Food insecurity     Worry: Not on file     Inability: Not on file     Transportation needs     Medical: Not on file     Non-medical: Not on file   Tobacco Use     Smoking status: Never Smoker     Smokeless tobacco: Never Used   Substance and Sexual Activity     Alcohol use: No     Drug use: No     Sexual activity: Yes     Partners: Male     Birth control/protection: I.U.D.     Comment: Mirena   Lifestyle     Physical activity     Days per week: Not on file     Minutes per session: Not on file     Stress: Not on file   Relationships     Social connections     Talks on phone: Not on file     Gets together: Not on file     Attends Je[employer]ah's witness service: Not on file     Active member of club or organization: Not on file     Attends meetings of clubs or organizations: Not on file     Relationship status: Not on file     Intimate partner violence     Fear of current or ex partner: Not on file     Emotionally abused: Not on file     Physically abused: Not on file     Forced sexual activity: Not on file   Other Topics Concern     Not on file   Social History  "Narrative     Not on file        ROS: 10-pt ROS negative except as noted above.     Objective:   /63 (BP Location: Left arm)   Pulse 91   Temp 98  F (36.7  C) (Oral)   Resp 16   Ht 1.448 m (4' 9\")   Wt 106.6 kg (235 lb)   SpO2 95%   BMI 50.85 kg/m    General - no acute distress, comfortable  HEENT - normocephalic, atraumatic, EOMI  Cardio - regular rate, regular rhythm  Pulm - non labored respirations on room air  Abdomen - obese, soft, moderately tender in the RUQ and epigastrium, pinto's sign negative  Neuro - moves all extremities without apparent deficit, non-focal  Extremities - no lower extremity edema, warm, well-perfused  Skin - no rash or bruising  Psych - age appropriate mental status / engagement     Labs:  WBC 12.6  Lipase and LFTs normal    Imaging:  Solitary gallstone at the gallbladder neck. Negative  sonographic Pinto sign. The common duct is mildly dilated measuring 9  mm.      "

## 2020-04-11 NOTE — PROGRESS NOTES
[facility], [facility] Progress Note    Main Plans for Today   - Cholecystectomy per surgery     Assessment & Plan   Joie Yadav is a 29 year old female admitted on 4/10/2020. She has a history of T2DM, PCOS, endometrial hyperplasia, and is admitted for cholelithiasis     #Cholelithiasis  #RUQ pain  #Leukocytosis  HDS, afebrile. WBC 12.6 with neutrophilia on admit. RUQ ttp, negative Pinto's sign. U/S showed large bile stone at gallbladder neck and mildly dilated CBD of 9 mm, no wall thickening or signs of inflammation. Normal ALT/AST and bili on admit.   -GI consulted, recommended surgical consult and GI follow-up TBD  -General surgery consulted - cholecystectomy today  -mIVF with LR 100mL/h until taking reliable PO  -Post-op ain control: scheduled tylenol and po oxycodone 5mg Q3H PRN     #Stable chronic medical conditions  -Type II DM: On metformin PTA, Hgb A1C 6.7.  -Holding home metformin  -MSSI  - Complex endometrial hyperplasia, Multiple complex endocervical cysts: Cysts being monitored by OB/GYN and Gyn Onc, and at this point from chart review, likely benign. Not causing current clinical picture. Endometrial hyperplasia resolving on biopsy in 2/2020. Last appt OB/GYN 3/20/20 with follow up scheduled in 08/2020.   - PCOS  - Vitiligo    # Pain Assessment:  Current Pain Score 4/11/2020   Patient currently in pain? yes   Pain score (0-10) -   Pain descriptors Sore   - Joie is experiencing pain due to cholelithiasis, cholecystectomy. Pain management was discussed and the plan was created in a collaborative fashion.  Joie's response to the current recommendations: engaged  - Please see the plan for pain management as documented above    Diet: Advance Diet as Tolerated: Regular Diet Adult  Fluids: LR 100mL/h  DVT Prophylaxis: Pneumatic Compression Devices  Code Status: Full Code    Disposition Plan   Expected discharge:Expected Discharge Date:  04/13/20 2 - 3 days, recommended to prior living arrangement once adequate pain management/ tolerating PO medications.Dispo: Expected Discharge Date: 04/13/20      Entered: Corinna Kim 04/11/2020, 2:34 PM   Information in the above section will display in the discharge planner report.      The patient's care was discussed with the Attending Physician, Dr. Martinez.    Corinna Kim  [facility]:   Pager: [phone]  Please see sticky note for cross cover information    Interval History    Overnight, patient had increasing pain requiring more frequent dosing with IV dilaudid, which worked well. No nausea, no vomiting.    Patient had cholecystectomy this morning. Having RUQ pain, concerned about leaving hospital too early. Requested update to her partner, Norris via phone ([phone]) who needs a  (used teresa Ochoa for interpretation).    Physical Exam   Vital Signs: Temp: 99.1  F (37.3  C) Temp src: Oral BP: 132/72 Pulse: 94 Heart Rate: 88 Resp: 24 SpO2: 96 % O2 Device: Nasal cannula Oxygen Delivery: 1 LPM  Weight: 235 lbs 0 oz   Physical Exam  Vitals signs reviewed.   Constitutional:       General: She is not in acute distress.     Appearance: She is obese. She is not ill-appearing, toxic-appearing or diaphoretic.   HENT:      Mouth/Throat:      Mouth: Mucous membranes are moist.   Cardiovascular:      Rate and Rhythm: Normal rate and regular rhythm.   Pulmonary:      Effort: Pulmonary effort is normal.      Breath sounds: Normal breath sounds.   Abdominal:      General: There is no distension.      Tenderness: There is abdominal tenderness.   Musculoskeletal:      Right lower leg: No edema.      Left lower leg: No edema.   Skin:     Capillary Refill: Capillary refill takes less than 2 seconds.      Coloration: Skin is not jaundiced.      Comments: Vitiligo    Neurological:      General: No focal deficit present.      Mental Status: She is oriented to person,  place, and time.         Data   Recent Labs   Lab 04/11/20  0653 04/10/20  0600   WBC 11.9* 12.6*   HGB 11.4* 12.8   MCV 79 79    364   INR  --  1.05    137   POTASSIUM 3.3* 3.6   CHLORIDE 104 104   CO2 26 28   BUN 8 16   CR 0.66 0.67   ANIONGAP 7 5   LAYLA 8.6 9.5   * 134*   ALBUMIN 3.1* 4.0   PROTTOTAL 7.4 8.9*   BILITOTAL 0.7 0.3   ALKPHOS 90 126   ALT 28 37   AST 14 18   LIPASE  --  93     No results found for this or any previous visit (from the past 24 hour(s)).

## 2020-04-11 NOTE — ANESTHESIA POSTPROCEDURE EVALUATION
Anesthesia POST Procedure Evaluation    Patient: Joie Yadav   MRN:     [id number] Gender:   female   Age:    29 year old :      1990        Preoperative Diagnosis: Acute cholecystitis [K81.0]   Procedure(s):  CHOLECYSTECTOMY, LAPAROSCOPIC   Postop Comments: No value filed.     Anesthesia Type: General       Disposition: Admission   Postop Pain Control: Uneventful            Sign Out: Well controlled pain   PONV: No   Neuro/Psych: Uneventful            Sign Out: Acceptable/Baseline neuro status   Airway/Respiratory: Uneventful            Sign Out: Acceptable/Baseline resp. status   CV/Hemodynamics: Uneventful            Sign Out: Acceptable CV status   Other NRE: NONE   DID A NON-ROUTINE EVENT OCCUR? No         Last Anesthesia Record Vitals:  CRNA VITALS  2020 1023 - 2020 1123      2020             Pulse:  96    SpO2:  100 %          Last PACU Vitals:  Vitals Value Taken Time   /66 2020 11:40 AM   Temp 37.6  C (99.6  F) 2020 11:30 AM   Pulse 91 2020 11:40 AM   Resp 26 2020 11:30 AM   SpO2 93 % 2020 11:43 AM   Temp src     NIBP     Pulse     SpO2     Resp     Temp     Ht Rate     Temp 2     Vitals shown include unvalidated device data.      Electronically Signed By: Crow Duque MD, 2020, 11:44 AM

## 2020-04-11 NOTE — PLAN OF CARE
Patient reports good pain control, intermittent nausea with relief after intervention, no emesis during the shift. PIV infusing. Pt is voiding and ambulating without difficulty, NPO at midnight for procedure tomorrow. Pre op shower done.

## 2020-04-11 NOTE — PROGRESS NOTES
Admitted/transferred from: PACU  2 RN full skin assessment completed by Thania Hitchcock, RN and Crystal Cherry, RN.  Skin assessment finding: new abdominal lap sites x4, small amount of drainage from one near umbilicus.  Interventions/actions: placed gauze over lap site    Will continue to monitor.

## 2020-04-11 NOTE — PLAN OF CARE
AVSS.  Abd pain continues - dilaudid IV PRN given with some relief, Tpump also helps.  Up with SBA to BR, voiding good vols.    NPO for procedure this AM.  PIV infusing.  BS checks as ordered, WNL.  Pre-op shower to be done at 0600.    Cont with POC.  OR this AM.

## 2020-04-11 NOTE — ADDENDUM NOTE
Addendum  created 04/11/20 1240 by Brittany Gutierrez MD    Intraprocedure LDAs edited, LDA properties accepted

## 2020-04-11 NOTE — ANESTHESIA PREPROCEDURE EVALUATION
"Anesthesia Pre-Procedure Evaluation    Patient: Joie Yadav   MRN:     [id number] Gender:   female   Age:    29 year old :      1990        Preoperative Diagnosis: Acute cholecystitis [K81.0]   Procedure(s):  CHOLECYSTECTOMY, LAPAROSCOPIC     LABS:  CBC:   Lab Results   Component Value Date    WBC 12.6 (H) 04/10/2020    WBC 9.9 2020    HGB 12.8 04/10/2020    HGB 11.4 (L) 2020    HCT 39.7 04/10/2020    HCT 35.7 2020     04/10/2020     2020     BMP:   Lab Results   Component Value Date     04/10/2020     2020    POTASSIUM 3.6 04/10/2020    POTASSIUM 3.9 2020    CHLORIDE 104 04/10/2020    CHLORIDE 105 2020    CO2 28 04/10/2020    CO2 27 2020    BUN 16 04/10/2020    BUN 13 2020    CR 0.67 04/10/2020    CR 0.72 2020     (H) 04/10/2020     (H) 2020     COAGS:   Lab Results   Component Value Date    INR 1.05 04/10/2020     POC:   Lab Results   Component Value Date     (H) 2020    HCG Negative 04/10/2020    HCGS Negative 2019     OTHER:   Lab Results   Component Value Date    A1C 6.7 (H) 04/10/2020    LAYLA 9.5 04/10/2020    ALBUMIN 4.0 04/10/2020    PROTTOTAL 8.9 (H) 04/10/2020    ALT 37 04/10/2020    AST 18 04/10/2020    ALKPHOS 126 04/10/2020    BILITOTAL 0.3 04/10/2020    LIPASE 93 04/10/2020    TSH 2.35 2020        Preop Vitals    BP Readings from Last 3 Encounters:   20 137/81   20 130/83   20 118/67    Pulse Readings from Last 3 Encounters:   04/10/20 91   20 76   20 82      Resp Readings from Last 3 Encounters:   20 16   20 16   19 14    SpO2 Readings from Last 3 Encounters:   20 100%   20 98%   19 99%      Temp Readings from Last 1 Encounters:   20 36.9  C (98.5  F) (Oral)    Ht Readings from Last 1 Encounters:   04/10/20 1.448 m (4' 9\")      Wt Readings from Last 1 Encounters:   04/10/20 106.6 kg (235 " "lb)    Estimated body mass index is 50.85 kg/m  as calculated from the following:    Height as of this encounter: 1.448 m (4' 9\").    Weight as of this encounter: 106.6 kg (235 lb).     LDA:  Peripheral IV 04/10/20 Right Upper arm (Active)   Site Assessment WDL 04/11/20 0000   Line Status Infusing 04/11/20 0000   Phlebitis Scale 0-->no symptoms 04/11/20 0000   Infiltration Scale 0 04/11/20 0000   Infiltration Site Treatment Method  None 04/10/20 1800   Extravasation? No 04/10/20 1800   Number of days: 1        Past Medical History:   Diagnosis Date     Diabetes (H)     Type 2     Endometrial hyperplasia without atypia, simple 4/8/2019     Obesity      PCOS (polycystic ovarian syndrome)      Vitiligo       Past Surgical History:   Procedure Laterality Date     NO HISTORY OF SURGERY        No Known Allergies     Anesthesia Evaluation     . Pt has not had prior anesthetic            ROS/MED HX    ENT/Pulmonary: Comment: Lung granuloma      Neurologic:  - neg neurologic ROS     Cardiovascular:     (+) Dyslipidemia, ----. : . . . :. .       METS/Exercise Tolerance:     Hematologic:  - neg hematologic  ROS       Musculoskeletal:  - neg musculoskeletal ROS       GI/Hepatic:     (+) cholecystitis/cholelithiasis,       Renal/Genitourinary: Comment: PCOS    (+) Nephrolithiasis ,       Endo:     (+) type II DM Last HgA1c: 6.7 Not using insulin Obesity, .      Psychiatric:  - neg psychiatric ROS       Infectious Disease:  - neg infectious disease ROS       Malignancy:      - no malignancy   Other:    (+) no H/O Chronic Pain,no other significant disability                        PHYSICAL EXAM:   Mental Status/Neuro: A/A/O   Airway: Facies: Feasible  Mallampati: II  Mouth/Opening: Full  TM distance: > 6 cm  Neck ROM: Full   Respiratory: Auscultation: CTAB     Resp. Rate: Normal     Resp. Effort: Normal      CV: Rhythm: Regular  Rate: Age appropriate  Heart: Normal Sounds  Edema: None   Comments:      Dental: Normal Dentition "                Assessment:   ASA SCORE: 2    H&P: History and physical reviewed and following examination; no interval change.   Smoking Status:  Non-Smoker/Unknown   NPO Status: NPO Appropriate     Plan:   Anes. Type:  General   Pre-Medication: None   Induction:  IV (Standard)   Airway: ETT; Oral   Access/Monitoring: PIV   Maintenance: Balanced     Postop Plan:   Postop Pain: Opioids  Postop Sedation/Airway: Not planned  Disposition: Inpatient/Admit     PONV Management:   Adult Risk Factors: Female, Non-Smoker, Postop Opioids   Prevention: Ondansetron, Dexamethasone     CONSENT: Direct conversation   Plan and risks discussed with: Patient   Blood Products: Consented (ALL Blood Products)                   Brittany Gutierrez MD

## 2020-04-11 NOTE — PLAN OF CARE
Patient arrived from PACU around 1220. VSS on room air. Pain controlled with tylenol and oxycodone. Tolerating clear liquids. Voiding with adequate urine output. Has not passed gas since surgery. Lap site near umbilicus had small amount of sanguinous drainage-covered with gauze. Other lap sites CDI and AZALEA. Potassium replaced, rechecks scheduled for 2000 and AM draw. Up with SBA.

## 2020-04-12 ENCOUNTER — PATIENT OUTREACH (OUTPATIENT)
Dept: CARE COORDINATION | Facility: CLINIC | Age: 30
End: 2020-04-12

## 2020-04-12 VITALS
BODY MASS INDEX: 50.7 KG/M2 | RESPIRATION RATE: 28 BRPM | HEART RATE: 87 BPM | WEIGHT: 235 LBS | HEIGHT: 57 IN | DIASTOLIC BLOOD PRESSURE: 77 MMHG | SYSTOLIC BLOOD PRESSURE: 132 MMHG | TEMPERATURE: 97.2 F | OXYGEN SATURATION: 92 %

## 2020-04-12 LAB
ALBUMIN SERPL-MCNC: 2.7 G/DL (ref 3.4–5)
ALP SERPL-CCNC: 74 U/L (ref 40–150)
ALT SERPL W P-5'-P-CCNC: 72 U/L (ref 0–50)
ANION GAP SERPL CALCULATED.3IONS-SCNC: 7 MMOL/L (ref 3–14)
AST SERPL W P-5'-P-CCNC: 49 U/L (ref 0–45)
BILIRUB SERPL-MCNC: 0.8 MG/DL (ref 0.2–1.3)
BUN SERPL-MCNC: 6 MG/DL (ref 7–30)
CALCIUM SERPL-MCNC: 8.6 MG/DL (ref 8.5–10.1)
CHLORIDE SERPL-SCNC: 106 MMOL/L (ref 94–109)
CO2 SERPL-SCNC: 25 MMOL/L (ref 20–32)
CREAT SERPL-MCNC: 0.61 MG/DL (ref 0.52–1.04)
ERYTHROCYTE [DISTWIDTH] IN BLOOD BY AUTOMATED COUNT: 16.1 % (ref 10–15)
GFR SERPL CREATININE-BSD FRML MDRD: >90 ML/MIN/{1.73_M2}
GLUCOSE SERPL-MCNC: 128 MG/DL (ref 70–99)
HCT VFR BLD AUTO: 32.7 % (ref 35–47)
HGB BLD-MCNC: 10.2 G/DL (ref 11.7–15.7)
MCH RBC QN AUTO: 24.8 PG (ref 26.5–33)
MCHC RBC AUTO-ENTMCNC: 31.2 G/DL (ref 31.5–36.5)
MCV RBC AUTO: 79 FL (ref 78–100)
PLATELET # BLD AUTO: 314 10E9/L (ref 150–450)
POTASSIUM SERPL-SCNC: 3.8 MMOL/L (ref 3.4–5.3)
PROT SERPL-MCNC: 7.2 G/DL (ref 6.8–8.8)
RBC # BLD AUTO: 4.12 10E12/L (ref 3.8–5.2)
SODIUM SERPL-SCNC: 138 MMOL/L (ref 133–144)
WBC # BLD AUTO: 16.4 10E9/L (ref 4–11)

## 2020-04-12 PROCEDURE — 36415 COLL VENOUS BLD VENIPUNCTURE: CPT | Performed by: STUDENT IN AN ORGANIZED HEALTH CARE EDUCATION/TRAINING PROGRAM

## 2020-04-12 PROCEDURE — 80053 COMPREHEN METABOLIC PANEL: CPT | Performed by: STUDENT IN AN ORGANIZED HEALTH CARE EDUCATION/TRAINING PROGRAM

## 2020-04-12 PROCEDURE — 25000132 ZZH RX MED GY IP 250 OP 250 PS 637: Performed by: STUDENT IN AN ORGANIZED HEALTH CARE EDUCATION/TRAINING PROGRAM

## 2020-04-12 PROCEDURE — 85027 COMPLETE CBC AUTOMATED: CPT | Performed by: STUDENT IN AN ORGANIZED HEALTH CARE EDUCATION/TRAINING PROGRAM

## 2020-04-12 RX ORDER — POLYETHYLENE GLYCOL 3350 17 G/17G
1 POWDER, FOR SOLUTION ORAL DAILY
Qty: 14 PACKET | Refills: 0 | Status: SHIPPED | OUTPATIENT
Start: 2020-04-12 | End: 2020-09-25

## 2020-04-12 RX ORDER — POLYETHYLENE GLYCOL 3350 17 G/17G
238 POWDER, FOR SOLUTION ORAL ONCE
Status: DISCONTINUED | OUTPATIENT
Start: 2020-04-12 | End: 2020-04-12

## 2020-04-12 RX ORDER — OXYCODONE HYDROCHLORIDE 5 MG/1
5 TABLET ORAL EVERY 6 HOURS PRN
Status: DISCONTINUED | OUTPATIENT
Start: 2020-04-12 | End: 2020-04-12 | Stop reason: HOSPADM

## 2020-04-12 RX ORDER — POLYETHYLENE GLYCOL 3350 17 G/17G
17 POWDER, FOR SOLUTION ORAL DAILY
Status: DISCONTINUED | OUTPATIENT
Start: 2020-04-12 | End: 2020-04-12 | Stop reason: HOSPADM

## 2020-04-12 RX ORDER — SENNOSIDES 8.6 MG
8.6 TABLET ORAL 2 TIMES DAILY
Status: DISCONTINUED | OUTPATIENT
Start: 2020-04-12 | End: 2020-04-12 | Stop reason: HOSPADM

## 2020-04-12 RX ORDER — OXYCODONE HYDROCHLORIDE 5 MG/1
5 TABLET ORAL EVERY 6 HOURS PRN
Qty: 10 TABLET | Refills: 0 | Status: SHIPPED | OUTPATIENT
Start: 2020-04-12 | End: 2020-09-25

## 2020-04-12 RX ORDER — ACETAMINOPHEN 325 MG/1
650 TABLET ORAL EVERY 6 HOURS
Qty: 80 TABLET | Refills: 0 | Status: SHIPPED | OUTPATIENT
Start: 2020-04-12 | End: 2020-04-24

## 2020-04-12 RX ADMIN — OXYCODONE HYDROCHLORIDE 5 MG: 5 TABLET ORAL at 00:22

## 2020-04-12 RX ADMIN — ACETAMINOPHEN 650 MG: 325 TABLET, FILM COATED ORAL at 03:02

## 2020-04-12 RX ADMIN — SENNOSIDES 8.6 MG: 8.6 TABLET, FILM COATED ORAL at 09:16

## 2020-04-12 RX ADMIN — ACETAMINOPHEN 650 MG: 325 TABLET, FILM COATED ORAL at 07:58

## 2020-04-12 RX ADMIN — POLYETHYLENE GLYCOL 3350 17 G: 17 POWDER, FOR SOLUTION ORAL at 09:16

## 2020-04-12 RX ADMIN — OXYCODONE HYDROCHLORIDE 5 MG: 5 TABLET ORAL at 06:33

## 2020-04-12 ASSESSMENT — ENCOUNTER SYMPTOMS
DIFFICULTY URINATING: 0
ABDOMINAL PAIN: 1
PALPITATIONS: 0
DIZZINESS: 0
COUGH: 0
VOMITING: 0
SHORTNESS OF BREATH: 0
CHILLS: 0
LIGHT-HEADEDNESS: 0
FEVER: 0
NAUSEA: 0
HEADACHES: 0
CONFUSION: 0

## 2020-04-12 ASSESSMENT — ACTIVITIES OF DAILY LIVING (ADL)
ADLS_ACUITY_SCORE: 11

## 2020-04-12 ASSESSMENT — PAIN DESCRIPTION - DESCRIPTORS
DESCRIPTORS: ACHING
DESCRIPTORS: ACHING

## 2020-04-12 NOTE — PROVIDER NOTIFICATION
Notified MD at 12:15 PM regarding sepsis protocol triggered.    Spoke with: Dr. Rafael Robbins    Comments: Patient was about to be discharged when she triggered the sepsis protocol due to elevated respiratory rate and WBC. Per MD, patient does not need lactate drawn before discharge. Patient will be getting labs rechecked within 7 days. Reinforced education to patient about deep breathing and incentive spirometry.

## 2020-04-12 NOTE — PROGRESS NOTES
Gastroenterology Inpatient Sign Off Note    Inpatient GI consults service will sign off. No further recommendations at this time. If primary team has addition questions, please page consult fellow listed in Km.    Current GI Consult Staff: Dr. Edwards    Follow up recommendations:   No outpatient GI clinic follow-up indicated. Follow-up with primary care provider at timing determined by discharge physician.    If outpatient GI follow-up is felt indicated by primary care, they may coordinate this by placing new GI referral and contacting  [facility] clinic ([facility]) - ([phone])

## 2020-04-12 NOTE — PROGRESS NOTES
"POST OP CHECK     S: Pain well controlled, denies CP/SOB. No flatus. No burping, no nausea or vomiting. Feels \"incredibly better.\"    O:  /71 (BP Location: Left arm)   Pulse 94   Temp 99.1  F (37.3  C) (Oral)   Resp 30   Ht 1.448 m (4' 9\")   Wt 106.6 kg (235 lb)   SpO2 94%   BMI 50.85 kg/m      I/O last 3 completed shifts:  In: 1700 [I.V.:1700]  Out: 2575 [Urine:2375; Blood:200]    Alert and oriented  Non labored breathing   Non cyanotic   Soft abdomen, clean incision site, appropriately tender  Warm BLE, no edema     A/P:     -Continue plan of cares per primary team      Luke Borjas MD, [facility]  Plastic Surgery PGY-1  Pager: [phone]    "

## 2020-04-12 NOTE — PLAN OF CARE
VSS on room air. Pain controlled with tylenol and oxycodone. Tolerating regular diet. Voiding with adequate urine output. Passing gas but no bowel movement yet. Miralax and senna given. Lap site near umbilicus still oozing scant amount-covered with gauze. Other lap sites CDI and AZALEA. Up independently. Reviewed AVS with patient and patient verbalized understanding. Discharged home.

## 2020-04-12 NOTE — PROGRESS NOTES
Surgery Progress Note  4/12/2020     Subjective:  - CORNELL overnight.  - Pain well controlled. Denies N/V. Tolerated diet. Ambulated. + flatus, No BM as of yet.    Objective:  Temp:  [97.5  F (36.4  C)-99.6  F (37.6  C)] 97.5  F (36.4  C)  Pulse:  [87-94] 87  Heart Rate:  [] 92  Resp:  [14-32] 32  BP: (112-143)/(56-89) 120/65  SpO2:  [93 %-100 %] 95 %  I/O last 3 completed shifts:  In: 2280 [P.O.:580; I.V.:1700]  Out: 3275 [Urine:3075; Blood:200]    Gen: Awake, alert, NAD   Resp: NLB on RA  Abd: Soft, obese, non-distended, appropriately tender  Ext: WWP  Dressing/Incision: C/d/i with dermabond in place    BMP  Recent Labs   Lab 04/12/20 0626 04/11/20 1959 04/11/20 0653 04/10/20  0600     --  137 137   POTASSIUM 3.8 4.1 3.3* 3.6   CHLORIDE 106  --  104 104   LAYLA 8.6  --  8.6 9.5   CO2 25  --  26 28   BUN 6*  --  8 16   CR 0.61  --  0.66 0.67   *  --  132* 134*     CBC  Recent Labs   Lab 04/12/20 0626 04/11/20 0653 04/10/20  0600   WBC 16.4* 11.9* 12.6*   RBC 4.12 4.57 5.05   HGB 10.2* 11.4* 12.8   HCT 32.7* 36.1 39.7   MCV 79 79 79   MCH 24.8* 24.9* 25.3*   MCHC 31.2* 31.6 32.2   RDW 16.1* 15.9* 15.8*    333 364     INR  Recent Labs   Lab 04/10/20  0600   INR 1.05      AST/ALT & Alk Phos  Recent Labs   Lab 04/12/20 0626 04/11/20 0653 04/10/20  0600   AST 49* 14 18   ALT 72* 28 37   ALKPHOS 74 90 126     Bili  Recent Labs   Lab Test 04/12/20  0626 04/11/20  0653 04/10/20  0600 02/29/20  2300   BILITOTAL 0.8 0.7 0.3 0.2     Lipase/Amlyase  Recent Labs   Lab 04/10/20  0600   LIPASE 93       A/P: Joie Yadav is a 29 year old female POD 1 lap eleazar  - AM CBC in process, will Follow-up   - If Hgb stable, okay to discharge home from surgical standpoint.   - Okay to return to work. No heavy lifting for 4-6 weeks. Will have phone follow-up with surgery team. Recommend follow-up with PCP as well.       D/w chief resident who discussed with staff    Brooke Hickman MD  Surgery PGY-1

## 2020-04-12 NOTE — PLAN OF CARE
"/59 (BP Location: Left arm)   Pulse 94   Temp 98.8  F (37.1  C) (Oral)   Resp 18   Ht 1.448 m (4' 9\")   Wt 106.6 kg (235 lb)   SpO2 94%   BMI 50.85 kg/m      VSS on room air. Up with SBA. Regular diet, denies nausea. Denies flatus or BM. Epigastric pain noted, tylenol and oxycodone for pain management. Lap sites CDI ex umbilical lap site with dressing d/t scant leaking. Resting between cares. Continue with POC.  "

## 2020-04-12 NOTE — DISCHARGE SUMMARY
[facility], [facility] Discharge Summary   Date of Admission:  4/10/2020  Date of Discharge:  4/12/2020 12:33 PM  Date of Service: 4/12/2020  Discharging Attending Provider: Dr. Renate Martinez  Discharge Team: [facility]  Discharge Diagnoses      Abdominal pain, epigastric  Acute cholecystitis  Acute cholecystitis  S/P cholecystectomy    Follow-ups Needed After Discharge   -Follow up with primary care provider, Tara Lewis, within 7 days for hospital follow- up.  The following labs/tests are recommended: CBC, CMP  -Follow up with general surgery clinic.     Hospital Course   Joie Yadav is a 29 year old female admitted on 4/10/2020 for acute cholecystitis. The following problems were addressed during her hospitalization:     #acute cholecystitis, resolved  #s/p cholecystectomy  #RUQ pain, resolved  U/S showed large bile stone at gallbladder neck and mildly dilated CBD of 9 mm, no wall thickening or signs of inflammation. Normal ALT/AST and bili on admit. GI and Gen Surg consulted during stay. Clinical picture represented early acute cholecystitis. General surgery performed laparoscopic cholecystectomy on 4/11/20. Patient was HDS, afebrile during stay but needing reminders for IS use, she was breathing shallowly/RR was 28 prior to discharge.  -Follow up with General surgery (likely phone follow up)   --No heavy lifting for 4-6 weeks  -Post-op pain control: tylenol and po oxycodone 5mg Q6H as needed    #Leukocytosis  #transaminitis, mild  WBC 12.6 with neutrophilia on admit and WBC of 16.4 on discharge (spike likely due to surgical procedure/reactive). Normal ALT/AST and bili on admit. Patient had a mild transaminitis of ALT/AST of 72/49, respectively. Mild transaminitis likely due to reactive process/surgery as well. No other sign of infectious etiology.  -Follow up with PCP within 1 week: needs repeat CBC, CMP     #Stable chronic  medical conditions  -Type II DM: resume metformin PTA, Hgb A1C 6.7.  - Complex endometrial hyperplasia, Multiple complex endocervical cysts:   Cysts being monitored by OB/GYN and Gyn Onc, and at this point from chart review, likely benign. Not causing current clinical picture. Endometrial hyperplasia resolving on biopsy in 2/2020. Last appt OB/GYN 3/20/20 with follow up scheduled in 08/2020.     # Discharge Pain Plan:   - During her hospitalization, Joie experienced pain due to post surgical pain.  The pain plan for discharge was discussed with Joie and the plan was created in a collaborative fashion.    - Opioids prescribed on discharge: oxycodone 5 mg Q6H prn  - Duration of opioids after discharge: Per CDC opioid prescribing guidelines, a 3 day prescription of opioids was provided.  - Bowel regimen: miralax    Consultations This Hospital Stay   GI PANCREATICOBILIARY ADULT IP CONSULT  MEDICATION HISTORY IP PHARMACY CONSULT  SURGERY GENERAL ADULT IP CONSULT    Code Status   Full Code     The patient was discussed with Dr. Diana Mcwilliams's [facility] Inpatient Service  [facility]   Pager: [phone]  ______________________________________________________________________  Review of Systems   Constitutional: Negative for chills and fever.   Respiratory: Negative for cough and shortness of breath.    Cardiovascular: Negative for chest pain and palpitations.   Gastrointestinal: Positive for abdominal pain (post surgical, no more RUQ Pain). Negative for nausea and vomiting.   Genitourinary: Negative for difficulty urinating.   Neurological: Negative for dizziness, light-headedness and headaches.   Psychiatric/Behavioral: Negative for confusion.      Physical Exam   Vital Signs: Temp: 97.2  F (36.2  C) Temp src: Oral BP: 132/77 Pulse: 87 Heart Rate: 97 Resp: 28 SpO2: 92 % O2 Device: None (Room air) Oxygen Delivery: 1 LPM  Weight: 235 lbs 0 oz    Physical Exam  Constitutional:        General: She is not in acute distress.     Appearance: She is obese. She is not ill-appearing, toxic-appearing or diaphoretic.   HENT:      Mouth/Throat:      Mouth: Mucous membranes are moist.   Cardiovascular:      Rate and Rhythm: Normal rate and regular rhythm.   Pulmonary:      Effort: Pulmonary effort is normal.      Breath sounds: Normal breath sounds.   Abdominal:      General: There is no distension.      Tenderness: There is abdominal tenderness (appropriate post surgical ttp). dermabond in place, trochanter incisions c/d/i. No drainage around umbilical site, dermabond in place.  Musculoskeletal:      Right lower leg: No edema.     Left lower leg: No edema.  Skin:     Capillary Refill: Capillary refill takes less than 2 seconds.     Coloration: Skin is not jaundiced.     Comments: Vitiligo  Neurological:      General: No focal deficit present.     Mental Status: She is oriented to person, place, and time.    Significant Results and Procedures   Most Recent 3 CBC's:  Recent Labs   Lab Test 04/12/20  0626 04/11/20  0653 04/10/20  0600   WBC 16.4* 11.9* 12.6*   HGB 10.2* 11.4* 12.8   MCV 79 79 79    333 364     Most Recent 2 LFT's:  Recent Labs   Lab Test 04/12/20  0626 04/11/20  0653   AST 49* 14   ALT 72* 28   ALKPHOS 74 90   BILITOTAL 0.8 0.7   ,   Results for orders placed or performed during the hospital encounter of 04/10/20   US Abdomen Limited (RUQ)    Narrative    ULTRASOUND ABDOMEN LIMITED 4/10/2020 8:40 AM    CLINICAL HISTORY: Abdomen pain right upper quadrant.    TECHNIQUE: Limited abdominal ultrasound.    COMPARISON: CT abdomen and pelvis 2/29/2020.    FINDINGS:  GALLBLADDER: Solitary stone at the gallbladder neck. Negative  sonographic Pinto sign. Gallbladder wall is 0.3 cm.    BILE DUCTS: There is no biliary dilatation. The common duct measures 9  mm.    LIVER: Normal where seen. Limited visualization.    RIGHT KIDNEY: No hydronephrosis.    PANCREAS: The visualized portions of the  pancreas are normal.    No ascites.      Impression    IMPRESSION:  Solitary gallstone at the gallbladder neck. Negative  sonographic Pinto sign. The common duct is mildly dilated measuring 9  mm.    ADAIR SOLORZANO MD     Pending Results   These results will be followed up by N/A  Unresulted Labs Ordered in the Past 30 Days of this Admission     No orders found from 3/11/2020 to 4/11/2020.        Primary Care Physician   Tara Lewis    Discharge Disposition   Discharged to home  Condition at discharge: Stable    Discharge Orders      Discharge Instructions    DIET  -Recommend eating a healthy diet with plenty of fruits and vegetables.   -We recommend eating slowly, chewing thoroughly, eating small frequent meals throughout the day  -Stay well hydrated.      ACTIVITY  -No lifting, pushing, pulling greater than 20 lbs and no strenuous exercise for 6 weeks   -No driving while on narcotic analgesics (i.e. Percocet, oxycodone, Vicodin)  -No driving until you are able to fully twist to both sides or slam on brakes quickly and without any pain    WOUND CARE  -Inspect your wounds daily for signs of infection (increased redness, drainage, pain)  -Keep your wound clean and dry  -You may shower, but do not soak in tub or pool      MEDICATIONS  - Do not take any additional Tylenol (acetaminophen) while using a narcotic pain medication which includes acetaminophen  - Do not take more than 4,000mg of acetaminophen in any 24 hour period, as this can cause liver damage  - Take stool softeners such as Senna or Miralax while you are using narcotics, but stop if you develop diarrhea  - Wean yourself off of narcotic pain medications    NOTIFY  Please contact us for problems after discharge such as:  -Temperature > 101F, chills, rigors, dizziness  -Redness around or purulent drainage from wound  -Inability to tolerate diet, nausea or vomiting  -You stop passing gas, develop significant bloating, abdominal pain  -Have blood in  stools/vomit  -Have severe diarrhea/constipation  -Any other questions or concerns.  - At nights (after 4:30pm), on weekends, or if urgent, call [phone] and ask the  to speak with the on-call Surgery resident    FOLLOW-UP:  - Call your primary care provider to touch base regarding your recent admission.    - Call or return sooner than your regularly scheduled visit if you develop any of the following: fever >101.5, uncontrolled pain, uncontrolled nausea or vomiting, as well as increased redness, swelling, or drainage from your wound.   -  A nurse from the General Surgery Clinic will contact you within 24 hours, or the next business day, after your discharge from the hospital.  If you have questions or to schedule a follow up appointment please call the General Surgery Clinic at [phone].  Call [phone] and ask to speak with the Surgery resident on call if you are having troubles in the evenings, at night, or on the weekend.     Reason for your hospital stay    You were hospitalized for acute cholecystitis, or inflammation of your gallbladder. Your gallbladder was removed on 4/11/20.     Activity    Your activity upon discharge: activity as tolerated and no driving while on analgesics  - Okay to return to work. No heavy lifting for 4-6 weeks.     When to contact your care team    Call your primary doctor if you have any of the following: temperature greater than 100.4 or less than 93,  increased shortness of breath, increased drainage, increased swelling or increased pain.     [facility]ult [facility] Follow-up and recommended labs and tests    Follow up with primary care provider, Tara Lewis, within 7 days for hospital follow- up.  The following labs/tests are recommended: CBC, CMP  Follow up with general surgery clinic (you will need to call to schedule an appointment). This will be a phone visit per surgery team.    If you cannot make appointment in 1 week with your primary care doctor, please  call [facility] [phone] for hospital follow up and lab work.     Appointments on [place] and/or [facility] (with [facility] or [facility] provider or service). Call [phone] if you haven't heard regarding these appointments within 7 days of discharge.     Full Code     Diet    Follow this diet upon discharge:  Regular Diet Adult     Discharge Medications   Discharge Medication List as of 4/12/2020 11:35 AM      START taking these medications    Details   oxyCODONE (ROXICODONE) 5 MG tablet Take 1 tablet (5 mg) by mouth every 6 hours as needed for moderate to severe pain, Disp-10 tablet,R-0, E-Prescribe      polyethylene glycol (MIRALAX) 17 g packet Take 17 g by mouth daily, Disp-14 packet,R-0, E-Prescribe         CONTINUE these medications which have CHANGED    Details   acetaminophen (TYLENOL) 325 MG tablet Take 2 tablets (650 mg) by mouth every 6 hours for 10 days, Disp-80 tablet,R-0, E-Prescribe         CONTINUE these medications which have NOT CHANGED    Details   ibuprofen (ADVIL/MOTRIN) 200 MG tablet Take 200 mg by mouth every 6 hours as needed for mild pain, Historical      metFORMIN (GLUCOPHAGE) 500 MG tablet Take 500 mg by mouth 4 times daily (with meals and nightly), Historical           Allergies   No Known Allergies

## 2020-04-13 ENCOUNTER — PATIENT OUTREACH (OUTPATIENT)
Dept: SURGERY | Facility: CLINIC | Age: 30
End: 2020-04-13

## 2020-04-13 NOTE — PROGRESS NOTES
RN Post-Op/Post-Discharge Care Coordination Note    Ms. Joie Yadav is a 29 year old female who underwent laparoscopic cholecystectomy on 4/11 with  Dr. Norris Reid.  Spoke with Patient.    Support  Patient able to care for self independently     Health Status  Fevers/chills: Patient denies any fever or chills.  Nausea/Vomiting: Patient denies nausea/vomiting.  Eating/drinking: Patient is able to eat and drink without any complaints. Reports decreased appetite and recommended small frequent meals.  Bowel habits: Patient reports having a normal bowel movement.  Drains (SUZAN): N/A  Incisions: Patient denies any signs and symptoms of infection..  Wound closure:  Skin Sealant  Pain: Improved.She is taking minimal Oxycodone.  Recommended alternating Tylenol and Ibuprofen PRN per package instructions  New Medications:  Tylenol, Miralax, Oxycodone    Activity/Restrictions  No lifting in excess of 15-20 pounds for 3-4 weeks    Equipment  None    Pathology reviewed with patient:  No, not yet available    Forms/Letters  No    All of her questions were answered including reviewing restrictions and wound care.  She will call this office if she has any further questions and/or concerns.     In lieu of a post-op clinic patient that patient would like to be contacted in approximately 7-10 days via telephone.    A copy of this note was routed to the primary surgeon.      Whom and When to Call  Patient acknowledges understanding of how to manage any medication changes and   when to seek medical care.     Patient advised that if after hour medical concerns arise to please call [phone] and choose option 4 to speak to the physician on call.

## 2020-04-13 NOTE — PROGRESS NOTES
Patient was contacted by an RN for post DC follow up so no duplicate post DC follow up call will be made    Princess Garza CMA   Post Hospital Discharge Team

## 2020-04-14 LAB — COPATH REPORT: NORMAL

## 2020-04-20 ENCOUNTER — PATIENT OUTREACH (OUTPATIENT)
Dept: SURGERY | Facility: CLINIC | Age: 30
End: 2020-04-20

## 2020-04-20 NOTE — PROGRESS NOTES
Joie Yadav was contacted several days post procedure via telephone for a status update and elected to have a telephone follow -up call approximately 7-10 days after original contact in lieu of a clinic visit with Dr. Heather Reid.  She continues to do well and the skin sealant remains intact. The patients wounds are healed and the area is C/D/I.  She is afebrile, pain improving daily, and nilam po; the patient is slowly resuming her normal activity.   Discussed restrictions including no lifting in excess of 20 pounds for 4 weeks. Reports loose stools after eating.    Pathology was reviewed with the patient: Yes    Patient would like a video po visit on 4/24 with Dr. Reid.      Pathology:  Copath Report  04/11/2020  9:28 AM  88    Patient Name: JOIE BENNETT   MR#: [id number]   Specimen #: [id number]   Collected: 4/11/2020   Received: 4/11/2020   Reported: 4/14/2020 18:20   Ordering Phy(s): HEATHER REID     For improved result formatting, select 'View Enhanced Report Format' under    Linked Documents section.     SPECIMEN(S):   Gallbladder and contents     FINAL DIAGNOSIS:   Gallbladder, Cholecystectomy:   Acute cholecystitis with gangrene and cholelithiasis

## 2020-04-23 ENCOUNTER — DOCUMENTATION ONLY (OUTPATIENT)
Dept: CARE COORDINATION | Facility: CLINIC | Age: 30
End: 2020-04-23

## 2020-04-24 ENCOUNTER — VIRTUAL VISIT (OUTPATIENT)
Dept: SURGERY | Facility: CLINIC | Age: 30
End: 2020-04-24
Payer: COMMERCIAL

## 2020-04-24 DIAGNOSIS — R10.11 RUQ PAIN: Primary | ICD-10-CM

## 2020-04-24 ASSESSMENT — PAIN SCALES - GENERAL: PAINLEVEL: MODERATE PAIN (4)

## 2020-04-24 NOTE — PATIENT INSTRUCTIONS
You met with Dr. Norris Reid.      Today's visit instructions:    Return to the Surgery Clinic on an as needed basis.        If you have questions please contact Juan David RN or Mary Jo RN during regular clinic hours, Monday through Friday 7:30 AM - 4:00 PM, or you can contact us via [facility] at anytime.       If you have urgent needs after-hours, weekends, or holidays please call the hospital at [phone] and ask to speak with our on-call General Surgery Team.    Appointment [phone], option #1   Nurse Advice (Juan David or Mary Jo): [phone]   Surgery Scheduler (Ruthie): [phone]  Fax: [phone]

## 2020-04-24 NOTE — PROGRESS NOTES
"Joie Yadav is a 29 year old female who is being evaluated via a billable video visit.      The patient has been notified of following:     \"This video visit will be conducted via a call between you and your physician/provider. We have found that certain health care needs can be provided without the need for an in-person physical exam.  This service lets us provide the care you need with a video conversation.  If a prescription is necessary we can send it directly to your pharmacy.  If lab work is needed we can place an order for that and you can then stop by our lab to have the test done at a later time.    Video visits are billed at different rates depending on your insurance coverage.  Please reach out to your insurance provider with any questions.    If during the course of the call the physician/provider feels a video visit is not appropriate, you will not be charged for this service.\"    Patient has given verbal consent for Video visit? Yes    How would you like to obtain your AVS? [facility]    Patient would like the video invitation sent by: Text to cell phone: [phone]    Will anyone else be joining your video visit? No      Patient underwent prior lap eleazar surgery and this occurred 2 weeks ago.    Joie Yadav overall has the following complaints: mild postop diarrhea    On exam (virtually):  There were no vitals taken for this visit.  Lung exam: breathing unlabored  Abdominal exam: soft; nontender; nondistended; incisions c/d/i    Plan:  pathology reviewed  -discussed lifting restrictions  -RTC prn    Norris Reid MD                  Video-Visit Details    Type of service:  Video Visit    Video Start Time: 1236  Video End Time: 1242    Originating Location (pt. Location): Home    Distant Location (provider location):  [facility] SURGERY     Mode of Communication:  Video Conference via [facility]      Norris Reid      "

## 2020-04-24 NOTE — NURSING NOTE
Chief Complaint   Patient presents with     Surgical Followup     Post op appt for eleazar.       There were no vitals filed for this visit.    There is no height or weight on file to calculate BMI.                            MARI ALEMAN, EMT

## 2020-05-11 ENCOUNTER — PATIENT OUTREACH (OUTPATIENT)
Dept: SURGERY | Facility: CLINIC | Age: 30
End: 2020-05-11

## 2020-05-11 NOTE — PROGRESS NOTES
Joie Yadav is a patient of Dr. Reid that underwent laparoscopic cholecystectomy on 4/11.  The patient is calling the office at this time requesting a letter for her work excusing her from work.  She returned to work today and states that after a couple hours of siting at her desk working (sedentary) she she experienced incisional discomfort and left for the day.  She is otherwise asymptomatic and recovering well.    The patient is no longer under any surgical restrictions and can return to her normal activities.  Discussed with the patient that she may want to try changing positions while working such as alternating sitting and standing.  All of her questions were answered.

## 2020-05-14 ENCOUNTER — HOSPITAL ENCOUNTER (EMERGENCY)
Facility: CLINIC | Age: 30
Discharge: HOME | End: 2020-05-14
Attending: EMERGENCY MEDICINE | Admitting: EMERGENCY MEDICINE
Payer: COMMERCIAL

## 2020-05-14 ENCOUNTER — APPOINTMENT (OUTPATIENT)
Dept: CT IMAGING | Facility: CLINIC | Age: 30
End: 2020-05-14
Attending: EMERGENCY MEDICINE
Payer: COMMERCIAL

## 2020-05-14 ENCOUNTER — TELEPHONE (OUTPATIENT)
Dept: SURGERY | Facility: CLINIC | Age: 30
End: 2020-05-14

## 2020-05-14 VITALS
TEMPERATURE: 98.6 F | BODY MASS INDEX: 49.08 KG/M2 | DIASTOLIC BLOOD PRESSURE: 70 MMHG | WEIGHT: 227.5 LBS | OXYGEN SATURATION: 100 % | HEART RATE: 77 BPM | HEIGHT: 57 IN | SYSTOLIC BLOOD PRESSURE: 118 MMHG | RESPIRATION RATE: 19 BRPM

## 2020-05-14 DIAGNOSIS — G89.18 ACUTE POST-OPERATIVE PAIN: ICD-10-CM

## 2020-05-14 DIAGNOSIS — Z03.818 ENCNTR FOR OBS FOR SUSP EXPSR TO OTH BIOLG AGENTS RULED OUT: ICD-10-CM

## 2020-05-14 LAB
ABO + RH BLD: NORMAL
ABO + RH BLD: NORMAL
ALBUMIN SERPL-MCNC: 3.7 G/DL (ref 3.4–5)
ALBUMIN UR-MCNC: NEGATIVE MG/DL
ALP SERPL-CCNC: 104 U/L (ref 40–150)
ALT SERPL W P-5'-P-CCNC: 42 U/L (ref 0–50)
ANION GAP SERPL CALCULATED.3IONS-SCNC: 7 MMOL/L (ref 3–14)
APPEARANCE UR: CLEAR
AST SERPL W P-5'-P-CCNC: 18 U/L (ref 0–45)
BACTERIA #/AREA URNS HPF: ABNORMAL /HPF
BILIRUB SERPL-MCNC: 0.4 MG/DL (ref 0.2–1.3)
BILIRUB UR QL STRIP: NEGATIVE
BLD GP AB SCN SERPL QL: NORMAL
BLOOD BANK CMNT PATIENT-IMP: NORMAL
BUN SERPL-MCNC: 10 MG/DL (ref 7–30)
CALCIUM SERPL-MCNC: 8.9 MG/DL (ref 8.5–10.1)
CHLORIDE SERPL-SCNC: 103 MMOL/L (ref 94–109)
CO2 SERPL-SCNC: 26 MMOL/L (ref 20–32)
COLOR UR AUTO: ABNORMAL
CREAT SERPL-MCNC: 0.72 MG/DL (ref 0.52–1.04)
ERYTHROCYTE [DISTWIDTH] IN BLOOD BY AUTOMATED COUNT: 15 % (ref 10–15)
GFR SERPL CREATININE-BSD FRML MDRD: >90 ML/MIN/{1.73_M2}
GLUCOSE SERPL-MCNC: 112 MG/DL (ref 70–99)
GLUCOSE UR STRIP-MCNC: NEGATIVE MG/DL
HCG UR QL: NEGATIVE
HCT VFR BLD AUTO: 38 % (ref 35–47)
HGB BLD-MCNC: 12.4 G/DL (ref 11.7–15.7)
HGB UR QL STRIP: ABNORMAL
HYALINE CASTS #/AREA URNS LPF: 1 /LPF (ref 0–2)
KETONES UR STRIP-MCNC: NEGATIVE MG/DL
LACTATE BLD-SCNC: 1.2 MMOL/L (ref 0.7–2)
LEUKOCYTE ESTERASE UR QL STRIP: ABNORMAL
LIPASE SERPL-CCNC: 71 U/L (ref 73–393)
MCH RBC QN AUTO: 25.3 PG (ref 26.5–33)
MCHC RBC AUTO-ENTMCNC: 32.6 G/DL (ref 31.5–36.5)
MCV RBC AUTO: 78 FL (ref 78–100)
MUCOUS THREADS #/AREA URNS LPF: PRESENT /LPF
NITRATE UR QL: NEGATIVE
PH UR STRIP: 6 PH (ref 5–7)
PLATELET # BLD AUTO: 335 10E9/L (ref 150–450)
POTASSIUM SERPL-SCNC: 3.7 MMOL/L (ref 3.4–5.3)
PROT SERPL-MCNC: 8.6 G/DL (ref 6.8–8.8)
RBC # BLD AUTO: 4.9 10E12/L (ref 3.8–5.2)
RBC #/AREA URNS AUTO: 1 /HPF (ref 0–2)
SARS-COV-2 PCR COMMENT: NORMAL
SARS-COV-2 RNA SPEC QL NAA+PROBE: NEGATIVE
SARS-COV-2 RNA SPEC QL NAA+PROBE: NORMAL
SODIUM SERPL-SCNC: 136 MMOL/L (ref 133–144)
SOURCE: ABNORMAL
SP GR UR STRIP: 1.01 (ref 1–1.03)
SPECIMEN EXP DATE BLD: NORMAL
SPECIMEN SOURCE: NORMAL
SPECIMEN SOURCE: NORMAL
SQUAMOUS #/AREA URNS AUTO: 5 /HPF (ref 0–1)
UROBILINOGEN UR STRIP-MCNC: NORMAL MG/DL (ref 0–2)
WBC # BLD AUTO: 12.8 10E9/L (ref 4–11)
WBC #/AREA URNS AUTO: 7 /HPF (ref 0–5)

## 2020-05-14 PROCEDURE — 86900 BLOOD TYPING SEROLOGIC ABO: CPT | Performed by: EMERGENCY MEDICINE

## 2020-05-14 PROCEDURE — 83605 ASSAY OF LACTIC ACID: CPT | Performed by: EMERGENCY MEDICINE

## 2020-05-14 PROCEDURE — 99285 EMERGENCY DEPT VISIT HI MDM: CPT | Mod: 25

## 2020-05-14 PROCEDURE — 25000128 H RX IP 250 OP 636: Performed by: STUDENT IN AN ORGANIZED HEALTH CARE EDUCATION/TRAINING PROGRAM

## 2020-05-14 PROCEDURE — 96375 TX/PRO/DX INJ NEW DRUG ADDON: CPT

## 2020-05-14 PROCEDURE — 83690 ASSAY OF LIPASE: CPT | Performed by: EMERGENCY MEDICINE

## 2020-05-14 PROCEDURE — 25800030 ZZH RX IP 258 OP 636: Performed by: EMERGENCY MEDICINE

## 2020-05-14 PROCEDURE — 99284 EMERGENCY DEPT VISIT MOD MDM: CPT | Mod: Z6 | Performed by: EMERGENCY MEDICINE

## 2020-05-14 PROCEDURE — 85027 COMPLETE CBC AUTOMATED: CPT | Performed by: EMERGENCY MEDICINE

## 2020-05-14 PROCEDURE — 96374 THER/PROPH/DIAG INJ IV PUSH: CPT | Mod: 59

## 2020-05-14 PROCEDURE — 80053 COMPREHEN METABOLIC PANEL: CPT | Performed by: EMERGENCY MEDICINE

## 2020-05-14 PROCEDURE — 25000128 H RX IP 250 OP 636: Performed by: EMERGENCY MEDICINE

## 2020-05-14 PROCEDURE — 86850 RBC ANTIBODY SCREEN: CPT | Performed by: EMERGENCY MEDICINE

## 2020-05-14 PROCEDURE — 87635 SARS-COV-2 COVID-19 AMP PRB: CPT | Performed by: EMERGENCY MEDICINE

## 2020-05-14 PROCEDURE — 86901 BLOOD TYPING SEROLOGIC RH(D): CPT | Performed by: EMERGENCY MEDICINE

## 2020-05-14 PROCEDURE — 74177 CT ABD & PELVIS W/CONTRAST: CPT

## 2020-05-14 PROCEDURE — 81001 URINALYSIS AUTO W/SCOPE: CPT | Performed by: EMERGENCY MEDICINE

## 2020-05-14 PROCEDURE — 81025 URINE PREGNANCY TEST: CPT | Performed by: EMERGENCY MEDICINE

## 2020-05-14 PROCEDURE — 96361 HYDRATE IV INFUSION ADD-ON: CPT

## 2020-05-14 RX ORDER — METOCLOPRAMIDE HYDROCHLORIDE 5 MG/ML
5 INJECTION INTRAMUSCULAR; INTRAVENOUS ONCE
Status: COMPLETED | OUTPATIENT
Start: 2020-05-14 | End: 2020-05-14

## 2020-05-14 RX ORDER — IOPAMIDOL 755 MG/ML
135 INJECTION, SOLUTION INTRAVASCULAR ONCE
Status: COMPLETED | OUTPATIENT
Start: 2020-05-14 | End: 2020-05-14

## 2020-05-14 RX ORDER — ONDANSETRON 2 MG/ML
8 INJECTION INTRAMUSCULAR; INTRAVENOUS ONCE
Status: COMPLETED | OUTPATIENT
Start: 2020-05-14 | End: 2020-05-14

## 2020-05-14 RX ADMIN — METOCLOPRAMIDE 5 MG: 5 INJECTION, SOLUTION INTRAMUSCULAR; INTRAVENOUS at 06:52

## 2020-05-14 RX ADMIN — IOPAMIDOL 135 ML: 755 INJECTION, SOLUTION INTRAVENOUS at 05:30

## 2020-05-14 RX ADMIN — SODIUM CHLORIDE 1000 ML: 9 INJECTION, SOLUTION INTRAVENOUS at 02:49

## 2020-05-14 RX ADMIN — ONDANSETRON 8 MG: 2 INJECTION INTRAMUSCULAR; INTRAVENOUS at 02:50

## 2020-05-14 ASSESSMENT — MIFFLIN-ST. JEOR: SCORE: 1635.81

## 2020-05-14 NOTE — CONSULTS
GENERAL SURGERY CONSULT NOTE    Consult Reason: abdominal pain, nausea, vomiting    HPI: Joie Yadav is a 29 year old female who underwent laparoscopic cholecystectomy with Dr. Reid 4/11 for acute cholecystitis who now presents with four days of abdominal pain, and one day of nausea/vomiting. Patient had been feeling improved after her lap eleazar until three days ago when she went back to work () and she developed mid abdominal pain after sitting for hours. The pain persisted and has gradually been worsening over the last several days until yesterday she took a leftover oxycodone and subsequently developed nausea and NBNB emesis. She then developed chest tightness that prompted her to come to the ED. No shortness of breath. Had previously tolerated oxycodone without issue. No urinary symptoms, no dark urine, no light colored stools, no constipation/dirarhea, appetite has otherwise been fine recently. No fevers or chills.    In the ED had WBC 12.8, T bili and liver enzymes WNL, vitals normal. CT A/P with small fluid collection in gallbladder fossa not unexpected post-operatively.     She is currently still reporting mid abdominal pain, no further nausea or chest tightness. No known ill contacts.    A/P: Patient is a 29 year old woman s/p laparoscopic cholecystectomy 4/11 for acute cholecystitis who now presents with four days of abdominal pain, and one day of nausea/vomiting. CT A/P notable for a small fluid collection on CT, consistent with post-surgical changes. It would be unusual for a biloma to present this far post-operatively and be so small on imaging. Does not appear to be an abscess. Collection does not need drainage. Has had no acholic stools or darker urine that could be concerning for bile duct injury, and Tbili, liver enzymes are all normal. Does have a mild leukocytosis, could be due to a gastroenteritis, afebrile, no obvious infectious cause on CT imaging.    - PO trial in ED. If  tolerates okay to discharge home from surgery standpoint, otherwise would recommend ED observation or admission to Medicine    Patient seen with chief resident who discussed with attending.    Thank you for the opportunity to participate in the care of this patient.    PMH:  Past Medical History:   Diagnosis Date     Diabetes (H)     Type 2     Endometrial hyperplasia without atypia, simple 4/8/2019     Obesity      PCOS (polycystic ovarian syndrome)      Vitiligo        PSH:  Past Surgical History:   Procedure Laterality Date     LAPAROSCOPIC CHOLECYSTECTOMY N/A 4/11/2020    Procedure: CHOLECYSTECTOMY, LAPAROSCOPIC;  Surgeon: Norris Reid MD;  Location:  OR     NO HISTORY OF SURGERY         FH:  Non-contributory    SH:  Tobacco use- none  EtOH use- none  Illicit drug use- none    Allergies:  No Known Allergies    Home Meds:  (Not in a hospital admission)      ROS: per HPI, remainder of ROS negative    Physical Exam:  Temp:  [98.3  F (36.8  C)-98.6  F (37  C)] 98.6  F (37  C)  Pulse:  [76-78] 78  Resp:  [18-19] 19  BP: (120-132)/(69-76) 120/69  SpO2:  [98 %-100 %] 98 %    Gen: NAD, resting comfortably in bed  Lungs: non-labored breathing on RA  CV: regular rhythm, normal rate by peripheral pulse  Abd: obese, soft, non-distended, mildly tender to palpation periumbilical, right and left mid abdomen. No rigidity or guarding. Port site incisions well healed, some skin glue still in place, no surrounding erythema, no drainage  Ext: no LE edema  Neuro: alert, moving all four extremities    Labs:CBC  Recent Labs   Lab 05/14/20 0235   WBC 12.8*   HGB 12.4        BMP  Recent Labs   Lab 05/14/20 0235      POTASSIUM 3.7   CHLORIDE 103   CO2 26   BUN 10   CR 0.72   *     LFT  Recent Labs   Lab 05/14/20 0235   AST 18   ALT 42   ALKPHOS 104   BILITOTAL 0.4   ALBUMIN 3.7     Pancreas  Recent Labs   Lab 05/14/20 0235   LIPASE 71*       Imaging:  CT A/P w/ IV contrast:   IMPRESSION:   1.  Recent  postoperative changes of cholecystectomy. Small amount of fluid in the cholecystectomy bed not unexpected for the recent postoperative state and most likely postoperative in nature. A small biloma accounting for this appearance would be   difficult to exclude but felt to be less likely. Nothing definite for abscess or drainable fluid collection. No biliary dilatation. If symptoms persist or worsen, short-term follow-up CT may be helpful.     2.  Otherwise no significant findings elsewhere in the abdomen or pelvis.          Seen with chief resident who discussed with staff attending.    Erlinda Springer MD  Surgery, PGY1  [id number]

## 2020-05-14 NOTE — ED NOTES
"     Emergency Department Patient Sign-out       Brief HPI:  This is a 29 year old female signed out to me by Dr. Aadms .  See initial ED Provider note for details of the presentation.       Significant Events prior to my assuming care: Patient transferred from the [facility].  Currently she has some pain around her lower incision.  She had a few episodes of nausea and vomiting after using oxycodone prior to coming in.  She had normal bowel movement yesterday.  No blood from vomit or bowel movements.  She has been eating and drinking normally.      Exam:   Patient Vitals for the past 24 hrs:   BP Temp Temp src Pulse Resp SpO2 Height Weight   05/14/20 0342 132/75 -- -- 78 19 98 % -- --   05/14/20 0316 121/75 98.6  F (37  C) Oral 76 18 100 % -- --   05/14/20 0207 129/76 98.3  F (36.8  C) Oral 76 18 99 % 1.448 m (4' 9\") 103.2 kg (227 lb 8 oz)       Physical Exam   Constitutional: oriented to person, place, and time. appears well-developed and well-nourished.   HENT:   Head: Normocephalic and atraumatic.   Neck: Normal range of motion.   Pulmonary/Chest: [place] normal. No respiratory distress.   Cardiac: No murmurs, rubs, gallops. RRR.  Abdominal: Abdomen soft, non distended. No RUQ TTP. Patient with 3 weal healed scars on abd, no TTP/warmth/erythema/drainage.  MSK: Long bones without deformity or evidence of trauma  Neurological: alert and oriented to person, place, and time.   Skin: Skin is warm and dry.   Psychiatric:  normal mood and affect.  behavior is normal. Thought content normal.       ED RESULTS:   Results for orders placed or performed during the hospital encounter of 05/14/20 (from the past 24 hour(s))   UA with Microscopic     Status: Abnormal    Collection Time: 05/14/20  2:27 AM   Result Value Ref Range    Color Urine Light Yellow     Appearance Urine Clear     Glucose Urine Negative NEG^Negative mg/dL    Bilirubin Urine Negative NEG^Negative    Ketones Urine Negative NEG^Negative mg/dL    Specific " Gravity Urine 1.009 1.003 - 1.035    Blood Urine Small (A) NEG^Negative    pH Urine 6.0 5.0 - 7.0 pH    Protein Albumin Urine Negative NEG^Negative mg/dL    Urobilinogen mg/dL Normal 0.0 - 2.0 mg/dL    Nitrite Urine Negative NEG^Negative    Leukocyte Esterase Urine Small (A) NEG^Negative    Source Midstream Urine     WBC Urine 7 (H) 0 - 5 /HPF    RBC Urine 1 0 - 2 /HPF    Bacteria Urine Moderate (A) NEG^Negative /HPF    Squamous Epithelial /HPF Urine 5 (H) 0 - 1 /HPF    Mucous Urine Present (A) NEG^Negative /LPF    Hyaline Casts 1 0 - 2 /LPF   HCG qualitative urine     Status: None    Collection Time: 05/14/20  2:27 AM   Result Value Ref Range    HCG Qual Urine Negative NEG^Negative   CBC with platelets     Status: Abnormal    Collection Time: 05/14/20  2:35 AM   Result Value Ref Range    WBC 12.8 (H) 4.0 - 11.0 10e9/L    RBC Count 4.90 3.8 - 5.2 10e12/L    Hemoglobin 12.4 11.7 - 15.7 g/dL    Hematocrit 38.0 35.0 - 47.0 %    MCV 78 78 - 100 fl    MCH 25.3 (L) 26.5 - 33.0 pg    MCHC 32.6 31.5 - 36.5 g/dL    RDW 15.0 10.0 - 15.0 %    Platelet Count 335 150 - 450 10e9/L   Comprehensive metabolic panel     Status: Abnormal    Collection Time: 05/14/20  2:35 AM   Result Value Ref Range    Sodium 136 133 - 144 mmol/L    Potassium 3.7 3.4 - 5.3 mmol/L    Chloride 103 94 - 109 mmol/L    Carbon Dioxide 26 20 - 32 mmol/L    Anion Gap 7 3 - 14 mmol/L    Glucose 112 (H) 70 - 99 mg/dL    Urea Nitrogen 10 7 - 30 mg/dL    Creatinine 0.72 0.52 - 1.04 mg/dL    GFR Estimate >90 >60 mL/min/[1.73_m2]    GFR Estimate If Black >90 >60 mL/min/[1.73_m2]    Calcium 8.9 8.5 - 10.1 mg/dL    Bilirubin Total 0.4 0.2 - 1.3 mg/dL    Albumin 3.7 3.4 - 5.0 g/dL    Protein Total 8.6 6.8 - 8.8 g/dL    Alkaline Phosphatase 104 40 - 150 U/L    ALT 42 0 - 50 U/L    AST 18 0 - 45 U/L   Lipase     Status: Abnormal    Collection Time: 05/14/20  2:35 AM   Result Value Ref Range    Lipase 71 (L) 73 - 393 U/L   Lactic acid whole blood     Status: None     Collection Time: 05/14/20  2:35 AM   Result Value Ref Range    Lactic Acid 1.2 0.7 - 2.0 mmol/L   Symptomatic COVID-19 Virus (Coronavirus) by PCR Nasopharyngeal     Status: None    Collection Time: 05/14/20  2:40 AM    Specimen: Nasopharyngeal   Result Value Ref Range    COVID-19 Virus PCR to [facility] - Source Nasopharyngeal     COVID-19 Virus PCR to [facility] - Result       Test received-See reflex to [facility] test SARS CoV2 (COVID-19) Virus RT-PCR   ABO/Rh type and screen     Status: None (In process)    Collection Time: 05/14/20  3:46 AM   Result Value Ref Range    ABO PENDING     Antibody Screen PENDING     Test Valid Only At          [facility]    Specimen Expires 05/17/2020        ED MEDICATIONS:   Medications   ondansetron (ZOFRAN) injection 8 mg (8 mg Intravenous Given 5/14/20 0250)   0.9% sodium chloride BOLUS (1,000 mLs Intravenous New Bag 5/14/20 0249)         Impression:  No diagnosis found.    Plan:    CT does show some fluid collection around the surgical site which may or may not be normal.  She does have elevated white blood count addition abdominal tenderness.  Surgery to see.  Patient be sent on to oncoming provider, disposition pending surgery evaluation..        MD Jeremy Mckinley, John Sutton MD  05/14/20 5632

## 2020-05-14 NOTE — ED PROVIDER NOTES
ED Provider Note  [facility]      History     Chief Complaint   Patient presents with     Abdominal Pain     Post cholecystectomy on 4/10/20.  Surgery resident told Pt to come in to ED.     HPI  Joie Yadav is a 29 year old female who presents to the ER with primary complaint of abdominal pain.  She had a laparoscopic cholecystectomy on 4/11/2020 at the [facility].  She states that the pain never went away, she had some ongoing pain around her umbilical laparoscopic site.  She states she went back to work on Monday for the first time.  She is an  and states that she is seated for most of the day.  She states that yesterday she started to feel much more fatigued and rundown, just did not feel well overall.  Pain is continued to worsen.  Tonight she states the pain was bad enough that she took 1 of her oxycodone, which she has not taken in quite some time.  She states after this she continued to feel worse, vomited (nonbloody emesis), and started having a headache as well.  No stuffy nose, sore throat, cough, shortness of breath, diarrhea.  No rash.  No loss of sense of taste or smell.  She called the surgery resident who asked that she come in for evaluation.    Past Medical History  Past Medical History:   Diagnosis Date     Diabetes (H)     Type 2     Endometrial hyperplasia without atypia, simple 4/8/2019     Obesity      PCOS (polycystic ovarian syndrome)      Vitiligo      Past Surgical History:   Procedure Laterality Date     LAPAROSCOPIC CHOLECYSTECTOMY N/A 4/11/2020    Procedure: CHOLECYSTECTOMY, LAPAROSCOPIC;  Surgeon: Norris Reid MD;  Location: [facility] OR     NO HISTORY OF SURGERY       metFORMIN (GLUCOPHAGE) 500 MG tablet  oxyCODONE (ROXICODONE) 5 MG tablet  polyethylene glycol (MIRALAX) 17 g packet  ibuprofen (ADVIL/MOTRIN) 200 MG tablet      No Known Allergies  Past medical history, past surgical history, medications, and allergies were reviewed with the  "patient. Additional pertinent items: None    Family History  History reviewed. No pertinent family history.  Family history was reviewed with the patient. Additional pertinent items: None    Social History  Social History     Tobacco Use     Smoking status: Never Smoker     Smokeless tobacco: Never Used   Substance Use Topics     Alcohol use: No     Drug use: No      Social history was reviewed with the patient. Additional pertinent items: None    Review of Systems  A complete review of systems was performed with pertinent positives and negatives noted in the HPI, and all other systems negative.    Physical Exam   BP: 129/76  Pulse: 76  Temp: 98.3  F (36.8  C)  Resp: 18  Height: 144.8 cm (4' 9\")  Weight: 103.2 kg (227 lb 8 oz)  SpO2: 99 %  Physical Exam  Constitutional:       General: She is not in acute distress.     Appearance: She is not diaphoretic.   HENT:      Head: Atraumatic.   Eyes:      General: No scleral icterus.  Cardiovascular:      Heart sounds: Normal heart sounds.   Pulmonary:      [place]: No respiratory distress.      Breath sounds: Normal breath sounds.   Abdominal:      Palpations: Abdomen is soft.      Tenderness: There is abdominal tenderness (diffuse tenderness, worst in the periumbilical and upper quadrants).   Musculoskeletal:         General: No tenderness.   Skin:     General: Skin is warm.      Findings: No rash.         ED Course      Procedures                         No results found for any visits on 05/14/20.  Medications   ondansetron (ZOFRAN) injection 8 mg (has no administration in time range)   0.9% sodium chloride BOLUS (has no administration in time range)        Assessments & Plan (with Medical Decision Making)   Basic labs were drawn, including LFTs, and patient was transferred to the [facility] ED for general surgery consult and likely imaging.    Dictation Disclaimer: Some of this Note has been completed with voice-recognition dictation software. Although errors are " generally corrected real-time, there is the potential for a rare error to be present in the completed chart.      I have reviewed the nursing notes. I have reviewed the findings, diagnosis, plan and need for follow up with the patient.    New Prescriptions    No medications on file       Final diagnoses:   None       --  Maria De Jesus Adams MD   Emergency Medicine   [facility], [place], EMERGENCY DEPARTMENT  5/14/2020     Maria De Jesus Adams MD  05/15/20 0152

## 2020-05-14 NOTE — ED TRIAGE NOTES
Pt had laprascopic cholecystectomy on 4/10/20.  Pt started having increased umbilical area pain tonight.  Pt took one leftover oxycodone from post op and felt worse after taking it.  Pt had three episodes of emesis after taking the oxycodone.

## 2020-05-14 NOTE — ED NOTES
Bed: ED38  Expected date:   Expected time:   Means of arrival:   Comments:  Transfer from [place]

## 2020-05-14 NOTE — TELEPHONE ENCOUNTER
Received a call from Joie regarding nausea and vomiting.     She is s/p lap cholecystectomy on 4/11/20 and is having worsening pain at the site of her incision. Today she took oxycodone for it and has started having nausea and vomiting and is unable to keep anything down. She had a very small amount of blood in her vomit. She usually has been having diarrhea but has not had a BM today and states she has not passed gas since her vomiting started. She has some chest tightness as well that improves with rest however she feels the same pain but much worse when she pushes on her chest. She currently feels lightheaded, dizzy and feels like she has subjective fevers though has not checked her temperature. She was advised to come to the ED to be evaluated and as she is very lightheaded and unable to stabilize herself, she was advised to use an ambulance for transfer.     Dianna Campbell MD  PGY-2 General Surgery       
15-Feb-2015

## 2020-05-14 NOTE — ED NOTES
Pt reports HA since taking oxycodone at 2000 on 5/14/20.  Pt also reports generalized fatigue and malaise.  COVID-19 order obtained and specimen collected.

## 2020-05-14 NOTE — LETTER
May 14, 2020      To Whom It May Concern:      Joie Yadav was seen in our Emergency Department today, 05/14/20.  I expect her condition to improve over the next 2  days.  She may return to work/school when improved.    Sincerely,        Carol Snider MD FACEP

## 2020-05-14 NOTE — ED AVS SNAPSHOT
[facility], [place], Emergency Department  [address] [place] [id number]  Phone:  [phone]                                    Joie Yadav   MRN: [id number]    Department:  [facility], Emergency Department   Date of Visit:  5/14/2020           After Visit Summary Signature Page    I have received my discharge instructions, and my questions have been answered. I have discussed any challenges I see with this plan with the nurse or doctor.    ..........................................................................................................................................  Patient/Patient Representative Signature      ..........................................................................................................................................  Patient Representative Print Name and Relationship to Patient    ..................................................               ................................................  Date                                   Time    ..........................................................................................................................................  Reviewed by Signature/Title    ...................................................              ..............................................  Date                                               Time          22EPIC Rev 08/18

## 2020-05-14 NOTE — DISCHARGE INSTRUCTIONS
Thank you for coming to the [facility] Emergency Department.     Please follow up with your surgeon as scheduled.    Take a bland diet today, then slowly return to your expected post-operative diet as tolerated in 24-48 hours.

## 2020-09-25 ENCOUNTER — OFFICE VISIT (OUTPATIENT)
Dept: OBGYN | Facility: CLINIC | Age: 30
End: 2020-09-25
Attending: OBSTETRICS & GYNECOLOGY
Payer: COMMERCIAL

## 2020-09-25 VITALS
SYSTOLIC BLOOD PRESSURE: 122 MMHG | DIASTOLIC BLOOD PRESSURE: 82 MMHG | HEIGHT: 57 IN | BODY MASS INDEX: 48.69 KG/M2 | HEART RATE: 73 BPM | WEIGHT: 225.7 LBS

## 2020-09-25 DIAGNOSIS — N85.00 ENDOMETRIAL HYPERPLASIA: Primary | ICD-10-CM

## 2020-09-25 LAB
HCG UR QL: NEGATIVE
INTERNAL QC OK POCT: NO

## 2020-09-25 PROCEDURE — G0463 HOSPITAL OUTPT CLINIC VISIT: HCPCS | Mod: 25

## 2020-09-25 PROCEDURE — 58100 BIOPSY OF UTERUS LINING: CPT | Mod: ZF | Performed by: STUDENT IN AN ORGANIZED HEALTH CARE EDUCATION/TRAINING PROGRAM

## 2020-09-25 PROCEDURE — 81025 URINE PREGNANCY TEST: CPT | Mod: ZF | Performed by: STUDENT IN AN ORGANIZED HEALTH CARE EDUCATION/TRAINING PROGRAM

## 2020-09-25 PROCEDURE — 88305 TISSUE EXAM BY PATHOLOGIST: CPT | Performed by: OBSTETRICS & GYNECOLOGY

## 2020-09-25 ASSESSMENT — MIFFLIN-ST. JEOR: SCORE: 1627.65

## 2020-09-25 NOTE — PROGRESS NOTES
"[facility]  Follow-Up Visit    S: Ms. Joie Yadav is a 29 year old  here for follow up of endometrial hyperplasia.    She has a history of endometrial hyperplasia which was diagnosed 2019 with Mirena IUD placement. She had follow up endometrial biopsies and had focal atypia on biopsy 2019. She saw Gyn Onc who recommended continuing Mirena IUD with every 3 months EMBx for one year. Her EMB returned to benign endometrium on 2019.   She reports not having bleeding with the IUD which continues to be the case. She does desire fertility and would like to become pregnant now if it would be safe. She does recognize her longtime history of PCOS and how that has made it difficult to become pregnant in the past and she would only want a pregnancy if it would be safe. She reports having seen a bariatric surgeon in the past but was not interested in the procedure. She reports working on her diet and exercise. She says she's eating mostly salads. Additionally, she had a cholecystectomy 5 months ago which has required her to change her diet. She walks about 30 minutes three times a week. She notes gaining the weight back that she lost prior to her cholecystectomy.     O:  /82 (BP Location: Left arm, Patient Position: Chair)   Pulse 73   Ht 1.448 m (4' 9\")   Wt 102.4 kg (225 lb 11.2 oz)   Breastfeeding No   BMI 48.84 kg/m    Gen: Well-appearing, NAD  Pelvic:  Normal appearing external female genitalia. Normal hair distribution. Vagina is without lesions. No discharge. Cervix nulliparous, no lesions, no cervical motion tenderness. IUD strings in place.     Endometrial Biopsy    Menstrual History:  Menstrual History 2017 1/10/2019 2019   LAST MENSTRUAL PERIOD 3/8/2017 2018 2019       Time Out - \"Pause for the Cause\"  Just before the procedure begins, through verbal and active participation of team members, verify:                      Initials   Patient Name EF   Patient  " EF   Procedure to be performed EF                                                                                                                                      Indication: history of endometrial hyperplasia with Mirena IUD in place    Pregnancy test:  negative    Consent: Verbal consent obtained from patient. , Risks, benefits of treatment, and no treatment were discussed.  Patient's questions were elicited and answered. , Written consent signed and scanned into medical record. and Patient received and verbalized understanding of discharge instructions    Using a medium Graves speculum, the cervix was visualized. The cervix was prepped with Betadine.  A single tooth tenaculum was applied to the anterior lip of the cervix. The endometrial pipelle was advanced through the cervix without difficulty and a sample collected. No additional pass was made.  The tenaculum was removed from the cervix and the tenaculum site made hemostatic with pressure.    EBL: 1 ml    Complications:  None  Pathology: EMB sample was sent to pathology.  Tolerance of Procedure:  Patient did tolerate the procedure well.        A/P:  Ms. Joie Yadav is a 29 year old  here for follow up of endometrial hyperplasia. Endometrial hyperplasia now resolved for 10 months. IUD remains in place. Patient desires future fertility but has PCOS and anovulation even prior to IUD placement. She also has morbid obesity. Recommended that she lose 10-15% of current weight for a BMI > 40. With a BMI < 40 she can undergo ovulation induction with medications with [facility] clinic. Reiterated that at this time it would be very difficult for her to become pregnant with her likely anovulation with PCOS and that the longer time period she spends without an IUD in place would be more time for a cancer to develop. Patient understands this and is willing to wait longer for pregnancy to try to achieve weight loss.   - Recommended continued efforts of weight  loss including exercise 30 minutes daily. Asked patient to reconsider bariatric surgery as well for long term health benefits.   - Encouraged follow up with Nutritionist  - Follow up EMBx in 6 months  - Patient desires results to be relayed via telephone call. Okay to leave a voicemail    Discussed with Dr. Christel Rodríguez MD  Obstetrics & Gynecology, PGY-3  9/25/2020 1:41 PM    I was present and assisted throughout the procedure,  I agree with the note above  Annmarie Kearney MD

## 2020-09-25 NOTE — LETTER
"2020       RE: Joie Yadav  [address] [facility] [address]     Dear Colleague,    Thank you for referring your patient, Joie Yadav, to the [facility] at [facility]. Please see a copy of my visit note below.    [facility]  Follow-Up Visit    S: Ms. Joie Yadav is a 29 year old  here for follow up of endometrial hyperplasia.    She has a history of endometrial hyperplasia which was diagnosed 2019 with Mirena IUD placement. She had follow up endometrial biopsies and had focal atypia on biopsy 2019. She saw Gyn Onc who recommended continuing Mirena IUD with every 3 months EMBx for one year. Her EMB returned to benign endometrium on 2019.   She reports not having bleeding with the IUD which continues to be the case. She does desire fertility and would like to become pregnant now if it would be safe. She does recognize her longtime history of PCOS and how that has made it difficult to become pregnant in the past and she would only want a pregnancy if it would be safe. She reports having seen a bariatric surgeon in the past but was not interested in the procedure. She reports working on her diet and exercise. She says she's eating mostly salads. Additionally, she had a cholecystectomy 5 months ago which has required her to change her diet. She walks about 30 minutes three times a week. She notes gaining the weight back that she lost prior to her cholecystectomy.     O:  /82 (BP Location: Left arm, Patient Position: Chair)   Pulse 73   Ht 1.448 m (4' 9\")   Wt 102.4 kg (225 lb 11.2 oz)   Breastfeeding No   BMI 48.84 kg/m    Gen: Well-appearing, NAD  Pelvic:  Normal appearing external female genitalia. Normal hair distribution. Vagina is without lesions. No discharge. Cervix nulliparous, no lesions, no cervical motion tenderness. IUD strings in place.     Endometrial Biopsy    Menstrual " "History:  Menstrual History 2017 1/10/2019 2019   LAST MENSTRUAL PERIOD 3/8/2017 2018 2019       Time Out - \"Pause for the Cause\"  Just before the procedure begins, through verbal and active participation of team members, verify:                      Initials   Patient Name EF   Patient  EF   Procedure to be performed EF                                                                                                                                      Indication: history of endometrial hyperplasia with Mirena IUD in place    Pregnancy test:  negative    Consent: Verbal consent obtained from patient. , Risks, benefits of treatment, and no treatment were discussed.  Patient's questions were elicited and answered. , Written consent signed and scanned into medical record. and Patient received and verbalized understanding of discharge instructions    Using a medium Graves speculum, the cervix was visualized. The cervix was prepped with Betadine.  A single tooth tenaculum was applied to the anterior lip of the cervix. The endometrial pipelle was advanced through the cervix without difficulty and a sample collected. No additional pass was made.  The tenaculum was removed from the cervix and the tenaculum site made hemostatic with pressure.    EBL: 1 ml    Complications:  None  Pathology: EMB sample was sent to pathology.  Tolerance of Procedure:  Patient did tolerate the procedure well.        A/P:  Ms. Joie Yadav is a 29 year old  here for follow up of endometrial hyperplasia. Endometrial hyperplasia now resolved for 10 months. IUD remains in place. Patient desires future fertility but has PCOS and anovulation even prior to IUD placement. She also has morbid obesity. Recommended that she lose 10-15% of current weight for a BMI > 40. With a BMI < 40 she can undergo ovulation induction with medications with [facility] clinic. Reiterated that at this time it would be very difficult for her to " become pregnant with her likely anovulation with PCOS and that the longer time period she spends without an IUD in place would be more time for a cancer to develop. Patient understands this and is willing to wait longer for pregnancy to try to achieve weight loss.   - Recommended continued efforts of weight loss including exercise 30 minutes daily. Asked patient to reconsider bariatric surgery as well for long term health benefits.   - Encouraged follow up with Nutritionist  - Follow up EMBx in 6 months  - Patient desires results to be relayed via telephone call. Okay to leave a voicemail    Discussed with Dr. Christel Rodríguez MD  Obstetrics & Gynecology, PGY-3  9/25/2020 1:41 PM    I was present and assisted throughout the procedure,  I agree with the note above  Annmarie Kearney MD

## 2020-09-25 NOTE — NURSING NOTE
Chief Complaint   Patient presents with     Follow Up     No changes since we seen her last       See LIZ Pineda 9/25/2020

## 2020-09-29 LAB — COPATH REPORT: NORMAL

## 2020-12-20 ENCOUNTER — HEALTH MAINTENANCE LETTER (OUTPATIENT)
Age: 30
End: 2020-12-20

## 2021-01-08 ENCOUNTER — ANCILLARY PROCEDURE (OUTPATIENT)
Dept: ULTRASOUND IMAGING | Facility: CLINIC | Age: 31
End: 2021-01-08
Attending: OBSTETRICS & GYNECOLOGY
Payer: COMMERCIAL

## 2021-01-08 PROCEDURE — 76830 TRANSVAGINAL US NON-OB: CPT | Mod: 26 | Performed by: OBSTETRICS & GYNECOLOGY

## 2021-01-08 PROCEDURE — 76830 TRANSVAGINAL US NON-OB: CPT

## 2021-04-06 ENCOUNTER — OFFICE VISIT (OUTPATIENT)
Dept: OBGYN | Facility: CLINIC | Age: 31
End: 2021-04-06
Payer: COMMERCIAL

## 2021-04-06 VITALS
WEIGHT: 183.3 LBS | BODY MASS INDEX: 39.54 KG/M2 | HEART RATE: 101 BPM | HEIGHT: 57 IN | DIASTOLIC BLOOD PRESSURE: 82 MMHG | SYSTOLIC BLOOD PRESSURE: 114 MMHG

## 2021-04-06 DIAGNOSIS — Z01.812 PRE-PROCEDURE LAB EXAM: Primary | ICD-10-CM

## 2021-04-06 DIAGNOSIS — N85.00 ENDOMETRIAL HYPERPLASIA: ICD-10-CM

## 2021-04-06 LAB
HCG UR QL: NEGATIVE
INTERNAL QC OK POCT: YES

## 2021-04-06 PROCEDURE — 88305 TISSUE EXAM BY PATHOLOGIST: CPT | Mod: 26 | Performed by: PATHOLOGY

## 2021-04-06 PROCEDURE — 81025 URINE PREGNANCY TEST: CPT | Performed by: STUDENT IN AN ORGANIZED HEALTH CARE EDUCATION/TRAINING PROGRAM

## 2021-04-06 PROCEDURE — 88305 TISSUE EXAM BY PATHOLOGIST: CPT | Mod: TC | Performed by: STUDENT IN AN ORGANIZED HEALTH CARE EDUCATION/TRAINING PROGRAM

## 2021-04-06 PROCEDURE — 58100 BIOPSY OF UTERUS LINING: CPT | Performed by: STUDENT IN AN ORGANIZED HEALTH CARE EDUCATION/TRAINING PROGRAM

## 2021-04-06 PROCEDURE — G0463 HOSPITAL OUTPT CLINIC VISIT: HCPCS | Mod: 25

## 2021-04-06 PROCEDURE — 58100 BIOPSY OF UTERUS LINING: CPT | Mod: GC | Performed by: OBSTETRICS & GYNECOLOGY

## 2021-04-06 PROCEDURE — 99213 OFFICE O/P EST LOW 20 MIN: CPT | Mod: 25 | Performed by: OBSTETRICS & GYNECOLOGY

## 2021-04-06 ASSESSMENT — MIFFLIN-ST. JEOR: SCORE: 1430.32

## 2021-04-06 NOTE — PROGRESS NOTES
"[facility]  Follow-Up Visit    S: Ms. Joie Yadav is a 29 year old  here for follow up of endometrial hyperplasia.    She has a history of endometrial hyperplasia which was diagnosed 2019 with Mirena IUD placement. She had follow up endometrial biopsies and had focal atypia on biopsy 2019. She saw Gyn Onc who recommended continuing Mirena IUD with every 3 months EMBx for one year. Her EMB returned to benign endometrium on 2019. She has had repeat EMBs that have been normal since that time. She has also been desiring fertility, however limiting factor has been weight loss. Previously counseled she must be BMI <40 for ovulation induction with [facility]. She has been working very hard on this with diet.     Notes that she hasn't had any bleeding since placement of Mirena IUD. She had menarche at 15yo, but after 6 months, then period stopped. She was unable to obtain menses without OCPs. She was on birth control for this for 3 years, then stopped and was attempting conception for 2 years 5213-6865 without success. She was then diagnosed with endometrial hyperplasia. She still strongly desires pregnancy.    Denies other concerns currently, including dysuria, vaginal discharge.    O:  /82 (BP Location: Left arm, Patient Position: Chair)   Pulse 101   Ht 1.448 m (4' 9\")   Wt 83.1 kg (183 lb 4.8 oz)   BMI 39.67 kg/m    Gen: Well-appearing, NAD  Pulm:  Normal work of breathing  Pelvic:  Normal appearing external female genitalia. Normal hair distribution. Vagina is without lesions. No discharge. Cervix nulliparous, no lesions, no cervical motion tenderness. IUD strings in place. EMB obtained - see below  Extr: Patches of vitiligo present  ______________________________________________________________________________________________    AUB/Endometrial Biopsy History:     1/10/19: ED visit for AUB, constant bleeding  US:  The uterus is normal in size measuring 9.6 x 4.6 x 5.3 cm. No " fibroids  are evident. Endometrial stripe measures 26 mm and is abnormally  thickened and heterogeneous for patient's age. No associated color  Doppler flow within the endometrium. Multiple nabothian cysts are  present. The right ovary is normal measuring 2.5 x 1.8 x 2.4 cm. The  left ovary is not visualized. No adnexal masses are present. No free  pelvic fluid is present.     2/18/19 [facility] clinic appt w/ EmBx and Mirena IUD placement:  Endometrium, biopsy:   -Endometrial hyperplasia with abundant squamous morules, without atypia   -Chronic endometritis   -Negative for atypia or malignancy      7/31/19 ED visit for R flank pain (this resolved)  US:  Uterus 7.6 x 5.2 x 3.9 cm in size. Endometrial stripe 0.9  cm. IUD identified in the endometrial cavity. There is a focal  collection of clustered cysts in the cervix approximately 2.2 x 1.6 x  2.0 cm in size concerning for adenoma malignum.     8/6/19 seen at [facility] for f/u EmBx and discussion of US findings  Progesterone BID started, took for 3d due to insurance issue.     A. ENDOMETRIAL BIOPSY:   -Focal residual atypical endometrial hyperplasia with squamous metaplasia   -Endometrium with decidualized stroma and inactive glands consistent with   progesterone use     B. ENDOCERVICAL CURETTINGS:   - Endocervical tissue with inflammatory and reactive change     9/17/19 GynOnc Consult:  Recommendations: No additional progesterone indicated. Cont Mirena IUD. Repeat US and EmBx in 3mo > If cysts worsen recommend CKC. If endometrial hyperplasia persistent for 1 year s/p Mirena (I.e. Feb 2020) recommend addressing definitive hysterectomy     11/8/2019:  US -  Uterine findings:              Presence: Visible Size: Normal 5.3 x 5.2 x 4.0 cm.  Endometrium = 10.0 mm. IUD in place              Cx length = 30.7 mm. Multiple cystic areas without increased blood flow.   EmBx - Inactive endometrial glands and decidualized stroma, consistent with exogenous progesterone  "effect      2020:  US (2020): Uterine findings:              Presence: Visible Size: Normal 5.3 x 4.5 x 3.9  cm.  Endometrium = 8.1 mm. IUD appears in place.              Cx length = 26.3 mm. Multiple cystic areas without increased blood flow measuring 2.3 x 2.1 x 1.5cm, similar as previously seen on 19.  Comments:  Cystic area within endocervix appears stable  Unable to complete EMBx due to severe yeast vaginitis. Will return for bx.  Discussed weight loss in general and referral to weight mgmt clinic.     2020:  EMBx:  - Inactive glands and decidualized stroma, consistent with exogenous   progesterone effect     2020   EMB    FINAL DIAGNOSIS:   ENDOMETRIUM, BIOPSY:   - Fragments of diffusely decidualized endometrial stroma and inactive   glands, consistent with exogenous   hormonal effect   - Negative for hyperplasia or malignancy     2021  TVUS  Uterine findings:              Presence: Visible Size: Normal 5.2 x 4.8 x 3.7 cm.  Endometrium = 6.9 mm, IUD appears in polace.              Cx length = 33.7 mm.  Comments:  Normal appearing uterus and endometrial stripe, IUD appears in good position.  Further studies as clinically indicated.       History of abnormal cervix:   -Likely TVUS followup in 3 months. If worsening cysts, consider CKC at this time. OK to see OBGYN although I am happy to help with CKC per Dr. Rizo. Repeat TVUS showed stable cervical cysts. TVUS most recently on 2021 visually cervix still appears with multiple nabothian cysts.     ______________________________________________________________________________________________    Menstrual History:  Menstrual History 2017 1/10/2019 2019   LAST MENSTRUAL PERIOD 3/8/2017 2018 2019       Time Out - \"Pause for the Cause\"  Just before the procedure begins, through verbal and active participation of team members, verify:                      Initials   Patient Name Joie Yadav   Patient  " 1990   Procedure to be performed EMB                                                                                                                                      Indication: history of endometrial hyperplasia with Mirena IUD in place    Pregnancy test:  negative    Consent: Verbal consent obtained from patient. , Risks, benefits of treatment, and no treatment were discussed.  Patient's questions were elicited and answered. , Written consent signed and scanned into medical record. and Patient received and verbalized understanding of discharge instructions    Using a medium Graves speculum, the cervix was visualized. The cervix was prepped with Betadine.  A single tooth tenaculum was applied to the anterior lip of the cervix. The endometrial pipelle was advanced through the cervix without difficulty and a sample collected with visible tissue. A second pass was made with collection of second sample. The tenaculum was removed from the cervix and the tenaculum site made hemostatic with pressure.    EBL: 1 ml    Complications:  None  Pathology: EMB sample was sent to pathology.  Tolerance of Procedure:  Patient did tolerate the procedure well.      Fertility testing 2020  TSH 2.35  Prolactin 12  AMH 6.93  FSH 3.6      A/P:  Ms. Joie Yadav is a 29 year old  here for follow up of endometrial hyperplasia. Endometrial hyperplasia now resolved for almost 2 years. IUD remains in place. Patient desires future fertility but has PCOS and anovulation even prior to IUD placement. However she has improved her weight with recent weight loss, BMI is now 39.67. If this EMB returns negative, will consider removal of Mirena IUD and expectant management to evaluate for return of regular menses. If this occurs, consider trial of attempting conception without treatment. If she does not obtain normal menses, then consider inducing withdrawal bleeding, fertility testing, and if anovulation present, treatment  with ovulation induction. If she does not resume regular menses, plan for repeat EMB at 6 months or in case of irregular or heavy bleeding. Congratulated patient on weight loss and encouraged continued efforts.     #History of endometrial hyperplasia  - EMB obtained today, patient desires results to be relayed via telephone call. Okay to leave a voicemail  - Follow up after EMB results for Mirena IUD removal, and expectant management to evaluate for spontaneous resumption of menses   - If she resumes menses, okay for trial of conception  - If she does not resume menses, pursue fertility work up and possible treatment options       RTC following EMB results.    Discussed and seen with Dr. Сергей Fish MD PGY2  Obstetrics & Gynecology  04/06/21     The Patient was seen in [facility] by Dr. Rushing.   I reviewed the history & exam. Assessment and plan were jointly made.  I was present for the endometrial biopsy procedure as described above.    Estephania Rushing MD, MPH

## 2021-04-08 LAB — COPATH REPORT: NORMAL

## 2021-04-24 ENCOUNTER — HEALTH MAINTENANCE LETTER (OUTPATIENT)
Age: 31
End: 2021-04-24

## 2021-04-30 ENCOUNTER — TELEPHONE (OUTPATIENT)
Dept: OBGYN | Facility: CLINIC | Age: 31
End: 2021-04-30

## 2021-04-30 NOTE — TELEPHONE ENCOUNTER
Called Joie with her EMB results. Apologized for delay in relaying them to her. No evidence of hyperplasia. Would be reasonable to remove IUD at this time as she desires fertility. She will call the clinic to schedule IUD removal. Would consider cyclic provera every 3 months if doesn't have return of regular menses.     Joselyn Green MD      ----- Message from Shantel Shearer sent at 4/30/2021  9:32 AM CDT -----  Regarding: Procedure results  Hello!  Joie calling to get results form her procedure that was done on 4/621 w/Dr. Fish. Please give her a call back to discuss results when possible. Thanks!    Best,  Shantel BURK    Please DO NOT send this message and/or reply back to sender. Call center representatives DO NOT respond to messages.

## 2021-05-18 NOTE — PROGRESS NOTES
"[facility]  Gynecology Problem Visit    SUBJECTIVE    Joie Yadav is a 30 year old G0 who is here for IUD removal. She has a history of endometrial hyperplasia with atypia that she has successfully treated with Mirena IUD placement. She has been working on weight loss in order to become pregnant, and she is now ready for pregnancy and therefore is ready to have the IUD removed.     She last weighted 229 in January and is now 175lb. She has cut lots of high calorie items out of her diet, including fast food, chips, juice, and soda. She is exercising about 1 hour a day, including 30m of walking and 30m of eliptical. She has not had bleeding with the IUD in place and is not having pain or other pelvic concerns. Does note periods were quite irregular with long gaps between periods prior to Mirena IUD insertion.     Her LMP is: No LMP recorded. (Menstrual status: IUD).    PAST MEDICAL HISTORY  Past Medical History:   Diagnosis Date     Diabetes (H)     Type 2     Endometrial hyperplasia without atypia, simple 4/8/2019     Obesity      PCOS (polycystic ovarian syndrome)      Vitiligo        MEDICATIONS  Current Outpatient Medications   Medication     ibuprofen (ADVIL/MOTRIN) 200 MG tablet     metFORMIN (GLUCOPHAGE) 500 MG tablet     Current Facility-Administered Medications   Medication     levonorgestrel (MIRENA) 20 MCG/24HR IUD 20 mcg       ALLERGIES  No Known Allergies    REVIEW OF SYSTEMS  A 10 point review of systems including Constitutional, Eyes, Respiratory, Cardiovascular, Gastroenterology, Genitourinary, Integumentary, Musculoskeletal, and Psychiatric, were all negative, except for pertinent positives noted in the above HPI.    OBJECTIVE  /84 (BP Location: Left arm, Patient Position: Chair)   Pulse 69   Ht 1.448 m (4' 9\")   Wt 79.5 kg (175 lb 3.2 oz)   BMI 37.91 kg/m      General: Alert, without distress  HEENT: normocephalic, without obvious abnormality   Cardiovascular: " extremities warm and well perfused   Lungs: normal work of breathing   Abdomen: soft, non-tender, non-distended   Pelvic: normal external female genitalia; normal vagina without discharge; normal cervix without lesions/masses; black Mirena strings visualized; normal anus/perineum   Extremities: normal    EMB 4/6/21:  ENDOMETRIUM, BIOPSY:   - Inactive endometrial glands and decidualized stroma, consistent with   exogenous progesterone effect   - Negative for hyperplasia or atypia     IUD Removal Procedure Note  Joie and I discussed the risks and benefits removing the IUD, including infection, pain, bleeding, and incomplete/unsuccessful removal. She agreed to proceed.    A speculum was placed into the vagina. The strings of the Mirena IUD were grasped with a ring forcep and removed intact without difficulty. The cervix was hemostatic and the instruments removed from the vagina. She tolerated the procedure well.    ASSESSMENT  Joie Yadav is a 30 year old G0 present for IUD removal in order to try to conceive.     PLAN  - congratulations on significant weight loss given. Joie will continue with the lifestyle changes she has made.  - Discussed how to time intercourse and when to call to establish prenatal care when she is pregnant  - Discussed starting a prenatal vitamin with at least 400mg of folic acid and 200mg DHA  - Discussed that her periods may be irregular upon return, and that she can come back if she goes 3 months without a period for infertility assessment/withdrawal bleed treatment   - Discussed if her periods return regularly but she isn't pregnant within 6 months, she should return for an endometrial biopsy    Lindsay De La Rosa MD, MSCI    [facility]

## 2021-05-19 ENCOUNTER — OFFICE VISIT (OUTPATIENT)
Dept: OBGYN | Facility: CLINIC | Age: 31
End: 2021-05-19
Attending: OBSTETRICS & GYNECOLOGY
Payer: COMMERCIAL

## 2021-05-19 VITALS
DIASTOLIC BLOOD PRESSURE: 84 MMHG | SYSTOLIC BLOOD PRESSURE: 117 MMHG | BODY MASS INDEX: 37.8 KG/M2 | HEART RATE: 69 BPM | HEIGHT: 57 IN | WEIGHT: 175.2 LBS

## 2021-05-19 DIAGNOSIS — Z31.69 ENCOUNTER FOR PRECONCEPTION CONSULTATION: Primary | ICD-10-CM

## 2021-05-19 DIAGNOSIS — E66.01 MORBID OBESITY (H): ICD-10-CM

## 2021-05-19 PROCEDURE — G0463 HOSPITAL OUTPT CLINIC VISIT: HCPCS

## 2021-05-19 PROCEDURE — 99213 OFFICE O/P EST LOW 20 MIN: CPT | Mod: 25 | Performed by: OBSTETRICS & GYNECOLOGY

## 2021-05-19 PROCEDURE — 58301 REMOVE INTRAUTERINE DEVICE: CPT | Performed by: OBSTETRICS & GYNECOLOGY

## 2021-05-19 ASSESSMENT — ANXIETY QUESTIONNAIRES
6. BECOMING EASILY ANNOYED OR IRRITABLE: NOT AT ALL
GAD7 TOTAL SCORE: 3
7. FEELING AFRAID AS IF SOMETHING AWFUL MIGHT HAPPEN: NOT AT ALL
1. FEELING NERVOUS, ANXIOUS, OR ON EDGE: SEVERAL DAYS
2. NOT BEING ABLE TO STOP OR CONTROL WORRYING: SEVERAL DAYS
3. WORRYING TOO MUCH ABOUT DIFFERENT THINGS: SEVERAL DAYS
5. BEING SO RESTLESS THAT IT IS HARD TO SIT STILL: NOT AT ALL

## 2021-05-19 ASSESSMENT — PATIENT HEALTH QUESTIONNAIRE - PHQ9: 5. POOR APPETITE OR OVEREATING: NOT AT ALL

## 2021-05-19 ASSESSMENT — MIFFLIN-ST. JEOR: SCORE: 1393.58

## 2021-05-19 NOTE — LETTER
5/19/2021       RE: Joie Yadav  [address] [facility] [address]     Dear Colleague,    Thank you for referring your patient, Joie Yadav, to the [facility] [place] at [facility]. Please see a copy of my visit note below.    [facility]  Gynecology Problem Visit    SUBJECTIVE    Joie Yadav is a 30 year old G0 who is here for IUD removal. She has a history of endometrial hyperplasia with atypia that she has successfully treated with Mirena IUD placement. She has been working on weight loss in order to become pregnant, and she is now ready for pregnancy and therefore is ready to have the IUD removed.     She last weighted 229 in January and is now 175lb. She has cut lots of high calorie items out of her diet, including fast food, chips, juice, and soda. She is exercising about 1 hour a day, including 30m of walking and 30m of eliptical. She has not had bleeding with the IUD in place and is not having pain or other pelvic concerns. Does note periods were quite irregular with long gaps between periods prior to Mirena IUD insertion.     Her LMP is: No LMP recorded. (Menstrual status: IUD).    PAST MEDICAL HISTORY  Past Medical History:   Diagnosis Date     Diabetes (H)     Type 2     Endometrial hyperplasia without atypia, simple 4/8/2019     Obesity      PCOS (polycystic ovarian syndrome)      Vitiligo        MEDICATIONS  Current Outpatient Medications   Medication     ibuprofen (ADVIL/MOTRIN) 200 MG tablet     metFORMIN (GLUCOPHAGE) 500 MG tablet     Current Facility-Administered Medications   Medication     levonorgestrel (MIRENA) 20 MCG/24HR IUD 20 mcg       ALLERGIES  No Known Allergies    REVIEW OF SYSTEMS  A 10 point review of systems including Constitutional, Eyes, Respiratory, Cardiovascular, Gastroenterology, Genitourinary, Integumentary, Musculoskeletal, and Psychiatric, were all  "negative, except for pertinent positives noted in the above HPI.    OBJECTIVE  /84 (BP Location: Left arm, Patient Position: Chair)   Pulse 69   Ht 1.448 m (4' 9\")   Wt 79.5 kg (175 lb 3.2 oz)   BMI 37.91 kg/m      General: Alert, without distress  HEENT: normocephalic, without obvious abnormality   Cardiovascular: extremities warm and well perfused   Lungs: normal work of breathing   Abdomen: soft, non-tender, non-distended   Pelvic: normal external female genitalia; normal vagina without discharge; normal cervix without lesions/masses; black Mirena strings visualized; normal anus/perineum   Extremities: normal    EMB 4/6/21:  ENDOMETRIUM, BIOPSY:   - Inactive endometrial glands and decidualized stroma, consistent with   exogenous progesterone effect   - Negative for hyperplasia or atypia     IUD Removal Procedure Note  Joie and I discussed the risks and benefits removing the IUD, including infection, pain, bleeding, and incomplete/unsuccessful removal. She agreed to proceed.    A speculum was placed into the vagina. The strings of the Mirena IUD were grasped with a ring forcep and removed intact without difficulty. The cervix was hemostatic and the instruments removed from the vagina. She tolerated the procedure well.    ASSESSMENT  Joie Yadav is a 30 year old G0 present for IUD removal in order to try to conceive.     PLAN  - congratulations on significant weight loss given. Joie will continue with the lifestyle changes she has made.  - Discussed how to time intercourse and when to call to establish prenatal care when she is pregnant  - Discussed starting a prenatal vitamin with at least 400mg of folic acid and 200mg DHA  - Discussed that her periods may be irregular upon return, and that she can come back if she goes 3 months without a period for infertility assessment/withdrawal bleed treatment   - Discussed if her periods return regularly but she isn't pregnant within 6 months, she " should return for an endometrial biopsy    Lindsay De La Rosa MD, MSCI    [facility]

## 2021-05-19 NOTE — PATIENT INSTRUCTIONS
So nice to meet you today!    Please start a prenatal vitamin with at least 400mg of folic acid and DHA. I like [employer] brand but you may use any that you like.    When you miss your period and have a positive pregnancy test, call to make a new OB appointment. This will be scheduled when we estimate you are around 8 weeks of pregnancy.    If you haven't had a period in 3 months and you are not pregnant, then please call for a followup appointment and we can evaluate you to help you ovulate.    If you aren't pregnant in 6 months, please call to make an appointment for an endometrial biopsy and consider evaluation for infertility.

## 2021-05-20 ASSESSMENT — ANXIETY QUESTIONNAIRES: GAD7 TOTAL SCORE: 3

## 2021-05-21 PROBLEM — E66.01 MORBID OBESITY (H): Status: ACTIVE | Noted: 2021-05-21

## 2021-08-08 ENCOUNTER — HEALTH MAINTENANCE LETTER (OUTPATIENT)
Age: 31
End: 2021-08-08

## 2021-10-03 ENCOUNTER — HEALTH MAINTENANCE LETTER (OUTPATIENT)
Age: 31
End: 2021-10-03

## 2022-02-03 NOTE — PROGRESS NOTES
"  PROCEDURE NOTE: ENDOMETRIAL BIOPSY     2022    PATIENT INFORMATION:   Subjective: History of endometrial hyperplasia with atypia. IUD removed in 2021 in hopes of conceiving. Since then, reports periods have been irregular, up to 90 days in between menses. Does reports that she may have been diagnosed with PCOS in the past.  21  Current contraception: none  No unprotected intercourse in the last week.  Urine pregnancy test was negative.    OB History    Para Term  AB Living   0 0 0 0 0 0   SAB IAB Ectopic Multiple Live Births   0 0 0 0 0       /85   Pulse 77   Ht 1.448 m (4' 9\")   Wt 93.9 kg (207 lb 1.6 oz)   BMI 44.82 kg/m      PREOPERATIVE DIAGNOSIS: History of endometrial hyperplasia with atypia    POSTOPERATIVE DIAGNOSIS: Same    PROCEDURE PERFORMED: Endometrial biopsy    ANESTHESIA: none     CONSENT: Her questions were answered. She was informed about the risks, benefits and alternatives to an endometrial biopsy.  She verbalized understanding of the following risks: infection, perforation, the possibility of the inability to complete the procedure and/or the need for future procedures.  Written informed consent was obtained and scanned into the medical record. Joie received and verbalized understanding of discharge instrcutions.    UNIVERSAL PROTOCOL/ TIMEOUT: \"Pause for the Cause\"  Preprocedure verification is complete - patient verified and consents confirmed, procedure sites are identified and marked.  Timeout was called before the start of the procedure, through verbal and active participation of team members, the patient's name,  and procedure to be performed were verified.                     PROCEDURE DETAILS:   The patient was placed in the dorsal lithotomy position. A speculum was inserted in the vagina, and the cervix visualized. The cervix was swabbed with Betadine.    A single-toothed tenaculum was applied to the anterior lip of the cervix for stabilization. " The endometrial pipelle was advanced through the cervix without difficulty and a sample collected. No additional passes were made.  The tenaculum was removed from the cervix and the tenaculum site made hemostatic with pressure.    EBL: <5cc  Complications: none noted.    Specimen: EMBx sample to pathology.    Patient tolerated the procedure well.    PLAN:   -- Post-operative instructions reviewed including symptoms of complications  -- OTC ibuprofen as needed for mild to moderate pain (ibuprofen 400-800 mg PO TID PRN or naproxen 500 mg PO BID for cramping)  -- Will notify patient of any abnormal results    -- Discussed use of provera to trigger a withdrawal bleed. Will get TSH, E2/FSH, testosterone, and AMH. Will return in 1 month for followup and discussion of infertility.    -- Plan repeat EMB in 6 months if not yet pregnant.    Lindsay De La Rosa MD, MSCI, FACOG    [facility]  Date of Service: 2/9/22

## 2022-02-09 ENCOUNTER — OFFICE VISIT (OUTPATIENT)
Dept: OBGYN | Facility: CLINIC | Age: 32
End: 2022-02-09
Attending: OBSTETRICS & GYNECOLOGY
Payer: COMMERCIAL

## 2022-02-09 VITALS
WEIGHT: 207.1 LBS | SYSTOLIC BLOOD PRESSURE: 127 MMHG | BODY MASS INDEX: 44.68 KG/M2 | HEIGHT: 57 IN | DIASTOLIC BLOOD PRESSURE: 85 MMHG | HEART RATE: 77 BPM

## 2022-02-09 DIAGNOSIS — N85.00 ENDOMETRIAL HYPERPLASIA: Primary | ICD-10-CM

## 2022-02-09 DIAGNOSIS — N92.6 IRREGULAR MENSES: ICD-10-CM

## 2022-02-09 PROCEDURE — 88305 TISSUE EXAM BY PATHOLOGIST: CPT | Mod: 26 | Performed by: PATHOLOGY

## 2022-02-09 PROCEDURE — 88305 TISSUE EXAM BY PATHOLOGIST: CPT | Mod: TC | Performed by: OBSTETRICS & GYNECOLOGY

## 2022-02-09 PROCEDURE — 58100 BIOPSY OF UTERUS LINING: CPT | Performed by: OBSTETRICS & GYNECOLOGY

## 2022-02-09 PROCEDURE — 58100 BIOPSY OF UTERUS LINING: CPT

## 2022-02-09 RX ORDER — MEDROXYPROGESTERONE ACETATE 10 MG
10 TABLET ORAL DAILY
Qty: 10 TABLET | Refills: 0 | Status: ON HOLD | OUTPATIENT
Start: 2022-02-09 | End: 2022-03-11

## 2022-02-09 ASSESSMENT — MIFFLIN-ST. JEOR: SCORE: 1533.28

## 2022-02-09 NOTE — LETTER
"2022       RE: Joie Yadav  [address] [facility] [address]     Dear Colleague,    Thank you for referring your patient, Joie Yadav, to the [facility] [place] at [facility]. Please see a copy of my visit note below.      PROCEDURE NOTE: ENDOMETRIAL BIOPSY     2022    PATIENT INFORMATION:   Subjective: History of endometrial hyperplasia with atypia. IUD removed in 2021 in hopes of conceiving. Since then, reports periods have been irregular, up to 90 days in between menses. Does reports that she may have been diagnosed with PCOS in the past.  21  Current contraception: none  No unprotected intercourse in the last week.  Urine pregnancy test was negative.    OB History    Para Term  AB Living   0 0 0 0 0 0   SAB IAB Ectopic Multiple Live Births   0 0 0 0 0       /85   Pulse 77   Ht 1.448 m (4' 9\")   Wt 93.9 kg (207 lb 1.6 oz)   BMI 44.82 kg/m      PREOPERATIVE DIAGNOSIS: History of endometrial hyperplasia with atypia    POSTOPERATIVE DIAGNOSIS: Same    PROCEDURE PERFORMED: Endometrial biopsy    ANESTHESIA: none     CONSENT: Her questions were answered. She was informed about the risks, benefits and alternatives to an endometrial biopsy.  She verbalized understanding of the following risks: infection, perforation, the possibility of the inability to complete the procedure and/or the need for future procedures.  Written informed consent was obtained and scanned into the medical record. Joie received and verbalized understanding of discharge instrcutions.    UNIVERSAL PROTOCOL/ TIMEOUT: \"Pause for the Cause\"  Preprocedure verification is complete - patient verified and consents confirmed, procedure sites are identified and marked.  Timeout was called before the start of the procedure, through verbal and active participation of team members, the patient's name,  and procedure " to be performed were verified.                     PROCEDURE DETAILS:   The patient was placed in the dorsal lithotomy position. A speculum was inserted in the vagina, and the cervix visualized. The cervix was swabbed with Betadine.    A single-toothed tenaculum was applied to the anterior lip of the cervix for stabilization. The endometrial pipelle was advanced through the cervix without difficulty and a sample collected. No additional passes were made.  The tenaculum was removed from the cervix and the tenaculum site made hemostatic with pressure.    EBL: <5cc  Complications: none noted.    Specimen: EMBx sample to pathology.    Patient tolerated the procedure well.    PLAN:   -- Post-operative instructions reviewed including symptoms of complications  -- OTC ibuprofen as needed for mild to moderate pain (ibuprofen 400-800 mg PO TID PRN or naproxen 500 mg PO BID for cramping)  -- Will notify patient of any abnormal results    -- Discussed use of provera to trigger a withdrawal bleed. Will get TSH, E2/FSH, testosterone, and AMH. Will return in 1 month for followup and discussion of infertility.    -- Plan repeat EMB in 6 months if not yet pregnant.    Lindsay De La Rosa MD, MSCI, FACOG    [facility]  Date of Service: 2/9/22

## 2022-02-09 NOTE — PATIENT INSTRUCTIONS
Please start provera one tablet once per day for 10 days tomorrow 2/10. Your period should come 5 to 10 days after you finish this medication. If your period doesn't come by 10 days after finishing the medication, please call the office.    On the 3rd to 5th day of your menses, please have your labs drawn.

## 2022-02-11 LAB
PATH REPORT.COMMENTS IMP SPEC: NORMAL
PATH REPORT.COMMENTS IMP SPEC: NORMAL
PATH REPORT.FINAL DX SPEC: NORMAL
PATH REPORT.GROSS SPEC: NORMAL
PATH REPORT.MICROSCOPIC SPEC OTHER STN: NORMAL
PATH REPORT.RELEVANT HX SPEC: NORMAL
PHOTO IMAGE: NORMAL

## 2022-02-15 ENCOUNTER — TELEPHONE (OUTPATIENT)
Dept: OBGYN | Facility: CLINIC | Age: 32
End: 2022-02-15
Payer: COMMERCIAL

## 2022-02-15 DIAGNOSIS — N85.02 EIN (ENDOMETRIAL INTRAEPITHELIAL NEOPLASIA): Primary | ICD-10-CM

## 2022-02-15 NOTE — TELEPHONE ENCOUNTER
Called to review path results with Joie, which shows re-development of aytpical hyperplasia. Wish to refer back to gyn onc.     Left message stating that I was trying to call and discuss results.     Lindsay De La Rosa MD, MSCI, FACOG    [facility]/OBGYN

## 2022-02-16 NOTE — TELEPHONE ENCOUNTER
Called to discuss path results with Joie.    Taking provera, has not yet had menses.     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 Recommended referral back to GYN ONC to discuss management, but that we should delay further attempts at pregnancy now. Discussed concern for underlying PCOS as the cause.     Joie will get labs once menses start to confirm diagnosis of PCOS so that in the future we would be able to work more quickly on infertility management.     Questions answered. Referral entered.

## 2022-02-22 ENCOUNTER — TELEPHONE (OUTPATIENT)
Dept: ONCOLOGY | Facility: CLINIC | Age: 32
End: 2022-02-22
Payer: COMMERCIAL

## 2022-02-22 ENCOUNTER — TELEPHONE (OUTPATIENT)
Dept: OBGYN | Facility: CLINIC | Age: 32
End: 2022-02-22
Payer: COMMERCIAL

## 2022-02-22 NOTE — TELEPHONE ENCOUNTER
----- Message from Wendy Dubois RN sent at 2/22/2022  2:22 PM CST -----  Regarding: question  Patient states she was given an Rx of 10 pills to take so she can have her period. She is on day 13 and has not started her period, and she was instructed to call if this happened.

## 2022-02-22 NOTE — TELEPHONE ENCOUNTER
Called Joie.  She took her last progesterone on 2/19.  Advised that it may take another week to get bleed.  To call back if she does not have a bleed by late next week.

## 2022-02-22 NOTE — TELEPHONE ENCOUNTER
RECORDS STATUS - ALL OTHER DIAGNOSIS      RECORDS RECEIVED FROM: [facility]   DATE RECEIVED: 02/22/2022   NOTES STATUS DETAILS   OFFICE NOTE from referring provider Epic 02/09/22: Dr. Lindsay De La Rosa   OFFICE NOTE from medical oncologist Epic 09/17/19: Dr. Екатерина Rizo   DISCHARGE SUMMARY from hospital     DISCHARGE REPORT from the ER [facility] 02/29/20, 07/31/19: [facility] ED   OPERATIVE REPORT     MEDICATION LIST [facility]    CLINICAL TRIAL TREATMENTS TO DATE     LABS     PATHOLOGY REPORTS Report in Epic 02/09/22: [id number]  04/06/21: [id number]  09/25/20: [id number]  02/18/20: [id number]  11/08/19: [id number]  08/06/19: [id number]  02/18/19: [id number]     ANYTHING RELATED TO DIAGNOSIS Epic Most recent labs 02/09/22   GENONOMIC TESTING     TYPE:     IMAGING (NEED IMAGES & REPORT)     CT SCANS PACS 05/14/40-04/14/19: CT Abd Pel     MRI     MAMMO     ULTRASOUND PACS 01/08/22: US Transvaginal  04/10/20: US Abd  11/08/19-01/10/19: US Pelvic   PET

## 2022-02-23 ENCOUNTER — PRE VISIT (OUTPATIENT)
Dept: ONCOLOGY | Facility: CLINIC | Age: 32
End: 2022-02-23
Payer: COMMERCIAL

## 2022-02-23 ENCOUNTER — VIRTUAL VISIT (OUTPATIENT)
Dept: ONCOLOGY | Facility: CLINIC | Age: 32
End: 2022-02-23
Attending: OBSTETRICS & GYNECOLOGY
Payer: COMMERCIAL

## 2022-02-23 DIAGNOSIS — N85.02 EIN (ENDOMETRIAL INTRAEPITHELIAL NEOPLASIA): Primary | ICD-10-CM

## 2022-02-23 PROCEDURE — 99215 OFFICE O/P EST HI 40 MIN: CPT | Mod: 95 | Performed by: OBSTETRICS & GYNECOLOGY

## 2022-02-23 RX ORDER — CEFAZOLIN SODIUM 2 G/50ML
2 SOLUTION INTRAVENOUS
Status: CANCELLED | OUTPATIENT
Start: 2022-02-23

## 2022-02-23 RX ORDER — HEPARIN SODIUM 5000 [USP'U]/.5ML
5000 INJECTION, SOLUTION INTRAVENOUS; SUBCUTANEOUS
Status: CANCELLED | OUTPATIENT
Start: 2022-02-23

## 2022-02-23 RX ORDER — CEFAZOLIN SODIUM 2 G/50ML
2 SOLUTION INTRAVENOUS SEE ADMIN INSTRUCTIONS
Status: CANCELLED | OUTPATIENT
Start: 2022-02-23

## 2022-02-23 RX ORDER — PHENAZOPYRIDINE HYDROCHLORIDE 200 MG/1
200 TABLET, FILM COATED ORAL ONCE
Status: CANCELLED | OUTPATIENT
Start: 2022-02-23 | End: 2022-02-23

## 2022-02-23 NOTE — PROGRESS NOTES
Joie is a 31 year old who is being evaluated via a billable video visit.      How would you like to obtain your AVS? MyChart  If the video visit is dropped, the invitation should be resent by: Text to cell phone: [phone]  Will anyone else be joining your video visit?   Yes, Camilo vanegas will join video visit.       Mei SALOMON    Video-Visit Details    30 minutes                Follow Up Notes on Referred Patient    Date: 2022       Dr. Delfina Augustine MD  No address on file       RE: Joie Yadav  : 1990  GEMMA: 2022    Dear Dr. Delfina Augustine:    Joie Yadav is a 31 year old woman with a diagnosis of EIN. The patient is a G0 that has a history of endometrial hyperplasia with atypia and has been treated with the Mirena IUD.  She had resolution of that.  The IUD was removed in May.  Unfortunately, she had recurrence of her endometrial hyperplasia with atypia on recent biopsy.  She is otherwise doing well overall.  Since this first started, she has lost weight of about 60 pounds and has resolution of diabetes.  She is not taking metformin now anymore.  She is otherwise eating and drinking well.  No nausea, vomiting, fever, chills.  Normal urinary and bowel function.  No B symptoms.  The patient, of note, has a history of PCOS on ovulation.  She goes 90 days without bleeding.        Past Medical History:    Past Medical History:   Diagnosis Date     Diabetes (H)     Type 2     Endometrial hyperplasia without atypia, simple 2019     Obesity      PCOS (polycystic ovarian syndrome)      Vitiligo          Past Surgical History:    Past Surgical History:   Procedure Laterality Date     LAPAROSCOPIC CHOLECYSTECTOMY N/A 2020    Procedure: CHOLECYSTECTOMY, LAPAROSCOPIC;  Surgeon: Norris Reid MD;  Location: [facility] OR     NO HISTORY OF SURGERY           Health Maintenance Due   Topic Date Due     PREVENTIVE CARE VISIT  Never done     LIPID  Never done      MICROALBUMIN  Never done     DIABETIC FOOT EXAM  Never done     ADVANCE CARE PLANNING  Never done     EYE EXAM  Never done     Pneumococcal Vaccine: Pediatrics (0 to 5 Years) and At-Risk Patients (6 to 64 Years) (1 of 2 - PPSV23) Never done     HIV SCREENING  Never done     HEPATITIS C SCREENING  Never done     DTAP/TDAP/TD IMMUNIZATION (5 - Td or Tdap) 08/18/2014     A1C  10/10/2020     BMP  05/14/2021     PAP  10/22/2021     COVID-19 Vaccine (3 - Booster for Pfizer series) 11/18/2021     PHQ-2  01/01/2022       Current Medications:     Current Outpatient Medications   Medication Sig Dispense Refill     ibuprofen (ADVIL/MOTRIN) 200 MG tablet Take 200 mg by mouth every 6 hours as needed for mild pain       metFORMIN (GLUCOPHAGE) 500 MG tablet Take 500 mg by mouth 4 times daily (with meals and nightly)       medroxyPROGESTERone (PROVERA) 10 MG tablet Take 1 tablet (10 mg) by mouth daily (Patient not taking: Reported on 2/23/2022) 10 tablet 0         Allergies:      No Known Allergies     Social History:     Social History     Tobacco Use     Smoking status: Never Smoker     Smokeless tobacco: Never Used   Substance Use Topics     Alcohol use: No       History   Drug Use No         Family History:   No family history of cancer.    Physical Exam:     There were no vitals taken for this visit.  There is no height or weight on file to calculate BMI.    General Appearance: healthy and alert, no distress     Psychiatric: appropriate mood and affect                                     Assessment:    Joie Yadav is a 31 year old woman with a diagnosis of EIN.     A total of 40 minutes was spent with the patient, 30 minutes of which were spent in counseling the patient and/or treatment planning.    1.  Endometrial hyperplasia with atypia.  2.  Class 2 obesity.    I discussed with the patient, I have recommended to proceed with an exam under anesthesia, D and C, hysteroscopy and placement of Mirena IUD.  We discussed  the possibility of finding low-grade cancer, natural progression and course of hyperplasia.  The patient does want to retain fertility.  We did discuss the average age of resolution that is about 9 months with IUD, and if she does then have followup 3 consecutive normal biopsies, could then remove the IUD again at that time.  We discussed also a similar management strategy even if she does have an early stage cancer that would be noninvasive.  The patient agrees with this plan, is very appreciative of her care.  We will have her see my colleagues in Anesthesia for preoperative optimization.  All questions were answered.      Risks, benefits and alternatives to proceed discussed in detail with the patient. Risks include but are not limited to bleeding, infection, possible injury to surrounding organs including bowel, bladder, ureter, need for second procedure/surgery related to complications from first procedure, postoperative medical complications such as cardiopulmonary events, lymphedema, lymphocyst, thromboembolic events.     Aldair Stout MD, MS    Department of Obstetrics and Gynecology   Division of Gynecologic Oncology   [facility]  Phone: [phone]        CC  Patient Care Team:  Tara Lewis NP as PCP - General (Nurse Practitioner)  Israel Ceron MD as MD (Urology)  Brooke Adame, RN as Registered Nurse (Oncology)  Vandana Jarquin MD as Resident (OB/Gyn)  Howie Ward MD as Resident  Murphy Rae MD as MD (OB/Gyn)  Fanta Rodríguez MD as Resident (OB/Gyn)  Lindsay De La Rosa MD as Assigned OBGYN Provider  SELF, REFERRED

## 2022-02-23 NOTE — Clinical Note
"    2/23/2022         RE: Joie Yadav  [address] [facility] [address]        Dear Colleague,    Thank you for referring your patient, Joie Yadav, to the [facility]. Please see a copy of my visit note below.    Joie is a 31 year old who is being evaluated via a billable video visit.      How would you like to obtain your AVS? MyChart  If the video visit is dropped, the invitation should be resent by: Text to cell phone: [phone]  Will anyone else be joining your video visit?   Yes, Camilo vanegas will join video visit.  {If patient encounters technical issues they should call [phone] :[id number]}     Mei SALOMON    Video Start Time: {video visit start/end time for provider to select:624487}  Video-Visit Details    Type of service:  Video Visit    Video End Time:{video visit start/end time for provider to select:846370}    Originating Location (pt. Location): {video visit patient location:200183::\"Home\"}    Distant Location (provider location):  [facility]     Platform used for Video Visit: {Virtual Visit Platforms:498961::\"Appography\"}      Again, thank you for allowing me to participate in the care of your patient.        Sincerely,        Aldair Stout MD  "

## 2022-02-23 NOTE — LETTER
2022      RE: Joie Yadav  [address] [facility] [address]                     Follow Up Notes on Referred Patient    Date: 2022       Dr. Delfina Augustine MD  No address on file       RE: Joie Yadav  : 1990  GEMMA: 2022    Dear Dr. Delfina Augustine:    Joie Yadav is a 31 year old woman with a diagnosis of EIN. The patient is a G0 that has a history of endometrial hyperplasia with atypia and has been treated with the Mirena IUD.  She had resolution of that.  The IUD was removed in May.  Unfortunately, she had recurrence of her endometrial hyperplasia with atypia on recent biopsy.  She is otherwise doing well overall.  Since this first started, she has lost weight of about 60 pounds and has resolution of diabetes.  She is not taking metformin now anymore.  She is otherwise eating and drinking well.  No nausea, vomiting, fever, chills.  Normal urinary and bowel function.  No B symptoms.  The patient, of note, has a history of PCOS on ovulation.  She goes 90 days without bleeding.        Past Medical History:    Past Medical History:   Diagnosis Date     Diabetes (H)     Type 2     Endometrial hyperplasia without atypia, simple 2019     Obesity      PCOS (polycystic ovarian syndrome)      Vitiligo          Past Surgical History:    Past Surgical History:   Procedure Laterality Date     LAPAROSCOPIC CHOLECYSTECTOMY N/A 2020    Procedure: CHOLECYSTECTOMY, LAPAROSCOPIC;  Surgeon: Norris Reid MD;  Location: [facility] OR     NO HISTORY OF SURGERY           Health Maintenance Due   Topic Date Due     PREVENTIVE CARE VISIT  Never done     LIPID  Never done     MICROALBUMIN  Never done     DIABETIC FOOT EXAM  Never done     ADVANCE CARE PLANNING  Never done     EYE EXAM  Never done     Pneumococcal Vaccine: Pediatrics (0 to 5 Years) and At-Risk Patients (6 to 64 Years) (1 of 2 - PPSV23) Never done     HIV SCREENING  Never done     HEPATITIS C SCREENING  Never  done     DTAP/TDAP/TD IMMUNIZATION (5 - Td or Tdap) 08/18/2014     A1C  10/10/2020     BMP  05/14/2021     PAP  10/22/2021     COVID-19 Vaccine (3 - Booster for Pfizer series) 11/18/2021     PHQ-2  01/01/2022       Current Medications:     Current Outpatient Medications   Medication Sig Dispense Refill     ibuprofen (ADVIL/MOTRIN) 200 MG tablet Take 200 mg by mouth every 6 hours as needed for mild pain       metFORMIN (GLUCOPHAGE) 500 MG tablet Take 500 mg by mouth 4 times daily (with meals and nightly)       medroxyPROGESTERone (PROVERA) 10 MG tablet Take 1 tablet (10 mg) by mouth daily (Patient not taking: Reported on 2/23/2022) 10 tablet 0         Allergies:      No Known Allergies     Social History:     Social History     Tobacco Use     Smoking status: Never Smoker     Smokeless tobacco: Never Used   Substance Use Topics     Alcohol use: No       History   Drug Use No         Family History:   No family history of cancer.    Physical Exam:     There were no vitals taken for this visit.  There is no height or weight on file to calculate BMI.    General Appearance: healthy and alert, no distress     Psychiatric: appropriate mood and affect                                     Assessment:    Joie Yadav is a 31 year old woman with a diagnosis of EIN.     A total of 30 minutes was spent with the patient, 25 minutes of which were spent in counseling the patient and/or treatment planning.    1.  Endometrial hyperplasia with atypia.  2.  Class 2 obesity.    I discussed with the patient, I have recommended to proceed with an exam under anesthesia, D and C, hysteroscopy and placement of Mirena IUD.  We discussed the possibility of finding low-grade cancer, natural progression and course of hyperplasia.  The patient does want to retain fertility.  We did discuss the average age of resolution that is about 9 months with IUD, and if she does then have followup 3 consecutive normal biopsies, could then remove the  IUD again at that time.  We discussed also a similar management strategy even if she does have an early stage cancer that would be noninvasive.  The patient agrees with this plan, is very appreciative of her care.  We will have her see my colleagues in Anesthesia for preoperative optimization.  All questions were answered.      Risks, benefits and alternatives to proceed discussed in detail with the patient. Risks include but are not limited to bleeding, infection, possible injury to surrounding organs including bowel, bladder, ureter, need for second procedure/surgery related to complications from first procedure, postoperative medical complications such as cardiopulmonary events, lymphedema, lymphocyst, thromboembolic events.     Aldair Stout MD, MS    Department of Obstetrics and Gynecology   Division of Gynecologic Oncology   [facility]  Phone: [phone]      CC  Patient Care Team:  Tara Lewis NP as PCP - General (Nurse Practitioner)  Israel Ceron MD as MD (Urology)  Brooke Adame, RN as Registered Nurse (Oncology)  Vandana Jarquin MD as Resident (OB/Gyn)  Howie Ward MD as Resident  Murphy Rae MD as MD (OB/Gyn)  Fanta Rodríguez MD as Resident (OB/Gyn)  Lindsay De La Rosa MD as Assigned OBGYN Provider

## 2022-03-01 ENCOUNTER — TELEPHONE (OUTPATIENT)
Dept: ONCOLOGY | Facility: CLINIC | Age: 32
End: 2022-03-01
Payer: COMMERCIAL

## 2022-03-01 DIAGNOSIS — Z11.59 ENCOUNTER FOR SCREENING FOR OTHER VIRAL DISEASES: Primary | ICD-10-CM

## 2022-03-01 NOTE — TELEPHONE ENCOUNTER
RN Care Coordinator: Linda Hutchins; [phone]    Surgery is scheduled with Dr. Stout on 3/11 at the [facility].  Scheduled per availability.    H&P to be completed by PAC video visit 3/8    COVID-19 test: 3/9 at [facility]    Post-op: 4/13, virtual visit     The RN completed the education regarding the surgery.     Patient will receive surgery arrival and start time from PAC.    The surgery packet was provided by the RN during appointment    Patient will complete COVID-19 test that was scheduled by surgical coordinator 2-4 days prior to surgery.     I left a detailed voicemail AND sent a "[employer] message with the scheduled information. Provided direct line. Will watch for response and/or call back from patient.

## 2022-03-01 NOTE — TELEPHONE ENCOUNTER
----- Message from Dianna Hutchins RN sent at 2/25/2022  8:18 AM CST -----  Argelia placed orders   PAC   POst op    THANKS  Linda

## 2022-03-01 NOTE — TELEPHONE ENCOUNTER
FUTURE VISIT INFORMATION      SURGERY INFORMATION:    Date: 3/11/22    [facility] or    Surgeon:  Aldair Stout MD    Anesthesia Type:  Moderate Sedation    Procedure: HYSTEROSCOPY, DIAGNOSTIC, WITH DILATION AND CURETTAGE OF UTERUS Mirena Intra uterine device placement    Consult: virtual visit 22    RECORDS REQUESTED FROM:       Primary Care Provider: Tara Lewis APRN, [facility]  - [facility]    Most recent EKG+ Tracin22

## 2022-03-08 ENCOUNTER — ANESTHESIA EVENT (OUTPATIENT)
Dept: SURGERY | Facility: CLINIC | Age: 32
End: 2022-03-08
Payer: COMMERCIAL

## 2022-03-08 ENCOUNTER — PRE VISIT (OUTPATIENT)
Dept: SURGERY | Facility: CLINIC | Age: 32
End: 2022-03-08
Payer: COMMERCIAL

## 2022-03-08 ENCOUNTER — VIRTUAL VISIT (OUTPATIENT)
Dept: SURGERY | Facility: CLINIC | Age: 32
End: 2022-03-08
Payer: COMMERCIAL

## 2022-03-08 DIAGNOSIS — N85.02 EIN (ENDOMETRIAL INTRAEPITHELIAL NEOPLASIA): ICD-10-CM

## 2022-03-08 DIAGNOSIS — Z01.818 PRE-OP EXAMINATION: Primary | ICD-10-CM

## 2022-03-08 PROCEDURE — 99203 OFFICE O/P NEW LOW 30 MIN: CPT | Mod: 95 | Performed by: PHYSICIAN ASSISTANT

## 2022-03-08 RX ORDER — ACETAMINOPHEN 325 MG/1
325-650 TABLET ORAL EVERY 6 HOURS PRN
COMMUNITY
End: 2024-01-09

## 2022-03-08 ASSESSMENT — LIFESTYLE VARIABLES: TOBACCO_USE: 0

## 2022-03-08 ASSESSMENT — PAIN SCALES - GENERAL: PAINLEVEL: NO PAIN (0)

## 2022-03-08 NOTE — H&P
Pre-Operative H & P     CC:  Preoperative exam to assess for increased cardiopulmonary risk while undergoing surgery and anesthesia.    Date of Encounter: 3/8/2022  Primary Care Physician:  Tara Lewis     Reason for visit:   Encounter Diagnoses   Name Primary?     Pre-op examination Yes     EIN (endometrial intraepithelial neoplasia)        HPI  Joie Yadav is a 31 year old female who presents for pre-operative H & P in preparation for  Procedure Information     Case: [id number] Date/Time: 03/11/22 0730    Procedures:       HYSTEROSCOPY, DIAGNOSTIC, WITH DILATION AND CURETTAGE OF UTERUS (N/A Abdomen)      Mirena Intra uterine device placement (N/A Abdomen)    Anesthesia type: Monitor Anesthesia Care    Diagnosis: EIN (endometrial intraepithelial neoplasia) [N85.02]    Pre-op diagnosis: EIN (endometrial intraepithelial neoplasia) [N85.02]    Location:  OR  /  OR    Providers: Aldair Stout MD          The patient is a 31-year-old woman with a past medical history significant for morbid obesity, type 2 diabetes, PCOS and endometrial intraepithelial neoplasia.  She has been treated in the past with a Mirena IUD and the Mirena was removed in May.  On recent biopsy she was again found to have endometrial hyperplasia with atypia and she was seen by Dr. Stout virtually on 2/23/2022.  She is now scheduled for the procedure as above.    History is obtained from the patient and chart review    Hx of abnormal bleeding or anti-platelet use: none    Menstrual history: No LMP recorded. (Menstrual status: UNKNOWN).:      Past Medical History  Past Medical History:   Diagnosis Date     Diabetes (H)     Type 2     Endometrial hyperplasia without atypia, simple 04/08/2019     Infection due to 2019 novel coronavirus      Neck pain      Obesity      PCOS (polycystic ovarian syndrome)      TMJ (temporomandibular joint syndrome)      Vitiligo        Past Surgical History  Past Surgical History:    Procedure Laterality Date     LAPAROSCOPIC CHOLECYSTECTOMY N/A 04/11/2020    Procedure: CHOLECYSTECTOMY, LAPAROSCOPIC;  Surgeon: Norris Reid MD;  Location: [facility] OR       Prior to Admission Medications  Current Outpatient Medications   Medication Sig Dispense Refill     acetaminophen (TYLENOL) 325 MG tablet Take 325-650 mg by mouth every 6 hours as needed for mild pain       ibuprofen (ADVIL/MOTRIN) 200 MG tablet Take 200 mg by mouth every 6 hours as needed for mild pain       metFORMIN (GLUCOPHAGE) 500 MG tablet Take 500 mg by mouth every evening        medroxyPROGESTERone (PROVERA) 10 MG tablet Take 1 tablet (10 mg) by mouth daily (Patient not taking: Reported on 2/23/2022) 10 tablet 0       Allergies  No Known Allergies    Social History  Social History     Socioeconomic History     Marital status: Single     Spouse name: Not on file     Number of children: Not on file     Years of education: Not on file     Highest education level: Not on file   Occupational History     Not on file   Tobacco Use     Smoking status: Never Smoker     Smokeless tobacco: Never Used   Substance and Sexual Activity     Alcohol use: No     Drug use: No     Sexual activity: Yes     Partners: Male     Birth control/protection: I.U.D.     Comment: Mirena   Other Topics Concern     Not on file   Social History Narrative     Not on file     Social Determinants of Health     Financial Resource Strain: Not on file   Food Insecurity: Not on file   Transportation Needs: Not on file   Physical Activity: Not on file   Stress: Not on file   Social Connections: Not on file   Intimate Partner Violence: Not on file   Housing Stability: Not on file       Family History  Family History   Problem Relation Age of Onset     Anesthesia Reaction No family hx of      Bleeding Disorder No family hx of      Clotting Disorder No family hx of        Review of Systems  The complete review of systems is negative other than noted in the HPI or here.    Anesthesia Evaluation   Pt has had prior anesthetic. Type: General.    No history of anesthetic complications       ROS/MED HX  ENT/Pulmonary:     (+) JEFFERY risk factors, obese,  (-) tobacco use   Neurologic:  - neg neurologic ROS     Cardiovascular:     (+) -----Previous cardiac testing   Echo: Date: Results:    Stress Test: Date: Results:    ECG Reviewed: Date: 2017 Results:  Sinus tachycardia  Cath: Date: Results:      METS/Exercise Tolerance: >4 METS    Hematologic:  - neg hematologic  ROS     Musculoskeletal: Comment: Neck pain        GI/Hepatic:  - neg GI/hepatic ROS  (-) GERD   Renal/Genitourinary: Comment: EIN    PCOS      Endo:     (+) type II DM, Last HgA1c: 5.5, date: 10/2021, Not using insulin, - not using insulin pump. Obesity,     Psychiatric/Substance Use:  - neg psychiatric ROS     Infectious Disease: Comment: COVID-19 positive with body aches on 1/11/22      Malignancy:  - neg malignancy ROS     Other: Comment: vitiligo           Virtual visit -  No vitals were obtained    Physical Exam  Constitutional: Awake, alert, cooperative, no apparent distress, and appears stated age.  Eyes: Pupils equal  HENT: Normocephalic  Respiratory: non labored breathing   Neurologic: Awake, alert, oriented to name, place and time.   Neuropsychiatric: Calm, cooperative. Normal affect.      Prior Labs/Diagnostic Studies   All labs and imaging personally reviewed   CBC WITH PLTS/AUTO DIFF  Specimen:  Blood   Ref Range & Units 4 mo ago   WBC 4.00 - 10.00 k/cmm 7.81    RBC 3.90 - 5.20 m/cmm 4.58    Hgb 11.5 - 15.7 g/dL 13.4    Hematocrit 34.0 - 45.0 % 39.7    MCV 80.0 - 100.0 fL 86.7    MCH 25.0 - 32.0 pg 29.3    MCHC 31.0 - 36.0 g/dL 33.8    RDW 11.5 - 14.5 % 13.8    Plt 150 - 400 k/cmm 299    MPV 6.5 - 12.5 fL 11.0    Neut Auto 34.0 - 70.0 % 60.1    Lymph Auto 20.0 - 40.0 % 29.7    Mono Auto 4.0 - 12.5 % 7.8    EOS Auto 0.0 - 6.0 % 2.0    BASO Auto 0.0 - 2.0 % 0.4    Abs Neutrophil 1.70 - 6.50 k/cmm 4.69    Abs Lymphocyte  0.80 - 4.00 k/cmm 2.32    Abs Monocyte 0.20 - 1.00 k/cmm 0.61    Abs Eosinophil 0.00 - 0.60 k/cmm 0.16    Abs Basophil 0.00 - 0.20 k/cmm 0.03    Resulting [facility]   Specimen Collected: 10/27/21  2:38 PM   GLYCOSYLATED HGB -  A1C  Specimen:  Blood   Ref Range & Units 4 mo ago Comments   Hemoglobin A1C 3.8 - 5.6 % 5.5     Estimated Average Glucose mg/dL 111  The ADA recommends reporting an estimated Average   Glucose (eAG) with all hemoglobin A1c results   using the equation derived from a study of 501   normal diabetic adults. Minority populations were   underrepresented and children were not included.   The EAG is not equivalent to a fasting glucose.   Resulting [facility]    Specimen Collected: 10/27/21  2:38 PM   PANEL BASIC METABOLIC (BMP)  Specimen:  Blood   Ref Range & Units 4 mo ago Comments   Sodium 136 - 145 mmol/L 140     Potassium 3.5 - 5.1 mmol/L 4.1     Chloride 98 - 107 mmol/L 102     CO2 21 - 32 mmol/L 30     AnGap 7 - 14 mEq/L 8     Glucose 70 - 100 mg/dL 92     BUN 7 - 18 mg/dL 19 High      Creatinine 0.55 - 1.02 mg/dL 0.75  [facility] uses a modified kinetic Tim reaction; [facility] methodology runs on average +0.13 (or 9%) mg/dl higher than [facility]'s enzymatic creatinine method.  Use caution when evaluating results across methodologies.   Calcium 8.3 - 10.4 mg/dL 8.9     eGFR, High >=60 ml/min/1.73m2 >120  Calculated using CKD-EPI equation   eGFR, Low >=60 ml/min/1.73m2 108  Calculated using CKD-EPI equation   Resulting [facility]    Specimen Collected: 10/27/21  2:38 PM     NPH COVID+FLU  Specimen:  Nasopharyngeal Swab   Ref Range & Units 1 mo ago Comments   COVID-19 Not Detected Detected Abnormal   Testing performed on [employer] using RT-PCR as authorized by the FDA under an Emergency Use Authorization (EUA) for Coronavirus Disease-2019 during the Public Health Emergency. A Non-Detected test result does not exclude the possibility of    infection due to improper specimen collection or organisms that may be below the sensitivity of the test.   Influenza A Not Detected Not Detected     Influenza B Not Detected Not Detected  Testing performed on [email or web address] using RT-PCR as authorized by the FDA under an Emergency Use Authorization (EUA) for Coronavirus Disease-2019 during the Public Health Emergency. A Non-Detected test result does not exclude the possibility of   infection due to improper specimen collection or organisms that may be below the sensitivity of the test.   Resulting Agency  [facility]      Narrative  Performed by [employer]  Is the patient a healthcare employee: No   Is the patient a [employer] ([facility]) Employee: No  Specimen Collected: 01/11/22  9:06 AM         EKG/ stress test - if available please see in ROS above     The patient's records and results personally reviewed by this provider.     Outside records reviewed from: Care Everywhere      Assessment      Joie Yadav is a 31 year old female was seen as a PAC referral for risk assessment and optimization for anesthesia.    Plan/Recommendations  Pt will be optimized for the proposed procedure.  See below for details on the assessment, risk, and preoperative recommendations    NEUROLOGY  - No history of TIA, CVA or seizure  -Post Op delirium risk factors:  No risk identified    ENT  - No current airway concerns.  Will need to be reassessed day of surgery.  Mallampati: Unable to assess  TM: Unable to assess   ~ TMJ- no locking open or closed. Clicking    CARDIAC  - No history of CAD, Hypertension and Afib  - METS (Metabolic Equivalents)  Patient performs 4 or more METS exercise without symptoms  Total Score: 0      RCRI-Very low risk: Class 1 0.4% complication rate  Total Score: 0        PULMONARY  - Obstructive Sleep Apnea  No current risk of obstructive sleep apnea   JEFFERY Low Risk  Total Score: 1           JEFFERY: BMI over 35 kg/m2      - Denies asthma or inhaler  "use  - Tobacco History      History   Smoking Status     Never Smoker   Smokeless Tobacco     Never Used       GI  PONV Medium Risk  Total Score: 2           1 AN PONV: Pt is Female    1 AN PONV: Patient is not a current smoker        /RENAL  - Baseline Creatinine  0.75  ~ PCOS  ~ EIN - procedure as above.     ENDOCRINE    - BMI: Estimated body mass index is 44.82 kg/m  as calculated from the following:    Height as of 2/9/22: 1.448 m (4' 9\").    Weight as of 2/9/22: 93.9 kg (207 lb 1.6 oz).  Class 3 Obesity (BMI > 40)  - Diabetes  Diabetes Mellitus, Type 2, non-insulin dependent.  Hold morning oral hypoglycemic medications. Recommend close monitoring of the patient's blood glucose levels throughout the perioperative period.    HEME  VTE Low Risk 0.26%  Total Score: 1           VTE: BMI greater than 39      - No history of abnormal bleeding or antiplatelet use.    MSK  ~ Neck pain - consideration for careful positioning     ID  ~ The patient tested positive for COVID-19 on 1/11/22. She reports body aches that have completely resolved. She is within 90 days of her positive test and shouldn't be re-tested. Patient informed and message sent to surgical team.       The patient is optimized for their procedure. AVS with information on surgery time/arrival time, meds and NPO status given by nursing staff. No further diagnostic testing indicated.    Please refer to the physical examination documented by the anesthesiologist in the anesthesia record on the day of surgery.    Video-Visit Details    Type of service:  Video Visit    Patient verbally consented to video service today: YES    Video Start Time: 2:56  Video End Time (time video stopped): 3:05    Originating Location (pt. Location): Home    Distant Location (provider location):  [facility] PREOPERATIVE ASSESSMENT CENTER     Mode of Communication:  Video Conference via [employer]  On the day of service:     Prep time: 7 minutes  Visit time: 9 minutes  Documentation time: " 13 minutes  ------------------------------------------  Total time: 29 minutes      Azul Angel PA-C  Preoperative Assessment Center  [facility]  Clinic and Surgery Center  Phone: [phone]  Fax: [phone]

## 2022-03-08 NOTE — PROGRESS NOTES
Joie is a 31 year old who is being evaluated via a billable video visit.      How would you like to obtain your AVS? MyChart  If the video visit is dropped, the invitation should be resent by: Text to cell phone: [phone]      HPI         Review of Systems         Objective    Vitals - Patient Reported  Pain Score: No Pain (0)        Physical Exam

## 2022-03-08 NOTE — PATIENT INSTRUCTIONS
Preparing for Your Surgery      Name:  Joie Yadav   MRN:  [id number]   :  1990   Today's Date:  3/8/2022       Arriving for surgery:  Surgery date:  3/11/22  Arrival time:  05:30 am    Restrictions due to COVID 19       Effective 22 [facility] is implementing the following visitor policy:     1 person may accompany the patient through the Pre-Op process.      That same person may wait in the Surgery Waiting room, provided there is enough room to social distance         Inpatients are allowed 2 visitors per day for the duration of their stay.        Visitors must wear a mask.      Visitors must not be ill.      Visiting hours are 8 am to 8 pm.    [employer] parking is available for anyone with mobility limitations or disabilities.  ([facility]  24 hours/ 7 days a week; [facility]  7 am- 3:30 pm, Mon- Fri)    Please come to:   [facility] [address]  [address] [place] [address]  -    Please proceed to [address] on the 3rd floor. [phone]?     - ?If you are in need of directions, wheelchair or escort please stop at the Information Desk in the lobby.  Inform the information person that you are here for surgery; a wheelchair and escort to [address] will be provided.?     What can I eat or drink?  -  You may eat and drink normally for up to 8 hours before your surgery.   -  You may have clear liquids until 2 hours before surgery.     Examples of clear liquids:  Water  Clear broth  Juices (apple, white grape, white cranberry  and cider) without pulp  Noncarbonated, powder based beverages  (lemonade and Bienvenido-Aid)  Sodas (Sprite, 7-Up, ginger ale and seltzer)  Coffee or tea (without milk or cream)  Gatorade    -  No Alcohol for at least 24 hours before surgery     Which medicines can I take?  Hold Aspirin for 7 days before surgery.   Hold Multivitamins for 7 days before surgery.  Hold Supplements for 7 days before surgery.  Hold  Ibuprofen (Advil, Motrin) for 1 day before surgery--unless otherwise directed by surgeon.  Hold Naproxen (Aleve) for 4 days before surgery.  -  PLEASE TAKE these medications the day of surgery:  Tylenol if needed; take other morning medications.    How do I prepare myself?  - Please take 2 showers before surgery using Scrubcare or Hibiclens soap.    Use this soap only from the neck to your toes.     Leave the soap on your skin for one minute--then rinse thoroughly.      You may use your own shampoo and conditioner; no other hair products.   - Please remove all jewelry and body piercings.  - No lotions, deodorants or fragrance.  - No makeup or fingernail polish.   - Bring your ID and insurance card.    -If you have a Deep Brain Stimulator, Spinal Cord Stimulator or any neuro stimulator device---you must bring the remote control to the hospital     - All patients are required to have a Covid-19 test within 4 days of surgery/procedure.      -Patients will be contacted by the [facility] scheduling team within 1 week of surgery to make an appointment.      - Patients may call the Scheduling team at [phone] if they have not been scheduled within 4 days of  surgery.      ALL PATIENTS GOING HOME THE SAME DAY OF SURGERY ARE REQUIRED TO HAVE A RESPONSIBLE ADULT TO DRIVE AND BE IN ATTENDANCE WITH THEM FOR 24 HOURS FOLLOWING SURGERY.      Questions or Concerns:    - For any questions regarding the day of surgery or your hospital stay, please contact the Pre Admission Nursing Office at [phone].       - If you have health changes between today and your surgery please call your surgeon.       For questions after surgery please call your surgeons office.

## 2022-03-09 ENCOUNTER — OFFICE VISIT (OUTPATIENT)
Dept: OBGYN | Facility: CLINIC | Age: 32
End: 2022-03-09
Attending: OBSTETRICS & GYNECOLOGY
Payer: COMMERCIAL

## 2022-03-09 VITALS — BODY MASS INDEX: 46.25 KG/M2 | WEIGHT: 214.4 LBS | HEIGHT: 57 IN

## 2022-03-09 DIAGNOSIS — N85.02 EIN (ENDOMETRIAL INTRAEPITHELIAL NEOPLASIA): ICD-10-CM

## 2022-03-09 DIAGNOSIS — N92.6 IRREGULAR MENSES: Primary | ICD-10-CM

## 2022-03-09 LAB — TSH SERPL DL<=0.005 MIU/L-ACNC: 2.37 MU/L (ref 0.4–4)

## 2022-03-09 PROCEDURE — 84443 ASSAY THYROID STIM HORMONE: CPT | Performed by: OBSTETRICS & GYNECOLOGY

## 2022-03-09 PROCEDURE — 83520 IMMUNOASSAY QUANT NOS NONAB: CPT | Performed by: OBSTETRICS & GYNECOLOGY

## 2022-03-09 PROCEDURE — 84270 ASSAY OF SEX HORMONE GLOBUL: CPT | Performed by: OBSTETRICS & GYNECOLOGY

## 2022-03-09 PROCEDURE — 36415 COLL VENOUS BLD VENIPUNCTURE: CPT | Performed by: OBSTETRICS & GYNECOLOGY

## 2022-03-09 PROCEDURE — G0463 HOSPITAL OUTPT CLINIC VISIT: HCPCS

## 2022-03-09 PROCEDURE — 84403 ASSAY OF TOTAL TESTOSTERONE: CPT | Performed by: OBSTETRICS & GYNECOLOGY

## 2022-03-09 PROCEDURE — 99213 OFFICE O/P EST LOW 20 MIN: CPT | Performed by: OBSTETRICS & GYNECOLOGY

## 2022-03-09 RX ORDER — MEDROXYPROGESTERONE ACETATE 10 MG
20 TABLET ORAL DAILY
Qty: 20 TABLET | Refills: 0 | Status: ON HOLD | OUTPATIENT
Start: 2022-03-09 | End: 2022-03-11

## 2022-03-09 NOTE — LETTER
"3/9/2022       RE: Joie Yadav  [address] [address]     Dear Colleague,    Thank you for referring your patient, Joie Yadav, to the [facility] [place] at [facility]. Please see a copy of my visit note below.    [facility]  Gynecology Problem Visit    SUBJECTIVE    Joie Yadav is a 31 year old G0 who is here to review the labs we performed for evaluation of oligomenorrhea. She unfortunately was unable to obtain them because she didn't have a withdrawal bleed after using the provera. She is undergoing a hysteroscopy, D&C, and Mirena IUD placement in 2 days with GYN/ONC.     Her LMP is: No LMP recorded. (Menstrual status: UNKNOWN).    PAST MEDICAL HISTORY  Past Medical History:   Diagnosis Date     Diabetes (H)     Type 2     Endometrial hyperplasia without atypia, simple 04/08/2019     Infection due to 2019 novel coronavirus      Neck pain      Obesity      PCOS (polycystic ovarian syndrome)      TMJ (temporomandibular joint syndrome)      Vitiligo        MEDICATIONS  Current Outpatient Medications   Medication     acetaminophen (TYLENOL) 325 MG tablet     ibuprofen (ADVIL/MOTRIN) 200 MG tablet     medroxyPROGESTERone (PROVERA) 10 MG tablet     metFORMIN (GLUCOPHAGE) 500 MG tablet     No current facility-administered medications for this visit.       ALLERGIES  No Known Allergies    REVIEW OF SYSTEMS  A 10 point review of systems including Constitutional, Eyes, Respiratory, Cardiovascular, Gastroenterology, Genitourinary, Integumentary, Musculoskeletal, and Psychiatric, were all negative, except for pertinent positives noted in the above HPI.    OBJECTIVE  Ht 1.448 m (4' 9\")   Wt 97.3 kg (214 lb 6.4 oz)   BMI 46.40 kg/m      General: Alert, without distress   HEENT: normocephalic, without obvious abnormality   Pelvic exam deferred    ASSESSMENT  Joie Yadav is a 31 year " old G0 with EIN and oligomenorrhea, PCOS suspected.    PLAN  -will plan to get TSH, testosterone, and AMH now, and FSH/estradiol and semen analysis for her partner once her treatment is concluded. Discussed that I would like to confirm diagnosis of PCOS as soon as we can so that she can initiate treatment with letrozole or consider injectables/IUI or IVF to expedite pregnancy once she is in remission again, given how soon the EIN recurred this time.    We are happy to perform EMBs for surveillance if at any time her oncology team feels it is appropriate.    Lindsay De La Rosa MD, MSCI    [facility]/OBGYN

## 2022-03-09 NOTE — PROGRESS NOTES
"[facility]  Gynecology Problem Visit    SUBJECTIVE    Joie Yadav is a 31 year old G0 who is here to review the labs we performed for evaluation of oligomenorrhea. She unfortunately was unable to obtain them because she didn't have a withdrawal bleed after using the provera. She is undergoing a hysteroscopy, D&C, and Mirena IUD placement in 2 days with GYN/ONC.     Her LMP is: No LMP recorded. (Menstrual status: UNKNOWN).    PAST MEDICAL HISTORY  Past Medical History:   Diagnosis Date     Diabetes (H)     Type 2     Endometrial hyperplasia without atypia, simple 04/08/2019     Infection due to 2019 novel coronavirus      Neck pain      Obesity      PCOS (polycystic ovarian syndrome)      TMJ (temporomandibular joint syndrome)      Vitiligo        MEDICATIONS  Current Outpatient Medications   Medication     acetaminophen (TYLENOL) 325 MG tablet     ibuprofen (ADVIL/MOTRIN) 200 MG tablet     medroxyPROGESTERone (PROVERA) 10 MG tablet     metFORMIN (GLUCOPHAGE) 500 MG tablet     No current facility-administered medications for this visit.       ALLERGIES  No Known Allergies    REVIEW OF SYSTEMS  A 10 point review of systems including Constitutional, Eyes, Respiratory, Cardiovascular, Gastroenterology, Genitourinary, Integumentary, Musculoskeletal, and Psychiatric, were all negative, except for pertinent positives noted in the above HPI.    OBJECTIVE  Ht 1.448 m (4' 9\")   Wt 97.3 kg (214 lb 6.4 oz)   BMI 46.40 kg/m      General: Alert, without distress   HEENT: normocephalic, without obvious abnormality   Pelvic exam deferred    ASSESSMENT  Joie Yadav is a 31 year old G0 with EIN and oligomenorrhea, PCOS suspected.    PLAN  -will plan to get TSH, testosterone, and AMH now, and FSH/estradiol and semen analysis for her partner once her treatment is concluded. Discussed that I would like to confirm diagnosis of PCOS as soon as we can so that she can initiate treatment with letrozole or " consider injectables/IUI or IVF to expedite pregnancy once she is in remission again, given how soon the EIN recurred this time.    We are happy to perform EMBs for surveillance if at any time her oncology team feels it is appropriate.    Lindsay De La Rosa MD, MSCI    [facility]/OBGYN

## 2022-03-10 LAB — SHBG SERPL-SCNC: 19 NMOL/L (ref 30–135)

## 2022-03-11 ENCOUNTER — HOSPITAL ENCOUNTER (OUTPATIENT)
Facility: CLINIC | Age: 32
Discharge: HOME | End: 2022-03-11
Attending: OBSTETRICS & GYNECOLOGY | Admitting: OBSTETRICS & GYNECOLOGY
Payer: COMMERCIAL

## 2022-03-11 ENCOUNTER — ANESTHESIA (OUTPATIENT)
Dept: SURGERY | Facility: CLINIC | Age: 32
End: 2022-03-11
Payer: COMMERCIAL

## 2022-03-11 VITALS
WEIGHT: 210.1 LBS | TEMPERATURE: 97.7 F | BODY MASS INDEX: 45.33 KG/M2 | DIASTOLIC BLOOD PRESSURE: 85 MMHG | HEART RATE: 74 BPM | OXYGEN SATURATION: 100 % | SYSTOLIC BLOOD PRESSURE: 123 MMHG | HEIGHT: 57 IN | RESPIRATION RATE: 16 BRPM

## 2022-03-11 DIAGNOSIS — Z30.430 ENCOUNTER FOR INSERTION OF MIRENA IUD: ICD-10-CM

## 2022-03-11 DIAGNOSIS — N85.02 COMPLEX ATYPICAL ENDOMETRIAL HYPERPLASIA: Primary | ICD-10-CM

## 2022-03-11 DIAGNOSIS — N85.02 EIN (ENDOMETRIAL INTRAEPITHELIAL NEOPLASIA): ICD-10-CM

## 2022-03-11 LAB
ABO/RH(D): NORMAL
ANTIBODY SCREEN: NEGATIVE
GLUCOSE BLDC GLUCOMTR-MCNC: 104 MG/DL (ref 70–99)
HCG UR QL: NEGATIVE
MIS SERPL-MCNC: 5.35 NG/ML
SPECIMEN EXPIRATION DATE: NORMAL
TESTOST FREE SERPL-MCNC: 0.76 NG/DL
TESTOST SERPL-MCNC: 32 NG/DL (ref 8–60)

## 2022-03-11 PROCEDURE — 86901 BLOOD TYPING SEROLOGIC RH(D): CPT | Performed by: STUDENT IN AN ORGANIZED HEALTH CARE EDUCATION/TRAINING PROGRAM

## 2022-03-11 PROCEDURE — 36415 COLL VENOUS BLD VENIPUNCTURE: CPT | Performed by: STUDENT IN AN ORGANIZED HEALTH CARE EDUCATION/TRAINING PROGRAM

## 2022-03-11 PROCEDURE — 250N000011 HC RX IP 250 OP 636: Performed by: OBSTETRICS & GYNECOLOGY

## 2022-03-11 PROCEDURE — 360N000076 HC SURGERY LEVEL 3, PER MIN: Performed by: OBSTETRICS & GYNECOLOGY

## 2022-03-11 PROCEDURE — 250N000009 HC RX 250: Performed by: NURSE ANESTHETIST, CERTIFIED REGISTERED

## 2022-03-11 PROCEDURE — 999N000141 HC STATISTIC PRE-PROCEDURE NURSING ASSESSMENT: Performed by: OBSTETRICS & GYNECOLOGY

## 2022-03-11 PROCEDURE — 370N000017 HC ANESTHESIA TECHNICAL FEE, PER MIN: Performed by: OBSTETRICS & GYNECOLOGY

## 2022-03-11 PROCEDURE — 258N000003 HC RX IP 258 OP 636: Performed by: STUDENT IN AN ORGANIZED HEALTH CARE EDUCATION/TRAINING PROGRAM

## 2022-03-11 PROCEDURE — 88305 TISSUE EXAM BY PATHOLOGIST: CPT | Mod: 26 | Performed by: PATHOLOGY

## 2022-03-11 PROCEDURE — 88305 TISSUE EXAM BY PATHOLOGIST: CPT | Mod: TC | Performed by: OBSTETRICS & GYNECOLOGY

## 2022-03-11 PROCEDURE — 250N000011 HC RX IP 250 OP 636: Performed by: NURSE ANESTHETIST, CERTIFIED REGISTERED

## 2022-03-11 PROCEDURE — 250N000013 HC RX MED GY IP 250 OP 250 PS 637: Performed by: ANESTHESIOLOGY

## 2022-03-11 PROCEDURE — 81025 URINE PREGNANCY TEST: CPT | Performed by: OBSTETRICS & GYNECOLOGY

## 2022-03-11 PROCEDURE — 710N000012 HC RECOVERY PHASE 2, PER MINUTE: Performed by: OBSTETRICS & GYNECOLOGY

## 2022-03-11 PROCEDURE — 82962 GLUCOSE BLOOD TEST: CPT

## 2022-03-11 PROCEDURE — 272N000001 HC OR GENERAL SUPPLY STERILE: Performed by: OBSTETRICS & GYNECOLOGY

## 2022-03-11 PROCEDURE — 250N000013 HC RX MED GY IP 250 OP 250 PS 637: Performed by: OBSTETRICS & GYNECOLOGY

## 2022-03-11 DEVICE — IMPLANTABLE DEVICE: Type: IMPLANTABLE DEVICE | Site: UTERUS | Status: FUNCTIONAL

## 2022-03-11 RX ORDER — HYDROMORPHONE HCL IN WATER/PF 6 MG/30 ML
0.2 PATIENT CONTROLLED ANALGESIA SYRINGE INTRAVENOUS EVERY 5 MIN PRN
Status: DISCONTINUED | OUTPATIENT
Start: 2022-03-11 | End: 2022-03-11 | Stop reason: HOSPADM

## 2022-03-11 RX ORDER — ONDANSETRON 2 MG/ML
4 INJECTION INTRAMUSCULAR; INTRAVENOUS EVERY 30 MIN PRN
Status: DISCONTINUED | OUTPATIENT
Start: 2022-03-11 | End: 2022-03-11 | Stop reason: HOSPADM

## 2022-03-11 RX ORDER — ACETAMINOPHEN 325 MG/1
975 TABLET ORAL EVERY 6 HOURS PRN
Qty: 50 TABLET | Refills: 0 | Status: SHIPPED | OUTPATIENT
Start: 2022-03-11 | End: 2024-01-26

## 2022-03-11 RX ORDER — ONDANSETRON 4 MG/1
4 TABLET, ORALLY DISINTEGRATING ORAL EVERY 30 MIN PRN
Status: DISCONTINUED | OUTPATIENT
Start: 2022-03-11 | End: 2022-03-11 | Stop reason: HOSPADM

## 2022-03-11 RX ORDER — ACETAMINOPHEN 325 MG/1
975 TABLET ORAL ONCE
Status: DISCONTINUED | OUTPATIENT
Start: 2022-03-11 | End: 2022-03-11 | Stop reason: HOSPADM

## 2022-03-11 RX ORDER — IBUPROFEN 400 MG/1
400 TABLET, FILM COATED ORAL EVERY 6 HOURS PRN
Qty: 30 TABLET | Refills: 0 | Status: SHIPPED | OUTPATIENT
Start: 2022-03-11 | End: 2024-01-26

## 2022-03-11 RX ORDER — FENTANYL CITRATE 50 UG/ML
25 INJECTION, SOLUTION INTRAMUSCULAR; INTRAVENOUS EVERY 5 MIN PRN
Status: DISCONTINUED | OUTPATIENT
Start: 2022-03-11 | End: 2022-03-11 | Stop reason: HOSPADM

## 2022-03-11 RX ORDER — CEFAZOLIN SODIUM/WATER 2 G/20 ML
2 SYRINGE (ML) INTRAVENOUS SEE ADMIN INSTRUCTIONS
Status: DISCONTINUED | OUTPATIENT
Start: 2022-03-11 | End: 2022-03-11 | Stop reason: HOSPADM

## 2022-03-11 RX ORDER — IBUPROFEN 400 MG/1
800 TABLET, FILM COATED ORAL ONCE
Status: DISCONTINUED | OUTPATIENT
Start: 2022-03-11 | End: 2022-03-11 | Stop reason: HOSPADM

## 2022-03-11 RX ORDER — OXYCODONE HYDROCHLORIDE 5 MG/1
5 TABLET ORAL EVERY 4 HOURS PRN
Status: DISCONTINUED | OUTPATIENT
Start: 2022-03-11 | End: 2022-03-11 | Stop reason: HOSPADM

## 2022-03-11 RX ORDER — HEPARIN SODIUM 5000 [USP'U]/.5ML
5000 INJECTION, SOLUTION INTRAVENOUS; SUBCUTANEOUS
Status: COMPLETED | OUTPATIENT
Start: 2022-03-11 | End: 2022-03-11

## 2022-03-11 RX ORDER — FENTANYL CITRATE 50 UG/ML
INJECTION, SOLUTION INTRAMUSCULAR; INTRAVENOUS PRN
Status: DISCONTINUED | OUTPATIENT
Start: 2022-03-11 | End: 2022-03-11

## 2022-03-11 RX ORDER — PROPOFOL 10 MG/ML
INJECTION, EMULSION INTRAVENOUS CONTINUOUS PRN
Status: DISCONTINUED | OUTPATIENT
Start: 2022-03-11 | End: 2022-03-11

## 2022-03-11 RX ORDER — SODIUM CHLORIDE, SODIUM LACTATE, POTASSIUM CHLORIDE, CALCIUM CHLORIDE 600; 310; 30; 20 MG/100ML; MG/100ML; MG/100ML; MG/100ML
500 INJECTION, SOLUTION INTRAVENOUS CONTINUOUS
Status: DISCONTINUED | OUTPATIENT
Start: 2022-03-11 | End: 2022-03-11 | Stop reason: HOSPADM

## 2022-03-11 RX ORDER — CEFAZOLIN SODIUM/WATER 2 G/20 ML
2 SYRINGE (ML) INTRAVENOUS
Status: COMPLETED | OUTPATIENT
Start: 2022-03-11 | End: 2022-03-11

## 2022-03-11 RX ORDER — LIDOCAINE HYDROCHLORIDE 20 MG/ML
INJECTION, SOLUTION INFILTRATION; PERINEURAL PRN
Status: DISCONTINUED | OUTPATIENT
Start: 2022-03-11 | End: 2022-03-11

## 2022-03-11 RX ORDER — OXYCODONE HYDROCHLORIDE 5 MG/1
5 TABLET ORAL
Status: DISCONTINUED | OUTPATIENT
Start: 2022-03-11 | End: 2022-03-11 | Stop reason: HOSPADM

## 2022-03-11 RX ORDER — PHENAZOPYRIDINE HYDROCHLORIDE 200 MG/1
200 TABLET, FILM COATED ORAL ONCE
Status: COMPLETED | OUTPATIENT
Start: 2022-03-11 | End: 2022-03-11

## 2022-03-11 RX ORDER — SODIUM CHLORIDE, SODIUM LACTATE, POTASSIUM CHLORIDE, CALCIUM CHLORIDE 600; 310; 30; 20 MG/100ML; MG/100ML; MG/100ML; MG/100ML
INJECTION, SOLUTION INTRAVENOUS CONTINUOUS
Status: DISCONTINUED | OUTPATIENT
Start: 2022-03-11 | End: 2022-03-11 | Stop reason: HOSPADM

## 2022-03-11 RX ADMIN — FENTANYL CITRATE 50 MCG: 50 INJECTION, SOLUTION INTRAMUSCULAR; INTRAVENOUS at 08:11

## 2022-03-11 RX ADMIN — MIDAZOLAM 1 MG: 1 INJECTION INTRAMUSCULAR; INTRAVENOUS at 07:57

## 2022-03-11 RX ADMIN — FENTANYL CITRATE 50 MCG: 50 INJECTION, SOLUTION INTRAMUSCULAR; INTRAVENOUS at 07:50

## 2022-03-11 RX ADMIN — PROPOFOL 100 MCG/KG/MIN: 10 INJECTION, EMULSION INTRAVENOUS at 07:45

## 2022-03-11 RX ADMIN — Medication 2 G: at 07:54

## 2022-03-11 RX ADMIN — HEPARIN SODIUM 5000 UNITS: 5000 INJECTION, SOLUTION INTRAVENOUS; SUBCUTANEOUS at 07:19

## 2022-03-11 RX ADMIN — PHENAZOPYRIDINE HYDROCHLORIDE 200 MG: 200 TABLET, FILM COATED ORAL at 07:17

## 2022-03-11 RX ADMIN — LIDOCAINE HYDROCHLORIDE 40 MG: 20 INJECTION, SOLUTION INFILTRATION; PERINEURAL at 07:57

## 2022-03-11 RX ADMIN — OXYCODONE HYDROCHLORIDE 5 MG: 5 TABLET ORAL at 09:12

## 2022-03-11 RX ADMIN — LEVONORGESTREL 20 MCG: 52 INTRAUTERINE DEVICE INTRAUTERINE at 09:30

## 2022-03-11 RX ADMIN — SODIUM CHLORIDE, POTASSIUM CHLORIDE, SODIUM LACTATE AND CALCIUM CHLORIDE: 600; 310; 30; 20 INJECTION, SOLUTION INTRAVENOUS at 07:36

## 2022-03-11 RX ADMIN — MIDAZOLAM 2 MG: 1 INJECTION INTRAMUSCULAR; INTRAVENOUS at 07:36

## 2022-03-11 ASSESSMENT — LIFESTYLE VARIABLES: TOBACCO_USE: 0

## 2022-03-11 NOTE — OP NOTE
Hysteroscopy with D&C    Date of Surgery: 22     Surgeon: Aldair Stout MD    Assistants: Dell Jones MD PGY-1    Pre-operative Diagnoses: Endometrial Intraepithelial neoplasia  Post-operative Diagnoses: Same as above, s/p procedure below    Procedure: Hysteroscopy, Diagnostic, with Dilation and Curettage; Placement of intrauterine device (Mirena)    Anesthesia: MAC    EBL: 10cc  IVF: 400cc  UOP: Not recorded  Fluid deficit:  150cc    Complications: None  Findings: Exam under anesthesia notable for normal appearing external genitalia, hypopigmented areas around vulva bilaterally most consistent with vitiligo, normal appearing vaginal and cervix without lesions, Bimanual exam revealed a small retroverted uterus and no adnexal masses. On hysteroscopy, cornea visualized bilaterally. No polyps or masses noted inside the uterus. Shaggy endometrium noted throughout    Specimens:       ID Type Source Tests Collected by Time Destination   1 : ENDOMETRIAL CURETTINGS  Curettings Endometrium SURGICAL PATHOLOGY EXAM Aldair Stout MD 3/11/2022  8:19 AM            Indications:  Joie Yadav is a 31 year old  with endometrial hyperplasia with atypia on endometrial biopsy that has been treated with Mirena IUD in the past. The IUD was removed in May and the endometrial hyperplasia with atypia returned. A hysteroscopy and D&C with subsequent Mirena IUD placement was recommended. Risks, benefits, and alternatives to the procedure were discussed with the patient who elected to proceed.  All questions were answered and an informed consent was obtained.    Procedure:  The patient was taken to the operating room where she underwent MAC anesthesia without difficulty.  She was placed in a dorsal lithotomy position using yellow fin stirrups.  The patient was examined for the above noted findings and then prepped and draped in the usual sterile fashion.  A medium graves speculum was inserted into the vagina.   A tenaculum was placed on the anterior cervical lip. The endocervical canal was serially dilated to 6 mm using Hegar dilators.  The uterus sounded to 8 cm.  The hysteroscope was inserted without difficulty with the above findings noted.  Visualization was achieved using normal saline as a distending medium. The hysteroscope was removed and sharp curettage was performed.  The tissue was sent to pathology. A Mirena IUD was placed within the intrauterine cavity in the usual fashion. The strings were cut 2cm from the external os. Mirena IUD Lot#: [id number], EXP: 11/2024. All instruments were then removed.  The tenaculum was removed from the cervix and the puncture sites were hemostatic.  The patient was repositioned to the supine position.  The patient tolerated the procedure well and was taken to the recovery room in stable condition.  Dr. Aldair Stout MD, MS    Department of Obstetrics and Gynecology   Division of Gynecologic Oncology   [facility]  Phone: [phone]

## 2022-03-11 NOTE — ANESTHESIA PREPROCEDURE EVALUATION
Pre-Operative H & P     CC:  Preoperative exam to assess for increased cardiopulmonary risk while undergoing surgery and anesthesia.    Date of Encounter: 3/8/2022  Primary Care Physician:  Tara Lewis     Reason for visit:   No diagnosis found.    HPI  Joie Yadav is a 31 year old female who presents for pre-operative H & P in preparation for  Procedure Information     Case: [id number] Date/Time: 03/11/22 0730    Procedures:       HYSTEROSCOPY, DIAGNOSTIC, WITH DILATION AND CURETTAGE OF UTERUS (N/A Abdomen)      Mirena Intra uterine device placement (N/A Abdomen)    Anesthesia type: Monitor Anesthesia Care    Diagnosis: EIN (endometrial intraepithelial neoplasia) [N85.02]    Pre-op diagnosis: EIN (endometrial intraepithelial neoplasia) [N85.02]    Location:  OR [address] [place] [address] OR    Providers: Aldair Stout MD          The patient is a 31-year-old woman with a past medical history significant for morbid obesity, type 2 diabetes, PCOS and endometrial intraepithelial neoplasia.  She has been treated in the past with a Mirena IUD and the Mirena was removed in May.  On recent biopsy she was again found to have endometrial hyperplasia with atypia and she was seen by Dr. Stout virtually on 2/23/2022.  She is now scheduled for the procedure as above.    History is obtained from the patient and chart review    Hx of abnormal bleeding or anti-platelet use: none    Menstrual history: No LMP recorded. (Menstrual status: UNKNOWN).:      Past Medical History  Past Medical History:   Diagnosis Date     Diabetes (H)     Type 2     Endometrial hyperplasia without atypia, simple 04/08/2019     Infection due to 2019 novel coronavirus      Neck pain      Obesity      PCOS (polycystic ovarian syndrome)      TMJ (temporomandibular joint syndrome)      Vitiligo        Past Surgical History  Past Surgical History:   Procedure Laterality Date     LAPAROSCOPIC CHOLECYSTECTOMY N/A 04/11/2020    Procedure:  CHOLECYSTECTOMY, LAPAROSCOPIC;  Surgeon: Norris Reid MD;  Location: [facility] OR       Prior to Admission Medications  No current outpatient medications on file.       Allergies  No Known Allergies    Social History  Social History     Socioeconomic History     Marital status: Single     Spouse name: Not on file     Number of children: Not on file     Years of education: Not on file     Highest education level: Not on file   Occupational History     Not on file   Tobacco Use     Smoking status: Never Smoker     Smokeless tobacco: Never Used   Substance and Sexual Activity     Alcohol use: No     Drug use: No     Sexual activity: Yes     Partners: Male     Birth control/protection: I.U.D.     Comment: Mirena   Other Topics Concern     Not on file   Social History Narrative     Not on file     Social Determinants of Health     Financial Resource Strain: Not on file   Food Insecurity: Not on file   Transportation Needs: Not on file   Physical Activity: Not on file   Stress: Not on file   Social Connections: Not on file   Intimate Partner Violence: Not on file   Housing Stability: Not on file       Family History  Family History   Problem Relation Age of Onset     Anesthesia Reaction No family hx of      Bleeding Disorder No family hx of      Clotting Disorder No family hx of        Review of Systems  The complete review of systems is negative other than noted in the HPI or here.   Anesthesia Evaluation   Pt has had prior anesthetic. Type: General.    No history of anesthetic complications       ROS/MED HX  ENT/Pulmonary:     (+) JEFFERY risk factors, obese,  (-) tobacco use   Neurologic:  - neg neurologic ROS     Cardiovascular:     (+) -----Previous cardiac testing   Echo: Date: Results:    Stress Test: Date: Results:    ECG Reviewed: Date: 2017 Results:  Sinus tachycardia  Cath: Date: Results:      METS/Exercise Tolerance: >4 METS    Hematologic:  - neg hematologic  ROS     Musculoskeletal: Comment: Neck pain         GI/Hepatic:  - neg GI/hepatic ROS  (-) GERD   Renal/Genitourinary: Comment: EIN    PCOS      Endo:     (+) type II DM, Last HgA1c: 5.5, date: 10/2021, Not using insulin, - not using insulin pump. Obesity,     Psychiatric/Substance Use:  - neg psychiatric ROS     Infectious Disease: Comment: COVID-19 positive with body aches on 1/11/22      Malignancy:  - neg malignancy ROS     Other: Comment: vitiligo           Virtual visit -  No vitals were obtained    Physical Exam  Constitutional: Awake, alert, cooperative, no apparent distress, and appears stated age.  Eyes: Pupils equal  HENT: Normocephalic  Respiratory: non labored breathing   Neurologic: Awake, alert, oriented to name, place and time.   Neuropsychiatric: Calm, cooperative. Normal affect.      Prior Labs/Diagnostic Studies   All labs and imaging personally reviewed   CBC WITH PLTS/AUTO DIFF  Specimen:  Blood   Ref Range & Units 4 mo ago   WBC 4.00 - 10.00 k/cmm 7.81    RBC 3.90 - 5.20 m/cmm 4.58    Hgb 11.5 - 15.7 g/dL 13.4    Hematocrit 34.0 - 45.0 % 39.7    MCV 80.0 - 100.0 fL 86.7    MCH 25.0 - 32.0 pg 29.3    MCHC 31.0 - 36.0 g/dL 33.8    RDW 11.5 - 14.5 % 13.8    Plt 150 - 400 k/cmm 299    MPV 6.5 - 12.5 fL 11.0    Neut Auto 34.0 - 70.0 % 60.1    Lymph Auto 20.0 - 40.0 % 29.7    Mono Auto 4.0 - 12.5 % 7.8    EOS Auto 0.0 - 6.0 % 2.0    BASO Auto 0.0 - 2.0 % 0.4    Abs Neutrophil 1.70 - 6.50 k/cmm 4.69    Abs Lymphocyte 0.80 - 4.00 k/cmm 2.32    Abs Monocyte 0.20 - 1.00 k/cmm 0.61    Abs Eosinophil 0.00 - 0.60 k/cmm 0.16    Abs Basophil 0.00 - 0.20 k/cmm 0.03    Resulting Agency  [facility]   Specimen Collected: 10/27/21  2:38 PM   GLYCOSYLATED HGB -  A1C  Specimen:  Blood   Ref Range & Units 4 mo ago Comments   Hemoglobin A1C 3.8 - 5.6 % 5.5     Estimated Average Glucose mg/dL 111  The ADA recommends reporting an estimated Average   Glucose (eAG) with all hemoglobin A1c results   using the equation derived from a study of 501   normal diabetic  adults. Minority populations were   underrepresented and children were not included.   The EAG is not equivalent to a fasting glucose.   Resulting [facility]    Specimen Collected: 10/27/21  2:38 PM   PANEL BASIC METABOLIC (BMP)  Specimen:  Blood   Ref Range & Units 4 mo ago Comments   Sodium 136 - 145 mmol/L 140     Potassium 3.5 - 5.1 mmol/L 4.1     Chloride 98 - 107 mmol/L 102     CO2 21 - 32 mmol/L 30     AnGap 7 - 14 mEq/L 8     Glucose 70 - 100 mg/dL 92     BUN 7 - 18 mg/dL 19 High      Creatinine 0.55 - 1.02 mg/dL 0.75  [facility] uses a modified kinetic Tim reaction; [facility] methodology runs on average +0.13 (or 9%) mg/dl higher than [facility]'s enzymatic creatinine method.  Use caution when evaluating results across methodologies.   Calcium 8.3 - 10.4 mg/dL 8.9     eGFR, High >=60 ml/min/1.73m2 >120  Calculated using CKD-EPI equation   eGFR, Low >=60 ml/min/1.73m2 108  Calculated using CKD-EPI equation   Resulting [facility]    Specimen Collected: 10/27/21  2:38 PM     NPH COVID+FLU  Specimen:  Nasopharyngeal Swab   Ref Range & Units 1 mo ago Comments   COVID-19 Not Detected Detected Abnormal   Testing performed on Cepheid GeneXpert using RT-PCR as authorized by the FDA under an Emergency Use Authorization (EUA) for Coronavirus Disease-2019 during the Public Health Emergency. A Non-Detected test result does not exclude the possibility of   infection due to improper specimen collection or organisms that may be below the sensitivity of the test.   Influenza A Not Detected Not Detected     Influenza B Not Detected Not Detected  Testing performed on Cepheid GeneXpert using RT-PCR as authorized by the FDA under an Emergency Use Authorization (EUA) for Coronavirus Disease-2019 during the Public Health Emergency. A Non-Detected test result does not exclude the possibility of   infection due to improper specimen collection or organisms that may be below the sensitivity of the  "test.   Resulting Agency  [facility]      Narrative  Performed by [facility]  Is the patient a healthcare employee: No   Is the patient a [employer] ([facility]) Employee: No  Specimen Collected: 01/11/22  9:06 AM         EKG/ stress test - if available please see in ROS above     The patient's records and results personally reviewed by this provider.     Outside records reviewed from: Care Everywhere      Assessment      Joie Yadav is a 31 year old female was seen as a PAC referral for risk assessment and optimization for anesthesia.    Plan/Recommendations  Pt will be optimized for the proposed procedure.  See below for details on the assessment, risk, and preoperative recommendations    NEUROLOGY  - No history of TIA, CVA or seizure  -Post Op delirium risk factors:  No risk identified    ENT  - No current airway concerns.  Will need to be reassessed day of surgery.  Mallampati: Unable to assess  TM: Unable to assess   ~ TMJ- no locking open or closed. Clicking    CARDIAC  - No history of CAD, Hypertension and Afib  - METS (Metabolic Equivalents)  Patient performs 4 or more METS exercise without symptoms  Total Score: 0      RCRI-Very low risk: Class 1 0.4% complication rate  Total Score: 0        PULMONARY  - Obstructive Sleep Apnea  No current risk of obstructive sleep apnea   JEFFERY Low Risk  Total Score: 1           JEFFERY: BMI over 35 kg/m2      - Denies asthma or inhaler use  - Tobacco History      History   Smoking Status     Never Smoker   Smokeless Tobacco     Never Used       GI  PONV Medium Risk  Total Score: 2           1 AN PONV: Pt is Female    1 AN PONV: Patient is not a current smoker        /RENAL  - Baseline Creatinine  0.75  ~ PCOS  ~ EIN - procedure as above.     ENDOCRINE    - BMI: Estimated body mass index is 45.46 kg/m  as calculated from the following:    Height as of an earlier encounter on 3/11/22: 1.448 m (4' 9\").    Weight as of an earlier encounter on 3/11/22: 95.3 kg (210 lb 1.6 " oz).  Class 3 Obesity (BMI > 40)  - Diabetes  Diabetes Mellitus, Type 2, non-insulin dependent.  Hold morning oral hypoglycemic medications. Recommend close monitoring of the patient's blood glucose levels throughout the perioperative period.    HEME  VTE Low Risk 0.26%  Total Score: 1           VTE: BMI greater than 39      - No history of abnormal bleeding or antiplatelet use.    MSK  ~ Neck pain - consideration for careful positioning     ID  ~ The patient tested positive for COVID-19 on 1/11/22. She reports body aches that have completely resolved. She is within 90 days of her positive test and shouldn't be re-tested. Patient informed and message sent to surgical team.       The patient is optimized for their procedure. AVS with information on surgery time/arrival time, meds and NPO status given by nursing staff. No further diagnostic testing indicated.    Please refer to the physical examination documented by the anesthesiologist in the anesthesia record on the day of surgery.    Video-Visit Details    Type of service:  Video Visit    Patient verbally consented to video service today: YES    Video Start Time: 2:56  Video End Time (time video stopped): 3:05    Originating Location (pt. Location): Home    Distant Location (provider location):  [facility] PREOPERATIVE ASSESSMENT CENTER     Mode of Communication:  Video Conference via [employer]  On the day of service:     Prep time: 7 minutes  Visit time: 9 minutes  Documentation time: 13 minutes  ------------------------------------------  Total time: 29 minutes      Azul Angel PA-C  Preoperative Assessment Center  [facility]  Clinic and Surgery Center  Phone: [phone]  Fax: [phone]    Physical Exam    Airway        Mallampati: II   TM distance: > 3 FB   Neck ROM: full   Mouth opening: > 3 cm    Respiratory Devices and Support         Dental           Cardiovascular             Pulmonary                     Anesthesia Plan    ASA  Status:  3      Anesthesia Type: MAC.              Consents    Anesthesia Plan(s) and associated risks, benefits, and realistic alternatives discussed. Questions answered and patient/representative(s) expressed understanding.     - Discussed: Risks, Benefits and Alternatives for BOTH SEDATION and the PROCEDURE were discussed     - Discussed with:  Patient         Postoperative Care    Pain management: IV analgesics.   PONV prophylaxis: Ondansetron (or other 5HT-3), Dexamethasone or Solumedrol     Comments:

## 2022-03-11 NOTE — LETTER
[facility]  [address] [address]  Phone: [phone]    March 11, 2022        Joie Yadav  [address] [address]          To whom it may concern:    RE: Joie Salter had a procedure today at Same Day Surgery. Patient may return to work in 2 weeks with the following:  No working or lifting restrictions    Please contact me for questions or concerns.      Sincerely,      Dell Jones MD, MPH  03/11/22 8:42 AM

## 2022-03-11 NOTE — BRIEF OP NOTE
[facility]    Brief Operative Note    Pre-operative diagnosis: EIN (endometrial intraepithelial neoplasia) [N85.02]  Post-operative diagnosis Same as pre-operative diagnosis    Procedure: Procedure(s):  HYSTEROSCOPY, DIAGNOSTIC, WITH DILATION AND CURETTAGE OF , PLACEMENT OF INTRAUTERINE DEVICE  Mirena Intra uterine device placement  Surgeon: Surgeon(s) and Role:     * Aldair Stout MD - Primary     * Dell Jones MD - Resident - Assisting  Anesthesia: Monitor Anesthesia Care   Estimated Blood Loss: 10 cc    Drains: None  Specimens:   ID Type Source Tests Collected by Time Destination   1 : ENDOMETRIAL CURETTINGS  [facility] Endometrium SURGICAL PATHOLOGY EXAM Aldair Stout MD 3/11/2022  8:19 AM      Findings:   - Exam under anesthesia notable for normal appearing external genitalia, hypopigmented areas around vulva bilaterally most consistent with vitiligo, normal appearing vaginal and cervix without lesions, Bimanual exam revealed a small retroverted uterus and no adnexal masses. On hysteroscopy, cornea visualized bilaterally. No polyps or masses noted inside the uterus. Shaggy endometrium noted throughout.    Complications: None.  Implants:   Implant Name Type Inv. Item Serial No.  Lot No. LRB No. Used Action   Mirena (levonorgesrol-releasing inrauterine system) 52mg   [id number] [facility] [id number] N/A 1 Implanted       Dell Jones MD, MPH  Ob/Gyn Resident, PGY-1  03/11/22 8:33 AM

## 2022-03-11 NOTE — ANESTHESIA POSTPROCEDURE EVALUATION
Patient: Joie Yadav    Procedure: Procedure(s):  HYSTEROSCOPY, DIAGNOSTIC, WITH DILATION AND CURETTAGE OF , PLACEMENT OF INTRAUTERINE DEVICE  Mirena Intra uterine device placement       Anesthesia Type:  MAC    Note:  Disposition: Outpatient   Postop Pain Control: Uneventful            Sign Out: Well controlled pain   PONV: No   Neuro/Psych: Uneventful            Sign Out: Acceptable/Baseline neuro status   Airway/Respiratory: Uneventful            Sign Out: Acceptable/Baseline resp. status   CV/Hemodynamics: Uneventful            Sign Out: Acceptable CV status; No obvious hypovolemia; No obvious fluid overload   Other NRE:    DID A NON-ROUTINE EVENT OCCUR?            Last vitals:  Vitals Value Taken Time   /82 03/11/22 1000   Temp 36.5  C (97.7  F) 03/11/22 0838   Pulse 82 03/11/22 1000   Resp 16 03/11/22 0915   SpO2 99 % 03/11/22 1003   Vitals shown include unvalidated device data.    Electronically Signed By: Rosanna Kelsey MD  March 11, 2022  10:24 AM

## 2022-03-11 NOTE — ANESTHESIA CARE TRANSFER NOTE
Patient: Joie Yadav    Procedure: Procedure(s):  HYSTEROSCOPY, DIAGNOSTIC, WITH DILATION AND CURETTAGE OF , PLACEMENT OF INTRAUTERINE DEVICE  Mirena Intra uterine device placement       Diagnosis: EIN (endometrial intraepithelial neoplasia) [N85.02]  Diagnosis Additional Information: No value filed.    Anesthesia Type:   MAC     Note:    Oropharynx: oropharynx clear of all foreign objects  Level of Consciousness: awake  Oxygen Supplementation: face mask    Independent Airway: airway patency satisfactory and stable  Dentition: dentition unchanged  Vital Signs Stable: post-procedure vital signs reviewed and stable  Report to RN Given: handoff report given  Patient transferred to: PACU    Handoff Report: Identifed the Patient, Identified the Reponsible Provider, Reviewed the pertinent medical history, Discussed the surgical course, Reviewed Intra-OP anesthesia mangement and issues during anesthesia, Set expectations for post-procedure period and Allowed opportunity for questions and acknowledgement of understanding      Vitals:  Vitals Value Taken Time   BP     Temp     Pulse     Resp     SpO2         Electronically Signed By: MOIZ Fregoso CRNA  March 11, 2022  8:35 AM

## 2022-03-11 NOTE — DISCHARGE INSTRUCTIONS
[facility], [place]  Same-Day Surgery   Adult Discharge Orders & Instructions     For 24 hours after surgery    1. Get plenty of rest.  A responsible adult must stay with you for at least 24 hours after you leave the hospital.   2. Do not drive or use heavy equipment.  If you have weakness or tingling, don't drive or use heavy equipment until this feeling goes away.  3. Do not drink alcohol.  4. Avoid strenuous or risky activities.  Ask for help when climbing stairs.   5. You may feel lightheaded.  IF so, sit for a few minutes before standing.  Have someone help you get up.   6. If you have nausea (feel sick to your stomach): Drink only clear liquids such as apple juice, ginger ale, broth or 7-Up.  Rest may also help.  Be sure to drink enough fluids.  Move to a regular diet as you feel able.  7. You may have a slight fever. Call the doctor if your fever is over 100 F (37.7 C) (taken under the tongue) or lasts longer than 24 hours.  8. You may have a dry mouth, a sore throat, muscle aches or trouble sleeping.  These should go away after 24 hours.  9. Do not make important or legal decisions.   Call your doctor for any of the followin.  Signs of infection (fever, growing tenderness at the surgery site, a large amount of drainage or bleeding, severe pain, foul-smelling drainage, redness, swelling).    2. It has been over 8 to 10 hours since surgery and you are still not able to urinate (pass water).    3.  Headache for over 24 hours.    To contact a doctor, call ______Dr Stout at the Gynecology/Oncology clinic at [phone]______________ or:        [phone] and ask for the resident on call for   ___________Gynecology________________ (answered 24 hours a day)      Emergency Department:    [facility]: [phone]       (TTY for hearing impaired: [phone])    [facility]: [phone]       (TTY for hearing impaired: [phone])

## 2022-03-11 NOTE — OR NURSING
Paged [phone]: Patient voided, VSS, she is wondering if she could get an order for oxycodone at discharge?

## 2022-03-20 ENCOUNTER — HEALTH MAINTENANCE LETTER (OUTPATIENT)
Age: 32
End: 2022-03-20

## 2022-04-13 ENCOUNTER — VIRTUAL VISIT (OUTPATIENT)
Dept: ONCOLOGY | Facility: CLINIC | Age: 32
End: 2022-04-13
Attending: NURSE PRACTITIONER
Payer: COMMERCIAL

## 2022-04-13 DIAGNOSIS — N85.02 EIN (ENDOMETRIAL INTRAEPITHELIAL NEOPLASIA): Primary | ICD-10-CM

## 2022-04-13 PROCEDURE — 99214 OFFICE O/P EST MOD 30 MIN: CPT | Mod: 95 | Performed by: OBSTETRICS & GYNECOLOGY

## 2022-04-13 NOTE — PROGRESS NOTES
Joie is a 31 year old who is being evaluated via a billable video visit.      How would you like to obtain your AVS? MyChart  If the video visit is dropped, the invitation should be resent by: Text to cell phone: [phone]  Will anyone else be joining your video visit? Cele SALOMON      Video-Visit Details    30 minutes                Follow Up Notes on Referred Patient    Date: 2022       Dr. Aldair Stout MD  [address] [place] [id number]       RE: Joie Yadav  : 1990  GEMMA: 2022    Dear Dr. Aldair Stout:    Joie Yadav is a 31 year old woman with a diagnosis of EIN.  She is here today for follow up after surgery. She has been doing well no nausea, vomitng fever or chills, normal urinary and bowel function, minimal vaginal bleeding.       Past Medical History:    Past Medical History:   Diagnosis Date     Diabetes (H)     Type 2     Endometrial hyperplasia without atypia, simple 2019     Infection due to 2019 novel coronavirus      Neck pain      Obesity      PCOS (polycystic ovarian syndrome)      TMJ (temporomandibular joint syndrome)      Vitiligo          Past Surgical History:    Past Surgical History:   Procedure Laterality Date     DILATION AND CURETTAGE, HYSTEROSCOPY DIAGNOSTIC, COMBINED N/A 3/11/2022    Procedure: HYSTEROSCOPY, DIAGNOSTIC, WITH DILATION AND CURETTAGE OF , PLACEMENT OF INTRAUTERINE DEVICE;  Surgeon: Aldair Stout MD;  [facility]     INSERT INTRAUTERINE DEVICE N/A 3/11/2022    Procedure: Mirena Intra uterine device placement;  Surgeon: Aldair Stout MD;  [facility]     LAPAROSCOPIC CHOLECYSTECTOMY N/A 2020    Procedure: CHOLECYSTECTOMY, LAPAROSCOPIC;  Surgeon: Norris Reid MD;  [facility]         Health Maintenance Due   Topic Date Due     PREVENTIVE CARE VISIT  Never done     LIPID  Never done     MICROALBUMIN  Never done     DIABETIC FOOT EXAM  Never done     ADVANCE  CARE PLANNING  Never done     EYE EXAM  Never done     Pneumococcal Vaccine: Pediatrics (0 to 5 Years) and At-Risk Patients (6 to 64 Years) (1 of 2 - PPSV23) Never done     HIV SCREENING  Never done     HEPATITIS C SCREENING  Never done     DTAP/TDAP/TD IMMUNIZATION (5 - Td or Tdap) 08/18/2014     A1C  10/10/2020     BMP  05/14/2021     PAP  10/22/2021       Current Medications:     Current Outpatient Medications   Medication Sig Dispense Refill     acetaminophen (TYLENOL) 325 MG tablet Take 325-650 mg by mouth every 6 hours as needed for mild pain       ibuprofen (ADVIL/MOTRIN) 200 MG tablet Take 200 mg by mouth every 6 hours as needed for mild pain       levonorgestrel (MIRENA) 20 MCG/24HR IUD 1 each (20 mcg) by Intrauterine route once for 1 dose 1 each 0     metFORMIN (GLUCOPHAGE) 500 MG tablet Take 500 mg by mouth every evening        acetaminophen (TYLENOL) 325 MG tablet Take 3 tablets (975 mg) by mouth every 6 hours as needed for mild pain 50 tablet 0     ibuprofen (ADVIL/MOTRIN) 400 MG tablet Take 1 tablet (400 mg) by mouth every 6 hours as needed for other (mild and/or inflammatory pain) 30 tablet 0         Allergies:      No Known Allergies     Social History:     Social History     Tobacco Use     Smoking status: Never Smoker     Smokeless tobacco: Never Used   Substance Use Topics     Alcohol use: No       History   Drug Use No         Family History:         Family History   Problem Relation Age of Onset     Anesthesia Reaction No family hx of      Bleeding Disorder No family hx of      Clotting Disorder No family hx of          Physical Exam:     There were no vitals taken for this visit.  There is no height or weight on file to calculate BMI.    General Appearance: healthy and alert, no distress      Psychiatric: appropriate mood and affect                 Assessment:    Joie Yadav is a 31 year old woman with a diagnosis of EIN.     A total of 30 minutes was spent with the patient, 25 minutes  of which were spent in counseling the patient and/or treatment planning.      1.  Endometrial hyperplasia with atypia.  2.  Class 2 obesity.     I discussed with the patient, I have recommended to continue with Mirena IUD.  We discussed the possibility of finding low-grade cancer, natural progression and course of hyperplasia.  The patient does want to retain fertility.  We did discuss the average age of resolution that is about 9 months with IUD, and if she does then have followup 3 consecutive normal biopsies, could then remove the IUD again at that time.  We discussed also a similar management strategy even if she does have an early stage cancer that would be noninvasive.  The patient agrees with this plan, is very appreciative of her care.  We will have her see my colleagues in Anesthesia for preoperative optimization.  All questions were answered.         Alesia Prado MD, MS    Department of Obstetrics and Gynecology   Division of Gynecologic Oncology   [facility]  Phone: [phone]    CC  Patient Care Team:  Tara Lewis NP as PCP - General (Nurse Practitioner)  Israel Ceron MD as MD (Urology)  Brooke Adame, RN as Registered Nurse (Oncology)  Vandana Jarquin MD as Resident (OB/Gyn)  Howie Ward MD as Resident  Murphy Rae MD as MD (OB/Gyn)  Fanta Rodríguez MD as Resident (OB/Gyn)  Lindsay De La Rosa MD as Assigned OBGYN Provider  Azul Angel PA-C as Assigned Surgical Provider  ALESIA PRADO

## 2022-04-13 NOTE — PATIENT INSTRUCTIONS
Follow up US and MD visit in 3-4 months    Scheduling will reach out and assist with this     If you don't hear from them please feel free to call [phone]    Have a beautiful day    Venu

## 2022-04-15 ENCOUNTER — TELEPHONE (OUTPATIENT)
Dept: ONCOLOGY | Facility: CLINIC | Age: 32
End: 2022-04-15
Payer: COMMERCIAL

## 2022-05-15 ENCOUNTER — HEALTH MAINTENANCE LETTER (OUTPATIENT)
Age: 32
End: 2022-05-15

## 2022-07-18 NOTE — PROGRESS NOTES
Follow Up Notes on Referred Patient    Date: 2022       Dr. Aldair Stout MD  [address] [place] [address]       RE: Joie Yadav  : 1990  GEMMA: 2022    Dear Dr. Aldair Stout:    Joie Yadav is a 31 year old woman with a diagnosis of EIN s/p mirena IUD placement.  She is here today for surveillance. She has been doing well no nausea, vomitng fever or chills, normal urinary and bowel function, minimal vaginal bleeding.  She does note some right sided abdominal pain that is intermittent.      Past Medical History:    Past Medical History:   Diagnosis Date     Diabetes (H)     Type 2     Endometrial hyperplasia without atypia, simple 2019     Infection due to 2019 novel coronavirus      Neck pain      Obesity      PCOS (polycystic ovarian syndrome)      TMJ (temporomandibular joint syndrome)      Vitiligo          Past Surgical History:    Past Surgical History:   Procedure Laterality Date     DILATION AND CURETTAGE, HYSTEROSCOPY DIAGNOSTIC, COMBINED N/A 3/11/2022    Procedure: HYSTEROSCOPY, DIAGNOSTIC, WITH DILATION AND CURETTAGE OF , PLACEMENT OF INTRAUTERINE DEVICE;  Surgeon: Aldair Stout MD;  [facility]     INSERT INTRAUTERINE DEVICE N/A 3/11/2022    Procedure: Mirena Intra uterine device placement;  Surgeon: Aldair Stout MD;  [facility]     LAPAROSCOPIC CHOLECYSTECTOMY N/A 2020    Procedure: CHOLECYSTECTOMY, LAPAROSCOPIC;  Surgeon: Norris Reid MD;  [facility]         Health Maintenance Due   Topic Date Due     PREVENTIVE CARE VISIT  Never done     LIPID  Never done     MICROALBUMIN  Never done     DIABETIC FOOT EXAM  Never done     ADVANCE CARE PLANNING  Never done     EYE EXAM  Never done     Pneumococcal Vaccine: Pediatrics (0 to 5 Years) and At-Risk Patients (6 to 64 Years) (1 - PCV) Never done     HIV SCREENING  Never done     HEPATITIS C SCREENING  Never done     DTAP/TDAP/TD IMMUNIZATION (5 - Td or  Tdap) 08/18/2014     A1C  10/10/2020     BMP  05/14/2021     PAP  10/22/2021       Current Medications:     Current Outpatient Medications   Medication Sig Dispense Refill     acetaminophen (TYLENOL) 325 MG tablet Take 3 tablets (975 mg) by mouth every 6 hours as needed for mild pain 50 tablet 0     acetaminophen (TYLENOL) 325 MG tablet Take 325-650 mg by mouth every 6 hours as needed for mild pain       ibuprofen (ADVIL/MOTRIN) 200 MG tablet Take 200 mg by mouth every 6 hours as needed for mild pain       ibuprofen (ADVIL/MOTRIN) 400 MG tablet Take 1 tablet (400 mg) by mouth every 6 hours as needed for other (mild and/or inflammatory pain) 30 tablet 0     metFORMIN (GLUCOPHAGE) 500 MG tablet Take 500 mg by mouth every evening        levonorgestrel (MIRENA) 20 MCG/24HR IUD 1 each (20 mcg) by Intrauterine route once for 1 dose 1 each 0         Allergies:      No Known Allergies     Social History:     Social History     Tobacco Use     Smoking status: Never Smoker     Smokeless tobacco: Never Used   Substance Use Topics     Alcohol use: No       History   Drug Use No         Family History:         Family History   Problem Relation Age of Onset     Anesthesia Reaction No family hx of      Bleeding Disorder No family hx of      Clotting Disorder No family hx of          Physical Exam:     /74 (BP Location: Right arm, Patient Position: Sitting, Cuff Size: Adult Large)   Pulse 74   Temp 98.5  F (36.9  C) (Oral)   Resp 18   Wt 106.3 kg (234 lb 6.4 oz)   SpO2 99%   BMI 50.72 kg/m    Body mass index is 50.72 kg/m .    General Appearance: healthy and alert, no distress      Psychiatric: appropriate mood and affect             Pelvic: Normal external genitalia.  Normal-appearing cervix no adnexal mass tenderness rectovaginal confirms    Procedure patient was consented for an endometrial biopsy endometrial biopsy was done without difficulties patient tolerated the procedure well and was in stable condition at the  end of the procedure.        Assessment:    Joie Yadav is a 31 year old woman with a diagnosis of EIN.     A total of 30 minutes was spent with the patient, 25 minutes of which were spent in counseling the patient and/or treatment planning. Does not include time for procedure.       1.  Endometrial hyperplasia with atypia.  2.  Class 2 obesity.     Endometrial biopsy obtained today and will plan for a TVUS today. I have recommended to continue with Mirena IUD.  The patient does want to retain fertility.  We did discuss the average age of resolution that is about 9 months with IUD, and if she does then have followup 3 consecutive normal biopsies, could then remove the IUD again at that time.  We discussed also a similar management strategy even if she does have an early stage cancer that would be noninvasive.  The patient agrees with this plan, is very appreciative of her care.  We will have her see my colleagues in Anesthesia for preoperative optimization.  All questions were answered.         Alesia Prado MD, MS    Department of Obstetrics and Gynecology   Division of Gynecologic Oncology   [facility]  Phone: [phone]    CC  Patient Care Team:  Tara Lewis NP as PCP - General (Nurse Practitioner)  Israel Ceron MD as MD (Urology)  Brooke Adame, RN as Registered Nurse (Oncology)  Vandana Jarquin MD as Resident (OB/Gyn)  Howie Ward MD as Resident  Murphy Rae MD as MD (OB/Gyn)  Fanta Rodríguez MD as Resident (OB/Gyn)  Lindsay eD La Rosa MD as Assigned OBGYN Provider  Azul Angel PA-C as Assigned Surgical Provider  Alesia Prado MD as Assigned Cancer Care Provider  ALESIA PRADO

## 2022-07-20 ENCOUNTER — ONCOLOGY VISIT (OUTPATIENT)
Dept: ONCOLOGY | Facility: CLINIC | Age: 32
End: 2022-07-20
Attending: OBSTETRICS & GYNECOLOGY
Payer: COMMERCIAL

## 2022-07-20 ENCOUNTER — APPOINTMENT (OUTPATIENT)
Dept: LAB | Facility: CLINIC | Age: 32
End: 2022-07-20
Attending: OBSTETRICS & GYNECOLOGY
Payer: COMMERCIAL

## 2022-07-20 VITALS
DIASTOLIC BLOOD PRESSURE: 74 MMHG | HEART RATE: 74 BPM | SYSTOLIC BLOOD PRESSURE: 125 MMHG | BODY MASS INDEX: 50.72 KG/M2 | OXYGEN SATURATION: 99 % | TEMPERATURE: 98.5 F | RESPIRATION RATE: 18 BRPM | WEIGHT: 234.4 LBS

## 2022-07-20 DIAGNOSIS — N85.02 EIN (ENDOMETRIAL INTRAEPITHELIAL NEOPLASIA): ICD-10-CM

## 2022-07-20 LAB — HCG UR QL: NEGATIVE

## 2022-07-20 PROCEDURE — 88305 TISSUE EXAM BY PATHOLOGIST: CPT | Mod: 26 | Performed by: PATHOLOGY

## 2022-07-20 PROCEDURE — 88305 TISSUE EXAM BY PATHOLOGIST: CPT | Mod: TC | Performed by: OBSTETRICS & GYNECOLOGY

## 2022-07-20 PROCEDURE — G0463 HOSPITAL OUTPT CLINIC VISIT: HCPCS

## 2022-07-20 PROCEDURE — 81025 URINE PREGNANCY TEST: CPT | Performed by: OBSTETRICS & GYNECOLOGY

## 2022-07-20 PROCEDURE — 99214 OFFICE O/P EST MOD 30 MIN: CPT | Performed by: OBSTETRICS & GYNECOLOGY

## 2022-07-20 ASSESSMENT — PAIN SCALES - GENERAL: PAINLEVEL: MODERATE PAIN (4)

## 2022-07-20 NOTE — LETTER
2022         RE: Joie Yadav  [address] [facility] [address]        Dear Colleague,    Thank you for referring your patient, Joie Yadav, to the [facility]. Please see a copy of my visit note below.     Follow Up Notes on Referred Patient    Date: 2022       Dr. Aldair Stout MD  [address] [place] [address]       RE: Joie Yadav  : 1990  GEMMA: 2022    Dear Dr. Aldair Stout:    Joie Yadav is a 31 year old woman with a diagnosis of EIN s/p mirena IUD placement.  She is here today for surveillance. She has been doing well no nausea, vomitng fever or chills, normal urinary and bowel function, minimal vaginal bleeding.  She does note some right sided abdominal pain that is intermittent.      Past Medical History:    Past Medical History:   Diagnosis Date     Diabetes (H)     Type 2     Endometrial hyperplasia without atypia, simple 2019     Infection due to 2019 novel coronavirus      Neck pain      Obesity      PCOS (polycystic ovarian syndrome)      TMJ (temporomandibular joint syndrome)      Vitiligo          Past Surgical History:    Past Surgical History:   Procedure Laterality Date     DILATION AND CURETTAGE, HYSTEROSCOPY DIAGNOSTIC, COMBINED N/A 3/11/2022    Procedure: HYSTEROSCOPY, DIAGNOSTIC, WITH DILATION AND CURETTAGE OF , PLACEMENT OF INTRAUTERINE DEVICE;  Surgeon: Aldair Stout MD;  [facility]     INSERT INTRAUTERINE DEVICE N/A 3/11/2022    Procedure: Mirena Intra uterine device placement;  Surgeon: Aldair Stout MD;  [facility]     LAPAROSCOPIC CHOLECYSTECTOMY N/A 2020    Procedure: CHOLECYSTECTOMY, LAPAROSCOPIC;  Surgeon: Norris Reid MD;  Location[facility]         Health Maintenance Due   Topic Date Due     PREVENTIVE CARE VISIT  Never done     LIPID  Never done     MICROALBUMIN  Never done     DIABETIC FOOT EXAM  Never done     ADVANCE  CARE PLANNING  Never done     EYE EXAM  Never done     Pneumococcal Vaccine: Pediatrics (0 to 5 Years) and At-Risk Patients (6 to 64 Years) (1 - PCV) Never done     HIV SCREENING  Never done     HEPATITIS C SCREENING  Never done     DTAP/TDAP/TD IMMUNIZATION (5 - Td or Tdap) 08/18/2014     A1C  10/10/2020     BMP  05/14/2021     PAP  10/22/2021       Current Medications:     Current Outpatient Medications   Medication Sig Dispense Refill     acetaminophen (TYLENOL) 325 MG tablet Take 3 tablets (975 mg) by mouth every 6 hours as needed for mild pain 50 tablet 0     acetaminophen (TYLENOL) 325 MG tablet Take 325-650 mg by mouth every 6 hours as needed for mild pain       ibuprofen (ADVIL/MOTRIN) 200 MG tablet Take 200 mg by mouth every 6 hours as needed for mild pain       ibuprofen (ADVIL/MOTRIN) 400 MG tablet Take 1 tablet (400 mg) by mouth every 6 hours as needed for other (mild and/or inflammatory pain) 30 tablet 0     metFORMIN (GLUCOPHAGE) 500 MG tablet Take 500 mg by mouth every evening        levonorgestrel (MIRENA) 20 MCG/24HR IUD 1 each (20 mcg) by Intrauterine route once for 1 dose 1 each 0         Allergies:      No Known Allergies     Social History:     Social History     Tobacco Use     Smoking status: Never Smoker     Smokeless tobacco: Never Used   Substance Use Topics     Alcohol use: No       History   Drug Use No         Family History:         Family History   Problem Relation Age of Onset     Anesthesia Reaction No family hx of      Bleeding Disorder No family hx of      Clotting Disorder No family hx of          Physical Exam:     /74 (BP Location: Right arm, Patient Position: Sitting, Cuff Size: Adult Large)   Pulse 74   Temp 98.5  F (36.9  C) (Oral)   Resp 18   Wt 106.3 kg (234 lb 6.4 oz)   SpO2 99%   BMI 50.72 kg/m    Body mass index is 50.72 kg/m .    General Appearance: healthy and alert, no distress      Psychiatric: appropriate mood and affect             Pelvic: Normal  external genitalia.  Normal-appearing cervix no adnexal mass tenderness rectovaginal confirms    Procedure patient was consented for an endometrial biopsy endometrial biopsy was done without difficulties patient tolerated the procedure well and was in stable condition at the end of the procedure.        Assessment:    Joie Yadav is a 31 year old woman with a diagnosis of EIN.     A total of 30 minutes was spent with the patient, 25 minutes of which were spent in counseling the patient and/or treatment planning. Does not include time for procedure.       1.  Endometrial hyperplasia with atypia.  2.  Class 2 obesity.     Endometrial biopsy obtained today and will plan for a TVUS today. I have recommended to continue with Mirena IUD.  The patient does want to retain fertility.  We did discuss the average age of resolution that is about 9 months with IUD, and if she does then have followup 3 consecutive normal biopsies, could then remove the IUD again at that time.  We discussed also a similar management strategy even if she does have an early stage cancer that would be noninvasive.  The patient agrees with this plan, is very appreciative of her care.  We will have her see my colleagues in Anesthesia for preoperative optimization.  All questions were answered.         Aldair Stout MD, MS    Department of Obstetrics and Gynecology   Division of Gynecologic Oncology   [facility]  Phone: [phone]    CC  Patient Care Team:  Tara Lewis NP as PCP - General (Nurse Practitioner)  Israel Ceron MD as MD (Urology)  Brooke Adame, RN as Registered Nurse (Oncology)  Vandana Jarquin MD as Resident (OB/Gyn)  Howie Ward MD as Resident  Murphy Rae MD as MD (OB/Gyn)  Fanta Rodríguez MD as Resident (OB/Gyn)  Lindsay De La Rosa MD as Assigned OBGYN Provider  zAul Angel PA-C as Assigned Surgical Provider

## 2022-07-20 NOTE — NURSING NOTE
Prior to the start of the procedure and with procedural staff participation, I verbally confirmed the patient s identity using two indicators, relevant allergies, that the procedure was appropriate and matched the consent or emergent situation, and that the correct equipment/implants were available. Immediately prior to starting the procedure I conducted the Time Out with the procedural staff and re-confirmed the patient s name, procedure, and site/side. (The Joint Commission universal protocol was followed.)  Yes    Sedation (Moderate or Deep): None    Post procedure pain: 0    COMPLETED  Endometrial Bx    Dianna Htuchins RN

## 2022-07-20 NOTE — NURSING NOTE
"Oncology Rooming Note    July 20, 2022 10:53 AM   Joie Yadav is a 31 year old female who presents for:    Chief Complaint   Patient presents with     Lab Only     Urine sample collected by RN in lab. VS taken.      Oncology Clinic Visit     EIN (endometrial intraepithelial neoplasia)      Initial Vitals: /74 (BP Location: Right arm, Patient Position: Sitting, Cuff Size: Adult Large)   Pulse 74   Temp 98.5  F (36.9  C) (Oral)   Resp 18   Wt 106.3 kg (234 lb 6.4 oz)   SpO2 99%   BMI 50.72 kg/m   Estimated body mass index is 50.72 kg/m  as calculated from the following:    Height as of 3/11/22: 1.448 m (4' 9\").    Weight as of this encounter: 106.3 kg (234 lb 6.4 oz). Body surface area is 2.07 meters squared.  Moderate Pain (4) Comment: Data Unavailable   No LMP recorded. (Menstrual status: IUD).  Allergies reviewed: Yes  Medications reviewed: Yes    Medications: Medication refills not needed today.  Pharmacy name entered into [employer]:    [facility] [place], [place] - [address] [id number] [place], [place] - [address] AT [address] [place] [address] [id number] [place], [place] - [address] AT [facility] & [address] [place] [address]    Clinical concerns: None.        Brooke Lam CMA            "

## 2022-07-20 NOTE — NURSING NOTE
Chief Complaint   Patient presents with     Lab Only     Urine sample collected by RN in lab. VS taken.      Urine sample provided by patient. Vital signs taken and patient checked in for clinic appointment.    Rama Manrique RN

## 2022-07-21 ENCOUNTER — ANCILLARY PROCEDURE (OUTPATIENT)
Dept: ULTRASOUND IMAGING | Facility: CLINIC | Age: 32
End: 2022-07-21
Attending: OBSTETRICS & GYNECOLOGY
Payer: COMMERCIAL

## 2022-07-21 ENCOUNTER — PATIENT OUTREACH (OUTPATIENT)
Dept: ONCOLOGY | Facility: CLINIC | Age: 32
End: 2022-07-21

## 2022-07-21 DIAGNOSIS — N85.02 EIN (ENDOMETRIAL INTRAEPITHELIAL NEOPLASIA): ICD-10-CM

## 2022-07-21 PROCEDURE — 76830 TRANSVAGINAL US NON-OB: CPT | Mod: GC | Performed by: STUDENT IN AN ORGANIZED HEALTH CARE EDUCATION/TRAINING PROGRAM

## 2022-07-21 PROCEDURE — 76856 US EXAM PELVIC COMPLETE: CPT | Mod: GC | Performed by: STUDENT IN AN ORGANIZED HEALTH CARE EDUCATION/TRAINING PROGRAM

## 2022-07-21 NOTE — PROGRESS NOTES
RN updated by Radiology that there was no blood flow to the R Ovary. However, they felt that it was less likely due to a torsion and more likely technical as the ovary is deep in the abdomin.     MD updated     Patient has had mild discomfort in her right ovary. Patient and RN reviewed when to seek immediate medical care.     Patient appreciative of the RN call    Dianna Hutchins RN

## 2022-07-25 DIAGNOSIS — N85.02 EIN (ENDOMETRIAL INTRAEPITHELIAL NEOPLASIA): Primary | ICD-10-CM

## 2022-08-04 NOTE — H&P
"[facility] Family Medicine History and Physical        Date of Admission:  4/10/2020  Date of Service: 4/10/2020       HPI      Chief Complaint   \"Upper belly pain\"    History is obtained from the patient and chart review.     History of Present Illness   Joie Yadav is a 29 year old female with a history of Type II DM, PCOS, endometrial hyperplasia and complex endocervical cyst (concerning for adenoma malignum; following with Dr. Rizo of the Gyn-Onc service) admitted with RUQ pain described as cramping/ stabbing started Monday (5 day duration) that occurrs after meals and worsening in intensity last night. Last night, after chicken soup, she noticed the pain and took tylenol but this didn't help, so she came to the Emergency Room. Hx of this pain 2 months ago in RUQ pain that went away on its own. Sometimes the pain radiates to her back. This morning, she felt like she was feverish when in the ED. No chills. Feels nausea this morning. No vomiting. Zofran helps. Last meal was yesterday at 9 PM. No previous history of surgeries. No known history of gallbladder disease. No previous history of pancreatitis. She says her diabetes is controlled with metformin. Recent CT scan on 02/29/20 due to RLQ and flank pain ED visit showed \"Opaque density in the gallbladder which could be sludge, stones, dense bile. No gallbladder distention or pericholecystic inflammation.\"    At [facility] ED, vitals significant for BP of 150/84, otherwise HDS. Exam showed RUQ ttp. Labs pertinent for normal ALT/AST, normal Tbili, normal alkaline phosphatase, negative UPT, Lipase within normal limits. Leukocytosis of 12.6 with neutrophilia present. UA showed trace LE but otherwise normal- dirty catch. RUQ U/S showed \"Solitary gallstone at the gallbladder neck. Negative sonographic Pinto sign. The common duct is mildly dilated measuring 9 mm. \"   She was given NS bolus, MIVF 125 ml/hr, 1 mg " "IV dilaudid, 4 mg IV morphine, and 4 mg IV zofran and transferred to [place] for medicine admission and GI consult.        History:   Review of Systems   Review of Systems   Constitutional: Positive for fatigue. Negative for chills, diaphoresis and fever.   Eyes: Negative for visual disturbance.   Respiratory: Negative for cough and shortness of breath.    Cardiovascular: Negative for chest pain and leg swelling.   Gastrointestinal: Positive for abdominal pain (mild RUQ pain now) and nausea. Negative for blood in stool, constipation, diarrhea and vomiting.   Genitourinary: Negative for dysuria and hematuria.   Skin: Negative for rash.   Neurological: Positive for headaches (\"a little bit\"). Negative for dizziness.   Psychiatric/Behavioral: Negative for behavioral problems and confusion.     Past Medical History    I have reviewed this patient's medical history and updated it with pertinent information if needed.  Past Medical History:   Diagnosis Date     Diabetes (H)     Type 2     Endometrial hyperplasia without atypia, simple 4/8/2019     Obesity      PCOS (polycystic ovarian syndrome)      Vitiligo       Past Surgical History   I have reviewed this patient's surgical history and updated it with pertinent information if needed.  Past Surgical History:   Procedure Laterality Date     NO HISTORY OF SURGERY       Social History   Social History     Tobacco Use     Smoking status: Never Smoker     Smokeless tobacco: Never Used   Substance Use Topics     Alcohol use: No     Drug use: No     Family History   I have reviewed this patient's family history and updated it with pertinent information if needed.   History reviewed. No pertinent family history.   Dad history of nephrolithiasis   No fam hx of gallbladder disease.     Prior to Admission Medications   Prior to Admission Medications   Prescriptions Last Dose Informant Patient Reported? Taking?   acetaminophen (TYLENOL) 325 MG tablet 4/9/2020 at Unknown time  Yes " Yes   Sig: Take 325-650 mg by mouth every 6 hours as needed for mild pain   ibuprofen (ADVIL/MOTRIN) 200 MG tablet Past Week at Unknown time  Yes Yes   Sig: Take 200 mg by mouth every 6 hours as needed for mild pain   metFORMIN (GLUCOPHAGE) 500 MG tablet 4/9/2020 at Unknown time  Yes Yes   Sig: Take 500 mg by mouth 4 times daily (with meals and nightly)      Facility-Administered Medications Last Administration Doses Remaining   levonorgestrel (MIRENA) 20 MCG/24HR IUD 20 mcg None recorded 1        Allergies   No Known Allergies        Physical Exam    Vital Signs: Temp: 97.4  F (36.3  C) Temp src: Oral BP: 120/64 Pulse: 89   Resp: 18 SpO2: 99 % O2 Device: None (Room air)    Weight: 235 lbs 0 oz    Physical Exam  Vitals signs reviewed.   Constitutional:       General: She is not in acute distress.     Appearance: Normal appearance. She is obese. She is not ill-appearing or diaphoretic.   HENT:      Head: Normocephalic and atraumatic.      Right Ear: External ear normal.      Left Ear: External ear normal.      Nose: Nose normal.      Mouth/Throat:      Mouth: Mucous membranes are moist.      Pharynx: No posterior oropharyngeal erythema.   Eyes:      General: No scleral icterus.     Extraocular Movements: Extraocular movements intact.      Conjunctiva/sclera: Conjunctivae normal.      Pupils: Pupils are equal, round, and reactive to light.   Cardiovascular:      Rate and Rhythm: Regular rhythm. Tachycardia present.      Pulses: Normal pulses.      Heart sounds: Normal heart sounds. No murmur.   Pulmonary:      Effort: Pulmonary effort is normal. No respiratory distress.      Breath sounds: Normal breath sounds. No stridor. No wheezing.   Abdominal:      General: Bowel sounds are normal.      Palpations: Abdomen is soft. There is no mass.      Tenderness: There is abdominal tenderness (RUQ ttp, negative raza's sign). There is no guarding or rebound.   Lymphadenopathy:      Cervical: No cervical adenopathy.   Skin:      General: Skin is warm and dry.      Capillary Refill: Capillary refill takes less than 2 seconds.      Coloration: Skin is not jaundiced.      Comments: Vitiligo present diffusely on chest/ abdomen and legs   Neurological:      General: No focal deficit present.      Mental Status: She is alert and oriented to person, place, and time.      Cranial Nerves: No cranial nerve deficit.   Psychiatric:         Mood and Affect: Mood normal.         Behavior: Behavior normal.         Thought Content: Thought content normal.       Assessment & Plan    Joie is a 29 year old female admitted on 4/10/2020 with RUQ pain and biliary disease. Likely biliary colic vs choledocholithiasis.     #choledocholithiasis vs biliary colic  #RUQ pain  #Leukocytosis  HDS, afebrile. WBC 12.6 with neutrophilia on admit. RUQ ttp, negative Pinto's sign. U/S showed large bile stone at gallbladder neck and mildly dilated CBD of 9 mm, no wall thickening or signs of inflammation. Afebrile. Normal mental status. Normal ALT/AST and bili on admit. No jaundice. No sign of stone in CBD making choledocholithiasis less likely. There is a stone present in gallbladder neck and I suspect another stone passed through CBD prior to RUQ U/S today.  -GI consult, appreciate care  -MIVF  -NPO  -IV dilaudid 0.3 mg Q3H as needed for pain control now  -may need surgical intervention for cholecystectomy if no GI involvement  -no antibiotics now    #Type II DM  On metformin PTA.  -Hgb A1c  -MSSI    #complex endometrial hyperplasia  #multiple complex endocervical cysts  Cysts being monitored by OB/GYN and Gyn Onc, and at this point from chart review, likely benign. Not causing current clinical picture. Endometrial hyperplasia resolving on biopsy in 2/2020. Last appt OB/GYN 3/20/20 with follow up scheduled in 08/2020.     #PCOS  #vitiligo    # Pain Assessment:  Current Pain Score 4/10/2020   Patient currently in pain? yes   Pain score (0-10) -   Pain descriptors Other  (comment)   - Joie is experiencing pain due to biliary disease. Pain management was discussed and the plan was created in a collaborative fashion.  Joie's response to the current recommendations: engaged  - Please see the plan for pain management as documented above    Diet: NPO for Medical/Clinical Reasons Except for: Meds, Ice Chips  Fluids: 100 ml/hr LR  DVT Prophylaxis: Pneumatic Compression Devices PCD  Code Status: Full Code    Disposition Plan   Expected discharge: 2 - 3 days; recommended to prior living arrangement once GI consultation. Dispo: Expected Discharge Date: 04/12/20  Entered: Rafael Robbins 04/10/2020, 1:54 PM   Information in the above section will display in the discharge planner report.  Discussed with and examined by faculty.    [facility]  [facility]  Pager: [phone]  Please see sticky note for cross cover information    Results:     Data   Recent Results (from the past 24 hour(s))   US Abdomen Limited (RUQ)    Narrative    ULTRASOUND ABDOMEN LIMITED 4/10/2020 8:40 AM    CLINICAL HISTORY: Abdomen pain right upper quadrant.    TECHNIQUE: Limited abdominal ultrasound.    COMPARISON: CT abdomen and pelvis 2/29/2020.    FINDINGS:  GALLBLADDER: Solitary stone at the gallbladder neck. Negative  sonographic Pinto sign. Gallbladder wall is 0.3 cm.    BILE DUCTS: There is no biliary dilatation. The common duct measures 9  mm.    LIVER: Normal where seen. Limited visualization.    RIGHT KIDNEY: No hydronephrosis.    PANCREAS: The visualized portions of the pancreas are normal.    No ascites.      Impression    IMPRESSION:  Solitary gallstone at the gallbladder neck. Negative  sonographic Pinto sign. The common duct is mildly dilated measuring 9  mm.    ADAIR SOLORZANO MD                no

## 2022-09-10 ENCOUNTER — HEALTH MAINTENANCE LETTER (OUTPATIENT)
Age: 32
End: 2022-09-10

## 2022-11-30 ENCOUNTER — ONCOLOGY VISIT (OUTPATIENT)
Dept: ONCOLOGY | Facility: CLINIC | Age: 32
End: 2022-11-30
Attending: OBSTETRICS & GYNECOLOGY
Payer: COMMERCIAL

## 2022-11-30 VITALS
RESPIRATION RATE: 16 BRPM | TEMPERATURE: 98.2 F | BODY MASS INDEX: 50.79 KG/M2 | HEART RATE: 71 BPM | DIASTOLIC BLOOD PRESSURE: 87 MMHG | OXYGEN SATURATION: 99 % | WEIGHT: 234.7 LBS | SYSTOLIC BLOOD PRESSURE: 128 MMHG

## 2022-11-30 DIAGNOSIS — N85.02 EIN (ENDOMETRIAL INTRAEPITHELIAL NEOPLASIA): ICD-10-CM

## 2022-11-30 LAB — HCG UR QL: NEGATIVE

## 2022-11-30 PROCEDURE — 81025 URINE PREGNANCY TEST: CPT | Performed by: OBSTETRICS & GYNECOLOGY

## 2022-11-30 PROCEDURE — 58100 BIOPSY OF UTERUS LINING: CPT

## 2022-11-30 PROCEDURE — 88305 TISSUE EXAM BY PATHOLOGIST: CPT | Mod: 26 | Performed by: PATHOLOGY

## 2022-11-30 PROCEDURE — 99213 OFFICE O/P EST LOW 20 MIN: CPT | Mod: 25 | Performed by: OBSTETRICS & GYNECOLOGY

## 2022-11-30 PROCEDURE — G0463 HOSPITAL OUTPT CLINIC VISIT: HCPCS

## 2022-11-30 PROCEDURE — 58100 BIOPSY OF UTERUS LINING: CPT | Performed by: OBSTETRICS & GYNECOLOGY

## 2022-11-30 PROCEDURE — 88305 TISSUE EXAM BY PATHOLOGIST: CPT | Mod: TC | Performed by: OBSTETRICS & GYNECOLOGY

## 2022-11-30 ASSESSMENT — PAIN SCALES - GENERAL: PAINLEVEL: NO PAIN (0)

## 2022-11-30 NOTE — NURSING NOTE
"Oncology Rooming Note    November 30, 2022 11:01 AM   Joie Yadav is a 31 year old female who presents for:    Chief Complaint   Patient presents with     Oncology Clinic Visit     RTN for Endometrial      Initial Vitals: Blood Pressure 128/87   Pulse 71   Temperature 98.2  F (36.8  C) (Oral)   Respiration 16   Weight 106.5 kg (234 lb 11.2 oz)   Oxygen Saturation 99%   Body Mass Index 50.79 kg/m   Estimated body mass index is 50.79 kg/m  as calculated from the following:    Height as of 3/11/22: 1.448 m (4' 9\").    Weight as of this encounter: 106.5 kg (234 lb 11.2 oz). Body surface area is 2.07 meters squared.  No Pain (0) Comment: Data Unavailable   No LMP recorded. (Menstrual status: IUD).  Allergies reviewed: Yes  Medications reviewed: Yes    Medications: Medication refills not needed today.  Pharmacy name entered into [employer]:    [facility] [place], [place] - [address] [id number] [place], [place] - [address] AT [address] [place] [address] & [facility] #[id number] [place], [place] - [address] AT [facility] & [address] [place] [address]    Clinical concerns: none       Khushi Razo MA            "

## 2022-11-30 NOTE — PROGRESS NOTES
Follow Up Notes on Referred Patient    Date: 2022       Dr. Aldair Stout MD  [address] [place] [address]       RE: Joie Yadav  : 1990  GEMMA: 2022    Joie Yadav is a 31 year old woman with a diagnosis of EIN s/p mirena IUD placement.  She is here today for follow up visit and endometrial biopsy. She has been doing well no nausea, vomiting fever or chills, normal urinary and bowel function, minimal vaginal bleeding.     Past Medical History:    Past Medical History:   Diagnosis Date     Diabetes (H)     Type 2     Endometrial hyperplasia without atypia, simple 2019     Infection due to 2019 novel coronavirus      Neck pain      Obesity      PCOS (polycystic ovarian syndrome)      TMJ (temporomandibular joint syndrome)      Vitiligo          Past Surgical History:    Past Surgical History:   Procedure Laterality Date     DILATION AND CURETTAGE, HYSTEROSCOPY DIAGNOSTIC, COMBINED N/A 3/11/2022    Procedure: HYSTEROSCOPY, DIAGNOSTIC, WITH DILATION AND CURETTAGE OF , PLACEMENT OF INTRAUTERINE DEVICE;  Surgeon: Aldair Stout MD;  Location: [facility] OR     INSERT INTRAUTERINE DEVICE N/A 3/11/2022    Procedure: Mirena Intra uterine device placement;  Surgeon: Aldair Stout MD;  Location: [facility] OR     LAPAROSCOPIC CHOLECYSTECTOMY N/A 2020    Procedure: CHOLECYSTECTOMY, LAPAROSCOPIC;  Surgeon: Norris Reid MD;  Location: [facility] OR         Health Maintenance Due   Topic Date Due     YEARLY PREVENTIVE VISIT  Never done     LIPID  Never done     MICROALBUMIN  Never done     DIABETIC FOOT EXAM  Never done     ADVANCE CARE PLANNING  Never done     EYE EXAM  Never done     Pneumococcal Vaccine: Pediatrics (0 to 5 Years) and At-Risk Patients (6 to 64 Years) (1 - PCV) Never done     HIV SCREENING  Never done     HEPATITIS C SCREENING  Never done     DTAP/TDAP/TD IMMUNIZATION (5 - Td or Tdap) 2014     A1C  10/10/2020     BMP  2021      PAP  10/22/2021     COVID-19 Vaccine (4 - Booster for Pfizer series) 04/22/2022     INFLUENZA VACCINE (1) 09/01/2022       Current Medications:     Current Outpatient Medications   Medication Sig Dispense Refill     acetaminophen (TYLENOL) 325 MG tablet Take 3 tablets (975 mg) by mouth every 6 hours as needed for mild pain 50 tablet 0     acetaminophen (TYLENOL) 325 MG tablet Take 325-650 mg by mouth every 6 hours as needed for mild pain       ibuprofen (ADVIL/MOTRIN) 200 MG tablet Take 200 mg by mouth every 6 hours as needed for mild pain       ibuprofen (ADVIL/MOTRIN) 400 MG tablet Take 1 tablet (400 mg) by mouth every 6 hours as needed for other (mild and/or inflammatory pain) 30 tablet 0     metFORMIN (GLUCOPHAGE) 500 MG tablet Take 500 mg by mouth every evening        levonorgestrel (MIRENA) 20 MCG/24HR IUD 1 each (20 mcg) by Intrauterine route once for 1 dose (Patient taking differently: 1 each by Intrauterine route once Day of insertion) 1 each 0         Allergies:      No Known Allergies     Social History:     Social History     Tobacco Use     Smoking status: Never     Smokeless tobacco: Never   Substance Use Topics     Alcohol use: No       History   Drug Use No         Family History:       Family History   Problem Relation Age of Onset     Anesthesia Reaction No family hx of      Bleeding Disorder No family hx of      Clotting Disorder No family hx of          Physical Exam:     /87   Pulse 71   Temp 98.2  F (36.8  C) (Oral)   Resp 16   Wt 106.5 kg (234 lb 11.2 oz)   SpO2 99%   BMI 50.79 kg/m    Body mass index is 50.79 kg/m .    General Appearance:  healthy and alert, no distress                Psychiatric:      appropriate mood and affect              Pelvic: Normal external genitalia.  Normal-appearing cervix IUD strings visible no adnexal mass tenderness rectovaginal confirms.     Procedure patient was consented for an endometrial biopsy endometrial biopsy was done without difficulties  patient tolerated the procedure well and was in stable condition at the end of the procedure.           Assessment:     Joie Yadav is a 31 year old woman with a diagnosis of EIN.      A total of 30 minutes was spent with the patient, 25 minutes of which were spent in counseling the patient and/or treatment planning. Does not include time for procedure.         1.  Endometrial hyperplasia with atypia.  2.  Class 2 obesity.      Endometrial biopsy obtained today and will plan for a TVUS. I have recommended to continue with Mirena IUD.  The patient does want to retain fertility.  We did discuss the average age of resolution that is about 9 months with IUD, and if she does then have followup 3 consecutive normal biopsies, could then remove the IUD again at that time.  We discussed also a similar management strategy even if she does have an early stage cancer that would be noninvasive.  The patient agrees with this plan, is very appreciative of her care.  We will have her see my colleagues in Anesthesia for preoperative optimization.  All questions were answered.          Alesia Prado MD, MS    Department of Obstetrics and Gynecology   Division of Gynecologic Oncology   [facility]  Phone: [phone]      CC  Patient Care Team:  Tara Lewis NP as PCP - General (Nurse Practitioner)  Israel Ceron MD as MD (Urology)  Brooke Adame, RN as Registered Nurse (Oncology)  Vandana Jarquin MD as Resident (OB/Gyn)  Howie Ward MD as Resident  Murphy Rae MD as MD (OB/Gyn)  Fanta Rodríguez MD as Resident (OB/Gyn)  Lindsay De La Rosa MD as Assigned OBGYN Provider  Azul Angel PA-C as Assigned Surgical Provider  Alesia Prado MD as Assigned Cancer Care Provider  ALESIA PRADO

## 2022-11-30 NOTE — NURSING NOTE
Prior to the start of the procedure and with procedural staff participation, I verbally confirmed the patient s identity using two indicators, relevant allergies, that the procedure was appropriate and matched the consent or emergent situation, and that the correct equipment/implants were available. Immediately prior to starting the procedure I conducted the Time Out with the procedural staff and re-confirmed the patient s name, procedure, and site/side. (The Joint Commission universal protocol was followed.)  Yes    Sedation (Moderate or Deep): None    Post procedure pain: 0    COMPLETED:  Endometrial Bx     Dianna Hutchins RN

## 2022-11-30 NOTE — LETTER
2022         RE: Joie Yadav  [address] [facility] [id number]        Dear Colleague,    Thank you for referring your patient, Joie Yadav, to the [facility]. Please see a copy of my visit note below.                Follow Up Notes on Referred Patient    Date: 2022       Dr. Aldair Stout MD  [address] [place] [address]       RE: Jioe Yadav  : 1990  GEMMA: 2022    Joie Yadav is a 31 year old woman with a diagnosis of EIN s/p mirena IUD placement.  She is here today for follow up visit and endometrial biopsy. She has been doing well no nausea, vomiting fever or chills, normal urinary and bowel function, minimal vaginal bleeding.     Past Medical History:    Past Medical History:   Diagnosis Date     Diabetes (H)     Type 2     Endometrial hyperplasia without atypia, simple 2019     Infection due to 2019 novel coronavirus      Neck pain      Obesity      PCOS (polycystic ovarian syndrome)      TMJ (temporomandibular joint syndrome)      Vitiligo          Past Surgical History:    Past Surgical History:   Procedure Laterality Date     DILATION AND CURETTAGE, HYSTEROSCOPY DIAGNOSTIC, COMBINED N/A 3/11/2022    Procedure: HYSTEROSCOPY, DIAGNOSTIC, WITH DILATION AND CURETTAGE OF , PLACEMENT OF INTRAUTERINE DEVICE;  Surgeon: Aldair Stout MD;  Location: [facility] OR     INSERT INTRAUTERINE DEVICE N/A 3/11/2022    Procedure: Mirena Intra uterine device placement;  Surgeon: Aldair Stout MD;  Location: [facility]     LAPAROSCOPIC CHOLECYSTECTOMY N/A 2020    Procedure: CHOLECYSTECTOMY, LAPAROSCOPIC;  Surgeon: Norris Reid MD;  Location: [facility] OR         Health Maintenance Due   Topic Date Due     YEARLY PREVENTIVE VISIT  Never done     LIPID  Never done     MICROALBUMIN  Never done     DIABETIC FOOT EXAM  Never done     ADVANCE CARE PLANNING  Never done     EYE EXAM  Never done      Pneumococcal Vaccine: Pediatrics (0 to 5 Years) and At-Risk Patients (6 to 64 Years) (1 - PCV) Never done     HIV SCREENING  Never done     HEPATITIS C SCREENING  Never done     DTAP/TDAP/TD IMMUNIZATION (5 - Td or Tdap) 08/18/2014     A1C  10/10/2020     BMP  05/14/2021     PAP  10/22/2021     COVID-19 Vaccine (4 - Booster for Pfizer series) 04/22/2022     INFLUENZA VACCINE (1) 09/01/2022       Current Medications:     Current Outpatient Medications   Medication Sig Dispense Refill     acetaminophen (TYLENOL) 325 MG tablet Take 3 tablets (975 mg) by mouth every 6 hours as needed for mild pain 50 tablet 0     acetaminophen (TYLENOL) 325 MG tablet Take 325-650 mg by mouth every 6 hours as needed for mild pain       ibuprofen (ADVIL/MOTRIN) 200 MG tablet Take 200 mg by mouth every 6 hours as needed for mild pain       ibuprofen (ADVIL/MOTRIN) 400 MG tablet Take 1 tablet (400 mg) by mouth every 6 hours as needed for other (mild and/or inflammatory pain) 30 tablet 0     metFORMIN (GLUCOPHAGE) 500 MG tablet Take 500 mg by mouth every evening        levonorgestrel (MIRENA) 20 MCG/24HR IUD 1 each (20 mcg) by Intrauterine route once for 1 dose (Patient taking differently: 1 each by Intrauterine route once Day of insertion) 1 each 0         Allergies:      No Known Allergies     Social History:     Social History     Tobacco Use     Smoking status: Never     Smokeless tobacco: Never   Substance Use Topics     Alcohol use: No       History   Drug Use No         Family History:       Family History   Problem Relation Age of Onset     Anesthesia Reaction No family hx of      Bleeding Disorder No family hx of      Clotting Disorder No family hx of          Physical Exam:     /87   Pulse 71   Temp 98.2  F (36.8  C) (Oral)   Resp 16   Wt 106.5 kg (234 lb 11.2 oz)   SpO2 99%   BMI 50.79 kg/m    Body mass index is 50.79 kg/m .    General Appearance:  healthy and alert, no distress                Psychiatric:       appropriate mood and affect              Pelvic: Normal external genitalia.  Normal-appearing cervix IUD strings visible no adnexal mass tenderness rectovaginal confirms.     Procedure patient was consented for an endometrial biopsy endometrial biopsy was done without difficulties patient tolerated the procedure well and was in stable condition at the end of the procedure.           Assessment:     Joie Yadav is a 31 year old woman with a diagnosis of EIN.      A total of 30 minutes was spent with the patient, 25 minutes of which were spent in counseling the patient and/or treatment planning. Does not include time for procedure.         1.  Endometrial hyperplasia with atypia.  2.  Class 2 obesity.      Endometrial biopsy obtained today and will plan for a TVUS. I have recommended to continue with Mirena IUD.  The patient does want to retain fertility.  We did discuss the average age of resolution that is about 9 months with IUD, and if she does then have followup 3 consecutive normal biopsies, could then remove the IUD again at that time.  We discussed also a similar management strategy even if she does have an early stage cancer that would be noninvasive.  The patient agrees with this plan, is very appreciative of her care.  We will have her see my colleagues in Anesthesia for preoperative optimization.  All questions were answered.          Aldair Stout MD, MS    Department of Obstetrics and Gynecology   Division of Gynecologic Oncology   [facility]  Phone: [phone]      CC  Patient Care Team:  Tara Lewis NP as PCP - General (Nurse Practitioner)  Israel Ceron MD as MD (Urology)  Brooke Adame, RN as Registered Nurse (Oncology)  Vandana Jarquin MD as Resident (OB/Gyn)  Howie Ward MD as Resident  Murphy Rae MD as MD (OB/Gyn)  Fanta Rodríguez MD as Resident (OB/Gyn)  Lindsay De La Rosa MD as Assigned OBGYN  Provider  Azul Angel PA-C as Assigned Surgical Provider

## 2022-12-01 ENCOUNTER — ANCILLARY PROCEDURE (OUTPATIENT)
Dept: ULTRASOUND IMAGING | Facility: CLINIC | Age: 32
End: 2022-12-01
Attending: OBSTETRICS & GYNECOLOGY
Payer: COMMERCIAL

## 2022-12-01 DIAGNOSIS — N85.02 EIN (ENDOMETRIAL INTRAEPITHELIAL NEOPLASIA): ICD-10-CM

## 2022-12-01 PROCEDURE — 76856 US EXAM PELVIC COMPLETE: CPT | Performed by: RADIOLOGY

## 2022-12-01 PROCEDURE — 76830 TRANSVAGINAL US NON-OB: CPT | Performed by: RADIOLOGY

## 2023-01-20 DIAGNOSIS — N85.02 EIN (ENDOMETRIAL INTRAEPITHELIAL NEOPLASIA): Primary | ICD-10-CM

## 2023-01-22 ENCOUNTER — HEALTH MAINTENANCE LETTER (OUTPATIENT)
Age: 33
End: 2023-01-22

## 2023-03-01 ENCOUNTER — ONCOLOGY VISIT (OUTPATIENT)
Dept: ONCOLOGY | Facility: CLINIC | Age: 33
End: 2023-03-01
Attending: OBSTETRICS & GYNECOLOGY
Payer: COMMERCIAL

## 2023-03-01 ENCOUNTER — ANCILLARY PROCEDURE (OUTPATIENT)
Dept: ULTRASOUND IMAGING | Facility: CLINIC | Age: 33
End: 2023-03-01
Attending: OBSTETRICS & GYNECOLOGY
Payer: COMMERCIAL

## 2023-03-01 ENCOUNTER — LAB (OUTPATIENT)
Dept: LAB | Facility: CLINIC | Age: 33
End: 2023-03-01
Payer: COMMERCIAL

## 2023-03-01 VITALS
RESPIRATION RATE: 16 BRPM | TEMPERATURE: 98.2 F | BODY MASS INDEX: 51.16 KG/M2 | HEART RATE: 70 BPM | OXYGEN SATURATION: 98 % | WEIGHT: 236.4 LBS | DIASTOLIC BLOOD PRESSURE: 82 MMHG | SYSTOLIC BLOOD PRESSURE: 120 MMHG

## 2023-03-01 DIAGNOSIS — N85.02 EIN (ENDOMETRIAL INTRAEPITHELIAL NEOPLASIA): ICD-10-CM

## 2023-03-01 DIAGNOSIS — N85.02 EIN (ENDOMETRIAL INTRAEPITHELIAL NEOPLASIA): Primary | ICD-10-CM

## 2023-03-01 LAB — HCG UR QL: NEGATIVE

## 2023-03-01 PROCEDURE — 76830 TRANSVAGINAL US NON-OB: CPT | Mod: GC | Performed by: STUDENT IN AN ORGANIZED HEALTH CARE EDUCATION/TRAINING PROGRAM

## 2023-03-01 PROCEDURE — 81025 URINE PREGNANCY TEST: CPT | Performed by: PATHOLOGY

## 2023-03-01 PROCEDURE — 58100 BIOPSY OF UTERUS LINING: CPT | Performed by: OBSTETRICS & GYNECOLOGY

## 2023-03-01 PROCEDURE — 88305 TISSUE EXAM BY PATHOLOGIST: CPT | Mod: TC | Performed by: OBSTETRICS & GYNECOLOGY

## 2023-03-01 PROCEDURE — G0463 HOSPITAL OUTPT CLINIC VISIT: HCPCS | Mod: 25 | Performed by: OBSTETRICS & GYNECOLOGY

## 2023-03-01 PROCEDURE — 58100 BIOPSY OF UTERUS LINING: CPT

## 2023-03-01 PROCEDURE — 76856 US EXAM PELVIC COMPLETE: CPT | Mod: GC | Performed by: STUDENT IN AN ORGANIZED HEALTH CARE EDUCATION/TRAINING PROGRAM

## 2023-03-01 PROCEDURE — 99214 OFFICE O/P EST MOD 30 MIN: CPT | Mod: 25 | Performed by: OBSTETRICS & GYNECOLOGY

## 2023-03-01 PROCEDURE — 88305 TISSUE EXAM BY PATHOLOGIST: CPT | Mod: 26 | Performed by: PATHOLOGY

## 2023-03-01 ASSESSMENT — PAIN SCALES - GENERAL: PAINLEVEL: MILD PAIN (3)

## 2023-03-01 NOTE — NURSING NOTE
Prior to the start of the procedure and with procedural staff participation, I verbally confirmed the patient s identity using two indicators, relevant allergies, that the procedure was appropriate and matched the consent or emergent situation, and that the correct equipment/implants were available. Immediately prior to starting the procedure I conducted the Time Out with the procedural staff and re-confirmed the patient s name, procedure, and site/side. (The Joint Commission universal protocol was followed.)  Yes    Sedation (Moderate or Deep): None    Post procedure pain: 0     COMPLETED   Endometrial Bx    Dianna Hutchins RN

## 2023-03-01 NOTE — LETTER
3/1/2023     RE: Joie Yadav  [address] [facility] [address]    Dear Colleague,    Thank you for referring your patient, Joie Yadav, to the [facility]. Please see a copy of my visit note below.                Follow Up Notes on Referred Patient    Date: 3/1/2023       Dr. Delfina Augustine MD  No address on file       RE: Joie Yadav  : 1990  GEMMA: 3/1/2023    Joie Yadav is a 32 year old woman with a diagnosis of EIN s/p mirena IUD placement.  She is here today for follow up visit and endometrial biopsy. She has been doing well no nausea, vomiting fever or chills, normal urinary and bowel function, minimal vaginal bleeding.      Past Medical History:    Past Medical History:   Diagnosis Date     Diabetes (H)     Type 2     Endometrial hyperplasia without atypia, simple 2019     Infection due to  novel coronavirus      Neck pain      Obesity      PCOS (polycystic ovarian syndrome)      TMJ (temporomandibular joint syndrome)      Vitiligo          Past Surgical History:    Past Surgical History:   Procedure Laterality Date     DILATION AND CURETTAGE, HYSTEROSCOPY DIAGNOSTIC, COMBINED N/A 3/11/2022    Procedure: HYSTEROSCOPY, DIAGNOSTIC, WITH DILATION AND CURETTAGE OF , PLACEMENT OF INTRAUTERINE DEVICE;  Surgeon: Aldair Stout MD;  [facility]     INSERT INTRAUTERINE DEVICE N/A 3/11/2022    Procedure: Mirena Intra uterine device placement;  Surgeon: Aldair Stout MD;  [facility]     LAPAROSCOPIC CHOLECYSTECTOMY N/A 2020    Procedure: CHOLECYSTECTOMY, LAPAROSCOPIC;  Surgeon: Norris Reid MD;  [facility]         Health Maintenance Due   Topic Date Due     YEARLY PREVENTIVE VISIT  Never done     LIPID  Never done     MICROALBUMIN  Never done     DIABETIC FOOT EXAM  Never done     ADVANCE CARE PLANNING  Never done     EYE EXAM  Never done     Pneumococcal Vaccine: Pediatrics (0 to 5 Years)  and At-Risk Patients (6 to 64 Years) (1 - PCV) Never done     HIV SCREENING  Never done     HEPATITIS C SCREENING  Never done     A1C  10/10/2020     BMP  05/14/2021     PAP  10/22/2021     COVID-19 Vaccine (4 - Booster for Pfizer series) 04/22/2022     PHQ-2 (once per calendar year)  01/01/2023       Current Medications:     Current Outpatient Medications   Medication Sig Dispense Refill     acetaminophen (TYLENOL) 325 MG tablet Take 3 tablets (975 mg) by mouth every 6 hours as needed for mild pain 50 tablet 0     ibuprofen (ADVIL/MOTRIN) 200 MG tablet Take 200 mg by mouth every 6 hours as needed for mild pain       metFORMIN (GLUCOPHAGE) 500 MG tablet Take 500 mg by mouth every evening        acetaminophen (TYLENOL) 325 MG tablet Take 325-650 mg by mouth every 6 hours as needed for mild pain (Patient not taking: Reported on 3/1/2023)       ibuprofen (ADVIL/MOTRIN) 400 MG tablet Take 1 tablet (400 mg) by mouth every 6 hours as needed for other (mild and/or inflammatory pain) (Patient not taking: Reported on 3/1/2023) 30 tablet 0     levonorgestrel (MIRENA) 20 MCG/24HR IUD 1 each (20 mcg) by Intrauterine route once for 1 dose (Patient taking differently: 1 each by Intrauterine route once Day of insertion) 1 each 0         Allergies:      No Known Allergies     Social History:     Social History     Tobacco Use     Smoking status: Never     Smokeless tobacco: Never   Substance Use Topics     Alcohol use: No       History   Drug Use No         Family History:       Family History   Problem Relation Age of Onset     Anesthesia Reaction No family hx of      Bleeding Disorder No family hx of      Clotting Disorder No family hx of          Physical Exam:     /82 (BP Location: Right arm, Patient Position: Sitting, Cuff Size: Adult Large)   Pulse 70   Temp 98.2  F (36.8  C) (Oral)   Resp 16   Wt 107.2 kg (236 lb 6.4 oz)   SpO2 98%   BMI 51.16 kg/m    Body mass index is 51.16 kg/m .    General  Appearance:  healthy and alert, no distress                Psychiatric:      appropriate mood and affect              Pelvic: Normal external genitalia.  Normal-appearing cervix IUD strings visible no adnexal mass tenderness rectovaginal confirms.     Procedure patient was consented for an endometrial biopsy endometrial biopsy was done without difficulties patient tolerated the procedure well and was in stable condition at the end of the procedure.           Assessment:     Joie Yadav is a 32 year old woman with a diagnosis of EIN.      A total of 30 minutes was spent with the patient, 25 minutes of which were spent in counseling the patient and/or treatment planning. Does not include time for procedure.         1.  Endometrial hyperplasia with atypia.  2.  Class 2 obesity.      Endometrial biopsy obtained today and will plan for a TVUS. I have recommended to continue with Mirena IUD.  The patient does want to retain fertility.  We did discuss the average age of resolution that is about 9 months with IUD, and if she does then have followup 3 consecutive normal biopsies, could then remove the IUD again at that time.  We discussed also a similar management strategy even if she does have an early stage cancer that would be noninvasive.  The patient agrees with this plan, is very appreciative of her care.  We will have her see my colleagues in Anesthesia for preoperative optimization.  All questions were answered.          Aldair Stout MD, MS    Department of Obstetrics and Gynecology   Division of Gynecologic Oncology   [facility]  Phone: [phone]       CC  Patient Care Team:  Tara Lewis NP as PCP - General (Nurse Practitioner)  Israel Ceron MD as MD (Urology)  Brooke Adame RN as Registered Nurse (Oncology)  Vandana Jarquin MD as Resident (OB/Gyn)  Howie Ward MD as Resident  Murphy Rae MD as MD (OB/Gyn)  Fanta Rodríguez  MD EMI as Resident (OB/Gyn)  Lindsay De La Rosa MD as Assigned OBGYN Provider  Azul Angel PA-C as Assigned Surgical Provider  Aldair Stout MD as Assigned Cancer Care Provider  SELF, REFERRED

## 2023-03-01 NOTE — NURSING NOTE
"Oncology Rooming Note    March 1, 2023 8:50 AM   Joie Yadav is a 32 year old female who presents for:    Chief Complaint   Patient presents with     Oncology Clinic Visit     EIN (endometrial intraepithelial neoplasia)     Initial Vitals: /82 (BP Location: Right arm, Patient Position: Sitting, Cuff Size: Adult Large)   Pulse 70   Temp 98.2  F (36.8  C) (Oral)   Resp 16   Wt 107.2 kg (236 lb 6.4 oz)   SpO2 98%   BMI 51.16 kg/m   Estimated body mass index is 51.16 kg/m  as calculated from the following:    Height as of 3/11/22: 1.448 m (4' 9\").    Weight as of this encounter: 107.2 kg (236 lb 6.4 oz). Body surface area is 2.08 meters squared.  Mild Pain (3) Comment: Data Unavailable   No LMP recorded. (Menstrual status: IUD).  Allergies reviewed: Yes  Medications reviewed: Yes    Medications: Medication refills not needed today.  Pharmacy name entered into e[employer]:    [facility] [place], [place] - [id number] [place], [place] - [address] AT [address] [place] [address] [facility]UG STORE #[id number] [place], [place] - [address] AT [address]    Clinical concerns: No additional clinical concerns.        Kelley Alvarado (Sil), LPN March 1, 2023 8:50 AM              "

## 2023-03-02 NOTE — PROGRESS NOTES
Follow Up Notes on Referred Patient    Date: 3/1/2023       Dr. Delfina Augustine MD  No address on file       RE: Joie Yadav  : 1990  GEMMA: 3/1/2023    Joie Yadav is a 32 year old woman with a diagnosis of EIN s/p mirena IUD placement.  She is here today for follow up visit and endometrial biopsy. She has been doing well no nausea, vomiting fever or chills, normal urinary and bowel function, minimal vaginal bleeding.      Past Medical History:    Past Medical History:   Diagnosis Date     Diabetes (H)     Type 2     Endometrial hyperplasia without atypia, simple 2019     Infection due to 2019 novel coronavirus      Neck pain      Obesity      PCOS (polycystic ovarian syndrome)      TMJ (temporomandibular joint syndrome)      Vitiligo          Past Surgical History:    Past Surgical History:   Procedure Laterality Date     DILATION AND CURETTAGE, HYSTEROSCOPY DIAGNOSTIC, COMBINED N/A 3/11/2022    Procedure: HYSTEROSCOPY, DIAGNOSTIC, WITH DILATION AND CURETTAGE OF , PLACEMENT OF INTRAUTERINE DEVICE;  Surgeon: Aldair Stout MD;  [facility]     INSERT INTRAUTERINE DEVICE N/A 3/11/2022    Procedure: Mirena Intra uterine device placement;  Surgeon: Aldair Stout MD;  [facility]     LAPAROSCOPIC CHOLECYSTECTOMY N/A 2020    Procedure: CHOLECYSTECTOMY, LAPAROSCOPIC;  Surgeon: Norris Reid MD;  [facility]         Health Maintenance Due   Topic Date Due     YEARLY PREVENTIVE VISIT  Never done     LIPID  Never done     MICROALBUMIN  Never done     DIABETIC FOOT EXAM  Never done     ADVANCE CARE PLANNING  Never done     EYE EXAM  Never done     Pneumococcal Vaccine: Pediatrics (0 to 5 Years) and At-Risk Patients (6 to 64 Years) (1 - PCV) Never done     HIV SCREENING  Never done     HEPATITIS C SCREENING  Never done     A1C  10/10/2020     BMP  2021     PAP  10/22/2021     COVID-19 Vaccine (4 - Booster for Pfizer series) 2022     PHQ-2  (once per calendar year)  01/01/2023       Current Medications:     Current Outpatient Medications   Medication Sig Dispense Refill     acetaminophen (TYLENOL) 325 MG tablet Take 3 tablets (975 mg) by mouth every 6 hours as needed for mild pain 50 tablet 0     ibuprofen (ADVIL/MOTRIN) 200 MG tablet Take 200 mg by mouth every 6 hours as needed for mild pain       metFORMIN (GLUCOPHAGE) 500 MG tablet Take 500 mg by mouth every evening        acetaminophen (TYLENOL) 325 MG tablet Take 325-650 mg by mouth every 6 hours as needed for mild pain (Patient not taking: Reported on 3/1/2023)       ibuprofen (ADVIL/MOTRIN) 400 MG tablet Take 1 tablet (400 mg) by mouth every 6 hours as needed for other (mild and/or inflammatory pain) (Patient not taking: Reported on 3/1/2023) 30 tablet 0     levonorgestrel (MIRENA) 20 MCG/24HR IUD 1 each (20 mcg) by Intrauterine route once for 1 dose (Patient taking differently: 1 each by Intrauterine route once Day of insertion) 1 each 0         Allergies:      No Known Allergies     Social History:     Social History     Tobacco Use     Smoking status: Never     Smokeless tobacco: Never   Substance Use Topics     Alcohol use: No       History   Drug Use No         Family History:       Family History   Problem Relation Age of Onset     Anesthesia Reaction No family hx of      Bleeding Disorder No family hx of      Clotting Disorder No family hx of          Physical Exam:     /82 (BP Location: Right arm, Patient Position: Sitting, Cuff Size: Adult Large)   Pulse 70   Temp 98.2  F (36.8  C) (Oral)   Resp 16   Wt 107.2 kg (236 lb 6.4 oz)   SpO2 98%   BMI 51.16 kg/m    Body mass index is 51.16 kg/m .    General Appearance:  healthy and alert, no distress                Psychiatric:      appropriate mood and affect              Pelvic: Normal external genitalia.  Normal-appearing cervix IUD strings visible no adnexal mass tenderness rectovaginal confirms.     Procedure patient was  consented for an endometrial biopsy endometrial biopsy was done without difficulties patient tolerated the procedure well and was in stable condition at the end of the procedure.           Assessment:     Joie Yadav is a 32 year old woman with a diagnosis of EIN.      A total of 30 minutes was spent with the patient, 25 minutes of which were spent in counseling the patient and/or treatment planning. Does not include time for procedure.         1.  Endometrial hyperplasia with atypia.  2.  Class 2 obesity.      Endometrial biopsy obtained today and will plan for a TVUS. I have recommended to continue with Mirena IUD.  The patient does want to retain fertility.  We did discuss the average age of resolution that is about 9 months with IUD, and if she does then have followup 3 consecutive normal biopsies, could then remove the IUD again at that time.  We discussed also a similar management strategy even if she does have an early stage cancer that would be noninvasive.  The patient agrees with this plan, is very appreciative of her care.  We will have her see my colleagues in Anesthesia for preoperative optimization.  All questions were answered.          Aldair Stout MD, MS    Department of Obstetrics and Gynecology   Division of Gynecologic Oncology   [facility]  Phone: [phone]       CC  Patient Care Team:  Tara Lewis NP as PCP - General (Nurse Practitioner)  Israel Ceron MD as MD (Urology)  Brooke Adame, RN as Registered Nurse (Oncology)  Vandana Jarquin MD as Resident (OB/Gyn)  Howie Ward MD as Resident  Murphy Rae MD as MD (OB/Gyn)  Fanta Rodríguez MD as Resident (OB/Gyn)  Lindsay De La Rosa MD as Assigned OBGYN Provider  Azul Angel PA-C as Assigned Surgical Provider  Aldair Stout MD as Assigned Cancer Care Provider  SELF, REFERRED

## 2023-03-09 ENCOUNTER — PATIENT OUTREACH (OUTPATIENT)
Dept: ONCOLOGY | Facility: CLINIC | Age: 33
End: 2023-03-09
Payer: COMMERCIAL

## 2023-03-09 NOTE — PROGRESS NOTES
MD reviewed US and Endometrial Bx    Per MD: OK to remove IUD when patient ready     Patient updated and would like IUD removed in May. Patient approved date and time of appt with NP for removal     Dianna Hutchins RN

## 2023-05-22 ENCOUNTER — ONCOLOGY VISIT (OUTPATIENT)
Dept: ONCOLOGY | Facility: CLINIC | Age: 33
End: 2023-05-22
Attending: OBSTETRICS & GYNECOLOGY
Payer: COMMERCIAL

## 2023-05-22 VITALS
RESPIRATION RATE: 18 BRPM | BODY MASS INDEX: 52.24 KG/M2 | DIASTOLIC BLOOD PRESSURE: 84 MMHG | SYSTOLIC BLOOD PRESSURE: 126 MMHG | TEMPERATURE: 98 F | OXYGEN SATURATION: 97 % | HEART RATE: 86 BPM | WEIGHT: 241.4 LBS

## 2023-05-22 DIAGNOSIS — N85.02 ENDOMETRIAL INTRAEPITHELIAL NEOPLASIA (EIN): Primary | ICD-10-CM

## 2023-05-22 PROCEDURE — G0463 HOSPITAL OUTPT CLINIC VISIT: HCPCS | Performed by: NURSE PRACTITIONER

## 2023-05-22 PROCEDURE — 99214 OFFICE O/P EST MOD 30 MIN: CPT | Performed by: NURSE PRACTITIONER

## 2023-05-22 ASSESSMENT — PAIN SCALES - GENERAL: PAINLEVEL: NO PAIN (0)

## 2023-05-22 NOTE — PROGRESS NOTES
Follow Up Notes on Referred Patient    Date: 2023       RE: Joie Yadav  : 1990  GEMMA: 2023      Joie Yadav is a 32 year old woman with a diagnosis of EIN.   She is here today for follow up and IUD removal.       Treatment history:   22: Endometrium, biopsy:  -Focal endometrial atypical hyperplasia with squamous metaplasia (Endometrial Intraepithelial Neoplasia, EIN)    3/11/22: Endometrium, curettage:  -Endometrial atypical hyperplasia with prominent squamous morules    22: Endometrium, biopsy:  - Inactive endometrium with decidualized stroma, consistent with exogenous progestin treatment  - Negative for hyperplasia or atypia    22: Endometrium, biopsy:  - Inactive endometrium with decidualized stroma consistent with exogenous progestin effect  - Negative for hyperplasia or atypia    3/1/23: Endometrium, biopsy:  - Inactive endometrial glands and decidualized stroma, consistent with exogenous progesterone effect  - Negative for hyperplasia or atypia     Per MD, IUD can be removed.          Today she comes to clinic with her . She denies any concerns for today. She states her PCP has retired and she needs to find a new one. She was previously diagnosed with DM and managed it but has recently gained more weight. She is sexually active and denies any issues. She states she has not had any spotting/bleedign with her current IUD. She has previously had an IUD removed. She states she was told by MD that she can try to get pregnant once her IUD has been removed. She is wondering how long she has to wait after removal to have intercourse.       Review of Systems:    Systemic           no weight changes; no fever; no chills; no night sweats; no appetite changes  Skin           no rashes, or lesions  Eye           no irritation; no changes in vision  Katie-Laryngeal           no dysphagia; no hoarseness   Pulmonary    no cough; no shortness of  breath  Cardiovascular    no chest pain; no palpitations  Gastrointestinal    no diarrhea; no constipation; no abdominal pain; no changes in bowel habits; no blood in stool  Genitourinary   no urinary frequency; no urinary urgency; no dysuria; no pain; no abnormal vaginal discharge; no abnormal vaginal bleeding  Breast    no breast discharge; no breast changes; no breast pain  Musculoskeletal    no myalgias; no arthralgias; no back pain  Psychiatric           no depressed mood; no anxiety    Hematologic             no tender lymph nodes; no noticeable swellings or lumps   Endocrine    no hot flashes; no heat/cold intolerance         Neurological   no tremor; no numbness and tingling; no headaches; no difficulty sleeping      Past Medical History:    Past Medical History:   Diagnosis Date     Diabetes (H)     Type 2     Endometrial hyperplasia without atypia, simple 04/08/2019     Infection due to 2019 novel coronavirus      Neck pain      Obesity      PCOS (polycystic ovarian syndrome)      TMJ (temporomandibular joint syndrome)      Vitiligo          Past Surgical History:    Past Surgical History:   Procedure Laterality Date     DILATION AND CURETTAGE, HYSTEROSCOPY DIAGNOSTIC, COMBINED N/A 3/11/2022    Procedure: HYSTEROSCOPY, DIAGNOSTIC, WITH DILATION AND CURETTAGE OF , PLACEMENT OF INTRAUTERINE DEVICE;  Surgeon: Aldair Stout MD;  [facility]     INSERT INTRAUTERINE DEVICE N/A 3/11/2022    Procedure: Mirena Intra uterine device placement;  Surgeon: Aldair Stout MD;  [facility]     LAPAROSCOPIC CHOLECYSTECTOMY N/A 04/11/2020    Procedure: CHOLECYSTECTOMY, LAPAROSCOPIC;  Surgeon: Norris Reid MD;  [facility]         Health Maintenance Due   Topic Date Due     YEARLY PREVENTIVE VISIT  Never done     LIPID  Never done     MICROALBUMIN  Never done     DIABETIC FOOT EXAM  Never done     ADVANCE CARE PLANNING  Never done     EYE EXAM  Never done     Pneumococcal Vaccine: Pediatrics  (0 to 5 Years) and At-Risk Patients (6 to 64 Years) (1 - PCV) Never done     HIV SCREENING  Never done     HEPATITIS C SCREENING  Never done     A1C  10/10/2020     BMP  05/14/2021     PAP  10/22/2021     COVID-19 Vaccine (4 - Pfizer series) 04/22/2022     PHQ-2 (once per calendar year)  01/01/2023     DTAP/TDAP/TD IMMUNIZATION (5 - Tdap) 01/17/2023       Current Medications:     Current Outpatient Medications   Medication Sig Dispense Refill     acetaminophen (TYLENOL) 325 MG tablet Take 3 tablets (975 mg) by mouth every 6 hours as needed for mild pain 50 tablet 0     ibuprofen (ADVIL/MOTRIN) 200 MG tablet Take 200 mg by mouth every 6 hours as needed for mild pain       acetaminophen (TYLENOL) 325 MG tablet Take 325-650 mg by mouth every 6 hours as needed for mild pain (Patient not taking: Reported on 3/1/2023)       ibuprofen (ADVIL/MOTRIN) 400 MG tablet Take 1 tablet (400 mg) by mouth every 6 hours as needed for other (mild and/or inflammatory pain) (Patient not taking: Reported on 3/1/2023) 30 tablet 0     levonorgestrel (MIRENA) 20 MCG/24HR IUD 1 each (20 mcg) by Intrauterine route once for 1 dose (Patient taking differently: 1 each by Intrauterine route once Day of insertion) 1 each 0     metFORMIN (GLUCOPHAGE) 500 MG tablet Take 500 mg by mouth every evening            Allergies:      No Known Allergies     Social History:     Social History     Tobacco Use     Smoking status: Never     Smokeless tobacco: Never   Vaping Use     Vaping status: Not on file   Substance Use Topics     Alcohol use: No       History   Drug Use No         Family History:       Family History   Problem Relation Age of Onset     Anesthesia Reaction No family hx of      Bleeding Disorder No family hx of      Clotting Disorder No family hx of          Physical Exam:     /84   Pulse 86   Temp 98  F (36.7  C) (Oral)   Resp 18   Wt 109.5 kg (241 lb 6.4 oz)   SpO2 97%   BMI 52.24 kg/m    Body mass index is 52.24  kg/m .    General Appearance: healthy and alert, no distress     HEENT: no thyromegaly, no palpable nodules or masses        Cardiovascular: regular rate and rhythm, no gallops, rubs or murmurs     Respiratory: lungs clear, no rales, rhonchi or wheezes, normal diaphragmatic excursion    Musculoskeletal: extremities non tender and without edema    Skin: no lesions or rashes     Neurological: normal gait, no gross defects     Psychiatric: appropriate mood and affect                               Hematological: normal cervical, supraclavicular and inguinal lymph nodes     Gastrointestinal:       abdomen soft, non-tender, non-distended, no organomegaly or masses    Genitourinary: External genitalia and urethral meatus appears normal.  Vagina is smooth without nodularity or masses; white discharge present in vaginal vault.  Cervix appears normal and without lesions; no CMT, IUD strings visible and IUD removed with only scant spotting. Patient reports minimal cramping.       Assessment:    Joie Yadav is a 32 year old woman with a diagnosis of EIN.   She is here today for follow up and IUD removal.     33 minutes spent on the date of the encounter doing chart review, history and exam, documentation and further activities as noted above      Plan:     1.)        Patient to RTC in 3 months for her next surveillance visit. Will have her see Dr. Stout to discuss follow up plan including timing of repeat endometrial biopsies. Discussed holding off on intercourse for a week. Reviewed signs and symptoms for when she should contact the clinic or seek additional care. Patient to contact the clinic with any questions or concerns in the interim.  Answered all of her questions to the best of my ability.     2.) Genetic risk factors were assessed and the patient does not meet the qualifications for a referral.      3.) Labs and/or tests ordered include:  None.      4.) Health maintenance issues addressed today include  annual health maintenance and non-gynecologic issues with PCP.    MOIZ Gusman, WHNP-BC, ANP-BC  Women's Health Nurse Practitioner  Adult Nurse Practitioner  Division of Gynecologic Oncology          CC  Patient Care Team:  Tara Lewis NP as PCP - General (Nurse Practitioner)  Israel Ceron MD as MD (Urology)  Brooke Adame, RN as Registered Nurse (Oncology)  Vandana Jarquin MD as Resident (OB/Gyn)  Howie Ward MD as Resident  Murphy Rae MD as MD (OB/Gyn)  Fanta Rodríguez MD as Resident (OB/Gyn)  Lindsay De La Rosa MD as Assigned OBGYN Provider  Azul Angel PA-C as Assigned Surgical Provider  Aldair Stout MD as Assigned Cancer Care Provider  SELF, REFERRED

## 2023-05-22 NOTE — NURSING NOTE
"Oncology Rooming Note    May 22, 2023 7:42 AM   Joie Yadav is a 32 year old female who presents for:    Chief Complaint   Patient presents with     Oncology Clinic Visit     RTN for Cyst of Cervix-removal of IUD     Initial Vitals: Blood Pressure 126/84   Pulse 86   Temperature 98  F (36.7  C) (Oral)   Respiration 18   Weight 109.5 kg (241 lb 6.4 oz)   Oxygen Saturation 97%   Body Mass Index 52.24 kg/m   Estimated body mass index is 52.24 kg/m  as calculated from the following:    Height as of 3/11/22: 1.448 m (4' 9\").    Weight as of this encounter: 109.5 kg (241 lb 6.4 oz). Body surface area is 2.1 meters squared.  No Pain (0) Comment: Data Unavailable   No LMP recorded. (Menstrual status: IUD).  Allergies reviewed: Yes  Medications reviewed: Yes    Medications: Medication refills not needed today.  Pharmacy name entered into [employer]:    [facility] [place], [place] - [address] [facility] #[id number] [place], [place] - [address] AT [address] [place] [address] & [facility] #[id number] [place], [place] - [address] AT [facility] & [address] [place] [address]    Clinical concerns: none       Khushi Razo MA            "

## 2023-05-22 NOTE — LETTER
2023         RE: Joie Yadav  [address] [facility] [address]        Dear Colleague,    Thank you for referring your patient, Joie Yadav, to the [facility]. Please see a copy of my visit note below.                Follow Up Notes on Referred Patient    Date: 2023       RE: Joie Yadav  : 1990  GEMMA: 2023      Joie Yadav is a 32 year old woman with a diagnosis of EIN.   She is here today for follow up and IUD removal.       Treatment history:   22: Endometrium, biopsy:  -Focal endometrial atypical hyperplasia with squamous metaplasia (Endometrial Intraepithelial Neoplasia, EIN)    3/11/22: Endometrium, curettage:  -Endometrial atypical hyperplasia with prominent squamous morules    22: Endometrium, biopsy:  - Inactive endometrium with decidualized stroma, consistent with exogenous progestin treatment  - Negative for hyperplasia or atypia    22: Endometrium, biopsy:  - Inactive endometrium with decidualized stroma consistent with exogenous progestin effect  - Negative for hyperplasia or atypia    3/1/23: Endometrium, biopsy:  - Inactive endometrial glands and decidualized stroma, consistent with exogenous progesterone effect  - Negative for hyperplasia or atypia     Per MD, IUD can be removed.          Today she comes to clinic with her . She denies any concerns for today. She states her PCP has retired and she needs to find a new one. She was previously diagnosed with DM and managed it but has recently gained more weight. She is sexually active and denies any issues. She states she has not had any spotting/bleedign with her current IUD. She has previously had an IUD removed. She states she was told by MD that she can try to get pregnant once her IUD has been removed. She is wondering how long she has to wait after removal to have intercourse.       Review of Systems:    Systemic           no  weight changes; no fever; no chills; no night sweats; no appetite changes  Skin           no rashes, or lesions  Eye           no irritation; no changes in vision  Katie-Laryngeal           no dysphagia; no hoarseness   Pulmonary    no cough; no shortness of breath  Cardiovascular    no chest pain; no palpitations  Gastrointestinal    no diarrhea; no constipation; no abdominal pain; no changes in bowel habits; no blood in stool  Genitourinary   no urinary frequency; no urinary urgency; no dysuria; no pain; no abnormal vaginal discharge; no abnormal vaginal bleeding  Breast    no breast discharge; no breast changes; no breast pain  Musculoskeletal    no myalgias; no arthralgias; no back pain  Psychiatric           no depressed mood; no anxiety    Hematologic             no tender lymph nodes; no noticeable swellings or lumps   Endocrine    no hot flashes; no heat/cold intolerance         Neurological   no tremor; no numbness and tingling; no headaches; no difficulty sleeping      Past Medical History:    Past Medical History:   Diagnosis Date     Diabetes (H)     Type 2     Endometrial hyperplasia without atypia, simple 04/08/2019     Infection due to 2019 novel coronavirus      Neck pain      Obesity      PCOS (polycystic ovarian syndrome)      TMJ (temporomandibular joint syndrome)      Vitiligo          Past Surgical History:    Past Surgical History:   Procedure Laterality Date     DILATION AND CURETTAGE, HYSTEROSCOPY DIAGNOSTIC, COMBINED N/A 3/11/2022    Procedure: HYSTEROSCOPY, DIAGNOSTIC, WITH DILATION AND CURETTAGE OF , PLACEMENT OF INTRAUTERINE DEVICE;  Surgeon: Aldair Stout MD;  Location:  OR     INSERT INTRAUTERINE DEVICE N/A 3/11/2022    Procedure: Mirena Intra uterine device placement;  Surgeon: Aldair Stout MD;  Location:  OR     LAPAROSCOPIC CHOLECYSTECTOMY N/A 04/11/2020    Procedure: CHOLECYSTECTOMY, LAPAROSCOPIC;  Surgeon: Norris Reid MD;  [facility]: [facility]  Maintenance Due   Topic Date Due     YEARLY PREVENTIVE VISIT  Never done     LIPID  Never done     MICROALBUMIN  Never done     DIABETIC FOOT EXAM  Never done     ADVANCE CARE PLANNING  Never done     EYE EXAM  Never done     Pneumococcal Vaccine: Pediatrics (0 to 5 Years) and At-Risk Patients (6 to 64 Years) (1 - PCV) Never done     HIV SCREENING  Never done     HEPATITIS C SCREENING  Never done     A1C  10/10/2020     BMP  05/14/2021     PAP  10/22/2021     COVID-19 Vaccine (4 - Pfizer series) 04/22/2022     PHQ-2 (once per calendar year)  01/01/2023     DTAP/TDAP/TD IMMUNIZATION (5 - Tdap) 01/17/2023       Current Medications:     Current Outpatient Medications   Medication Sig Dispense Refill     acetaminophen (TYLENOL) 325 MG tablet Take 3 tablets (975 mg) by mouth every 6 hours as needed for mild pain 50 tablet 0     ibuprofen (ADVIL/MOTRIN) 200 MG tablet Take 200 mg by mouth every 6 hours as needed for mild pain       acetaminophen (TYLENOL) 325 MG tablet Take 325-650 mg by mouth every 6 hours as needed for mild pain (Patient not taking: Reported on 3/1/2023)       ibuprofen (ADVIL/MOTRIN) 400 MG tablet Take 1 tablet (400 mg) by mouth every 6 hours as needed for other (mild and/or inflammatory pain) (Patient not taking: Reported on 3/1/2023) 30 tablet 0     levonorgestrel (MIRENA) 20 MCG/24HR IUD 1 each (20 mcg) by Intrauterine route once for 1 dose (Patient taking differently: 1 each by Intrauterine route once Day of insertion) 1 each 0     metFORMIN (GLUCOPHAGE) 500 MG tablet Take 500 mg by mouth every evening            Allergies:      No Known Allergies     Social History:     Social History     Tobacco Use     Smoking status: Never     Smokeless tobacco: Never   Vaping Use     Vaping status: Not on file   Substance Use Topics     Alcohol use: No       History   Drug Use No         Family History:       Family History   Problem Relation Age of Onset     Anesthesia Reaction No family hx of      Bleeding  Disorder No family hx of      Clotting Disorder No family hx of          Physical Exam:     /84   Pulse 86   Temp 98  F (36.7  C) (Oral)   Resp 18   Wt 109.5 kg (241 lb 6.4 oz)   SpO2 97%   BMI 52.24 kg/m    Body mass index is 52.24 kg/m .    General Appearance: healthy and alert, no distress     HEENT: no thyromegaly, no palpable nodules or masses        Cardiovascular: regular rate and rhythm, no gallops, rubs or murmurs     Respiratory: lungs clear, no rales, rhonchi or wheezes, normal diaphragmatic excursion    Musculoskeletal: extremities non tender and without edema    Skin: no lesions or rashes     Neurological: normal gait, no gross defects     Psychiatric: appropriate mood and affect                               Hematological: normal cervical, supraclavicular and inguinal lymph nodes     Gastrointestinal:       abdomen soft, non-tender, non-distended, no organomegaly or masses    Genitourinary: External genitalia and urethral meatus appears normal.  Vagina is smooth without nodularity or masses; white discharge present in vaginal vault.  Cervix appears normal and without lesions; no CMT, IUD strings visible and IUD removed with only scant spotting. Patient reports minimal cramping.       Assessment:    Joie Yadav is a 32 year old woman with a diagnosis of EIN.   She is here today for follow up and IUD removal.     33 minutes spent on the date of the encounter doing chart review, history and exam, documentation and further activities as noted above      Plan:     1.)        Patient to RTC in 3 months for her next surveillance visit. Will have her see Dr. Stout to discuss follow up plan including timing of repeat endometrial biopsies. Discussed holding off on intercourse for a week. Reviewed signs and symptoms for when she should contact the clinic or seek additional care. Patient to contact the clinic with any questions or concerns in the interim.  Answered all of her questions to  the best of my ability.     2.) Genetic risk factors were assessed and the patient does not meet the qualifications for a referral.      3.) Labs and/or tests ordered include:  None.      4.) Health maintenance issues addressed today include annual health maintenance and non-gynecologic issues with PCP.    MOIZ Gusman, WHNP-BC, ANP-BC  Women's Health Nurse Practitioner  Adult Nurse Practitioner  Division of Gynecologic Oncology          CC  Patient Care Team:  Tara Lewis NP as PCP - General (Nurse Practitioner)  Israel Ceron MD as MD (Urology)  Brooke Adame RN as Registered Nurse (Oncology)  Vandana Jarquin MD as Resident (OB/Gyn)  Howie Ward MD as Resident  Murphy Rae MD as MD (OB/Gyn)  Fanta Rodríguez MD as Resident (OB/Gyn)  Lindsay De La Rosa MD as Assigned OBGYN Provider  Azul Angel PA-C as Assigned Surgical Provider  Aldair Stout MD as Assigned Cancer Care Provider  SELF, REFERRED      Again, thank you for allowing me to participate in the care of your patient.        Sincerely,        MOIZ Birch CNP

## 2023-06-03 ENCOUNTER — HEALTH MAINTENANCE LETTER (OUTPATIENT)
Age: 33
End: 2023-06-03

## 2023-06-22 ENCOUNTER — HOSPITAL ENCOUNTER (EMERGENCY)
Facility: CLINIC | Age: 33
Discharge: HOME | End: 2023-06-23
Attending: EMERGENCY MEDICINE | Admitting: EMERGENCY MEDICINE
Payer: COMMERCIAL

## 2023-06-22 DIAGNOSIS — R07.89 CHEST WALL PAIN: ICD-10-CM

## 2023-06-22 LAB
BASOPHILS # BLD AUTO: 0.1 10E3/UL (ref 0–0.2)
BASOPHILS NFR BLD AUTO: 1 %
EOSINOPHIL # BLD AUTO: 0.4 10E3/UL (ref 0–0.7)
EOSINOPHIL NFR BLD AUTO: 4 %
ERYTHROCYTE [DISTWIDTH] IN BLOOD BY AUTOMATED COUNT: 13.3 % (ref 10–15)
HCG SERPL QL: NEGATIVE
HCT VFR BLD AUTO: 41.2 % (ref 35–47)
HGB BLD-MCNC: 13.5 G/DL (ref 11.7–15.7)
IMM GRANULOCYTES # BLD: 0 10E3/UL
IMM GRANULOCYTES NFR BLD: 0 %
LYMPHOCYTES # BLD AUTO: 3.4 10E3/UL (ref 0.8–5.3)
LYMPHOCYTES NFR BLD AUTO: 34 %
MCH RBC QN AUTO: 27.4 PG (ref 26.5–33)
MCHC RBC AUTO-ENTMCNC: 32.8 G/DL (ref 31.5–36.5)
MCV RBC AUTO: 84 FL (ref 78–100)
MONOCYTES # BLD AUTO: 0.7 10E3/UL (ref 0–1.3)
MONOCYTES NFR BLD AUTO: 7 %
NEUTROPHILS # BLD AUTO: 5.5 10E3/UL (ref 1.6–8.3)
NEUTROPHILS NFR BLD AUTO: 54 %
NRBC # BLD AUTO: 0 10E3/UL
NRBC BLD AUTO-RTO: 0 /100
PLATELET # BLD AUTO: 335 10E3/UL (ref 150–450)
RBC # BLD AUTO: 4.92 10E6/UL (ref 3.8–5.2)
WBC # BLD AUTO: 9.9 10E3/UL (ref 4–11)

## 2023-06-22 PROCEDURE — 80048 BASIC METABOLIC PNL TOTAL CA: CPT | Performed by: EMERGENCY MEDICINE

## 2023-06-22 PROCEDURE — 99285 EMERGENCY DEPT VISIT HI MDM: CPT | Performed by: EMERGENCY MEDICINE

## 2023-06-22 PROCEDURE — 85379 FIBRIN DEGRADATION QUANT: CPT | Performed by: EMERGENCY MEDICINE

## 2023-06-22 PROCEDURE — 93005 ELECTROCARDIOGRAM TRACING: CPT | Performed by: EMERGENCY MEDICINE

## 2023-06-22 PROCEDURE — 36415 COLL VENOUS BLD VENIPUNCTURE: CPT | Performed by: EMERGENCY MEDICINE

## 2023-06-22 PROCEDURE — 84703 CHORIONIC GONADOTROPIN ASSAY: CPT | Performed by: EMERGENCY MEDICINE

## 2023-06-22 PROCEDURE — 93010 ELECTROCARDIOGRAM REPORT: CPT | Performed by: EMERGENCY MEDICINE

## 2023-06-22 PROCEDURE — 83735 ASSAY OF MAGNESIUM: CPT | Performed by: EMERGENCY MEDICINE

## 2023-06-22 PROCEDURE — 85025 COMPLETE CBC W/AUTO DIFF WBC: CPT | Performed by: EMERGENCY MEDICINE

## 2023-06-22 PROCEDURE — 84443 ASSAY THYROID STIM HORMONE: CPT | Performed by: EMERGENCY MEDICINE

## 2023-06-22 ASSESSMENT — ACTIVITIES OF DAILY LIVING (ADL): ADLS_ACUITY_SCORE: 35

## 2023-06-22 NOTE — LETTER
[facility] EMERGENCY DEPARTMENT  [address] [address]  [phone]      2023    Joie Yadav  [address] [id number] [phone] (home)     : 1990      To Whom it may concern:    Joie Yadav was seen in our Emergency Department today, 2023 for an illness. For the next 1 days she should not work.    Sincerely,    Karely Ocasio RN

## 2023-06-23 ENCOUNTER — APPOINTMENT (OUTPATIENT)
Dept: GENERAL RADIOLOGY | Facility: CLINIC | Age: 33
End: 2023-06-23
Attending: EMERGENCY MEDICINE
Payer: COMMERCIAL

## 2023-06-23 VITALS
HEART RATE: 79 BPM | BODY MASS INDEX: 52.22 KG/M2 | WEIGHT: 241.3 LBS | RESPIRATION RATE: 16 BRPM | DIASTOLIC BLOOD PRESSURE: 87 MMHG | SYSTOLIC BLOOD PRESSURE: 127 MMHG | TEMPERATURE: 98.9 F | OXYGEN SATURATION: 98 %

## 2023-06-23 LAB
ANION GAP SERPL CALCULATED.3IONS-SCNC: 12 MMOL/L (ref 7–15)
ATRIAL RATE - MUSE: 78 BPM
ATRIAL RATE - MUSE: 88 BPM
BUN SERPL-MCNC: 10.7 MG/DL (ref 6–20)
CALCIUM SERPL-MCNC: 9.4 MG/DL (ref 8.6–10)
CHLORIDE SERPL-SCNC: 100 MMOL/L (ref 98–107)
CREAT SERPL-MCNC: 0.71 MG/DL (ref 0.51–0.95)
D DIMER PPP FEU-MCNC: 0.38 UG/ML FEU (ref 0–0.5)
DEPRECATED HCO3 PLAS-SCNC: 25 MMOL/L (ref 22–29)
DIASTOLIC BLOOD PRESSURE - MUSE: NORMAL MMHG
DIASTOLIC BLOOD PRESSURE - MUSE: NORMAL MMHG
GFR SERPL CREATININE-BSD FRML MDRD: >90 ML/MIN/1.73M2
GLUCOSE SERPL-MCNC: 127 MG/DL (ref 70–99)
INTERPRETATION ECG - MUSE: NORMAL
INTERPRETATION ECG - MUSE: NORMAL
MAGNESIUM SERPL-MCNC: 1.9 MG/DL (ref 1.7–2.3)
P AXIS - MUSE: 35 DEGREES
P AXIS - MUSE: 39 DEGREES
POTASSIUM SERPL-SCNC: 4 MMOL/L (ref 3.4–5.3)
PR INTERVAL - MUSE: 140 MS
PR INTERVAL - MUSE: 144 MS
QRS DURATION - MUSE: 76 MS
QRS DURATION - MUSE: 80 MS
QT - MUSE: 374 MS
QT - MUSE: 414 MS
QTC - MUSE: 452 MS
QTC - MUSE: 471 MS
R AXIS - MUSE: 2 DEGREES
R AXIS - MUSE: 6 DEGREES
SODIUM SERPL-SCNC: 137 MMOL/L (ref 136–145)
SYSTOLIC BLOOD PRESSURE - MUSE: NORMAL MMHG
SYSTOLIC BLOOD PRESSURE - MUSE: NORMAL MMHG
T AXIS - MUSE: 10 DEGREES
T AXIS - MUSE: 12 DEGREES
TSH SERPL DL<=0.005 MIU/L-ACNC: 3.59 UIU/ML (ref 0.3–4.2)
VENTRICULAR RATE- MUSE: 78 BPM
VENTRICULAR RATE- MUSE: 88 BPM

## 2023-06-23 PROCEDURE — 93005 ELECTROCARDIOGRAM TRACING: CPT | Performed by: EMERGENCY MEDICINE

## 2023-06-23 PROCEDURE — 99285 EMERGENCY DEPT VISIT HI MDM: CPT | Mod: 25 | Performed by: EMERGENCY MEDICINE

## 2023-06-23 PROCEDURE — 71046 X-RAY EXAM CHEST 2 VIEWS: CPT

## 2023-06-23 ASSESSMENT — ACTIVITIES OF DAILY LIVING (ADL): ADLS_ACUITY_SCORE: 35

## 2023-06-23 NOTE — ED PROVIDER NOTES
[facility] EMERGENCY DEPARTMENT ([facility])    6/22/23      ED PROVIDER NOTE        History     Chief Complaint   Patient presents with     Chest Pain     Dizziness     HPI     Joie Yadav is a 32 year old female with a past medical history of lung granuloma, obesity, cholecystitis, PCOS, type 2 diabetes, elevated cholesterol with elevated triglycerides, leukocytosis, and endometrial intraepithelial neoplasia who presents to the emergency department for dizziness, chest pain, and shortness of breath.  Patient reports symptoms started on Monday, 3 days ago, and have gotten worse.  She has central chest pain that started at about 6 PM today, radiates through to the back.  It is worse with deep breaths.  She denies any cough.  She denies any fevers or chills, no nasal congestion or sore throat.  Patient has had some nausea but denies any vomiting, no diarrhea.  No abdominal pain.  She does note dizziness which she feels has been present since Monday.  It is not dependent on what she does.  It is not positional.    She has had some occasional leg cramping but denies any leg pain at present.  No leg swelling.  No personal or family history of thromboembolic disease.    She recently had an IUD removed.  She is not on any exogenous hormones.  She does not think that she is pregnant.    Past Medical History:   Diagnosis Date     Diabetes (H)     Type 2     Endometrial hyperplasia without atypia, simple 04/08/2019     Infection due to 2019 novel coronavirus      Neck pain      Obesity      PCOS (polycystic ovarian syndrome)      TMJ (temporomandibular joint syndrome)      Vitiligo        Past Surgical History:   Procedure Laterality Date     DILATION AND CURETTAGE, HYSTEROSCOPY DIAGNOSTIC, COMBINED N/A 3/11/2022    Procedure: HYSTEROSCOPY, DIAGNOSTIC, WITH DILATION AND CURETTAGE OF , PLACEMENT OF INTRAUTERINE DEVICE;  Surgeon: Aldair Stout MD;  Location: [facility] OR     INSERT INTRAUTERINE DEVICE N/A  3/11/2022    Procedure: Mirena Intra uterine device placement;  Surgeon: Aldair Stout MD;  Location: [facility] OR     LAPAROSCOPIC CHOLECYSTECTOMY N/A 04/11/2020    Procedure: CHOLECYSTECTOMY, LAPAROSCOPIC;  Surgeon: Norris Reid MD;  Location: [facility] OR       Family History   Problem Relation Age of Onset     Anesthesia Reaction No family hx of      Bleeding Disorder No family hx of      Clotting Disorder No family hx of        Social History     Tobacco Use     Smoking status: Never     Smokeless tobacco: Never   Substance Use Topics     Alcohol use: No         Past Medical History  Past Medical History:   Diagnosis Date     Diabetes (H)     Type 2     Endometrial hyperplasia without atypia, simple 04/08/2019     Infection due to 2019 novel coronavirus      Neck pain      Obesity      PCOS (polycystic ovarian syndrome)      TMJ (temporomandibular joint syndrome)      Vitiligo      Past Surgical History:   Procedure Laterality Date     DILATION AND CURETTAGE, HYSTEROSCOPY DIAGNOSTIC, COMBINED N/A 3/11/2022    Procedure: HYSTEROSCOPY, DIAGNOSTIC, WITH DILATION AND CURETTAGE OF , PLACEMENT OF INTRAUTERINE DEVICE;  Surgeon: Aldair Stout MD;  Location: [facility] OR     INSERT INTRAUTERINE DEVICE N/A 3/11/2022    Procedure: Mirena Intra uterine device placement;  Surgeon: Aldair Stout MD;  Location: [facility] OR     LAPAROSCOPIC CHOLECYSTECTOMY N/A 04/11/2020    Procedure: CHOLECYSTECTOMY, LAPAROSCOPIC;  Surgeon: Norris Reid MD;  Location: [facility] OR     acetaminophen (TYLENOL) 325 MG tablet  acetaminophen (TYLENOL) 325 MG tablet  ibuprofen (ADVIL/MOTRIN) 200 MG tablet  ibuprofen (ADVIL/MOTRIN) 400 MG tablet  metFORMIN (GLUCOPHAGE) 500 MG tablet  levonorgestrel (MIRENA) 20 MCG/24HR IUD      No Known Allergies  Family History  Family History   Problem Relation Age of Onset     Anesthesia Reaction No family hx of      Bleeding Disorder No family hx of      Clotting Disorder No family hx of      Social History    Social History     Tobacco Use     Smoking status: Never     Smokeless tobacco: Never   Substance Use Topics     Alcohol use: No     Drug use: No         A medically appropriate review of systems was performed with pertinent positives and negatives noted in the HPI, and all other systems negative.    Physical Exam   BP: 127/87  Pulse: 86  Temp: 98.9  F (37.2  C)  Resp: 16  Weight: 109.5 kg (241 lb 4.8 oz)  SpO2: 98 %  Physical Exam  Vitals and nursing note reviewed.   Constitutional:       General: She is not in acute distress.     Appearance: She is not diaphoretic.      Comments: Obese adult female, alert, cooperative, NAD   HENT:      Head: Atraumatic.      Mouth/Throat:      Mouth: Mucous membranes are moist.      Pharynx: Oropharynx is clear. No oropharyngeal exudate.   Eyes:      General: No scleral icterus.     Pupils: Pupils are equal, round, and reactive to light.   Cardiovascular:      Rate and Rhythm: Normal rate.      Pulses: Normal pulses.      Heart sounds: Normal heart sounds. No murmur heard.  Pulmonary:      Effort: No respiratory distress.      Breath sounds: Normal breath sounds.      Comments: Anterior and superior chest wall tenderness to palpation  Chest:      Chest wall: Tenderness present.   Abdominal:      General: Bowel sounds are normal.      Palpations: Abdomen is soft.      Tenderness: There is no abdominal tenderness. There is no guarding or rebound.      Comments: Morbidly obese, soft, nontender   Musculoskeletal:         General: No tenderness.      Right lower leg: Edema present.      Left lower leg: Edema present.      Comments: Slight BLE   Skin:     General: Skin is warm.      Findings: No rash.   Neurological:      General: No focal deficit present.      Mental Status: She is alert.      GCS: GCS eye subscore is 4. GCS verbal subscore is 5. GCS motor subscore is 6.      Cranial Nerves: Cranial nerves 2-12 are intact.      Sensory: Sensation is intact.      Motor: Motor function  is intact.      Coordination: Coordination is intact.           ED Course, Procedures, & Data      Procedures          EKG Interpretation:      Interpreted by Laquita Oconnell MD  Time reviewed:0005   Symptoms at time of EKG: None   Rhythm: Normal sinus   Rate: Normal  Axis: Normal  Ectopy: None  Conduction: Normal  ST Segments/ T Waves: T wave inversion aVR, III, no ST elevation/depression  Q Waves: None  Comparison to prior: No old EKG available    Clinical Impression: non-specific EKG                     Results for orders placed or performed during the hospital encounter of 06/22/23   D dimer quantitative     Status: Normal   Result Value Ref Range    D-Dimer Quantitative 0.38 0.00 - 0.50 ug/mL FEU    Narrative    This D-dimer assay is intended for use in conjunction with a clinical pretest probability assessment model to exclude pulmonary embolism (PE) and deep venous thrombosis (DVT) in outpatients suspected of PE or DVT. The cut-off value is 0.50 ug/mL FEU.   Basic metabolic panel     Status: Abnormal   Result Value Ref Range    Sodium 137 136 - 145 mmol/L    Potassium 4.0 3.4 - 5.3 mmol/L    Chloride 100 98 - 107 mmol/L    Carbon Dioxide (CO2) 25 22 - 29 mmol/L    Anion Gap 12 7 - 15 mmol/L    Urea Nitrogen 10.7 6.0 - 20.0 mg/dL    Creatinine 0.71 0.51 - 0.95 mg/dL    Calcium 9.4 8.6 - 10.0 mg/dL    Glucose 127 (H) 70 - 99 mg/dL    GFR Estimate >90 >60 mL/min/1.73m2   HCG qualitative pregnancy (blood)     Status: Normal   Result Value Ref Range    hCG Serum Qualitative Negative Negative   Magnesium     Status: Normal   Result Value Ref Range    Magnesium 1.9 1.7 - 2.3 mg/dL   TSH with free T4 reflex     Status: Normal   Result Value Ref Range    TSH 3.59 0.30 - 4.20 uIU/mL   CBC with platelets and differential     Status: None   Result Value Ref Range    WBC Count 9.9 4.0 - 11.0 10e3/uL    RBC Count 4.92 3.80 - 5.20 10e6/uL    Hemoglobin 13.5 11.7 - 15.7 g/dL    Hematocrit 41.2 35.0 - 47.0 %    MCV 84 78  - 100 fL    MCH 27.4 26.5 - 33.0 pg    MCHC 32.8 31.5 - 36.5 g/dL    RDW 13.3 10.0 - 15.0 %    Platelet Count 335 150 - 450 10e3/uL    % Neutrophils 54 %    % Lymphocytes 34 %    % Monocytes 7 %    % Eosinophils 4 %    % Basophils 1 %    % Immature Granulocytes 0 %    NRBCs per 100 WBC 0 <1 /100    Absolute Neutrophils 5.5 1.6 - 8.3 10e3/uL    Absolute Lymphocytes 3.4 0.8 - 5.3 10e3/uL    Absolute Monocytes 0.7 0.0 - 1.3 10e3/uL    Absolute Eosinophils 0.4 0.0 - 0.7 10e3/uL    Absolute Basophils 0.1 0.0 - 0.2 10e3/uL    Absolute Immature Granulocytes 0.0 <=0.4 10e3/uL    Absolute NRBCs 0.0 10e3/uL   EKG 12 lead     Status: None (Preliminary result)   Result Value Ref Range    Systolic Blood Pressure  mmHg    Diastolic Blood Pressure  mmHg    Ventricular Rate 78 BPM    Atrial Rate 78 BPM    VA Interval 144 ms    QRS Duration 80 ms     ms    QTc 471 ms    P Axis 39 degrees    R AXIS 6 degrees    T Axis 12 degrees    Interpretation ECG Sinus rhythm  Normal ECG      CBC with Platelets & Differential     Status: None    Narrative    The following orders were created for panel order CBC with Platelets & Differential.  Procedure                               Abnormality         Status                     ---------                               -----------         ------                     CBC with platelets and d...[[id number]]                      Final result                 Please view results for these tests on the individual orders.     Medications - No data to display    Chest XR,  PA & LAT    (Results Pending)          Critical care was not performed.     Medical Decision Making  The patient's presentation was of moderate complexity (an undiagnosed new problem with uncertain diagnosis).    The patient's evaluation involved:  review of external note(s) from 1 sources (see separate area of note for details)  ordering and/or review of 3+ test(s) in this encounter (see separate area of note for details), as well  as reviewing CXR and independently interpret this.     The patient's management necessitated low risk treatment.      Assessment & Plan    Patient presents to the emergency department today for the above complaints.  Differential diagnosis could include ACS, intermittent arrhythmia, PE, anxiety, musculoskeletal etiology, amongst others.  We did do an EKG upon patient's arrival and this demonstrates sinus rhythm without any sign of ST elevation or depression.    We did establish IV access and did draw blood for laboratory analysis.  CBC shows a normal white count of 9.9, normal hemoglobin of 13.5, normal platelet count.  D-dimer was within normal limits.  BMP shows a glucose of 127, electrolytes appear to be within normal limits.  hCG is negative.  Magnesium and TSH are within normal limits.    Chest x-ray is clear.  Suspect that anxiety could potentially be responsible for some of the patient's symptoms.  Additionally, she does have some chest wall tenderness to palpation and this may be due to musculoskeletal etiology.  Would recommend NSAIDs.  She should follow-up with her primary clinic if symptoms are persistent.        I have reviewed the nursing notes. I have reviewed the findings, diagnosis, plan and need for follow up with the patient.    New Prescriptions    No medications on file       Final diagnoses:   Chest wall pain       Laquita Oconnell MD   [facility] EMERGENCY DEPARTMENT  6/22/2023     Laquita Oconnell MD  06/23/23 [id number]       Laquita Oconnell MD  06/23/23 [id number]

## 2023-06-23 NOTE — DISCHARGE INSTRUCTIONS
You have been seen in the emergency department today for your symptoms.  Your blood work today is normal.  We do not see any sign of blood clot.  Your hemoglobin, electrolytes, and kidney function are normal.  Your EKG does not show any sign of acute heart problems.  You have some tenderness to palpation over the chest wall.  Sometimes this is due to inflammation of the muscles of the chest wall.  You can use over-the-counter Motrin to help with this.    Your chest X ray is clear, there is no sign of problems with the heart or lungs.     If your symptoms are persistent, we recommend that you follow-up with your primary clinic.

## 2023-07-23 ENCOUNTER — HEALTH MAINTENANCE LETTER (OUTPATIENT)
Age: 33
End: 2023-07-23

## 2023-09-06 ENCOUNTER — ONCOLOGY VISIT (OUTPATIENT)
Dept: ONCOLOGY | Facility: CLINIC | Age: 33
End: 2023-09-06
Attending: OBSTETRICS & GYNECOLOGY
Payer: COMMERCIAL

## 2023-09-06 VITALS
RESPIRATION RATE: 16 BRPM | DIASTOLIC BLOOD PRESSURE: 75 MMHG | WEIGHT: 239.4 LBS | BODY MASS INDEX: 51.81 KG/M2 | HEART RATE: 72 BPM | SYSTOLIC BLOOD PRESSURE: 123 MMHG | OXYGEN SATURATION: 96 % | TEMPERATURE: 98.3 F

## 2023-09-06 DIAGNOSIS — N85.02 ENDOMETRIAL INTRAEPITHELIAL NEOPLASIA (EIN): Primary | ICD-10-CM

## 2023-09-06 PROCEDURE — 99214 OFFICE O/P EST MOD 30 MIN: CPT | Performed by: OBSTETRICS & GYNECOLOGY

## 2023-09-06 PROCEDURE — G0463 HOSPITAL OUTPT CLINIC VISIT: HCPCS | Performed by: OBSTETRICS & GYNECOLOGY

## 2023-09-06 ASSESSMENT — PAIN SCALES - GENERAL: PAINLEVEL: NO PAIN (0)

## 2023-09-06 NOTE — PATIENT INSTRUCTIONS
Follow up in November US, lab and visit with MD     Scheduling will reach out and assist with these appointments     Have a beautiful day     Venu

## 2023-09-06 NOTE — LETTER
2023         RE: Joie Yadav  [address]  [facility] [address]        Dear Colleague,    Thank you for referring your patient, Joie Yadav, to the [facility]. Please see a copy of my visit note below.                Follow Up Notes on Referred Patient    Date: 2023       Dr. Delfina Augustine MD  No address on file       RE: Joie Yadav  : 1990  GEMMA: 2023    Joie Yadav is a 32 year old woman with a diagnosis of EIN s/p mirena IUD placement, that has been removed in May after 3 normal biopsies.  She has been doing well no nausea, vomiting fever or chills, normal urinary and bowel function, minimal vaginal bleeding and possible one period since.        Past Medical History:    Past Medical History:   Diagnosis Date    Diabetes (H)     Type 2    Endometrial hyperplasia without atypia, simple 2019    Infection due to  novel coronavirus     Neck pain     Obesity     PCOS (polycystic ovarian syndrome)     TMJ (temporomandibular joint syndrome)     Vitiligo          Past Surgical History:    Past Surgical History:   Procedure Laterality Date    DILATION AND CURETTAGE, HYSTEROSCOPY DIAGNOSTIC, COMBINED N/A 3/11/2022    Procedure: HYSTEROSCOPY, DIAGNOSTIC, WITH DILATION AND CURETTAGE OF , PLACEMENT OF INTRAUTERINE DEVICE;  Surgeon: Aldair Stout MD;  [facility]    INSERT INTRAUTERINE DEVICE N/A 3/11/2022    Procedure: Mirena Intra uterine device placement;  Surgeon: Aldair Stout MD;  [facility]    LAPAROSCOPIC CHOLECYSTECTOMY N/A 2020    Procedure: CHOLECYSTECTOMY, LAPAROSCOPIC;  Surgeon: Norris Reid MD;  [facility]         Health Maintenance Due   Topic Date Due    YEARLY PREVENTIVE VISIT  Never done    LIPID  Never done    MICROALBUMIN  Never done    DIABETIC FOOT EXAM  Never done    ADVANCE CARE PLANNING  Never done    EYE EXAM  Never done    Pneumococcal Vaccine: Pediatrics  (0 to 5 Years) and At-Risk Patients (6 to 64 Years) (1 - PCV) Never done    HIV SCREENING  Never done    HEPATITIS C SCREENING  Never done    A1C  10/10/2020    PAP  01/30/2022    PHQ-2 (once per calendar year)  01/01/2023    DTAP/TDAP/TD IMMUNIZATION (5 - Tdap) 01/17/2023    INFLUENZA VACCINE (1) 09/01/2023    COVID-19 Vaccine (4 - 2023-24 season) 09/01/2023       Current Medications:     Current Outpatient Medications   Medication Sig Dispense Refill    acetaminophen (TYLENOL) 325 MG tablet Take 3 tablets (975 mg) by mouth every 6 hours as needed for mild pain 50 tablet 0    acetaminophen (TYLENOL) 325 MG tablet Take 325-650 mg by mouth every 6 hours as needed for mild pain      ibuprofen (ADVIL/MOTRIN) 200 MG tablet Take 200 mg by mouth every 6 hours as needed for mild pain      ibuprofen (ADVIL/MOTRIN) 400 MG tablet Take 1 tablet (400 mg) by mouth every 6 hours as needed for other (mild and/or inflammatory pain) 30 tablet 0    levonorgestrel (MIRENA) 20 MCG/24HR IUD 1 each (20 mcg) by Intrauterine route once for 1 dose (Patient taking differently: 1 each by Intrauterine route once Day of insertion) 1 each 0    metFORMIN (GLUCOPHAGE) 500 MG tablet Take 500 mg by mouth every evening            Allergies:      No Known Allergies     Social History:     Social History     Tobacco Use    Smoking status: Never    Smokeless tobacco: Never   Substance Use Topics    Alcohol use: No       History   Drug Use No         Family History:       Family History   Problem Relation Age of Onset    Anesthesia Reaction No family hx of     Bleeding Disorder No family hx of     Clotting Disorder No family hx of          Physical Exam:     /75 (BP Location: Right arm, Patient Position: Sitting, Cuff Size: Adult Regular)   Pulse 72   Temp 98.3  F (36.8  C) (Oral)   Resp 16   Wt 108.6 kg (239 lb 6.4 oz)   SpO2 96%   BMI 51.81 kg/m    Body mass index is 51.81 kg/m .    General Appearance:  healthy and alert, no distress                 Psychiatric:      appropriate mood and affect                              Assessment:     Joie Yadav is a 32 year old woman with a diagnosis of EIN.      A total of 30 minutes was spent with the patient, 25 minutes of which were spent in counseling the patient and/or treatment planning. Does not include time for procedure.         1.  Endometrial hyperplasia with atypia.  2.  Class 2 obesity.  3.  Fertility preservation       We discussed we will repeat TVUS and visit with possible biopsy in 4 month. We discussed we will place another IUD if she has recurrent EIN. We discussed also a similar management strategy even if she does have an early stage cancer that would be noninvasive.  The patient agrees with this plan, is very appreciative of her care.  All questions were answered.          Aldair Stout MD, MS    Department of Obstetrics and Gynecology   Division of Gynecologic Oncology   [facility]  Phone: [phone]       CC  Patient Care Team:  Israel Ceron MD as MD (Urology)  Vandana Jarquin MD as Resident (OB/Gyn)  Howie Ward MD as Resident

## 2023-09-06 NOTE — NURSING NOTE
"Oncology Rooming Note    September 6, 2023 8:45 AM   Joie Yadav is a 32 year old female who presents for:    Chief Complaint   Patient presents with    Oncology Clinic Visit     Endometrial intraepithelial neoplasia     Initial Vitals: /75 (BP Location: Right arm, Patient Position: Sitting, Cuff Size: Adult Regular)   Pulse 72   Temp 98.3  F (36.8  C) (Oral)   Resp 16   Wt 108.6 kg (239 lb 6.4 oz)   SpO2 96%   BMI 51.81 kg/m   Estimated body mass index is 51.81 kg/m  as calculated from the following:    Height as of 3/11/22: 1.448 m (4' 9\").    Weight as of this encounter: 108.6 kg (239 lb 6.4 oz). Body surface area is 2.09 meters squared.  No Pain (0) Comment: Data Unavailable   No LMP recorded. (Menstrual status: IUD).  Allergies reviewed: Yes  Medications reviewed: Yes    Medications: Medication refills not needed today.  Pharmacy name entered into [employer]:    [facility] [place], [place] - [address] [place] [address] DRUG STORE #[id number] [place], [place] - [address] AT [address] [place] [address] DRUG STORE #[id number] [place], [place] - [address] AT [facility] & [address] [place] [address]    Clinical concerns:       Cj Mcmahan              "

## 2023-10-09 NOTE — PROGRESS NOTES
Follow Up Notes on Referred Patient    Date: 2023       Dr. Delfina Augustine MD  No address on file       RE: Joie Yadav  : 1990  GEMMA: 2023    Joie Yadav is a 32 year old woman with a diagnosis of EIN s/p mirena IUD placement, that has been removed in May after 3 normal biopsies.  She has been doing well no nausea, vomiting fever or chills, normal urinary and bowel function, minimal vaginal bleeding and possible one period since.        Past Medical History:    Past Medical History:   Diagnosis Date    Diabetes (H)     Type 2    Endometrial hyperplasia without atypia, simple 2019    Infection due to 2019 novel coronavirus     Neck pain     Obesity     PCOS (polycystic ovarian syndrome)     TMJ (temporomandibular joint syndrome)     Vitiligo          Past Surgical History:    Past Surgical History:   Procedure Laterality Date    DILATION AND CURETTAGE, HYSTEROSCOPY DIAGNOSTIC, COMBINED N/A 3/11/2022    Procedure: HYSTEROSCOPY, DIAGNOSTIC, WITH DILATION AND CURETTAGE OF , PLACEMENT OF INTRAUTERINE DEVICE;  Surgeon: Aldair Stout MD;  [facility]    INSERT INTRAUTERINE DEVICE N/A 3/11/2022    Procedure: Mirena Intra uterine device placement;  Surgeon: Aldair Stout MD;  [facility]    LAPAROSCOPIC CHOLECYSTECTOMY N/A 2020    Procedure: CHOLECYSTECTOMY, LAPAROSCOPIC;  Surgeon: Norris Reid MD;  [facility]         Health Maintenance Due   Topic Date Due    YEARLY PREVENTIVE VISIT  Never done    LIPID  Never done    MICROALBUMIN  Never done    DIABETIC FOOT EXAM  Never done    ADVANCE CARE PLANNING  Never done    EYE EXAM  Never done    Pneumococcal Vaccine: Pediatrics (0 to 5 Years) and At-Risk Patients (6 to 64 Years) (1 - PCV) Never done    HIV SCREENING  Never done    HEPATITIS C SCREENING  Never done    A1C  10/10/2020    PAP  2022    PHQ-2 (once per calendar year)  2023    DTAP/TDAP/TD IMMUNIZATION (5 - Tdap)  01/17/2023    INFLUENZA VACCINE (1) 09/01/2023    COVID-19 Vaccine (4 - 2023-24 season) 09/01/2023       Current Medications:     Current Outpatient Medications   Medication Sig Dispense Refill    acetaminophen (TYLENOL) 325 MG tablet Take 3 tablets (975 mg) by mouth every 6 hours as needed for mild pain 50 tablet 0    acetaminophen (TYLENOL) 325 MG tablet Take 325-650 mg by mouth every 6 hours as needed for mild pain      ibuprofen (ADVIL/MOTRIN) 200 MG tablet Take 200 mg by mouth every 6 hours as needed for mild pain      ibuprofen (ADVIL/MOTRIN) 400 MG tablet Take 1 tablet (400 mg) by mouth every 6 hours as needed for other (mild and/or inflammatory pain) 30 tablet 0    levonorgestrel (MIRENA) 20 MCG/24HR IUD 1 each (20 mcg) by Intrauterine route once for 1 dose (Patient taking differently: 1 each by Intrauterine route once Day of insertion) 1 each 0    metFORMIN (GLUCOPHAGE) 500 MG tablet Take 500 mg by mouth every evening            Allergies:      No Known Allergies     Social History:     Social History     Tobacco Use    Smoking status: Never    Smokeless tobacco: Never   Substance Use Topics    Alcohol use: No       History   Drug Use No         Family History:       Family History   Problem Relation Age of Onset    Anesthesia Reaction No family hx of     Bleeding Disorder No family hx of     Clotting Disorder No family hx of          Physical Exam:     /75 (BP Location: Right arm, Patient Position: Sitting, Cuff Size: Adult Regular)   Pulse 72   Temp 98.3  F (36.8  C) (Oral)   Resp 16   Wt 108.6 kg (239 lb 6.4 oz)   SpO2 96%   BMI 51.81 kg/m    Body mass index is 51.81 kg/m .    General Appearance:  healthy and alert, no distress                Psychiatric:      appropriate mood and affect                              Assessment:     Joie Yadav is a 32 year old woman with a diagnosis of EIN.      A total of 30 minutes was spent with the patient, 25 minutes of which were spent in  counseling the patient and/or treatment planning. Does not include time for procedure.         1.  Endometrial hyperplasia with atypia.  2.  Class 2 obesity.  3.  Fertility preservation       We discussed we will repeat TVUS and visit with possible biopsy in 4 month. We discussed we will place another IUD if she has recurrent EIN. We discussed also a similar management strategy even if she does have an early stage cancer that would be noninvasive.  The patient agrees with this plan, is very appreciative of her care.  All questions were answered.          lAdair Stout MD, MS    Department of Obstetrics and Gynecology   Division of Gynecologic Oncology   [facility]  Phone: [phone]       CC  Patient Care Team:  Israel Ceron MD as MD (Urology)  Vandana Jarquin MD as Resident (OB/Gyn)  Howie Ward MD as Resident  Murphy Rae MD as MD (OB/Gyn)  Fanta Rodríguez MD as Resident (OB/Gyn)  Aldair Stout MD as Assigned Cancer Care Provider  SELF, REFERRED

## 2023-12-06 ENCOUNTER — ANCILLARY PROCEDURE (OUTPATIENT)
Dept: ULTRASOUND IMAGING | Facility: CLINIC | Age: 33
End: 2023-12-06
Attending: OBSTETRICS & GYNECOLOGY
Payer: COMMERCIAL

## 2023-12-06 ENCOUNTER — LAB (OUTPATIENT)
Dept: LAB | Facility: CLINIC | Age: 33
End: 2023-12-06
Payer: COMMERCIAL

## 2023-12-06 ENCOUNTER — ONCOLOGY VISIT (OUTPATIENT)
Dept: ONCOLOGY | Facility: CLINIC | Age: 33
End: 2023-12-06
Attending: OBSTETRICS & GYNECOLOGY
Payer: COMMERCIAL

## 2023-12-06 VITALS
OXYGEN SATURATION: 96 % | RESPIRATION RATE: 20 BRPM | HEART RATE: 89 BPM | TEMPERATURE: 98.1 F | DIASTOLIC BLOOD PRESSURE: 80 MMHG | WEIGHT: 232.8 LBS | SYSTOLIC BLOOD PRESSURE: 114 MMHG | BODY MASS INDEX: 50.38 KG/M2

## 2023-12-06 DIAGNOSIS — N85.02 EIN (ENDOMETRIAL INTRAEPITHELIAL NEOPLASIA): Primary | ICD-10-CM

## 2023-12-06 DIAGNOSIS — N85.02 ENDOMETRIAL INTRAEPITHELIAL NEOPLASIA (EIN): ICD-10-CM

## 2023-12-06 LAB — HCG UR QL: NEGATIVE

## 2023-12-06 PROCEDURE — 99214 OFFICE O/P EST MOD 30 MIN: CPT | Mod: 25 | Performed by: OBSTETRICS & GYNECOLOGY

## 2023-12-06 PROCEDURE — G0463 HOSPITAL OUTPT CLINIC VISIT: HCPCS | Performed by: OBSTETRICS & GYNECOLOGY

## 2023-12-06 PROCEDURE — 81025 URINE PREGNANCY TEST: CPT | Performed by: PATHOLOGY

## 2023-12-06 PROCEDURE — 76856 US EXAM PELVIC COMPLETE: CPT | Performed by: RADIOLOGY

## 2023-12-06 PROCEDURE — 76830 TRANSVAGINAL US NON-OB: CPT | Performed by: RADIOLOGY

## 2023-12-06 PROCEDURE — 88305 TISSUE EXAM BY PATHOLOGIST: CPT | Mod: TC | Performed by: OBSTETRICS & GYNECOLOGY

## 2023-12-06 PROCEDURE — 58100 BIOPSY OF UTERUS LINING: CPT | Performed by: OBSTETRICS & GYNECOLOGY

## 2023-12-06 PROCEDURE — 88305 TISSUE EXAM BY PATHOLOGIST: CPT | Mod: 26 | Performed by: STUDENT IN AN ORGANIZED HEALTH CARE EDUCATION/TRAINING PROGRAM

## 2023-12-06 ASSESSMENT — PAIN SCALES - GENERAL: PAINLEVEL: NO PAIN (0)

## 2023-12-06 NOTE — LETTER
12/6/2023         RE: Joie Yadav  [address] [facility] [address]        Dear Colleague,    Thank you for referring your patient, Joie Yadav, to the [facility]. Please see a copy of my visit note below.    Gynecologic Oncology Clinic - Established Patient Visit    Visit date: Dec 6, 2023     Reason for visit: EIN    Interval history: Joie Yadav is a 32 year old woman with a diagnosis of EIN s/p mirena IUD placement and removal after 3 normal biopsies. The IUD was removed in May of 2023. She is hoping to become pregnant and states that she has not had any bleeding since her IUD was placed (2 years). She has never been pregnant.   She notes intermittent breast tenderness as well as right-sided pelvic pain monthly though she states that she has not had any bleeding.  She has been doing well no nausea, vomiting fever or chills, normal urinary and bowel function, no lightheadedness or dizziness, and no vaginal bleeding. She notes that she has been trying to lose weight over the past  2 months and has lost 7 pounds.      Relevant history:    EMBx (3/11/22):  Endometrium, curettage:  - Endometrial atypical hyperplasia with prominent squamous morules     EMBx (7/20/22):  Endometrium, biopsy:  - Inactive endometrium with decidualized stroma, consistent with exogenous progestin treatment  - Negative for hyperplasia or atypia    EMBx (11/30/22)  Endometrium, biopsy:  - Inactive endometrium with decidualized stroma consistent with exogenous progestin effect  - Negative for hyperplasia or atypia    EMBx (3/1/23):  Endometrium, biopsy:  - Inactive endometrial glands and decidualized stroma, consistent with exogenous progesterone effect  - Negative for hyperplasia or atypia     Past Medical History:  Past Medical History:   Diagnosis Date     Diabetes (H)     Type 2     Endometrial hyperplasia without atypia, simple 04/08/2019     Infection due to 2019 novel  coronavirus      Neck pain      Obesity      PCOS (polycystic ovarian syndrome)      TMJ (temporomandibular joint syndrome)      Vitiligo        Past Surgical History:  Past Surgical History:   Procedure Laterality Date     DILATION AND CURETTAGE, HYSTEROSCOPY DIAGNOSTIC, COMBINED N/A 3/11/2022    Procedure: HYSTEROSCOPY, DIAGNOSTIC, WITH DILATION AND CURETTAGE OF , PLACEMENT OF INTRAUTERINE DEVICE;  Surgeon: Aldair Stout MD;  [facility]     INSERT INTRAUTERINE DEVICE N/A 3/11/2022    Procedure: Mirena Intra uterine device placement;  Surgeon: Aldair Stout MD;  [facility]     LAPAROSCOPIC CHOLECYSTECTOMY N/A 04/11/2020    Procedure: CHOLECYSTECTOMY, LAPAROSCOPIC;  Surgeon: Norris Reid MD;  [facility]        Physical Exam:  /80 (BP Location: Right arm, Patient Position: Sitting, Cuff Size: Adult Large)   Pulse 89   Temp 98.1  F (36.7  C) (Oral)   Resp 20   Wt 105.6 kg (232 lb 12.8 oz)   SpO2 96%   BMI 50.38 kg/m     General appearance: no acute distress, well groomed, sitting comfortably   HEENT: No cervical, submandibular, or supraclavicular lymphadenectomy   CV: regular rate, regular rhythm, no murmurs appreciated  Resp: clear to auscultation bilaterally, normal respiratory effort  GI: abdomen soft, non-tender, non-distended, no appreciable masses  :  External: vulva/labia normal in appearance without lesions  Vagina: mucosa is pink, no lesions appreciated  Cervix: nulliparous appearing, without lesions or abnormal vasculature.  An endometrial biopsy was performed without complication.  Uterus/adnexa: no pelvic masses appreciated    Assessment:  Joie Yadav is a 32 year old woman with a diagnosis of EIN.      A total of 33 minutes was spent with the patient, on documentation, chart review, counseling the patient and/or treatment planning.  Does not include time for procedure.      1.  Endometrial hyperplasia with atypia.  2.  Class 2 obesity.  3.   Fertility preservation       We discussed we will repeat TVUS and visit with possible biopsy in 3 month. We discussed we will place another IUD if she has recurrent EIN. We discussed also a similar management strategy even if she does have an early stage cancer that would be noninvasive.  The patient agrees with this plan, is very appreciative of her care.  Discussed that we would recommend that she establish care with a general OB/GYN to discuss her fertility.  The patient has a known history of PCOS and has been amenorrheic since her IUD was removed. The patient will reach out to S who she had seen previously.  All questions were answered.      Aldair Stout MD, MS    Department of Obstetrics and Gynecology   Division of Gynecologic Oncology   [facility]  Phone: [phone]          Again, thank you for allowing me to participate in the care of your patient.        Sincerely,        Aldair Stout MD

## 2023-12-06 NOTE — NURSING NOTE
"Oncology Rooming Note    December 6, 2023 12:30 PM   Joie Yadav is a 33 year old female who presents for:    Chief Complaint   Patient presents with    Oncology Clinic Visit     Endometrial intraepithelial neoplasia     Initial Vitals: /80 (BP Location: Right arm, Patient Position: Sitting, Cuff Size: Adult Large)   Pulse 89   Temp 98.1  F (36.7  C) (Oral)   Resp 20   Wt 105.6 kg (232 lb 12.8 oz)   SpO2 96%   BMI 50.38 kg/m   Estimated body mass index is 50.38 kg/m  as calculated from the following:    Height as of 3/11/22: 1.448 m (4' 9\").    Weight as of this encounter: 105.6 kg (232 lb 12.8 oz). Body surface area is 2.06 meters squared.  No Pain (0) Comment: Data Unavailable   No LMP recorded. (Menstrual status: IUD).  Allergies reviewed: Yes  Medications reviewed: Yes    Medications: Medication refills not needed today.  Pharmacy name entered into [employer]:    [facility] [place], [place] - [id number] [place], [place] - [address] AT [address] [place] [address] DRUG STORE #[id number] [place], [place] - [address] AT [facility] & [address] [place] [address]    Clinical concerns:  none      Judy Boykin              "

## 2023-12-06 NOTE — PROGRESS NOTES
[address] [place] [phone]  [facility] [address] [facility]    Pacemaker/Defibrillator Clinic          04/05/21        Ryne Felix  [address] [place] [address] [id number]  [facility] [address]        Dear Ryne Felix    This letter is to inform you that we received the transmission from your monitor at home that checks your implanted heart device. The next date your monitor will automatically transmit will be 5-10-21. If your report needs attention we will notify you. Your device and monitor are wireless and most transmit cellularly, but please periodically check your monitor is still plugged in to the electrical outlet. If you still use the telephone land line to send please ensure the connection to the phone david is secure. This will help to ensure successful automatic transmissions in the future. Also, the monitor needs to be close to you while sleeping at night. Please be aware that the remote device transmission sites are periodically monitored only during regular business hours during which simultaneous in-office device clinics are being run. If your transmission requires attention, we will contact you as soon as possible. Thank you.             [facility] - Established Patient Visit    Visit date: Dec 6, 2023     Reason for visit: EIN    Interval history: Joie Yadav is a 32 year old woman with a diagnosis of EIN s/p mirena IUD placement and removal after 3 normal biopsies. The IUD was removed in May of 2023. She is hoping to become pregnant and states that she has not had any bleeding since her IUD was placed (2 years). She has never been pregnant.   She notes intermittent breast tenderness as well as right-sided pelvic pain monthly though she states that she has not had any bleeding.  She has been doing well no nausea, vomiting fever or chills, normal urinary and bowel function, no lightheadedness or dizziness, and no vaginal bleeding. She notes that she has been trying to lose weight over the past  2 months and has lost 7 pounds.      Relevant history:    EMBx (3/11/22):  Endometrium, curettage:  - Endometrial atypical hyperplasia with prominent squamous morules     EMBx (7/20/22):  Endometrium, biopsy:  - Inactive endometrium with decidualized stroma, consistent with exogenous progestin treatment  - Negative for hyperplasia or atypia    EMBx (11/30/22)  Endometrium, biopsy:  - Inactive endometrium with decidualized stroma consistent with exogenous progestin effect  - Negative for hyperplasia or atypia    EMBx (3/1/23):  Endometrium, biopsy:  - Inactive endometrial glands and decidualized stroma, consistent with exogenous progesterone effect  - Negative for hyperplasia or atypia     Past Medical History:  Past Medical History:   Diagnosis Date    Diabetes (H)     Type 2    Endometrial hyperplasia without atypia, simple 04/08/2019    Infection due to 2019 novel coronavirus     Neck pain     Obesity     PCOS (polycystic ovarian syndrome)     TMJ (temporomandibular joint syndrome)     Vitiligo        Past Surgical History:  Past Surgical History:   Procedure Laterality Date    DILATION AND CURETTAGE, HYSTEROSCOPY DIAGNOSTIC,  COMBINED N/A 3/11/2022    Procedure: HYSTEROSCOPY, DIAGNOSTIC, WITH DILATION AND CURETTAGE OF , PLACEMENT OF INTRAUTERINE DEVICE;  Surgeon: Aldair Stout MD;  [facility]    INSERT INTRAUTERINE DEVICE N/A 3/11/2022    Procedure: Mirena Intra uterine device placement;  Surgeon: Aldair Stout MD;  [facility]    LAPAROSCOPIC CHOLECYSTECTOMY N/A 04/11/2020    Procedure: CHOLECYSTECTOMY, LAPAROSCOPIC;  Surgeon: Norris Reid MD;  [facility]        Physical Exam:  /80 (BP Location: Right arm, Patient Position: Sitting, Cuff Size: Adult Large)   Pulse 89   Temp 98.1  F (36.7  C) (Oral)   Resp 20   Wt 105.6 kg (232 lb 12.8 oz)   SpO2 96%   BMI 50.38 kg/m     General appearance: no acute distress, well groomed, sitting comfortably   HEENT: No cervical, submandibular, or supraclavicular lymphadenectomy   CV: regular rate, regular rhythm, no murmurs appreciated  Resp: clear to auscultation bilaterally, normal respiratory effort  GI: abdomen soft, non-tender, non-distended, no appreciable masses  :  External: vulva/labia normal in appearance without lesions  Vagina: mucosa is pink, no lesions appreciated  Cervix: nulliparous appearing, without lesions or abnormal vasculature.  An endometrial biopsy was performed without complication.  Uterus/adnexa: no pelvic masses appreciated    Assessment:  Joie Yadav is a 32 year old woman with a diagnosis of EIN.      A total of 33 minutes was spent with the patient, on documentation, chart review, counseling the patient and/or treatment planning.  Does not include time for procedure.      1.  Endometrial hyperplasia with atypia.  2.  Class 2 obesity.  3.  Fertility preservation       We discussed we will repeat TVUS and visit with possible biopsy in 3 month. We discussed we will place another IUD if she has recurrent EIN. We discussed also a similar management strategy even if she does have an early stage cancer that would be  noninvasive.  The patient agrees with this plan, is very appreciative of her care.  Discussed that we would recommend that she establish care with a general OB/GYN to discuss her fertility.  The patient has a known history of PCOS and has been amenorrheic since her IUD was removed. The patient will reach out to S who she had seen previously.  All questions were answered.      Aldair Stout MD, MS    Department of Obstetrics and Gynecology   Division of Gynecologic Oncology   [facility]  Phone: [phone]

## 2023-12-06 NOTE — PROGRESS NOTES
Ovarian pain right side, half day - 1 day. Not sure if with cycles. No bleeding since may. Seven pounds weight loss, two months.

## 2023-12-11 DIAGNOSIS — N85.02 EIN (ENDOMETRIAL INTRAEPITHELIAL NEOPLASIA): Primary | ICD-10-CM

## 2024-01-08 NOTE — PROGRESS NOTES
[facility] New Gynecology Patient    CC: Infertility in setting of PCOS    HPI: Ms Joie Yadav is a 33 year old year old female who presents for infertility with PCOS.     Patient has EIN and is followed by gyn onc Dr. Stout. She desires fertility. Her other medical history is complicated by DM2, PCOS. Plan with Dr. Stout is to repeat TVUS and biopsy in 3 months. Will replace IUD if EIN recurs. Patient has PCOS and has been amenorrheic since IUD removal in May 2023.     Patient states she was diagnosed with PCOS when she was 18 based on ultrasound. She has never had normal periods. She has always had oligomenorrhea. She started OCPs in her mid-20s and had some regularity to her periods then, but then started having constant bleeding. This led to an endometrial biopsy which revealed EIN as above.     Patient has been working on her diabetes, she did have a period of time where she lost 60 pounds, but thinks she has gained some of this back. She takes no medications for her diabetes currently.     Patient states she and her  have been trying for pregnancy for about 10 years, but does note that she has used contraception in relation to PCOS and EIN for about 5 of those years. She states she has never been pregnant.     ROS: 10-Point ROS negative except as noted in HPI    History:    - LMP: Unknown  - Menses: Very irregular, no period since IUD was removed 2023    Past Medical History:   Diagnosis Date    Diabetes (H)     Type 2    Endometrial hyperplasia without atypia, simple 2019    Infection due to 2019 novel coronavirus     Neck pain     Obesity     PCOS (polycystic ovarian syndrome)     TMJ (temporomandibular joint syndrome)     Vitiligo        Past Surgical History:   Procedure Laterality Date    DILATION AND CURETTAGE, HYSTEROSCOPY DIAGNOSTIC, COMBINED N/A 3/11/2022    Procedure: HYSTEROSCOPY, DIAGNOSTIC, WITH DILATION AND CURETTAGE OF , PLACEMENT OF  "INTRAUTERINE DEVICE;  Surgeon: Aldair Stout MD;  Location: [facility] OR    INSERT INTRAUTERINE DEVICE N/A 3/11/2022    Procedure: Mirena Intra uterine device placement;  Surgeon: Aldair Stout MD;  Location: [facility] OR    LAPAROSCOPIC CHOLECYSTECTOMY N/A 04/11/2020    Procedure: CHOLECYSTECTOMY, LAPAROSCOPIC;  Surgeon: Norris Reid MD;  Location: [facility] OR       Social History:  Social History     Tobacco Use    Smoking status: Never    Smokeless tobacco: Never   Substance Use Topics    Alcohol use: No       Physical Exam:     /86   Pulse 81   Ht 1.499 m (4' 11\")   Wt 106.6 kg (235 lb)   LMP  (LMP Unknown)   BMI 47.46 kg/m    Body mass index is 47.46 kg/m .    General: Alert and oriented, no acute distress  Psych: Appropriate affect  CV: Well perfused, normal heart rate  Lungs: No increased work of breathing    Assessment and Plan:  Joie Yadav is a 33 year old woman with EIN, PCOS, DM2 who presents for evaluation of infertility.      #Infertility  - Reviewed that patient had normal AMH, testosterone with Dr. De La Rosa in 2022  - Will check a prolactin today for completeness  - Reviewed that with amenorrhea, patient is not regularly ovulating  - Reviewed that with her EIN, it would be beneficial to become pregnant as soon as possible and also that with her elevated BMI, medications available through this office may not be effective or safe  - Given referral form for [facility] clinics  - Patient to follow up with S office if [facility] clinic unable to help or if she has a pregnancy    #PCOS  - Reviewed that patient should never go more than 90 days without a bleed  - With amenorrhea for >6 months, given provera withdrawal bleed today with one refill, patient to take this now. Reviewed return precautions for heavy bleeding.   - Reviewed the importance of endometrial protection with amenorrhea and prior EIN, patient will follow up with this office for either birth control or scheduled bleeds if she is not " actively receiving care with [facility] or gyn onc  - Reviewed metabolic syndrome associated with PCOS, A1c, lipid profile, TSH. A1c 7.5, triglycerides 388  - Message sent for patient to see PCP regarding abnormal labs  - Patient desired weight management clinic referral, placed today    Staffed with Dr. Green.    Juan David Rodriguez MD  Obstetrics, Gynecology, and Women's Health  PGY4  5:28 PM 01/09/2024    The Patient was seen in [facility] by JUAN DAVID RODRIGUEZ.  I reviewed the history & exam. Assessment and plan were jointly made.    Joselyn Green MD

## 2024-01-09 ENCOUNTER — OFFICE VISIT (OUTPATIENT)
Dept: OBGYN | Facility: CLINIC | Age: 34
End: 2024-01-09
Payer: COMMERCIAL

## 2024-01-09 ENCOUNTER — MYC MEDICAL ADVICE (OUTPATIENT)
Dept: OBGYN | Facility: CLINIC | Age: 34
End: 2024-01-09

## 2024-01-09 ENCOUNTER — APPOINTMENT (OUTPATIENT)
Dept: LAB | Facility: CLINIC | Age: 34
End: 2024-01-09
Payer: COMMERCIAL

## 2024-01-09 VITALS
SYSTOLIC BLOOD PRESSURE: 130 MMHG | HEIGHT: 59 IN | HEART RATE: 81 BPM | BODY MASS INDEX: 47.37 KG/M2 | WEIGHT: 235 LBS | DIASTOLIC BLOOD PRESSURE: 86 MMHG

## 2024-01-09 DIAGNOSIS — N97.9 FEMALE INFERTILITY: ICD-10-CM

## 2024-01-09 DIAGNOSIS — N85.02 EIN (ENDOMETRIAL INTRAEPITHELIAL NEOPLASIA): ICD-10-CM

## 2024-01-09 DIAGNOSIS — E11.9 TYPE 2 DIABETES MELLITUS WITHOUT COMPLICATION, WITHOUT LONG-TERM CURRENT USE OF INSULIN (H): ICD-10-CM

## 2024-01-09 DIAGNOSIS — E28.2 PCOS (POLYCYSTIC OVARIAN SYNDROME): Primary | ICD-10-CM

## 2024-01-09 LAB
CHOLEST SERPL-MCNC: 220 MG/DL
FASTING STATUS PATIENT QL REPORTED: NO
HBA1C MFR BLD: 7.5 %
HDLC SERPL-MCNC: 44 MG/DL
LDLC SERPL CALC-MCNC: 98 MG/DL
NONHDLC SERPL-MCNC: 176 MG/DL
PROLACTIN SERPL 3RD IS-MCNC: 7 NG/ML (ref 5–23)
TRIGL SERPL-MCNC: 388 MG/DL
TSH SERPL DL<=0.005 MIU/L-ACNC: 1.92 UIU/ML (ref 0.3–4.2)

## 2024-01-09 PROCEDURE — 84146 ASSAY OF PROLACTIN: CPT | Performed by: STUDENT IN AN ORGANIZED HEALTH CARE EDUCATION/TRAINING PROGRAM

## 2024-01-09 PROCEDURE — 36415 COLL VENOUS BLD VENIPUNCTURE: CPT | Performed by: STUDENT IN AN ORGANIZED HEALTH CARE EDUCATION/TRAINING PROGRAM

## 2024-01-09 PROCEDURE — 80061 LIPID PANEL: CPT | Performed by: STUDENT IN AN ORGANIZED HEALTH CARE EDUCATION/TRAINING PROGRAM

## 2024-01-09 PROCEDURE — 99203 OFFICE O/P NEW LOW 30 MIN: CPT | Mod: GE | Performed by: OBSTETRICS & GYNECOLOGY

## 2024-01-09 PROCEDURE — 84443 ASSAY THYROID STIM HORMONE: CPT | Performed by: STUDENT IN AN ORGANIZED HEALTH CARE EDUCATION/TRAINING PROGRAM

## 2024-01-09 PROCEDURE — G0463 HOSPITAL OUTPT CLINIC VISIT: HCPCS | Performed by: STUDENT IN AN ORGANIZED HEALTH CARE EDUCATION/TRAINING PROGRAM

## 2024-01-09 PROCEDURE — 83036 HEMOGLOBIN GLYCOSYLATED A1C: CPT | Performed by: STUDENT IN AN ORGANIZED HEALTH CARE EDUCATION/TRAINING PROGRAM

## 2024-01-09 RX ORDER — MEDROXYPROGESTERONE ACETATE 10 MG
10 TABLET ORAL DAILY
Qty: 10 TABLET | Refills: 1 | Status: SHIPPED | OUTPATIENT
Start: 2024-01-09 | End: 2024-03-29

## 2024-01-09 NOTE — PATIENT INSTRUCTIONS
Thank you for trusting us with your care!     If you need to contact us for questions about:  Symptoms, Scheduling & Medical Questions; Non-urgent (2-3 day response) Kaycee message, Urgent (needing response today) [phone] (if after 3:30pm next day response)   Prescriptions: Please call your Pharmacy   Billing: [facility] [phone] or [facility]:[phone]

## 2024-01-26 ENCOUNTER — OFFICE VISIT (OUTPATIENT)
Dept: FAMILY MEDICINE | Facility: CLINIC | Age: 34
End: 2024-01-26
Attending: FAMILY MEDICINE
Payer: COMMERCIAL

## 2024-01-26 VITALS
DIASTOLIC BLOOD PRESSURE: 83 MMHG | WEIGHT: 236 LBS | HEART RATE: 76 BPM | HEIGHT: 59 IN | SYSTOLIC BLOOD PRESSURE: 124 MMHG | BODY MASS INDEX: 47.58 KG/M2

## 2024-01-26 DIAGNOSIS — Z23 NEED FOR VACCINATION: ICD-10-CM

## 2024-01-26 DIAGNOSIS — E78.2 ELEVATED CHOLESTEROL WITH ELEVATED TRIGLYCERIDES: ICD-10-CM

## 2024-01-26 DIAGNOSIS — E11.9 CONTROLLED TYPE 2 DIABETES MELLITUS WITHOUT COMPLICATION, WITHOUT LONG-TERM CURRENT USE OF INSULIN (H): Primary | ICD-10-CM

## 2024-01-26 DIAGNOSIS — E66.01 MORBID OBESITY (H): ICD-10-CM

## 2024-01-26 PROCEDURE — 99204 OFFICE O/P NEW MOD 45 MIN: CPT | Performed by: FAMILY MEDICINE

## 2024-01-26 PROCEDURE — 250N000011 HC RX IP 250 OP 636

## 2024-01-26 PROCEDURE — 90677 PCV20 VACCINE IM: CPT

## 2024-01-26 PROCEDURE — G0009 ADMIN PNEUMOCOCCAL VACCINE: HCPCS

## 2024-01-26 PROCEDURE — G0463 HOSPITAL OUTPT CLINIC VISIT: HCPCS | Performed by: FAMILY MEDICINE

## 2024-01-26 PROCEDURE — 99207 PR FOOT EXAM NO CHARGE: CPT | Performed by: FAMILY MEDICINE

## 2024-01-26 RX ORDER — PREGABALIN 75 MG/1
75 CAPSULE ORAL 3 TIMES DAILY
COMMUNITY
End: 2024-03-29

## 2024-01-26 ASSESSMENT — PAIN SCALES - GENERAL: PAINLEVEL: SEVERE PAIN (7)

## 2024-01-26 NOTE — PROGRESS NOTES
"CC: Consult      SUBJECTIVE:  Joie Yadav presents for management of Type 2 Diabetes. She is a new patient to me. She was diagnosed with diabetes in 2018 with A1c 7.6%, was able to bring A1c down to normal range with metformin 500 mg daily and lifestyle changes. Highest A1c was 7.7%, and she had A1cs in the 5% range throughout 2021. Now, she has gained weight again and A1c is up to 7.5% two weeks ago. She is not taking metformin.     COMPLIANCE/SURVEILLANCE:     Medication compliance: Hasn't been taking metformin for about 2 years  Diabetic diet compliance: Started making some changes about 2 weeks ago, reducing carbohydrates   Home glucose monitoring: None   Eye exam: Nov 2023  Nephropathy: none  Peripheral neuropathy: none   Sees podiatry:  no   History of foot sores/infections: no   History of amputations:  no  Autonomic neuropathy:  no  Macrovascular disease:    CAD: no   PVD: no   Stroke: no    Hypertension: no    Dyslipidemia: yes, high triglycerides   Tobacco use:  no   ASA prophylaxis: no   Routine dental care: yes     Immunizations current: due for Prevnar 20       DIABETES ROS: No numbness in tingling in feet. Occasional polydipsia and polyuria. No chest pain or palpitations.     EXAM:   /83   Pulse 76   Ht 1.5 m (4' 11.06\")   Wt 107 kg (236 lb)   LMP 01/23/2024 (Exact Date)   BMI 47.58 kg/m    Body mass index is 47.58 kg/m .  General: alert and no distress  Lungs: Normal respiratory rate & rhythm, clear to ausculation bilaterally.   Heart: RRR, no murmurs.   Feet:  Pulses: DP & PT pulses 2+.   Sensory: Sensation intact to monofilament.   Skin: No rashes, ulcers, or lesions.   Toenails: normal nails without lesions    LABS:   Cholesterol (mg/dL)   Date Value   01/09/2024 220 (H)     Direct Measure HDL (mg/dL)   Date Value   01/09/2024 44 (L)     LDL Cholesterol Calculated (mg/dL)   Date Value   01/09/2024 98     AST (U/L)   Date Value   05/14/2020 18     Hemoglobin (g/dL)   Date Value "   06/22/2023 13.5   05/14/2020 12.4     TSH   Date Value   01/09/2024 1.92 uIU/mL   03/09/2022 2.37 mU/L   02/18/2020 2.35 mU/L     Creatinine (mg/dL)   Date Value   06/22/2023 0.71   05/14/2020 0.72        ASSESSMENT/PLAN:   1. Diabetes:   - A1c goal: 7%  - Medication changes today:  start metformin - up titrate to 2000 mg total daily.  - Lifestyle management: Discussed nutrition & physical activity recommendations. Discussed how weight loss may help.   - Diabetes education referral placed.   - Prevnar 20 vaccine today.   - Labs: 3 months - A1c, lipid, BMP, urine microalbumin  - Follow-up: 3 months      2. Lipids:  - Review of recent lipid panel reveals: LDL <100, triglycerides elevated although non-fasting sample.  - Treatment recommendations: Lifestyle management. Patient desires pregnancy, will not prescribe statin.    3. Blood Pressure:  - Blood pressure today is within target.    - Treatment recommendations:  None.    Return to clinic:  Return in about 3 months (around 4/26/2024).    Julieta Thompson MD  Family Medicine

## 2024-02-06 ENCOUNTER — VIRTUAL VISIT (OUTPATIENT)
Dept: EDUCATION SERVICES | Facility: CLINIC | Age: 34
End: 2024-02-06
Attending: FAMILY MEDICINE
Payer: COMMERCIAL

## 2024-02-06 DIAGNOSIS — E11.9 CONTROLLED TYPE 2 DIABETES MELLITUS WITHOUT COMPLICATION, WITHOUT LONG-TERM CURRENT USE OF INSULIN (H): Primary | ICD-10-CM

## 2024-02-06 PROCEDURE — G0108 DIAB MANAGE TRN  PER INDIV: HCPCS | Mod: 95 | Performed by: DIETITIAN, REGISTERED

## 2024-02-06 NOTE — PATIENT INSTRUCTIONS
Goals for Diabetes Care:     1. Eat 3 balanced meals each day - use plate planner.  Monitor carb intake - limiting rice  NO soda/lemonade. Drink more water     Do not wait longer than 4-5 hours to eat something  Make sure you include protein source with each meal and at bedtime - this has been shown to help with blood glucose elevations      2. Activity really helps improve blood sugars. Try to Incorporate 30 minutes activity into each day - does not need to be all at one time & walking counts!     3. Take diabetes medications as prescribed      Follow up for A1C in 3 months and with your Doctor. Follow up with your Diabetic Educator as needed to assess BG targets and need for modifications to medications and/or lifestyle.     Call with any questions.    Thank you!  Kimberly Burden RDN, AUNDREA, [facility]   Certified Diabetes Care &   Triage [phone]

## 2024-02-06 NOTE — LETTER
2/6/2024         RE: Joie Yadav  [address] [place] [facility] [address]        Dear Colleague,    Thank you for referring your patient, Joie Yadav, to the [facility]. Please see a copy of my visit note below.      Diabetes Self-Management Education & Support    Presents for:      Type of service:  Video Visit    ASSESSMENT:  Initial visit for diabetes education  Previous dx   Family hx: both parents, sisters, paternal grandmother   Dyslipidemia    Obesity      Denies any s/s of hyperglycemia    Most recent A1C not meeting ADA goal of A1C <7.0.  Per chart review, pt had great success with wt loss in 2021 and her A1C dropped down to normal range and she stopped taking metformin.Was started back on metformin by Dr. Thompson 1/26/24.     Taking 1000 mg metformin daily - tolerating well.  We reviewed metformin rx and up titrating to 2000 mg daily - pt expressed understanding     SMBG:  Pt not interested in home monitoring of BS at this point - we will revisit at the follow up visit     Has been making changes to her diet and intake.  3 meals daily. Does brown rice and eats more veg now. Has also cut out the sweets.  Endorses drinking lemonade and soda often.    Has been exercising 4x/wk for at least 30 mins      No other concerns today.    Education/Discussion: Reviewed diabetes basics, AIC and BS goals, sx and tx of hypo and hyperglycemia, complications of uncontrolled diabetes, general activity and diet guidelines, CHO foods, concept of budgeting and balancing, plate method for portion control, wt loss basics: pt expressed understanding.    Encouraged to not wait longer than 4-6 hours to eat meals or snacks.Discussed the need to eat healthily - mainly vegetables, some meat, limited rice/bread noodles, exercise, and sleep well.     Patient's most recent   Lab Results   Component Value Date    A1C 7.5 01/09/2024    A1C 6.7 04/10/2020     is not meeting goal of  <7.0      Based on learning assessment above, most appropriate setting for further diabetes education would be: Individual setting.      PLAN    Goals for Diabetes Care:     1. Eat 3 balanced meals each day - use plate planner.  Monitor carb intake - limiting rice  NO soda/lemonade, drink more water     Do not wait longer than 4-5 hours to eat something  Make sure you include protein source with each meal and at bedtime - this has been shown to help with blood glucose elevations      2. Activity really helps improve blood sugars. Try to Incorporate 30 minutes activity into each day - does not need to be all at one time & walking counts!     3. Take diabetes medications as prescribed.    4. Pt not interested in home monitoring of BS at this point - we will revisit at the follow up visit      Follow up for A1C in 3 months and with your Doctor. Follow up with your Diabetic Educator as needed to assess BG targets and need for modifications to medications and/or lifestyle.     Call with any questions.    Thank you!  Kimberly Burden RDN, AUNDREA, [facility]   Certified Diabetes Care &   Triage [phone]      Topics to cover at upcoming visits: needs AADE 7 review      Follow-up: 4-6 weeks (or sooner of concerns) - provided phone number  PCP: 4/30, pt will also be due for A1C redraw    See Care Plan for co-developed, patient-state behavior change goals.  AVS provided for patient today.    Education Materials Provided:  Emailed with permission:  Living Healthy with Diabetes, Carbohydrate Counting, and My Plate Planner      SUBJECTIVE/OBJECTIVE:  Accompanied by: Self  Diabetes education in the past 24mo: No  Diabetes type: Type 2  Cultural Influences/Ethnic Background:   or     Patient Problem List and Family Medical History reviewed for relevant medical history, current medical status, and diabetes risk factors.    Vitals:  LMP 01/23/2024 (Exact Date)   Estimated body mass index is 47.58 kg/m  as  "calculated from the following:    Height as of 1/26/24: 1.5 m (4' 11.06\").    Weight as of 1/26/24: 107 kg (236 lb).   Last 3 BP:   BP Readings from Last 3 Encounters:   01/26/24 124/83   01/09/24 130/86   12/06/23 114/80       History   Smoking Status    Never   Smokeless Tobacco    Never       Labs:  Lab Results   Component Value Date    A1C 7.5 01/09/2024    A1C 6.7 04/10/2020     Lab Results   Component Value Date     06/22/2023     03/11/2022     05/14/2020     Lab Results   Component Value Date    LDL 98 01/09/2024     Direct Measure HDL   Date Value Ref Range Status   01/09/2024 44 (L) >=50 mg/dL Final   ]  GFR Estimate   Date Value Ref Range Status   06/22/2023 >90 >60 mL/min/1.73m2 Final   05/14/2020 >90 >60 mL/min/[1.73_m2] Final     Comment:     Non  GFR Calc  Starting 12/18/2018, serum creatinine based estimated GFR (eGFR) will be   calculated using the Chronic Kidney Disease Epidemiology Collaboration   (CKD-EPI) equation.       GFR Estimate If Black   Date Value Ref Range Status   05/14/2020 >90 >60 mL/min/[1.73_m2] Final     Comment:      GFR Calc  Starting 12/18/2018, serum creatinine based estimated GFR (eGFR) will be   calculated using the Chronic Kidney Disease Epidemiology Collaboration   (CKD-EPI) equation.       Lab Results   Component Value Date    CR 0.71 06/22/2023    CR 0.72 05/14/2020     No results found for: \"MICROALBUMIN\"    Healthy Eating:  Healthy Eating Assessed Today: Yes  Meal planning/habits: Avoiding sweets  Who cooks/prepares meals for you?: Self, Spouse  Who purchases food in  your home?: Self, Spouse  Meals include: Breakfast, Lunch, Dinner  Breakfast: oats, berries  Lunch: brown rice, beef, veg  Dinner: salad with chicken  Snacks: almonds, apple  Beverages: Water, Soda, Other (see Comments) (lemonade, soda at times)    Being Active:  Being Active Assessed Today: Yes  Exercise:: Yes  Days per week of moderate to strenuous " exercise (like a brisk walk): 4  On average, minutes per day of exercise at this level: 30  Exercise Minutes per Week: 120    Monitoring:  Monitoring Assessed Today: Yes  Times checking blood sugar at home (number): Never    ***    Taking Medications:  Diabetes Medication(s)       Biguanides       metFORMIN (GLUCOPHAGE) 500 MG tablet Take 1 tablet (500 mg) by mouth daily (with breakfast) for 7 days, THEN 1 tablet (500 mg) 2 times daily (with meals) for 7 days, THEN 2 tablets (1,000 mg) 2 times daily (with meals) for 76 days.            Taking Medication Assessed Today: Yes  Current Treatments: Diet, Oral Medication (taken by mouth)  Diabetes medication side effects?: No    Problem Solving:  Is the patient at risk for hypoglycemia?: No              Reducing Risks:  Has dilated eye exam at least once a year?: Yes  Sees dentist every 6 months?: Yes  Feet checked by healthcare provider in the last year?: Yes    Healthy Coping:  Emotional response to diabetes: Ready to learn  Informal Support system:: Spouse  Stage of change: PREPARATION (Decided to change - considering how)  Patient Activation Measure Survey Score:       No data to display                  Care Plan and Education Provided:  {Care Plan and Eduction Provided:011814}    ***    Time Spent: {:348763} minutes  Encounter Type: Individual    Any diabetes medication dose changes were made via the CDE Protocol per the patient's {:115072}. A copy of this encounter was shared with the provider.

## 2024-02-06 NOTE — PROGRESS NOTES
Diabetes Self-Management Education & Support    Presents for:      Type of service:  Video Visit    ASSESSMENT:  Initial visit for diabetes education  Previous dx   Family hx: both parents, sisters, paternal grandmother   Dyslipidemia    Obesity      Denies any s/s of hyperglycemia    Most recent A1C not meeting ADA goal of A1C <7.0.  Per chart review, pt had great success with wt loss in 2021 and her A1C dropped down to normal range and she stopped taking metformin.Was started back on metformin by Dr. Thompson 1/26/24.     Taking 1000 mg metformin daily - tolerating well.  We reviewed metformin rx and up titrating to 2000 mg daily - pt expressed understanding     SMBG:  Pt not interested in home monitoring of BS at this point - we will revisit at the follow up visit     Has been making changes to her diet and intake.  3 meals daily. Does brown rice and eats more veg now. Has also cut out the sweets.  Endorses drinking lemonade and soda often.    Has been exercising 4x/wk for at least 30 mins      No other concerns today.    Education/Discussion: Reviewed diabetes basics, AIC and BS goals, sx and tx of hypo and hyperglycemia, complications of uncontrolled diabetes, general activity and diet guidelines, CHO foods, concept of budgeting and balancing, plate method for portion control, wt loss basics: pt expressed understanding.    Encouraged to not wait longer than 4-6 hours to eat meals or snacks.Discussed the need to eat healthily - mainly vegetables, some meat, limited rice/bread noodles, exercise, and sleep well.     Patient's most recent   Lab Results   Component Value Date    A1C 7.5 01/09/2024    A1C 6.7 04/10/2020     is not meeting goal of <7.0      Based on learning assessment above, most appropriate setting for further diabetes education would be: Individual setting.      PLAN    Goals for Diabetes Care:     1. Eat 3 balanced meals each day - use plate planner.  Monitor carb intake - limiting rice  NO  "soda/lemonade, drink more water     Do not wait longer than 4-5 hours to eat something  Make sure you include protein source with each meal and at bedtime - this has been shown to help with blood glucose elevations      2. Activity really helps improve blood sugars. Try to Incorporate 30 minutes activity into each day - does not need to be all at one time & walking counts!     3. Take diabetes medications as prescribed.    4. Pt not interested in home monitoring of BS at this point - we will revisit at the follow up visit      Follow up for A1C in 3 months and with your Doctor. Follow up with your Diabetic Educator as needed to assess BG targets and need for modifications to medications and/or lifestyle.     Call with any questions.    Thank you!  Kimberly Burden RDN, AUNDREA, [facility]   Certified Diabetes Care &   Triage [phone]      Topics to cover at upcoming visits: needs AADE 7 review      Follow-up: 4-6 weeks (or sooner of concerns) - provided phone number  PCP: 4/30, pt will also be due for A1C redraw    See Care Plan for co-developed, patient-state behavior change goals.  AVS provided for patient today.    Education Materials Provided:  Emailed with permission:  Living Healthy with Diabetes, Carbohydrate Counting, and My Plate Planner      SUBJECTIVE/OBJECTIVE:  Accompanied by: Self  Diabetes education in the past 24mo: No  Diabetes type: Type 2  Cultural Influences/Ethnic Background:   or     Patient Problem List and Family Medical History reviewed for relevant medical history, current medical status, and diabetes risk factors.    Vitals:  LMP 01/23/2024 (Exact Date)   Estimated body mass index is 47.58 kg/m  as calculated from the following:    Height as of 1/26/24: 1.5 m (4' 11.06\").    Weight as of 1/26/24: 107 kg (236 lb).   Last 3 BP:   BP Readings from Last 3 Encounters:   01/26/24 124/83   01/09/24 130/86   12/06/23 114/80       History   Smoking Status    Never   Smokeless " "Tobacco    Never       Labs:  Lab Results   Component Value Date    A1C 7.5 01/09/2024    A1C 6.7 04/10/2020     Lab Results   Component Value Date     06/22/2023     03/11/2022     05/14/2020     Lab Results   Component Value Date    LDL 98 01/09/2024     Direct Measure HDL   Date Value Ref Range Status   01/09/2024 44 (L) >=50 mg/dL Final   ]  GFR Estimate   Date Value Ref Range Status   06/22/2023 >90 >60 mL/min/1.73m2 Final   05/14/2020 >90 >60 mL/min/[1.73_m2] Final     Comment:     Non  GFR Calc  Starting 12/18/2018, serum creatinine based estimated GFR (eGFR) will be   calculated using the Chronic Kidney Disease Epidemiology Collaboration   (CKD-EPI) equation.       GFR Estimate If Black   Date Value Ref Range Status   05/14/2020 >90 >60 mL/min/[1.73_m2] Final     Comment:      GFR Calc  Starting 12/18/2018, serum creatinine based estimated GFR (eGFR) will be   calculated using the Chronic Kidney Disease Epidemiology Collaboration   (CKD-EPI) equation.       Lab Results   Component Value Date    CR 0.71 06/22/2023    CR 0.72 05/14/2020     No results found for: \"MICROALBUMIN\"    Healthy Eating:  Healthy Eating Assessed Today: Yes  Meal planning/habits: Avoiding sweets  Who cooks/prepares meals for you?: Self, Spouse  Who purchases food in  your home?: Self, Spouse  Meals include: Breakfast, Lunch, Dinner  Breakfast: oats, berries  Lunch: brown rice, beef, veg  Dinner: salad with chicken  Snacks: almonds, apple  Beverages: Water, Soda, Other (see Comments) (lemonade, soda at times)    Being Active:  Being Active Assessed Today: Yes  Exercise:: Yes  Days per week of moderate to strenuous exercise (like a brisk walk): 4  On average, minutes per day of exercise at this level: 30  Exercise Minutes per Week: 120    Monitoring:  Monitoring Assessed Today: Yes  Times checking blood sugar at home (number): Never      Taking Medications:  Diabetes Medication(s)       " Biguanides       metFORMIN (GLUCOPHAGE) 500 MG tablet Take 1 tablet (500 mg) by mouth daily (with breakfast) for 7 days, THEN 1 tablet (500 mg) 2 times daily (with meals) for 7 days, THEN 2 tablets (1,000 mg) 2 times daily (with meals) for 76 days.            Taking Medication Assessed Today: Yes  Current Treatments: Diet, Oral Medication (taken by mouth)  Diabetes medication side effects?: No    Problem Solving:  Is the patient at risk for hypoglycemia?: No              Reducing Risks:  Has dilated eye exam at least once a year?: Yes  Sees dentist every 6 months?: Yes  Feet checked by healthcare provider in the last year?: Yes    Healthy Coping:  Emotional response to diabetes: Ready to learn  Informal Support system:: Spouse  Stage of change: PREPARATION (Decided to change - considering how)  Patient Activation Measure Survey Score:       No data to display                Time Spent: 60 minutes  Encounter Type: Individual    Any diabetes medication dose changes were made via the CDE Protocol per the patient's referring provider. A copy of this encounter was shared with the provider.

## 2024-02-06 NOTE — LETTER
2/6/2024         RE: Joie Yadav  [address] [facility] [address]        Dear Colleague,    Thank you for referring your patient, Joie Yadav, to the [facility]. Please see a copy of my visit note below.      Diabetes Self-Management Education & Support    Presents for:      Type of service:  Video Visit    ASSESSMENT:  Initial visit for diabetes education  Previous dx   Family hx: both parents, sisters, paternal grandmother   Dyslipidemia    Obesity      Denies any s/s of hyperglycemia    Most recent A1C not meeting ADA goal of A1C <7.0.  Per chart review, pt had great success with wt loss in 2021 and her A1C dropped down to normal range and she stopped taking metformin.Was started back on metformin by Dr. Thompson 1/26/24.     Taking 1000 mg metformin daily - tolerating well.  We reviewed metformin rx and up titrating to 2000 mg daily - pt expressed understanding     SMBG:  Pt not interested in home monitoring of BS at this point - we will revisit at the follow up visit     Has been making changes to her diet and intake.  3 meals daily. Does brown rice and eats more veg now. Has also cut out the sweets.  Endorses drinking lemonade and soda often.    Has been exercising 4x/wk for at least 30 mins      No other concerns today.    Education/Discussion: Reviewed diabetes basics, AIC and BS goals, sx and tx of hypo and hyperglycemia, complications of uncontrolled diabetes, general activity and diet guidelines, CHO foods, concept of budgeting and balancing, plate method for portion control, wt loss basics: pt expressed understanding.    Encouraged to not wait longer than 4-6 hours to eat meals or snacks.Discussed the need to eat healthily - mainly vegetables, some meat, limited rice/bread noodles, exercise, and sleep well.     Patient's most recent   Lab Results   Component Value Date    A1C 7.5 01/09/2024    A1C 6.7 04/10/2020     is not meeting goal of  <7.0      Based on learning assessment above, most appropriate setting for further diabetes education would be: Individual setting.      PLAN    Goals for Diabetes Care:     1. Eat 3 balanced meals each day - use plate planner.  Monitor carb intake - limiting rice  NO soda/lemonade, drink more water     Do not wait longer than 4-5 hours to eat something  Make sure you include protein source with each meal and at bedtime - this has been shown to help with blood glucose elevations      2. Activity really helps improve blood sugars. Try to Incorporate 30 minutes activity into each day - does not need to be all at one time & walking counts!     3. Take diabetes medications as prescribed.    4. Pt not interested in home monitoring of BS at this point - we will revisit at the follow up visit      Follow up for A1C in 3 months and with your Doctor. Follow up with your Diabetic Educator as needed to assess BG targets and need for modifications to medications and/or lifestyle.     Call with any questions.    Thank you!  Kimberly Burden RDN, AUNDREA, [facility] [place]   Certified Diabetes Care &   Triage [phone]      Topics to cover at upcoming visits: needs AADE 7 review      Follow-up: 4-6 weeks (or sooner of concerns) - provided phone number  PCP: 4/30, pt will also be due for A1C redraw    See Care Plan for co-developed, patient-state behavior change goals.  AVS provided for patient today.    Education Materials Provided:  Emailed with permission:  Living Healthy with Diabetes, Carbohydrate Counting, and My Plate Planner      SUBJECTIVE/OBJECTIVE:  Accompanied by: Self  Diabetes education in the past 24mo: No  Diabetes type: Type 2  Cultural Influences/Ethnic Background:   or     Patient Problem List and Family Medical History reviewed for relevant medical history, current medical status, and diabetes risk factors.    Vitals:  LMP 01/23/2024 (Exact Date)   Estimated body mass index is 47.58 kg/m  as  "calculated from the following:    Height as of 1/26/24: 1.5 m (4' 11.06\").    Weight as of 1/26/24: 107 kg (236 lb).   Last 3 BP:   BP Readings from Last 3 Encounters:   01/26/24 124/83   01/09/24 130/86   12/06/23 114/80       History   Smoking Status     Never   Smokeless Tobacco     Never       Labs:  Lab Results   Component Value Date    A1C 7.5 01/09/2024    A1C 6.7 04/10/2020     Lab Results   Component Value Date     06/22/2023     03/11/2022     05/14/2020     Lab Results   Component Value Date    LDL 98 01/09/2024     Direct Measure HDL   Date Value Ref Range Status   01/09/2024 44 (L) >=50 mg/dL Final   ]  GFR Estimate   Date Value Ref Range Status   06/22/2023 >90 >60 mL/min/1.73m2 Final   05/14/2020 >90 >60 mL/min/[1.73_m2] Final     Comment:     Non  GFR Calc  Starting 12/18/2018, serum creatinine based estimated GFR (eGFR) will be   calculated using the Chronic Kidney Disease Epidemiology Collaboration   (CKD-EPI) equation.       GFR Estimate If Black   Date Value Ref Range Status   05/14/2020 >90 >60 mL/min/[1.73_m2] Final     Comment:      GFR Calc  Starting 12/18/2018, serum creatinine based estimated GFR (eGFR) will be   calculated using the Chronic Kidney Disease Epidemiology Collaboration   (CKD-EPI) equation.       Lab Results   Component Value Date    CR 0.71 06/22/2023    CR 0.72 05/14/2020     No results found for: \"MICROALBUMIN\"    Healthy Eating:  Healthy Eating Assessed Today: Yes  Meal planning/habits: Avoiding sweets  Who cooks/prepares meals for you?: Self, Spouse  Who purchases food in  your home?: Self, Spouse  Meals include: Breakfast, Lunch, Dinner  Breakfast: oats, berries  Lunch: brown rice, beef, veg  Dinner: salad with chicken  Snacks: almonds, apple  Beverages: Water, Soda, Other (see Comments) (lemonade, soda at times)    Being Active:  Being Active Assessed Today: Yes  Exercise:: Yes  Days per week of moderate to strenuous " exercise (like a brisk walk): 4  On average, minutes per day of exercise at this level: 30  Exercise Minutes per Week: 120    Monitoring:  Monitoring Assessed Today: Yes  Times checking blood sugar at home (number): Never    ***    Taking Medications:  Diabetes Medication(s)       Biguanides       metFORMIN (GLUCOPHAGE) 500 MG tablet Take 1 tablet (500 mg) by mouth daily (with breakfast) for 7 days, THEN 1 tablet (500 mg) 2 times daily (with meals) for 7 days, THEN 2 tablets (1,000 mg) 2 times daily (with meals) for 76 days.            Taking Medication Assessed Today: Yes  Current Treatments: Diet, Oral Medication (taken by mouth)  Diabetes medication side effects?: No    Problem Solving:  Is the patient at risk for hypoglycemia?: No              Reducing Risks:  Has dilated eye exam at least once a year?: Yes  Sees dentist every 6 months?: Yes  Feet checked by healthcare provider in the last year?: Yes    Healthy Coping:  Emotional response to diabetes: Ready to learn  Informal Support system:: Spouse  Stage of change: PREPARATION (Decided to change - considering how)  Patient Activation Measure Survey Score:       No data to display                  Care Plan and Education Provided:  {Care Plan and Eduction Provided:663739}    ***    Time Spent: {:574003} minutes  Encounter Type: Individual    Any diabetes medication dose changes were made via the CDE Protocol per the patient's {:473653}. A copy of this encounter was shared with the provider.

## 2024-02-06 NOTE — Clinical Note
2/6/2024         RE: Joie Yadav  [address] [facility] [address]        Dear Colleague,    Thank you for referring your patient, Joie Yadav, to the [facility]. Please see a copy of my visit note below.      Diabetes Self-Management Education & Support    Presents for:      Type of service:  Video Visit    ASSESSMENT:  Initial visit for diabetes education  Previous dx   Family hx: both parents, sisters, paternal grandmother   Dyslipidemia    Obesity      Denies any s/s of hyperglycemia    Most recent A1C not meeting ADA goal of A1C <7.0.  Per chart review, pt had great success with wt loss in 2021 and her A1C dropped down to normal range and she stopped taking metformin.Was started back on metformin by Dr. Thompson 1/26/24.     Taking 1000 mg metformin daily - tolerating well.  We reviewed metformin rx and up titrating to 2000 mg daily - pt expressed understanding     SMBG:  Pt not interested in home monitoring of BS at this point - we will revisit at the follow up visit     Has been making changes to her diet and intake.  3 meals daily. Does brown rice and eats more veg now. Has also cut out the sweets.  Endorses drinking lemonade and soda often.    Has been exercising 4x/wk for at least 30 mins      No other concerns today.    Education/Discussion: Reviewed diabetes basics, AIC and BS goals, sx and tx of hypo and hyperglycemia, complications of uncontrolled diabetes, general activity and diet guidelines, CHO foods, concept of budgeting and balancing, plate method for portion control, wt loss basics: pt expressed understanding.    Encouraged to not wait longer than 4-6 hours to eat meals or snacks.Discussed the need to eat healthily - mainly vegetables, some meat, limited rice/bread noodles, exercise, and sleep well.     Patient's most recent   Lab Results   Component Value Date    A1C 7.5 01/09/2024    A1C 6.7 04/10/2020     is not meeting goal of  <7.0      Based on learning assessment above, most appropriate setting for further diabetes education would be: Individual setting.      PLAN    Goals for Diabetes Care:     1. Eat 3 balanced meals each day - use plate planner.  Monitor carb intake - limiting rice  NO soda/lemonade, drink more water     Do not wait longer than 4-5 hours to eat something  Make sure you include protein source with each meal and at bedtime - this has been shown to help with blood glucose elevations      2. Activity really helps improve blood sugars. Try to Incorporate 30 minutes activity into each day - does not need to be all at one time & walking counts!     3. Take diabetes medications as prescribed.    4. Pt not interested in home monitoring of BS at this point - we will revisit at the follow up visit      Follow up for A1C in 3 months and with your Doctor. Follow up with your Diabetic Educator as needed to assess BG targets and need for modifications to medications and/or lifestyle.     Call with any questions.    Thank you!  Kimberly Burden RDN, AUNDREA, [facility] [place]   Certified Diabetes Care &   Triage [phone]      Topics to cover at upcoming visits: needs AADE 7 review      Follow-up: 4-6 weeks (or sooner of concerns) - provided phone number  PCP: 4/30, pt will also be due for A1C redraw    See Care Plan for co-developed, patient-state behavior change goals.  AVS provided for patient today.    Education Materials Provided:  Emailed with permission:  Living Healthy with Diabetes, Carbohydrate Counting, and My Plate Planner      SUBJECTIVE/OBJECTIVE:  Accompanied by: Self  Diabetes education in the past 24mo: No  Diabetes type: Type 2  Cultural Influences/Ethnic Background:   or     Patient Problem List and Family Medical History reviewed for relevant medical history, current medical status, and diabetes risk factors.    Vitals:  LMP 01/23/2024 (Exact Date)   Estimated body mass index is 47.58 kg/m  as  "calculated from the following:    Height as of 1/26/24: 1.5 m (4' 11.06\").    Weight as of 1/26/24: 107 kg (236 lb).   Last 3 BP:   BP Readings from Last 3 Encounters:   01/26/24 124/83   01/09/24 130/86   12/06/23 114/80       History   Smoking Status    Never   Smokeless Tobacco    Never       Labs:  Lab Results   Component Value Date    A1C 7.5 01/09/2024    A1C 6.7 04/10/2020     Lab Results   Component Value Date     06/22/2023     03/11/2022     05/14/2020     Lab Results   Component Value Date    LDL 98 01/09/2024     Direct Measure HDL   Date Value Ref Range Status   01/09/2024 44 (L) >=50 mg/dL Final   ]  GFR Estimate   Date Value Ref Range Status   06/22/2023 >90 >60 mL/min/1.73m2 Final   05/14/2020 >90 >60 mL/min/[1.73_m2] Final     Comment:     Non  GFR Calc  Starting 12/18/2018, serum creatinine based estimated GFR (eGFR) will be   calculated using the Chronic Kidney Disease Epidemiology Collaboration   (CKD-EPI) equation.       GFR Estimate If Black   Date Value Ref Range Status   05/14/2020 >90 >60 mL/min/[1.73_m2] Final     Comment:      GFR Calc  Starting 12/18/2018, serum creatinine based estimated GFR (eGFR) will be   calculated using the Chronic Kidney Disease Epidemiology Collaboration   (CKD-EPI) equation.       Lab Results   Component Value Date    CR 0.71 06/22/2023    CR 0.72 05/14/2020     No results found for: \"MICROALBUMIN\"    Healthy Eating:  Healthy Eating Assessed Today: Yes  Meal planning/habits: Avoiding sweets  Who cooks/prepares meals for you?: Self, Spouse  Who purchases food in  your home?: Self, Spouse  Meals include: Breakfast, Lunch, Dinner  Breakfast: oats, berries  Lunch: brown rice, beef, veg  Dinner: salad with chicken  Snacks: almonds, apple  Beverages: Water, Soda, Other (see Comments) (lemonade, soda at times)    Being Active:  Being Active Assessed Today: Yes  Exercise:: Yes  Days per week of moderate to strenuous " exercise (like a brisk walk): 4  On average, minutes per day of exercise at this level: 30  Exercise Minutes per Week: 120    Monitoring:  Monitoring Assessed Today: Yes  Times checking blood sugar at home (number): Never    ***    Taking Medications:  Diabetes Medication(s)       Biguanides       metFORMIN (GLUCOPHAGE) 500 MG tablet Take 1 tablet (500 mg) by mouth daily (with breakfast) for 7 days, THEN 1 tablet (500 mg) 2 times daily (with meals) for 7 days, THEN 2 tablets (1,000 mg) 2 times daily (with meals) for 76 days.            Taking Medication Assessed Today: Yes  Current Treatments: Diet, Oral Medication (taken by mouth)  Diabetes medication side effects?: No    Problem Solving:  Is the patient at risk for hypoglycemia?: No              Reducing Risks:  Has dilated eye exam at least once a year?: Yes  Sees dentist every 6 months?: Yes  Feet checked by healthcare provider in the last year?: Yes    Healthy Coping:  Emotional response to diabetes: Ready to learn  Informal Support system:: Spouse  Stage of change: PREPARATION (Decided to change - considering how)  Patient Activation Measure Survey Score:       No data to display                  Care Plan and Education Provided:  {Care Plan and Eduction Provided:455795}    ***    Time Spent: {:432838} minutes  Encounter Type: Individual    Any diabetes medication dose changes were made via the CDE Protocol per the patient's {:680025}. A copy of this encounter was shared with the provider.

## 2024-03-01 ENCOUNTER — ANCILLARY PROCEDURE (OUTPATIENT)
Dept: ULTRASOUND IMAGING | Facility: CLINIC | Age: 34
End: 2024-03-01
Attending: OBSTETRICS & GYNECOLOGY
Payer: COMMERCIAL

## 2024-03-01 ENCOUNTER — MEDICAL CORRESPONDENCE (OUTPATIENT)
Dept: SCHEDULING | Facility: CLINIC | Age: 34
End: 2024-03-01

## 2024-03-01 DIAGNOSIS — N85.02 EIN (ENDOMETRIAL INTRAEPITHELIAL NEOPLASIA): ICD-10-CM

## 2024-03-01 PROCEDURE — 76830 TRANSVAGINAL US NON-OB: CPT | Mod: GC | Performed by: STUDENT IN AN ORGANIZED HEALTH CARE EDUCATION/TRAINING PROGRAM

## 2024-03-01 PROCEDURE — 76856 US EXAM PELVIC COMPLETE: CPT | Mod: GC | Performed by: STUDENT IN AN ORGANIZED HEALTH CARE EDUCATION/TRAINING PROGRAM

## 2024-03-11 ENCOUNTER — ONCOLOGY VISIT (OUTPATIENT)
Dept: ONCOLOGY | Facility: HOSPITAL | Age: 34
End: 2024-03-11
Attending: OBSTETRICS & GYNECOLOGY
Payer: COMMERCIAL

## 2024-03-11 VITALS
OXYGEN SATURATION: 97 % | DIASTOLIC BLOOD PRESSURE: 63 MMHG | HEART RATE: 81 BPM | SYSTOLIC BLOOD PRESSURE: 135 MMHG | RESPIRATION RATE: 18 BRPM | TEMPERATURE: 98 F | WEIGHT: 239.3 LBS | BODY MASS INDEX: 48.24 KG/M2

## 2024-03-11 DIAGNOSIS — N85.02 EIN (ENDOMETRIAL INTRAEPITHELIAL NEOPLASIA): Primary | ICD-10-CM

## 2024-03-11 PROCEDURE — 99214 OFFICE O/P EST MOD 30 MIN: CPT | Mod: 25 | Performed by: OBSTETRICS & GYNECOLOGY

## 2024-03-11 PROCEDURE — 58100 BIOPSY OF UTERUS LINING: CPT | Performed by: OBSTETRICS & GYNECOLOGY

## 2024-03-11 PROCEDURE — G0463 HOSPITAL OUTPT CLINIC VISIT: HCPCS | Performed by: OBSTETRICS & GYNECOLOGY

## 2024-03-11 RX ORDER — ACETAMINOPHEN 325 MG/1
325-650 TABLET ORAL EVERY 6 HOURS PRN
COMMUNITY
End: 2024-08-21

## 2024-03-11 RX ORDER — CEFAZOLIN SODIUM 2 G/100ML
2 INJECTION, SOLUTION INTRAVENOUS SEE ADMIN INSTRUCTIONS
Status: CANCELLED | OUTPATIENT
Start: 2024-03-11

## 2024-03-11 RX ORDER — PHENAZOPYRIDINE HYDROCHLORIDE 100 MG/1
200 TABLET, FILM COATED ORAL ONCE
Status: CANCELLED | OUTPATIENT
Start: 2024-03-11 | End: 2024-03-11

## 2024-03-11 RX ORDER — CEFAZOLIN SODIUM 2 G/100ML
2 INJECTION, SOLUTION INTRAVENOUS
Status: CANCELLED | OUTPATIENT
Start: 2024-03-11

## 2024-03-11 RX ORDER — METRONIDAZOLE 500 MG/100ML
500 INJECTION, SOLUTION INTRAVENOUS
Status: CANCELLED | OUTPATIENT
Start: 2024-03-11

## 2024-03-11 RX ORDER — HEPARIN SODIUM 5000 [USP'U]/.5ML
5000 INJECTION, SOLUTION INTRAVENOUS; SUBCUTANEOUS
Status: CANCELLED | OUTPATIENT
Start: 2024-03-11

## 2024-03-11 ASSESSMENT — PAIN SCALES - GENERAL: PAINLEVEL: MILD PAIN (3)

## 2024-03-11 NOTE — PATIENT INSTRUCTIONS
Surgery: HYSTEROSCOPY, WITH DILATION AND CURETTAGE OF UTERUS     Overview    Dilation and curettage (D&C) is a procedure to remove tissue from inside your uterus. Health care providers perform dilation and curettage to diagnose and treat certain uterine conditions -- such as heavy bleeding -- or to clear the uterine lining after a miscarriage or .    In a dilation and curettage, your provider uses small instruments or a medication to open (dilate) the lower, narrow part of your uterus (cervix). Your provider then uses a surgical instrument called a curette, which can be a sharp instrument or suction device, to remove uterine tissue.      Why it's done  Dilation and curettage is used to diagnose or treat a uterine condition.    To diagnose a condition  Before doing a D&C, your provider might recommend a procedure called endometrial biopsy or endometrial sampling to diagnose a condition. Endometrial sampling might be done if:    You have unusual uterine bleeding  You have bleeding after menopause  You have unusual endometrial cells, which are discovered during a routine test for cervical cancer  To perform the test, your provider collects a tissue sample from the lining of your uterus (endometrium) and sends the sample to a lab for testing. The test can check for:    Endometrial intraepithelial hyperplasia -- a precancerous condition in which the uterine lining becomes too thick  Uterine polyps  Uterine cancer or dysplasia  If more information is needed, your provider then might recommend a D&C, which is usually done in an operating room.    To treat a condition    When performing a D&C to treat a condition, your provider removes the contents from inside your uterus, not just a small tissue sample. This might be done to:    Prevent infection or heavy bleeding by clearing tissues that remain in the uterus after a miscarriage or   Remove a tumor that forms instead of a typical pregnancy (molar  pregnancy)  Treat excessive bleeding after delivery by clearing out any placenta that remains in the uterus  Remove cervical or uterine polyps, which are usually noncancerous (benign)  A D&C might be combined with another procedure called hysteroscopy. During hysteroscopy, your provider inserts a slim instrument with a light and camera on the end into your vagina, through your cervix and into your uterus.    Your provider then views the lining of your uterus on a screen, checking for areas that look unusual. Your provider also checks for polyps and takes tissue samples as needed. During a hysteroscopy, uterine polyps and fibroid tumors can be removed.    At times, a hysteroscopy might be done combined with an endometrial biopsy before a full D&C procedure.    Risks  Complications from dilation and curettage are rare. However, there are risks, including:    Perforation of the uterus. This occurs when a surgical instrument pokes a hole in the uterus. This happens more often in women who were recently pregnant and in women who have gone through menopause.    Most perforations heal on their own. However, if a blood vessel or other organ is damaged, a second procedure might be needed to repair it.    Damage to the cervix. If the cervix is torn during the D&C, your provider can apply pressure or medicine to stop the bleeding or can close the wound with stitches (sutures). This might be prevented if the cervix is softened with medication before the D&C.  Scar tissue on the uterine wall. Rarely, a D&C results in development of scar tissue in the uterus, a condition known as Asherman's syndrome. Asherman's syndrome happens most often when the D&C is done after a miscarriage or delivery.    This can lead to unusual, absent or painful menstrual cycles, future miscarriages and infertility. It can often be treated with surgery.    Infection. Infection after a D&C is rare.  Contact your health care provider if after a D&C you  have:    Bleeding that's heavy enough that you need to change pads every hour  Lasting dizziness or lightheadedness  Fever  Cramps lasting more than 48 hours  Pain that gets worse instead of better  Foul-smelling discharge from the vagina    After the procedure  You'll likely spend a few hours in a recovery room after the D&C so that you can be monitored for heavy bleeding or other complications. This also gives you time to recover from the effects of anesthesia.    Typical side effects of a D&C can last a few days and include:    Mild cramping  Spotting or light bleeding  For discomfort from cramping, your provider might suggest taking ibuprofen (Advil, Motrin IB, others) or another medication.    You should be able to resume your activities within a day or two.    To prevent infection, don't put anything in your vagina until your provider says it's OK. Ask when you can use tampons and resume sexual activity.    Your uterus must build a new lining after a D&C, so your next period might be early or late. If you had a D&C because of a miscarriage, and you want to become pregnant, talk with your provider about when it's safe to start trying again.

## 2024-03-11 NOTE — NURSING NOTE
"Oncology Rooming Note    March 11, 2024 8:54 AM   Joie Yadav is a 33 year old female who presents for:    Chief Complaint   Patient presents with    Oncology Clinic Visit     Gyn Onc follow up     Initial Vitals: /63   Pulse 81   Temp 98  F (36.7  C)   Resp 18   Wt 108.5 kg (239 lb 4.8 oz)   LMP 01/23/2024 (Exact Date)   SpO2 97%   BMI 48.24 kg/m   Estimated body mass index is 48.24 kg/m  as calculated from the following:    Height as of 1/26/24: 1.5 m (4' 11.06\").    Weight as of this encounter: 108.5 kg (239 lb 4.8 oz). Body surface area is 2.13 meters squared.  Mild Pain (3) Comment: Data Unavailable   Patient's last menstrual period was 01/23/2024 (exact date).  Allergies reviewed: Yes  Medications reviewed: Yes    Medications: Medication refills not needed today.  Pharmacy name entered into [address]:    [facility] [place], [place] - [address] [place] [address] DR[facility] STORE #[id number] [place], [place] - [address] AT [address] [place] [address] DRUG STORE #[id number] [place], [place] - [address] AT [address]    Frailty Screening:   Is the patient here for a new oncology consult visit in cancer care? 2. No      Clinical concerns: R ovary pain at times, comes and goes.  Dr. Stout was not notified.       Estephania Bailey RN              "

## 2024-03-11 NOTE — LETTER
3/11/2024         RE: Joie Yadav  [address] [facility] [id number]        Dear Colleague,    Thank you for referring your patient, oJie Yadav, to the [facility]. Please see a copy of my visit note below.                Follow Up Notes on Referred Patient    Date: 3/13/2024       Dr. Delfina Augustine MD  No address on file       RE: Joie Yadav  : 1990  GEMMA: 3/11/2024    Reason for visit: EIN     Interval history: Joie Yadav is a 33 year old woman with a diagnosis of EIN s/p mirena IUD placement and removal after 3 normal biopsies. The IUD was removed in May of 2023. She is hoping to become pregnant and states that she has not had any bleeding since her IUD was placed (2 years). She has never been pregnant.   She notes intermittent breast tenderness as well as right-sided pelvic pain monthly though she states she has received progesterone from her gynecologist and had a withdrawal bleeding after that.  She is scheduled to see a fertility specialist this.  She has been doing well no nausea, vomiting fever or chills, normal urinary and bowel function, no lightheadedness or dizziness she is scheduled to see a fertility specialist. She notes that she has been trying to lose weight over the past  2 months and has lost 7 pounds.            Past Medical History:    Past Medical History:   Diagnosis Date     Diabetes (H)     Type 2     Endometrial hyperplasia without atypia, simple 2019     Infection due to 2019 novel coronavirus      Neck pain      Obesity      PCOS (polycystic ovarian syndrome)      TMJ (temporomandibular joint syndrome)      Vitiligo          Past Surgical History:    Past Surgical History:   Procedure Laterality Date     DILATION AND CURETTAGE, HYSTEROSCOPY DIAGNOSTIC, COMBINED N/A 3/11/2022    Procedure: HYSTEROSCOPY, DIAGNOSTIC, WITH DILATION AND CURETTAGE OF , PLACEMENT OF INTRAUTERINE DEVICE;  Surgeon:  Aldair Stout MD;  Location: [facility] OR     INSERT INTRAUTERINE DEVICE N/A 3/11/2022    Procedure: Mirena Intra uterine device placement;  Surgeon: Aldair Stout MD;  Location: [facility] OR     LAPAROSCOPIC CHOLECYSTECTOMY N/A 04/11/2020    Procedure: CHOLECYSTECTOMY, LAPAROSCOPIC;  Surgeon: Norris Reid MD;  Location: [facility] OR         Health Maintenance Due   Topic Date Due     YEARLY PREVENTIVE VISIT  Never done     MICROALBUMIN  Never done     ADVANCE CARE PLANNING  Never done     EYE EXAM  Never done     HIV SCREENING  Never done     HEPATITIS C SCREENING  Never done     PAP  10/22/2021     DTAP/TDAP/TD IMMUNIZATION (5 - Tdap) 01/17/2023     COVID-19 Vaccine (4 - 2023-24 season) 09/01/2023       Current Medications:     Current Outpatient Medications   Medication Sig Dispense Refill     acetaminophen (TYLENOL) 325 MG tablet Take 325-650 mg by mouth every 6 hours as needed for mild pain       metFORMIN (GLUCOPHAGE) 500 MG tablet Take 1 tablet (500 mg) by mouth daily (with breakfast) for 7 days, THEN 1 tablet (500 mg) 2 times daily (with meals) for 7 days, THEN 2 tablets (1,000 mg) 2 times daily (with meals) for 76 days. 325 tablet 0     pregabalin (LYRICA) 75 MG capsule Take 75 mg by mouth 3 times daily       medroxyPROGESTERone (PROVERA) 10 MG tablet Take 1 tablet (10 mg) by mouth daily (Patient not taking: Reported on 3/11/2024) 10 tablet 1         Allergies:      No Known Allergies     Social History:     Social History     Tobacco Use     Smoking status: Never     Smokeless tobacco: Never   Substance Use Topics     Alcohol use: No       History   Drug Use No         Family History:       Family History   Problem Relation Age of Onset     Anesthesia Reaction No family hx of      Bleeding Disorder No family hx of      Clotting Disorder No family hx of          Physical Exam:     /63   Pulse 81   Temp 98  F (36.7  C)   Resp 18   Wt 108.5 kg (239 lb 4.8 oz)   LMP 01/23/2024 (Exact Date)   SpO2  97%   BMI 48.24 kg/m    Body mass index is 48.24 kg/m .    General appearance: no acute distress, well groomed, sitting comfortably   HEENT: No cervical, submandibular, or supraclavicular lymphadenectomy   GI: abdomen soft, non-tender, non-distended, no appreciable masses  :  External: vulva/labia normal in appearance without lesions  Vagina: mucosa is pink, no lesions appreciated  Cervix: nulliparous appearing, without lesions or abnormal vasculature.    Uterus/adnexa: no pelvic masses appreciated    Procedure patient was consented for endometrial biopsy.  Due to cervical stenosis endometrial biopsy    Performed.  The patient was in stable conditions at the end of the procedure.         Assessment:  Joie Yadav is a 33 year old woman with a diagnosis of EIN.      A total of 33 minutes was spent with the patient, on documentation, chart review, counseling the patient and/or treatment planning.  Does not include time for procedure.      1.  Endometrial hyperplasia with atypia.  2.  Class 2 obesity.  3.  Fertility preservation       We discussed we will proceed with an exam under anesthesia hysteroscopy as well as D&C in the operating room to sample endometrial lining.    The patient has a known history of PCOS and has been amenorrheic since her IUD was removed.  She is scheduled to see a fertility specialist.  We will have her see my colleagues in anesthesia for preoperative optimization.  She agrees with this plan she is very appreciative of her care.  All questions were answered.      Risks, benefits and alternatives to proceed discussed in detail with the patient. Risks include but are not limited to bleeding, infection, possible injury to surrounding organs including bowel, bladder, ureter, need for second procedure/surgery related to complications from first procedure, postoperative medical complications such as cardiopulmonary events, lymphedema, lymphocyst, thromboembolic events.        Aldair MEZA  MD [place], MS    Department of Obstetrics and Gynecology   Division of Gynecologic Oncology   [facility]  Phone: [phone]    CC  Patient Care Team:  Julieta Thompson MD as PCP - General (Family Medicine)  Israel Ceron MD as MD (Urology)  Vandana Jarquin MD as Resident (OB/Gyn)  Howie Ward MD as Resident  Murphy Rae MD as MD (OB/Gyn)  Fanta Rodríguez MD as Resident (OB/Gyn)  Aldair Stout MD as Assigned Cancer Care Provider  Juan David Ocasio MD as Resident (Student in organized health care education/training program)  Julieta Thompsno MD as Assigned PCP  SELF, REFERRED      Again, thank you for allowing me to participate in the care of your patient.        Sincerely,        Aldair Stout MD

## 2024-03-12 ENCOUNTER — PATIENT OUTREACH (OUTPATIENT)
Dept: ONCOLOGY | Facility: CLINIC | Age: 34
End: 2024-03-12
Payer: COMMERCIAL

## 2024-03-12 ENCOUNTER — TELEPHONE (OUTPATIENT)
Dept: ONCOLOGY | Facility: CLINIC | Age: 34
End: 2024-03-12
Payer: COMMERCIAL

## 2024-03-12 NOTE — PROGRESS NOTES
[facility]: Cancer Care Initial Note                                    Discussion with Patient:                                                      Surgery              Assessment:                                                      Initial  Current living arrangement:: I live in a private home with spouse    Plan of Care Education Review:   Assessment completed with:: Patient    Plan of Care Education   Diagnosis:: Surgery  Does patient understand diagnosis?: Yes  Tx plan/regimen:: surgery  Does patient understand treatment plan/regimen?: Yes    Evaluation of Learning  Patient Education Provided: Yes  Readiness:: Eager  Method:: Literature  Response:: Verbalizes understanding               Pre/Post Procedure:   Surgery/Procedure plan reviewed with patient  Planned Surgery or Procedure: HYSTEROSCOPY, WITH DILATION AND CURETTAGE OF UTERUS  Anesthesia Type: general anesthesia  Relevant Diagnosis: Endometrial hyperplasia with atypia  Pre-Op Physical to be completed by (e.g.: PCP, Pre-Assessment Center, etc.):: PAC     Education  Person Taught: patient  Learning Readiness and Ability: no barriers identified  Pre-op Care Instructions: when to call provider;pain management;sexual activity restriction;physical activity restriction  Pre-op Infection Prevention Reviewed: other (see comment) (PAC will review)  Pre-op Planning Reviewed:  arrangements, if indicated  Pre-op Education/Instructions provided: how to prepare for surgery  Post-op Care Instructions: proper use of medications, when to take, and when to hold;when to call provider;pain management;diet;sexual activity restriction;physical activity restriction  Education Outcome Evaluation: eager to learn            Pre-op Checklist Reviewed  Labs: n/a  Chest X-Ray: n/a  COVID-19 Testing: n/a  EKG: n/a  Urinalysis: n/a  Anticoagulation plan: n/a  Bowel Prep: n/a  Rehab: n/a  Other (see comment): n/a         Intervention/Education provided during outreach:                                                        Patient will be scheduled for surgery, PAC and post op    Follow up call in 1-2 weeks    Signature:  Dianna Hutchins RN

## 2024-03-12 NOTE — TELEPHONE ENCOUNTER
Spoke with the patient and was able to confirm all scheduled information.     Patient is schedule for surgery with: Dr. Stout    Surgery Date: 4/16     Location: [facility]    H&P: to be completed by PAC clinic - scheduled on 3/29     Post-op:  5/8, in-person visit,      Patient will receive surgery arrival and start time from PAC. Patient is aware that if times change after they see PAC, they will receive a call from the pre-admission nurses 1-2 days prior to surgery with updated arrival time and NPO instructions.    Patient aware times are subject to change up until day before surgery.     Patient questions/concerns: N/A     Surgery packet was sent via US mail on  3/12      Yue Rodriguez on 3/12/2024 at 3:22 PM

## 2024-03-13 NOTE — PROGRESS NOTES
Follow Up Notes on Referred Patient    Date: 3/13/2024       Dr. Delfina Augustine MD  No address on file       RE: Joie Yadav  : 1990  GEMMA: 3/11/2024    Reason for visit: EIN     Interval history: Joie Yadav is a 33 year old woman with a diagnosis of EIN s/p mirena IUD placement and removal after 3 normal biopsies. The IUD was removed in May of 2023. She is hoping to become pregnant and states that she has not had any bleeding since her IUD was placed (2 years). She has never been pregnant.   She notes intermittent breast tenderness as well as right-sided pelvic pain monthly though she states she has received progesterone from her gynecologist and had a withdrawal bleeding after that.  She is scheduled to see a fertility specialist this.  She has been doing well no nausea, vomiting fever or chills, normal urinary and bowel function, no lightheadedness or dizziness she is scheduled to see a fertility specialist. She notes that she has been trying to lose weight over the past  2 months and has lost 7 pounds.            Past Medical History:    Past Medical History:   Diagnosis Date    Diabetes (H)     Type 2    Endometrial hyperplasia without atypia, simple 2019    Infection due to 2019 novel coronavirus     Neck pain     Obesity     PCOS (polycystic ovarian syndrome)     TMJ (temporomandibular joint syndrome)     Vitiligo          Past Surgical History:    Past Surgical History:   Procedure Laterality Date    DILATION AND CURETTAGE, HYSTEROSCOPY DIAGNOSTIC, COMBINED N/A 3/11/2022    Procedure: HYSTEROSCOPY, DIAGNOSTIC, WITH DILATION AND CURETTAGE OF , PLACEMENT OF INTRAUTERINE DEVICE;  Surgeon: Aldair Stout MD;  Location: [facility] OR    INSERT INTRAUTERINE DEVICE N/A 3/11/2022    Procedure: Mirena Intra uterine device placement;  Surgeon: Aldair Stout MD;  Location: [facility] OR    LAPAROSCOPIC CHOLECYSTECTOMY N/A 2020    Procedure: CHOLECYSTECTOMY, LAPAROSCOPIC;   Surgeon: Norris Reid MD;  Location:  OR         Health Maintenance Due   Topic Date Due    YEARLY PREVENTIVE VISIT  Never done    MICROALBUMIN  Never done    ADVANCE CARE PLANNING  Never done    EYE EXAM  Never done    HIV SCREENING  Never done    HEPATITIS C SCREENING  Never done    PAP  10/22/2021    DTAP/TDAP/TD IMMUNIZATION (5 - Tdap) 01/17/2023    COVID-19 Vaccine (4 - 2023-24 season) 09/01/2023       Current Medications:     Current Outpatient Medications   Medication Sig Dispense Refill    acetaminophen (TYLENOL) 325 MG tablet Take 325-650 mg by mouth every 6 hours as needed for mild pain      metFORMIN (GLUCOPHAGE) 500 MG tablet Take 1 tablet (500 mg) by mouth daily (with breakfast) for 7 days, THEN 1 tablet (500 mg) 2 times daily (with meals) for 7 days, THEN 2 tablets (1,000 mg) 2 times daily (with meals) for 76 days. 325 tablet 0    pregabalin (LYRICA) 75 MG capsule Take 75 mg by mouth 3 times daily      medroxyPROGESTERone (PROVERA) 10 MG tablet Take 1 tablet (10 mg) by mouth daily (Patient not taking: Reported on 3/11/2024) 10 tablet 1         Allergies:      No Known Allergies     Social History:     Social History     Tobacco Use    Smoking status: Never    Smokeless tobacco: Never   Substance Use Topics    Alcohol use: No       History   Drug Use No         Family History:       Family History   Problem Relation Age of Onset    Anesthesia Reaction No family hx of     Bleeding Disorder No family hx of     Clotting Disorder No family hx of          Physical Exam:     /63   Pulse 81   Temp 98  F (36.7  C)   Resp 18   Wt 108.5 kg (239 lb 4.8 oz)   LMP 01/23/2024 (Exact Date)   SpO2 97%   BMI 48.24 kg/m    Body mass index is 48.24 kg/m .    General appearance: no acute distress, well groomed, sitting comfortably   HEENT: No cervical, submandibular, or supraclavicular lymphadenectomy   GI: abdomen soft, non-tender, non-distended, no appreciable masses  :  External: vulva/labia  normal in appearance without lesions  Vagina: mucosa is pink, no lesions appreciated  Cervix: nulliparous appearing, without lesions or abnormal vasculature.    Uterus/adnexa: no pelvic masses appreciated    Procedure patient was consented for endometrial biopsy.  Due to cervical stenosis endometrial biopsy    Performed.  The patient was in stable conditions at the end of the procedure.         Assessment:  Joie Yadav is a 33 year old woman with a diagnosis of EIN.      A total of 33 minutes was spent with the patient, on documentation, chart review, counseling the patient and/or treatment planning.  Does not include time for procedure.      1.  Endometrial hyperplasia with atypia.  2.  Class 2 obesity.  3.  Fertility preservation       We discussed we will proceed with an exam under anesthesia hysteroscopy as well as D&C in the operating room to sample endometrial lining.    The patient has a known history of PCOS and has been amenorrheic since her IUD was removed.  She is scheduled to see a fertility specialist.  We will have her see my colleagues in anesthesia for preoperative optimization.  She agrees with this plan she is very appreciative of her care.  All questions were answered.      Risks, benefits and alternatives to proceed discussed in detail with the patient. Risks include but are not limited to bleeding, infection, possible injury to surrounding organs including bowel, bladder, ureter, need for second procedure/surgery related to complications from first procedure, postoperative medical complications such as cardiopulmonary events, lymphedema, lymphocyst, thromboembolic events.        Aldair Stout MD, MS    Department of Obstetrics and Gynecology   Division of Gynecologic Oncology   [facility]  Phone: [phone]    CC  Patient Care Team:  Julieta Thompson MD as PCP - General (Family Medicine)  Israel Ceron MD as MD (Urology)  Vandana Jarquin  MD Nika as Resident (OB/Gyn)  Howie Ward MD as Resident  Murphy Rae MD as MD (OB/Gyn)  Fanta Rodríguez MD as Resident (OB/Gyn)  Aldair Stout MD as Assigned Cancer Care Provider  Juan David Ocasio MD as Resident (Student in organized health care education/training program)  Julieta Thompson MD as Assigned PCP  SELF, REFERRED

## 2024-03-13 NOTE — TELEPHONE ENCOUNTER
FUTURE VISIT INFORMATION      SURGERY INFORMATION:  Date: 3/29/2024  Location:  [facility] OR   Surgeon:  Aldair Stout MD   Anesthesia Type:  MAC  Procedure: HYSTEROSCOPY, WITH DILATION AND CURETTAGE OF UTERUS       RECORDS REQUESTED FROM:       Primary Care Provider: Dr. Julieta Thompson     Pertinent Medical History:    Most recent EKG+ [id number]

## 2024-03-29 ENCOUNTER — OFFICE VISIT (OUTPATIENT)
Dept: SURGERY | Facility: CLINIC | Age: 34
End: 2024-03-29
Payer: COMMERCIAL

## 2024-03-29 ENCOUNTER — ANESTHESIA EVENT (OUTPATIENT)
Dept: SURGERY | Facility: CLINIC | Age: 34
End: 2024-03-29
Payer: COMMERCIAL

## 2024-03-29 ENCOUNTER — PRE VISIT (OUTPATIENT)
Dept: SURGERY | Facility: CLINIC | Age: 34
End: 2024-03-29

## 2024-03-29 VITALS
WEIGHT: 238 LBS | HEIGHT: 57 IN | TEMPERATURE: 97.8 F | HEART RATE: 98 BPM | BODY MASS INDEX: 51.34 KG/M2 | DIASTOLIC BLOOD PRESSURE: 87 MMHG | SYSTOLIC BLOOD PRESSURE: 125 MMHG | RESPIRATION RATE: 16 BRPM | OXYGEN SATURATION: 100 %

## 2024-03-29 DIAGNOSIS — N85.02 EIN (ENDOMETRIAL INTRAEPITHELIAL NEOPLASIA): ICD-10-CM

## 2024-03-29 DIAGNOSIS — Z01.818 PREOP EXAMINATION: Primary | ICD-10-CM

## 2024-03-29 DIAGNOSIS — E11.9 CONTROLLED TYPE 2 DIABETES MELLITUS WITHOUT COMPLICATION, WITHOUT LONG-TERM CURRENT USE OF INSULIN (H): ICD-10-CM

## 2024-03-29 PROCEDURE — 99213 OFFICE O/P EST LOW 20 MIN: CPT

## 2024-03-29 RX ORDER — CALCIUM CARBONATE 500 MG/1
1 TABLET, CHEWABLE ORAL 2 TIMES DAILY
COMMUNITY

## 2024-03-29 ASSESSMENT — PAIN SCALES - GENERAL: PAINLEVEL: NO PAIN (0)

## 2024-03-29 ASSESSMENT — ENCOUNTER SYMPTOMS: SEIZURES: 0

## 2024-03-29 NOTE — PATIENT INSTRUCTIONS
Preparing for Your Surgery      Name:  Joie Yadav   MRN:  [id number]   :  1990   Today's Date:  3/29/2024       Arriving for surgery:  Surgery date:  24  Arrival time:  6 am    Please come to:     [facility] [address]  [address] [place] [place]  [address]      The [facility] Patient /Visitor Ramp is located at [address]. Patients and visitors who self-park will receive the reduced hospital parking rate. If the Patient /Visitor Ramp is full, please follow the signs to the [employer] parking located at the main hospital entrance.     parking is available ( 24 hours/ 7 days a week)    Discounted parking pass options are available for patients and visitors. They can be purchased at the "[employer] desk at the main hospital entrance.    -  Stop at the security desk and they will direct surgery patients to Registration, and then the 3rd floor Surgery Waiting Room. [phone]     -  If you are in need of directions, wheelchair or escort please stop at the Information/security desk in the lobby.       What can I eat or drink?  -  You may eat and drink normally up to 8 hours prior to arrival time. (Until 10 pm 4-15-24)  -  You may have clear liquids until 2 hours prior to arrival time. (Until 4 am)    Examples of clear liquids:  Water  Clear broth  Juices (apple, white grape, white cranberry  and cider) without pulp  Noncarbonated, powder based beverages  (lemonade and Bienvenido-Aid)  Sodas (Sprite, 7-Up, ginger ale and seltzer)  Coffee or tea (without milk or cream)  Gatorade    -  No Alcohol or cannabis products for at least 24 hours before surgery.     Which medicines can I take?  Hold Aspirin for 7 days before surgery.   Hold Multivitamins for 7 days before surgery.  Hold Supplements for 7 days before surgery.  Hold Ibuprofen (Advil, Motrin) for 1 day before surgery--unless otherwise directed by surgeon.  Hold Naproxen  (Aleve) for 4 days before surgery.  Acetaminophen (Tylenol) is okay to take if needed.    -  DO NOT take these medications the day of surgery:  Metformin (Glucophage)  TUMS (Calcium Carbonate)    -  PLEASE TAKE these medications the day of surgery:  Acetaminophen (Tylenol) if needed    How do I prepare myself?  - Please take 2 showers (one the night prior to surgery and one the morning of surgery) using Scrubcare or Hibiclens soap.    Use this soap only from the neck to your toes.     Leave the soap on your skin for one minute--then rinse thoroughly.      You may use your own shampoo and conditioner. No other hair products.   - Please remove all jewelry and body piercings.  - No lotions, deodorants or fragrance.  - No makeup or fingernail polish.   - Bring your ID and insurance card.    -If you use a CPAP machine, please bring the CPAP machine, tubing, and mask to hospital.    -If you have a Deep Brain Stimulator, Spinal Cord Stimulator, or any Neuro Stimulator device---you must bring the remote control to the hospital.      ALL PATIENTS GOING HOME THE SAME DAY OF SURGERY ARE REQUIRED TO HAVE A RESPONSIBLE ADULT TO DRIVE AND BE IN ATTENDANCE WITH THEM FOR 24 HOURS FOLLOWING SURGERY.    Covid testing policy as of 12/06/2022  Your surgeon will notify and schedule you for a COVID test if one is needed before surgery--please direct any questions or COVID symptoms to your surgeon      Questions or Concerns:    - For any questions regarding the day of surgery or your hospital stay, please contact the Pre Admission Nursing Office at [phone].       - If you have health changes between today and your surgery, please call your surgeon.       - For questions after surgery, please call your surgeons office.           Current Visitor Guidelines    You may have 2 visitors in the pre op area.    Visiting hours: 8 a.m. to 8:30 p.m.    Patients confirmed or suspected to have symptoms of COVID 19 or flu:     No visitors allowed  for adult patients.   Children (under age 18) can have 1 named visitor.     People who are sick or showing symptoms of COVID 19 or flu:    Are not allowed to visit patients--we can only make exceptions in special situations.       Please follow these guidelines for your visit:          Please maintain social distance          Masking is optional--however at times you may be asked to wear a mask for the safety of yourself and others     Clean your hands with alcohol hand . Do this when you arrive at and leave the building and patient room,    And again after you touch your mask or anything in the room.     Go directly to and from the room you are visiting.     Stay in the patient s room during your visit. Limit going to other places in the hospital as much as possible     Leave bags and jackets at home or in the car.     For everyone s health, please don t come and go during your visit. That includes for smoking   during your visit.

## 2024-03-29 NOTE — H&P
Pre-Operative H & P     CC:  Preoperative exam to assess for increased cardiopulmonary risk while undergoing surgery and anesthesia.    Date of Encounter: 3/29/2024  Primary Care Physician:  Julieta Thompson     Reason for visit:   Encounter Diagnoses   Name Primary?    Preop examination Yes    EIN (endometrial intraepithelial neoplasia)        HPI  Joie Yadav is a 33 year old female who presents for pre-operative H & P in preparation for  Procedure Information       Case: [id number] Date/Time: 04/16/24 0800    Procedure: HYSTEROSCOPY, WITH DILATION AND CURETTAGE OF UTERUS (Abdomen)    Anesthesia type: MAC    Diagnosis: EIN (endometrial intraepithelial neoplasia) [N85.02]    Pre-op diagnosis: EIN (endometrial intraepithelial neoplasia) [N85.02]    Location:  OR  /  OR    Providers: Aldair Stout MD            Joie Yadav is a 33 year old woman with a PMH significant for HLD, Type II DM, PCOS, and obesity with a history of EIN s/p mirena IUD placement and removal after 3 normal biopsies. The IUD was removed in May of 2023. She follows with Dr. Stout and is now scheduled for the above procedure for further management.     History is obtained from the patient and chart review    Hx of abnormal bleeding or anti-platelet use: None.     Menstrual history: Patient's last menstrual period was 01/29/2024 (approximate).: With PCOS, patient reports rare, infrequent periods    Past Medical History  Past Medical History:   Diagnosis Date    Diabetes (H)     Type 2    Endometrial hyperplasia without atypia, simple 04/08/2019    Infection due to 2019 novel coronavirus     Neck pain     Obesity     PCOS (polycystic ovarian syndrome)     TMJ (temporomandibular joint syndrome)     Vitiligo        Past Surgical History  Past Surgical History:   Procedure Laterality Date    DILATION AND CURETTAGE, HYSTEROSCOPY DIAGNOSTIC, COMBINED N/A 3/11/2022    Procedure: HYSTEROSCOPY, DIAGNOSTIC, WITH DILATION AND  CURETTAGE OF , PLACEMENT OF INTRAUTERINE DEVICE;  Surgeon: Aldair Stout MD;  Location: [facility] OR    INSERT INTRAUTERINE DEVICE N/A 3/11/2022    Procedure: Mirena Intra uterine device placement;  Surgeon: Aldair Stout MD;  Location: [facility] OR    LAPAROSCOPIC CHOLECYSTECTOMY N/A 04/11/2020    Procedure: CHOLECYSTECTOMY, LAPAROSCOPIC;  Surgeon: Norris Reid MD;  Location: [facility] OR       Prior to Admission Medications  Current Outpatient Medications   Medication Sig Dispense Refill    acetaminophen (TYLENOL) 325 MG tablet Take 325-650 mg by mouth every 6 hours as needed for mild pain      metFORMIN (GLUCOPHAGE) 500 MG tablet Take 1 tablet (500 mg) by mouth daily (with breakfast) for 7 days, THEN 1 tablet (500 mg) 2 times daily (with meals) for 7 days, THEN 2 tablets (1,000 mg) 2 times daily (with meals) for 76 days. 325 tablet 0       Allergies  No Known Allergies    Social History  Social History     Socioeconomic History    Marital status: Single     Spouse name: Not on file    Number of children: Not on file    Years of education: Not on file    Highest education level: Not on file   Occupational History    Not on file   Tobacco Use    Smoking status: Never    Smokeless tobacco: Never   Vaping Use    Vaping Use: Never used   Substance and Sexual Activity    Alcohol use: Never    Drug use: Never    Sexual activity: Yes     Partners: Male     Comment: Mirena   Other Topics Concern    Not on file   Social History Narrative    Not on file     Social Determinants of Health     Financial Resource Strain: Low Risk  (1/19/2024)    Financial Resource Strain     Within the past 12 months, have you or your family members you live with been unable to get utilities (heat, electricity) when it was really needed?: No   Food Insecurity: Low Risk  (1/19/2024)    Food Insecurity     Within the past 12 months, did you worry that your food would run out before you got money to buy more?: No     Within the past 12 months, did  the food you bought just not last and you didn t have money to get more?: No   Transportation Needs: Low Risk  (1/19/2024)    Transportation Needs     Within the past 12 months, has lack of transportation kept you from medical appointments, getting your medicines, non-medical meetings or appointments, work, or from getting things that you need?: No   Physical Activity: Not on file   Stress: Not on file   Social Connections: Not on file   Interpersonal Safety: Not on file   Housing Stability: Low Risk  (1/19/2024)    Housing Stability     Do you have housing? : Yes     Are you worried about losing your housing?: No       Family History  Family History   Problem Relation Age of Onset    Anesthesia Reaction No family hx of     Bleeding Disorder No family hx of     Clotting Disorder No family hx of        Review of Systems  The complete review of systems is negative other than noted in the HPI or here.   Anesthesia Evaluation   Pt has had prior anesthetic. Type: General.    No history of anesthetic complications       ROS/MED HX  ENT/Pulmonary:     (+)     JEFFERY risk factors,   obese,                             (-) asthma and recent URI   Neurologic:  - neg neurologic ROS  (-) no seizures and no CVA   Cardiovascular:     (+) Dyslipidemia - -   -  - -                                 Previous cardiac testing   Echo: Date: Results:    Stress Test:  Date: Results:    ECG Reviewed:  Date: 6/22/23 Results:  NSR  Cath:  Date: Results:   (-) [facility]   METS/Exercise Tolerance:  Comment: - Walking x 30 mins continuously without any exertional symptoms   Hematologic:  - neg hematologic  ROS     Musculoskeletal:  - neg musculoskeletal ROS     GI/Hepatic:     (+) GERD (Relieved with Tums), Asymptomatic on medication,        cholecystitis/cholelithiasis (s/p lap eleazar),          Renal/Genitourinary: Comment: - EIN    (+)       Nephrolithiasis ,    (-) renal disease   Endo: Comment: - PCOS        (+)  type II DM, Last HgA1c: 7.5, date:  "1/9/24,   Normal glucose range: Does not check BG at home,        Obesity,       Psychiatric/Substance Use:  - neg psychiatric ROS     Infectious Disease:  - neg infectious disease ROS     Malignancy:  - neg malignancy ROS     Other:            /87 (BP Location: Right arm, Patient Position: Sitting, Cuff Size: Adult Large)   Pulse 98   Temp 97.8  F (36.6  C) (Oral)   Resp 16   Ht 1.448 m (4' 9\")   Wt 108 kg (238 lb)   LMP 01/29/2024 (Approximate)   SpO2 100%   Breastfeeding No   BMI 51.50 kg/m      Physical Exam   Constitutional: Awake, alert, cooperative, no apparent distress, and appears stated age.  Eyes: Pupils equal, round and reactive to light, extra ocular muscles intact, sclera clear, conjunctiva normal.  HENT: Normocephalic, oral pharynx with moist mucus membranes, good dentition. No goiter appreciated.   Respiratory: Clear to auscultation bilaterally, no crackles or wheezing.  Cardiovascular: Regular rate and rhythm, normal S1 and S2, and no murmur noted.  Carotids +2, no bruits. No edema. Palpable pulses to radial arteries.   GI: Normal bowel sounds, abdominal exam difficult due to body habitus, soft, non-distended, non-tender, no masses palpated, no hepatosplenomegaly.    Lymph/Hematologic: No cervical lymphadenopathy and no supraclavicular lymphadenopathy.  Genitourinary: Deferred.   Skin: Warm and dry.    Musculoskeletal: Full ROM of neck. There is no redness, warmth, or swelling of the joints. Gross motor strength is normal.    Neurologic: Awake, alert, oriented to name, place and time. Cranial nerves II-XII are grossly intact. Gait is normal.   Neuropsychiatric: Calm, cooperative. Normal affect.     Prior Labs/Diagnostic Studies   All labs and imaging personally reviewed     Component      Latest Ref Rng 6/22/2023  11:39 PM   Sodium      136 - 145 mmol/L 137    Potassium      3.4 - 5.3 mmol/L 4.0    Chloride      98 - 107 mmol/L 100    Carbon Dioxide (CO2)      22 - 29 mmol/L 25  "   Anion Gap      7 - 15 mmol/L 12    Urea Nitrogen      6.0 - 20.0 mg/dL 10.7    Creatinine      0.51 - 0.95 mg/dL 0.71    Calcium      8.6 - 10.0 mg/dL 9.4    Glucose      70 - 99 mg/dL 127 (H)    GFR Estimate      >60 mL/min/1.73m2 >90       Legend:  (H) High    EKG/ stress test - if available please see in ROS above     The patient's records and results personally reviewed by this provider.     Outside records reviewed from: Care Everywhere    LAB/DIAGNOSTIC STUDIES TODAY:  CBC, A1C    Assessment    Joie Yadav is a 33 year old female seen as a PAC referral for risk assessment and optimization for anesthesia.    Plan/Recommendations  Pt will be optimized for the proposed procedure.  See below for details on the assessment, risk, and preoperative recommendations    NEUROLOGY  - No history of TIA, CVA or seizure  -Post Op delirium risk factors:  No risk identified    ENT  - No current airway concerns.  Will need to be reassessed day of surgery.  Mallampati: III  TM: > 3  - Thick neck    CARDIAC  - No history of CAD, Hypertension, and Afib Patient is able to achieve > 4 METS and denies any symptoms of CP, chest tightness, or SOB.  - Hyperlipidemia    - METS (Metabolic Equivalents)  Patient performs 4 or more METS exercise without symptoms            Total Score: 0      RCRI-Very low risk: Class 1 0.4% complication rate            Total Score: 0        PULMONARY  JEFFERY Low Risk            Total Score: 1    JEFFERY: BMI over 35 kg/m2      - Denies asthma or inhaler use  - Tobacco History    History   Smoking Status    Never   Smokeless Tobacco    Never       GI  - GERD  Controlled on medications: Tums  Hold Tums on DOS.  PONV Medium Risk  Total Score: 2           1 AN PONV: Pt is Female    1 AN PONV: Patient is not a current smoker        /RENAL  - Baseline Creatinine  0.71    - EIN (see HPI) - procedure planned as above.     ENDOCRINE    - BMI: Estimated body mass index is 51.5 kg/m  as calculated from the  "following:    Height as of this encounter: 1.448 m (4' 9\").    Weight as of this encounter: 108 kg (238 lb).  Class 3 Obesity (BMI > 40)  - Diabetes  Diabetes Mellitus, Type II, non-insulin dependent.  A1C on 1/9/24 was 7.5. Rechecking today.   Hold morning oral hypoglycemic medications. Recommend close monitoring of the patient's blood glucose levels throughout the perioperative period.    HEME  VTE Low Risk 0.26%            Total Score: 1    VTE: BMI greater than 39      - No history of abnormal bleeding or antiplatelet use.      Different anesthesia methods/types have been discussed with the patient, but they are aware that the final plan will be decided by the assigned anesthesia provider on the date of service.      The patient is optimized for their procedure. AVS with information on surgery time/arrival time, meds and NPO status given by nursing staff. No further diagnostic testing indicated.      On the day of service:     Prep time: 7 minutes  Visit time: 5 minutes  Documentation time: 5 minutes  ------------------------------------------  Total time: 17 minutes      MOIZ Lo CNP  Preoperative Assessment Center  [facility]  Phone: [phone]  Fax: [phone]    "

## 2024-03-30 ENCOUNTER — LAB (OUTPATIENT)
Dept: LAB | Facility: CLINIC | Age: 34
End: 2024-03-30
Payer: COMMERCIAL

## 2024-03-30 ENCOUNTER — ANCILLARY PROCEDURE (OUTPATIENT)
Dept: MRI IMAGING | Facility: CLINIC | Age: 34
End: 2024-03-30
Payer: COMMERCIAL

## 2024-03-30 DIAGNOSIS — Z01.818 PREOP EXAMINATION: ICD-10-CM

## 2024-03-30 DIAGNOSIS — E11.9 CONTROLLED TYPE 2 DIABETES MELLITUS WITHOUT COMPLICATION, WITHOUT LONG-TERM CURRENT USE OF INSULIN (H): ICD-10-CM

## 2024-03-30 DIAGNOSIS — E78.2 ELEVATED CHOLESTEROL WITH ELEVATED TRIGLYCERIDES: ICD-10-CM

## 2024-03-30 DIAGNOSIS — N85.02 EIN (ENDOMETRIAL INTRAEPITHELIAL NEOPLASIA): ICD-10-CM

## 2024-03-30 DIAGNOSIS — M54.12 RADICULOPATHY, CERVICAL REGION: ICD-10-CM

## 2024-03-30 LAB
ANION GAP SERPL CALCULATED.3IONS-SCNC: 13 MMOL/L (ref 7–15)
BUN SERPL-MCNC: 14.2 MG/DL (ref 6–20)
CALCIUM SERPL-MCNC: 8.6 MG/DL (ref 8.6–10)
CHLORIDE SERPL-SCNC: 106 MMOL/L (ref 98–107)
CHOLEST SERPL-MCNC: 197 MG/DL
CREAT SERPL-MCNC: 0.66 MG/DL (ref 0.51–0.95)
CREAT UR-MCNC: 129 MG/DL
DEPRECATED HCO3 PLAS-SCNC: 21 MMOL/L (ref 22–29)
EGFRCR SERPLBLD CKD-EPI 2021: >90 ML/MIN/1.73M2
ERYTHROCYTE [DISTWIDTH] IN BLOOD BY AUTOMATED COUNT: 13.4 % (ref 10–15)
FASTING STATUS PATIENT QL REPORTED: YES
GLUCOSE SERPL-MCNC: 159 MG/DL (ref 70–99)
HBA1C MFR BLD: 7.7 %
HCG UR QL: NEGATIVE
HCT VFR BLD AUTO: 38 % (ref 35–47)
HDLC SERPL-MCNC: 39 MG/DL
HGB BLD-MCNC: 12.6 G/DL (ref 11.7–15.7)
LDLC SERPL CALC-MCNC: ABNORMAL MG/DL
MCH RBC QN AUTO: 27.5 PG (ref 26.5–33)
MCHC RBC AUTO-ENTMCNC: 33.2 G/DL (ref 31.5–36.5)
MCV RBC AUTO: 83 FL (ref 78–100)
MICROALBUMIN UR-MCNC: 210 MG/L
MICROALBUMIN/CREAT UR: 162.79 MG/G CR (ref 0–25)
NONHDLC SERPL-MCNC: 158 MG/DL
PLATELET # BLD AUTO: 316 10E3/UL (ref 150–450)
POTASSIUM SERPL-SCNC: 4 MMOL/L (ref 3.4–5.3)
RBC # BLD AUTO: 4.59 10E6/UL (ref 3.8–5.2)
SODIUM SERPL-SCNC: 140 MMOL/L (ref 135–145)
TRIGL SERPL-MCNC: 439 MG/DL
WBC # BLD AUTO: 8.8 10E3/UL (ref 4–11)

## 2024-03-30 PROCEDURE — 82570 ASSAY OF URINE CREATININE: CPT | Performed by: FAMILY MEDICINE

## 2024-03-30 PROCEDURE — 81025 URINE PREGNANCY TEST: CPT | Performed by: PATHOLOGY

## 2024-03-30 PROCEDURE — 85027 COMPLETE CBC AUTOMATED: CPT | Performed by: PATHOLOGY

## 2024-03-30 PROCEDURE — 99000 SPECIMEN HANDLING OFFICE-LAB: CPT | Performed by: PATHOLOGY

## 2024-03-30 PROCEDURE — 36415 COLL VENOUS BLD VENIPUNCTURE: CPT | Performed by: PATHOLOGY

## 2024-03-30 PROCEDURE — 83721 ASSAY OF BLOOD LIPOPROTEIN: CPT | Performed by: FAMILY MEDICINE

## 2024-03-30 PROCEDURE — 80048 BASIC METABOLIC PNL TOTAL CA: CPT | Performed by: PATHOLOGY

## 2024-03-30 PROCEDURE — 80061 LIPID PANEL: CPT | Performed by: PATHOLOGY

## 2024-03-30 PROCEDURE — 72141 MRI NECK SPINE W/O DYE: CPT | Mod: GC | Performed by: RADIOLOGY

## 2024-03-30 PROCEDURE — 83036 HEMOGLOBIN GLYCOSYLATED A1C: CPT | Performed by: FAMILY MEDICINE

## 2024-04-01 DIAGNOSIS — E78.2 ELEVATED CHOLESTEROL WITH ELEVATED TRIGLYCERIDES: Primary | ICD-10-CM

## 2024-04-01 LAB — LDLC SERPL DIRECT ASSAY-MCNC: 102 MG/DL

## 2024-04-15 ASSESSMENT — ENCOUNTER SYMPTOMS: SEIZURES: 0

## 2024-04-15 NOTE — ANESTHESIA PREPROCEDURE EVALUATION
Anesthesia Pre-Procedure Evaluation    Patient: Joie Yadav   MRN: [id number] : 1990        Procedure : Procedure(s):  HYSTEROSCOPY, WITH DILATION AND CURETTAGE OF UTERUS, Latex Free          Past Medical History:   Diagnosis Date    Diabetes (H)     Type 2    Endometrial hyperplasia without atypia, simple 2019    Infection due to 2019 novel coronavirus     Neck pain     Obesity     PCOS (polycystic ovarian syndrome)     TMJ (temporomandibular joint syndrome)     Vitiligo       Past Surgical History:   Procedure Laterality Date    DILATION AND CURETTAGE, HYSTEROSCOPY DIAGNOSTIC, COMBINED N/A 3/11/2022    Procedure: HYSTEROSCOPY, DIAGNOSTIC, WITH DILATION AND CURETTAGE OF , PLACEMENT OF INTRAUTERINE DEVICE;  Surgeon: Aldari Stout MD;  Location: [facility] OR    INSERT INTRAUTERINE DEVICE N/A 3/11/2022    Procedure: Mirena Intra uterine device placement;  Surgeon: Aldair Stout MD;  Location: [facility] OR    LAPAROSCOPIC CHOLECYSTECTOMY N/A 2020    Procedure: CHOLECYSTECTOMY, LAPAROSCOPIC;  Surgeon: Norris Reid MD;  Location: [facility] OR      No Known Allergies   Social History     Tobacco Use    Smoking status: Never    Smokeless tobacco: Never   Substance Use Topics    Alcohol use: Never      Wt Readings from Last 1 Encounters:   24 108 kg (238 lb)        Anesthesia Evaluation   Pt has had prior anesthetic. Type: General.    No history of anesthetic complications       ROS/MED HX  ENT/Pulmonary:     (+)     JEFFERY risk factors, snores loudly,  obese,                             (-) asthma and recent URI   Neurologic:  - neg neurologic ROS  (-) no seizures and no CVA   Cardiovascular:     (+) Dyslipidemia - -   -  - -                                 Previous cardiac testing   Echo: Date: Results:    Stress Test:  Date: Results:    ECG Reviewed:  Date: 23 Results:  NSR  Cath:  Date: Results:   (-) [facility]   METS/Exercise Tolerance:  Comment: - Walking x 30 mins continuously without  any exertional symptoms   Hematologic:  - neg hematologic  ROS     Musculoskeletal:  - neg musculoskeletal ROS     GI/Hepatic:     (+) GERD (Relieved with Tums), Asymptomatic on medication,        cholecystitis/cholelithiasis (s/p lap eleazar),          Renal/Genitourinary: Comment: - EIN    (+)       Nephrolithiasis ,    (-) renal disease   Endo: Comment: - PCOS        (+)  type II DM (on metformin), Last HgA1c: 7.5, date: 1/9/24,   Normal glucose range: Does not check BG at home,        Obesity,       Psychiatric/Substance Use:  - neg psychiatric ROS     Infectious Disease:  - neg infectious disease ROS     Malignancy:  - neg malignancy ROS     Other:            Physical Exam    Airway        Mallampati: IV   TM distance: > 3 FB   Neck ROM: full   Mouth opening: > 3 cm    Respiratory Devices and Support         Dental       (+) Completely normal teeth      Cardiovascular   cardiovascular exam normal       Rhythm and rate: regular and normal     Pulmonary   pulmonary exam normal        breath sounds clear to auscultation           OUTSIDE LABS:  CBC:   Lab Results   Component Value Date    WBC 8.8 03/30/2024    WBC 9.9 06/22/2023    HGB 12.6 03/30/2024    HGB 13.5 06/22/2023    HCT 38.0 03/30/2024    HCT 41.2 06/22/2023     03/30/2024     06/22/2023     BMP:   Lab Results   Component Value Date     03/30/2024     06/22/2023    POTASSIUM 4.0 03/30/2024    POTASSIUM 4.0 06/22/2023    CHLORIDE 106 03/30/2024    CHLORIDE 100 06/22/2023    CO2 21 (L) 03/30/2024    CO2 25 06/22/2023    BUN 14.2 03/30/2024    BUN 10.7 06/22/2023    CR 0.66 03/30/2024    CR 0.71 06/22/2023     (H) 03/30/2024     (H) 06/22/2023     COAGS:   Lab Results   Component Value Date    INR 1.05 04/10/2020     POC:   Lab Results   Component Value Date     (H) 04/11/2020    HCG Negative 03/30/2024    HCGS Negative 06/22/2023     HEPATIC:   Lab Results   Component Value Date    ALBUMIN 3.7 05/14/2020     "PROTTOTAL 8.6 05/14/2020    ALT 42 05/14/2020    AST 18 05/14/2020    ALKPHOS 104 05/14/2020    BILITOTAL 0.4 05/14/2020     OTHER:   Lab Results   Component Value Date    LACT 1.2 05/14/2020    A1C 7.7 (H) 03/30/2024    LAYLA 8.6 03/30/2024    MAG 1.9 06/22/2023    LIPASE 71 (L) 05/14/2020    TSH 1.92 01/09/2024       Anesthesia Plan    ASA Status:  3    NPO Status:  NPO Appropriate    Anesthesia Type: MAC.     - Reason for MAC: straight local not clinically adequate   Induction: Propofol.   Maintenance: TIVA.        Consents    Anesthesia Plan(s) and associated risks, benefits, and realistic alternatives discussed. Questions answered and patient/representative(s) expressed understanding.     - Discussed: Risks, Benefits and Alternatives for BOTH SEDATION and the PROCEDURE were discussed     - Discussed with:  Patient      - Extended Intubation/Ventilatory Support Discussed: No.      - Patient is DNR/DNI Status: No     Use of blood products discussed: No .     Postoperative Care    Pain management: IV analgesics, Oral pain medications.   PONV prophylaxis: Ondansetron (or other 5HT-3), Background Propofol Infusion     Comments:    Other Comments: 32 yo female PMHx T2DM on metformin, obesity (BMI 52) presenting for hysteroscopy + D&C. Plan MAC with standard ASA monitors. Risks and benefits of anesthesia/procedure explained including but not limited to allergic reaction, aspiration, need for invasive airway, somnolence, delirium, vocal cord/dental trauma, nausea/vomiting.             Love Snowden MD    I have reviewed the pertinent notes and labs in the chart from the past 30 days and (re)examined the patient.  Any updates or changes from those notes are reflected in this note.              # DMII: A1C = 7.7 % (Ref range: <5.7 %) within past 6 months  # Severe Obesity: Estimated body mass index is 51.5 kg/m  as calculated from the following:    Height as of 3/29/24: 1.448 m (4' 9\").    Weight as of 3/29/24: 108 kg " (238 lb).

## 2024-04-16 ENCOUNTER — HOSPITAL ENCOUNTER (OUTPATIENT)
Facility: CLINIC | Age: 34
Discharge: HOME | End: 2024-04-16
Attending: OBSTETRICS & GYNECOLOGY | Admitting: OBSTETRICS & GYNECOLOGY
Payer: COMMERCIAL

## 2024-04-16 ENCOUNTER — ANESTHESIA (OUTPATIENT)
Dept: SURGERY | Facility: CLINIC | Age: 34
End: 2024-04-16
Payer: COMMERCIAL

## 2024-04-16 VITALS
SYSTOLIC BLOOD PRESSURE: 124 MMHG | OXYGEN SATURATION: 92 % | RESPIRATION RATE: 16 BRPM | HEIGHT: 57 IN | BODY MASS INDEX: 52.32 KG/M2 | HEART RATE: 73 BPM | WEIGHT: 242.51 LBS | TEMPERATURE: 97.5 F | DIASTOLIC BLOOD PRESSURE: 92 MMHG

## 2024-04-16 DIAGNOSIS — N85.01 ENDOMETRIAL HYPERPLASIA WITHOUT ATYPIA, SIMPLE: Primary | ICD-10-CM

## 2024-04-16 LAB
GLUCOSE BLDC GLUCOMTR-MCNC: 134 MG/DL (ref 70–99)
GLUCOSE BLDC GLUCOMTR-MCNC: 157 MG/DL (ref 70–99)
HCG UR QL: NEGATIVE

## 2024-04-16 PROCEDURE — 88305 TISSUE EXAM BY PATHOLOGIST: CPT | Mod: 26 | Performed by: PATHOLOGY

## 2024-04-16 PROCEDURE — 250N000013 HC RX MED GY IP 250 OP 250 PS 637: Performed by: STUDENT IN AN ORGANIZED HEALTH CARE EDUCATION/TRAINING PROGRAM

## 2024-04-16 PROCEDURE — 272N000001 HC OR GENERAL SUPPLY STERILE: Performed by: OBSTETRICS & GYNECOLOGY

## 2024-04-16 PROCEDURE — 250N000011 HC RX IP 250 OP 636: Performed by: STUDENT IN AN ORGANIZED HEALTH CARE EDUCATION/TRAINING PROGRAM

## 2024-04-16 PROCEDURE — 250N000011 HC RX IP 250 OP 636: Mod: JZ | Performed by: OBSTETRICS & GYNECOLOGY

## 2024-04-16 PROCEDURE — 258N000003 HC RX IP 258 OP 636: Performed by: STUDENT IN AN ORGANIZED HEALTH CARE EDUCATION/TRAINING PROGRAM

## 2024-04-16 PROCEDURE — 370N000017 HC ANESTHESIA TECHNICAL FEE, PER MIN: Performed by: OBSTETRICS & GYNECOLOGY

## 2024-04-16 PROCEDURE — 250N000013 HC RX MED GY IP 250 OP 250 PS 637: Performed by: OBSTETRICS & GYNECOLOGY

## 2024-04-16 PROCEDURE — 999N000141 HC STATISTIC PRE-PROCEDURE NURSING ASSESSMENT: Performed by: OBSTETRICS & GYNECOLOGY

## 2024-04-16 PROCEDURE — 81025 URINE PREGNANCY TEST: CPT | Performed by: OBSTETRICS & GYNECOLOGY

## 2024-04-16 PROCEDURE — 250N000011 HC RX IP 250 OP 636: Performed by: OBSTETRICS & GYNECOLOGY

## 2024-04-16 PROCEDURE — 82962 GLUCOSE BLOOD TEST: CPT

## 2024-04-16 PROCEDURE — 88305 TISSUE EXAM BY PATHOLOGIST: CPT | Mod: TC | Performed by: OBSTETRICS & GYNECOLOGY

## 2024-04-16 PROCEDURE — 710N000012 HC RECOVERY PHASE 2, PER MINUTE: Performed by: OBSTETRICS & GYNECOLOGY

## 2024-04-16 PROCEDURE — 58558 HYSTEROSCOPY BIOPSY: CPT | Performed by: STUDENT IN AN ORGANIZED HEALTH CARE EDUCATION/TRAINING PROGRAM

## 2024-04-16 PROCEDURE — 360N000076 HC SURGERY LEVEL 3, PER MIN: Performed by: OBSTETRICS & GYNECOLOGY

## 2024-04-16 RX ORDER — IBUPROFEN 400 MG/1
800 TABLET, FILM COATED ORAL ONCE
Status: DISCONTINUED | OUTPATIENT
Start: 2024-04-16 | End: 2024-04-16 | Stop reason: HOSPADM

## 2024-04-16 RX ORDER — KETOROLAC TROMETHAMINE 30 MG/ML
INJECTION, SOLUTION INTRAMUSCULAR; INTRAVENOUS PRN
Status: DISCONTINUED | OUTPATIENT
Start: 2024-04-16 | End: 2024-04-16

## 2024-04-16 RX ORDER — NALOXONE HYDROCHLORIDE 0.4 MG/ML
0.1 INJECTION, SOLUTION INTRAMUSCULAR; INTRAVENOUS; SUBCUTANEOUS
Status: DISCONTINUED | OUTPATIENT
Start: 2024-04-16 | End: 2024-04-16 | Stop reason: HOSPADM

## 2024-04-16 RX ORDER — OXYCODONE HYDROCHLORIDE 5 MG/1
5 TABLET ORAL
Status: DISCONTINUED | OUTPATIENT
Start: 2024-04-16 | End: 2024-04-16 | Stop reason: HOSPADM

## 2024-04-16 RX ORDER — ACETAMINOPHEN 325 MG/1
975 TABLET ORAL ONCE
Status: COMPLETED | OUTPATIENT
Start: 2024-04-16 | End: 2024-04-16

## 2024-04-16 RX ORDER — LIDOCAINE 40 MG/G
CREAM TOPICAL
Status: DISCONTINUED | OUTPATIENT
Start: 2024-04-16 | End: 2024-04-16 | Stop reason: HOSPADM

## 2024-04-16 RX ORDER — PHENAZOPYRIDINE HYDROCHLORIDE 200 MG/1
200 TABLET, FILM COATED ORAL ONCE
Status: COMPLETED | OUTPATIENT
Start: 2024-04-16 | End: 2024-04-16

## 2024-04-16 RX ORDER — METRONIDAZOLE 500 MG/100ML
500 INJECTION, SOLUTION INTRAVENOUS
Status: COMPLETED | OUTPATIENT
Start: 2024-04-16 | End: 2024-04-16

## 2024-04-16 RX ORDER — IBUPROFEN 600 MG/1
600 TABLET, FILM COATED ORAL EVERY 6 HOURS PRN
Qty: 12 TABLET | Refills: 0 | Status: SHIPPED | OUTPATIENT
Start: 2024-04-16

## 2024-04-16 RX ORDER — IBUPROFEN 600 MG/1
600 TABLET, FILM COATED ORAL EVERY 6 HOURS PRN
Qty: 12 TABLET | Refills: 0 | Status: SHIPPED | OUTPATIENT
Start: 2024-04-16 | End: 2024-04-16

## 2024-04-16 RX ORDER — PROPOFOL 10 MG/ML
INJECTION, EMULSION INTRAVENOUS PRN
Status: DISCONTINUED | OUTPATIENT
Start: 2024-04-16 | End: 2024-04-16

## 2024-04-16 RX ORDER — ACETAMINOPHEN 325 MG/1
650 TABLET ORAL EVERY 6 HOURS PRN
Qty: 24 TABLET | Refills: 0 | Status: SHIPPED | OUTPATIENT
Start: 2024-04-16 | End: 2024-04-16

## 2024-04-16 RX ORDER — SODIUM CHLORIDE, SODIUM LACTATE, POTASSIUM CHLORIDE, CALCIUM CHLORIDE 600; 310; 30; 20 MG/100ML; MG/100ML; MG/100ML; MG/100ML
INJECTION, SOLUTION INTRAVENOUS CONTINUOUS PRN
Status: DISCONTINUED | OUTPATIENT
Start: 2024-04-16 | End: 2024-04-16

## 2024-04-16 RX ORDER — PROPOFOL 10 MG/ML
INJECTION, EMULSION INTRAVENOUS CONTINUOUS PRN
Status: DISCONTINUED | OUTPATIENT
Start: 2024-04-16 | End: 2024-04-16

## 2024-04-16 RX ORDER — CEFAZOLIN SODIUM/WATER 2 G/20 ML
2 SYRINGE (ML) INTRAVENOUS
Status: COMPLETED | OUTPATIENT
Start: 2024-04-16 | End: 2024-04-16

## 2024-04-16 RX ORDER — FENTANYL CITRATE 50 UG/ML
INJECTION, SOLUTION INTRAMUSCULAR; INTRAVENOUS PRN
Status: DISCONTINUED | OUTPATIENT
Start: 2024-04-16 | End: 2024-04-16

## 2024-04-16 RX ORDER — ONDANSETRON 2 MG/ML
INJECTION INTRAMUSCULAR; INTRAVENOUS PRN
Status: DISCONTINUED | OUTPATIENT
Start: 2024-04-16 | End: 2024-04-16

## 2024-04-16 RX ORDER — ACETAMINOPHEN 325 MG/1
650 TABLET ORAL EVERY 6 HOURS PRN
Qty: 24 TABLET | Refills: 0 | Status: SHIPPED | OUTPATIENT
Start: 2024-04-16

## 2024-04-16 RX ORDER — CEFAZOLIN SODIUM/WATER 2 G/20 ML
2 SYRINGE (ML) INTRAVENOUS SEE ADMIN INSTRUCTIONS
Status: DISCONTINUED | OUTPATIENT
Start: 2024-04-16 | End: 2024-04-16 | Stop reason: HOSPADM

## 2024-04-16 RX ORDER — HEPARIN SODIUM 5000 [USP'U]/.5ML
5000 INJECTION, SOLUTION INTRAVENOUS; SUBCUTANEOUS
Status: COMPLETED | OUTPATIENT
Start: 2024-04-16 | End: 2024-04-16

## 2024-04-16 RX ADMIN — ACETAMINOPHEN 975 MG: 325 TABLET, FILM COATED ORAL at 09:19

## 2024-04-16 RX ADMIN — Medication 2 G: at 08:13

## 2024-04-16 RX ADMIN — METRONIDAZOLE 500 MG: 500 INJECTION, SOLUTION INTRAVENOUS at 06:35

## 2024-04-16 RX ADMIN — PROPOFOL 150 MCG/KG/MIN: 10 INJECTION, EMULSION INTRAVENOUS at 08:04

## 2024-04-16 RX ADMIN — FENTANYL CITRATE 25 MCG: 50 INJECTION INTRAMUSCULAR; INTRAVENOUS at 08:43

## 2024-04-16 RX ADMIN — PROPOFOL 50 MG: 10 INJECTION, EMULSION INTRAVENOUS at 08:08

## 2024-04-16 RX ADMIN — PROPOFOL 30 MG: 10 INJECTION, EMULSION INTRAVENOUS at 08:42

## 2024-04-16 RX ADMIN — SODIUM CHLORIDE, POTASSIUM CHLORIDE, SODIUM LACTATE AND CALCIUM CHLORIDE: 600; 310; 30; 20 INJECTION, SOLUTION INTRAVENOUS at 07:58

## 2024-04-16 RX ADMIN — FENTANYL CITRATE 25 MCG: 50 INJECTION INTRAMUSCULAR; INTRAVENOUS at 08:41

## 2024-04-16 RX ADMIN — PHENYLEPHRINE HYDROCHLORIDE 100 MCG: 10 INJECTION INTRAVENOUS at 08:18

## 2024-04-16 RX ADMIN — KETOROLAC TROMETHAMINE 30 MG: 30 INJECTION, SOLUTION INTRAMUSCULAR at 08:47

## 2024-04-16 RX ADMIN — PHENYLEPHRINE HYDROCHLORIDE 100 MCG: 10 INJECTION INTRAVENOUS at 08:25

## 2024-04-16 RX ADMIN — PHENAZOPYRIDINE 200 MG: 200 TABLET ORAL at 06:35

## 2024-04-16 RX ADMIN — MIDAZOLAM 2 MG: 1 INJECTION INTRAMUSCULAR; INTRAVENOUS at 08:00

## 2024-04-16 RX ADMIN — ONDANSETRON 4 MG: 2 INJECTION INTRAMUSCULAR; INTRAVENOUS at 08:47

## 2024-04-16 RX ADMIN — HEPARIN SODIUM 5000 UNITS: 5000 INJECTION, SOLUTION INTRAVENOUS; SUBCUTANEOUS at 06:35

## 2024-04-16 ASSESSMENT — ACTIVITIES OF DAILY LIVING (ADL)
ADLS_ACUITY_SCORE: 31

## 2024-04-16 NOTE — OP NOTE
[facility]   Operative Note    Date of service: 2024    Preoperative diagnosis:  Endometrial intraepithelial neoplasia   Postoperative diagnosis:  Same as above status post below listed procedure     Procedure:  EUA, diagnostic hysteroscopy, sharp curettage      Surgeon:  Aldair Stout MD     Assistant: Laquita Bolden MD PGY-4     Anesthesia:  MAC   EBL:  5mL  IVF:  700mL  UOP:  Void prior to procedure     Specimens: Endometrial curettings    Complications: None apparent     Findings:    Exam under anesthesia revealed normal cervix, anteverted uterus, no adnexal masses.   Speculum exam revealed normal cervix with no lesions.   Hysteroscopy revealed diffusely polypoid appearance of the intrauterine cavity.   D&C performed with good return of tissue, gritty texture throughout.     Indications:  Joie Yadav is a 33 year old  patient with a past medical history of EIN who presented for endometrial sampling and visualization via exam under anesthesia, diagnostic hysteroscopy and sharp curettage for sampling. Risks, benefits, and alternatives to the procedure were discussed with the patient who elected to proceed.  All questions were answered and an informed consent was obtained.    Procedure:  The patient was taken to the operating room where she underwent MAC anesthesia without difficulty.  She was placed in a dorsal lithotomy position using yellow-fin stirrups.  The patient was examined for the above noted findings and then prepped and draped in the usual sterile fashion. A medium graves speculum was inserted into the vagina. A tenaculum was placed on the anterior cervical lip.The endocervical canal was serially dilated to 8mm using Hegar dilators. The hysteroscope was inserted without difficulty with the above findings noted. The hysteroscope was removed and sharp curettage was performed until a gritty texture was noted throughout. All instruments were then removed. The tenaculum was removed from the  cervix and the puncture sites noted to be hemostatic with pressure.  The patient was repositioned to the supine position.  The patient tolerated the procedure well and was taken to the recovery room in stable condition.      Aldair Stout MD, MS    Department of Obstetrics and Gynecology   Division of Gynecologic Oncology   [facility]  Phone: [phone]

## 2024-04-16 NOTE — ANESTHESIA CARE TRANSFER NOTE
Patient: Joie Yadav    Procedure: Procedure(s):  HYSTEROSCOPY, WITH DILATION AND CURETTAGE OF UTERUS, Latex Free       Diagnosis: EIN (endometrial intraepithelial neoplasia) [N85.02]  Diagnosis Additional Information: No value filed.    Anesthesia Type:   MAC     Note:    Oropharynx: oropharynx clear of all foreign objects and spontaneously breathing  Level of Consciousness: awake  Oxygen Supplementation: room air    Independent Airway: airway patency satisfactory and stable  Dentition: dentition unchanged  Vital Signs Stable: post-procedure vital signs reviewed and stable  Report to RN Given: handoff report given  Patient transferred to: Phase II    Handoff Report: Identifed the Patient, Identified the Reponsible Provider, Reviewed the pertinent medical history, Discussed the surgical course, Reviewed Intra-OP anesthesia mangement and issues during anesthesia, Set expectations for post-procedure period and Allowed opportunity for questions and acknowledgement of understanding      Vitals:  Vitals Value Taken Time   /74 04/16/24 0908   Temp 36.4  C (97.6  F) 04/16/24 0908   Pulse     Resp 16 04/16/24 0908   SpO2 96 % 04/16/24 0910   Vitals shown include unfiled device data.    Electronically Signed By: MOIZ Levine CRNA  April 16, 2024  9:11 AM

## 2024-04-16 NOTE — DISCHARGE INSTRUCTIONS
Contacting your Doctor -   To contact a doctor, call Dr Stout at the Gynecology/Oncology clinic at [phone] or:  [phone] and ask for the resident on call for Gynecology/Oncology (answered 24 hours a day)   Emergency Department:  [facility]: [phone]  [facility]: [phone] if you are in need of immediate or emergent help

## 2024-04-16 NOTE — ANESTHESIA POSTPROCEDURE EVALUATION
Patient: Joie Yadav    Procedure: Procedure(s):  HYSTEROSCOPY, WITH DILATION AND CURETTAGE OF UTERUS, Latex Free       Anesthesia Type:  MAC    Note:  Disposition: Outpatient   Postop Pain Control: Uneventful            Sign Out: Well controlled pain   PONV: No   Neuro/Psych: Uneventful            Sign Out: Acceptable/Baseline neuro status   Airway/Respiratory: Uneventful            Sign Out: Acceptable/Baseline resp. status   CV/Hemodynamics: Uneventful            Sign Out: Acceptable CV status; No obvious hypovolemia; No obvious fluid overload   Other NRE: NONE   DID A NON-ROUTINE EVENT OCCUR? No           Last vitals:  Vitals Value Taken Time   /92 04/16/24 0934   Temp 36.4  C (97.5  F) 04/16/24 0934   Pulse 73 04/16/24 0934   Resp 16 04/16/24 0934   SpO2 92 % 04/16/24 0934       Electronically Signed By: Tamara Cardenas MD  April 16, 2024  9:40 AM

## 2024-04-16 NOTE — PROGRESS NOTES
"Gynecologic Oncology Postoperative Check Note  4/16/2024    S: Patient reports she is doing well postoperatively. Pain IS well controlled with Oral pain meds. Ambulating without pain. Voiding spontaneously. Tolerating crackers and water without nausea or vomiting. Denies chest pain, shortness of breath, dizziness, or other concerns at this time.     O:  Vitals:    04/16/24 0605 04/16/24 0624   BP:  132/85   Cuff Size:  Adult Large   Pulse:  94   Resp:  14   Temp:  98.1  F (36.7  C)   TempSrc:  Oral   SpO2:  99%   Weight: 110 kg (242 lb 8.1 oz)    Height: 1.448 m (4' 9\")        Gen: In no distress  Cardio: RRR, S1/S2, no murmurs  Resp: CTAB, no wheezing or crackles  Abdomen: soft  Extremities: Non-tender, no edema    A: 33 year old POD#0 s/p hysteroscopy with D&C. Doing well.    Dz: EIN  FEN: regular  Pain: ibuprofen and tylebol PRN  : voiding spontaneously    Dispo: To home    Sushil Rivera DO, MA  [facility]- PGY2  11:30 AM April 16, 2024       "

## 2024-04-16 NOTE — BRIEF OP NOTE
[facility]    Brief Operative Note    Pre-operative diagnosis: EIN (endometrial intraepithelial neoplasia) [N85.02]  Post-operative diagnosis Same as pre-operative diagnosis    Procedure: HYSTEROSCOPY, WITH DILATION AND CURETTAGE OF UTERUS, Latex Free, N/A - Abdomen    Surgeon: Surgeons and Role:     * Aldair Stout MD - Primary     * Laquita Bolden MD - Resident - Assisting  Anesthesia: MAC   Estimated Blood Loss: Less than 10 ml    Drains: None  Specimens:   ID Type Source Tests Collected by Time Destination   1 : Uterine curettings Tissue Uterus SURGICAL PATHOLOGY EXAM Aldair Stout MD 4/16/2024  8:44 AM      Findings:   Exam under anesthesia with anteverted uterus, no adnexal fullness. Diagnostic hysteroscopy with diffuse polypoid endometrial cavity. Sharp curettage with return of tan appearing tissue. All surgical sites hemostatic at end of case .  Complications: None.  Implants: * No implants in log *    Laquita Bolden MD   OB/GYN PGY-4  04/16/24 9:01 AM

## 2024-05-03 ENCOUNTER — OFFICE VISIT (OUTPATIENT)
Dept: FAMILY MEDICINE | Facility: CLINIC | Age: 34
End: 2024-05-03
Attending: FAMILY MEDICINE
Payer: COMMERCIAL

## 2024-05-03 VITALS
HEART RATE: 80 BPM | WEIGHT: 238 LBS | SYSTOLIC BLOOD PRESSURE: 122 MMHG | BODY MASS INDEX: 51.5 KG/M2 | DIASTOLIC BLOOD PRESSURE: 86 MMHG

## 2024-05-03 DIAGNOSIS — E11.9 TYPE 2 DIABETES MELLITUS WITHOUT COMPLICATION, WITHOUT LONG-TERM CURRENT USE OF INSULIN (H): Primary | ICD-10-CM

## 2024-05-03 DIAGNOSIS — R12 HEARTBURN: ICD-10-CM

## 2024-05-03 DIAGNOSIS — E78.2 ELEVATED CHOLESTEROL WITH ELEVATED TRIGLYCERIDES: ICD-10-CM

## 2024-05-03 PROBLEM — K81.0 ACUTE CHOLECYSTITIS: Status: RESOLVED | Noted: 2020-04-10 | Resolved: 2024-05-03

## 2024-05-03 PROCEDURE — G0463 HOSPITAL OUTPT CLINIC VISIT: HCPCS | Performed by: FAMILY MEDICINE

## 2024-05-03 PROCEDURE — 99214 OFFICE O/P EST MOD 30 MIN: CPT | Performed by: FAMILY MEDICINE

## 2024-05-03 ASSESSMENT — PAIN SCALES - GENERAL: PAINLEVEL: NO PAIN (0)

## 2024-05-03 NOTE — PATIENT INSTRUCTIONS
Famotidine (Pepcid) available over the counter  If that doesn't work, then you can try a 2 week course of omeprazole (also available over the counter)  You can continue to use Tums as needed    Continue metformin 3 tablets daily  Add Jardiance 10 mg daily   Labs in late June  Follow-up visit in November

## 2024-05-03 NOTE — PROGRESS NOTES
CC: RECHECK      SUBJECTIVE:  Joie Yadav presents for management of Type 2 Diabetes. She was diagnosed with diabetes in 2018 with A1c 7.6%, was able to bring A1c down to normal range with metformin 500 mg daily and lifestyle changes. Highest A1c was 7.7%, and she had A1cs in the 5% range throughout 2021. Now, she has gained weight again and A1c was up to 7.7% in 3/2024. She was resumed on metformin.     COMPLIANCE/SURVEILLANCE:     Medication compliance: taking metformin 1500 mg daily -- trying 2000 mg caused stomach upset  Diabetic diet compliance: monitoring carbohydrates, met with diabetes education  Home glucose monitoring: None   Eye exam: Nov 2023  Nephropathy: none  Peripheral neuropathy: none   Sees podiatry:  no   History of foot sores/infections: no   History of amputations:  no  Autonomic neuropathy:  no  Macrovascular disease:    CAD: no   PVD: no   Stroke: no    Hypertension: no    Dyslipidemia: yes, high triglycerides   Tobacco use:  no   ASA prophylaxis: no   Routine dental care: yes     Immunizations current: yes    DIABETES ROS: No numbness in tingling in feet. Occasional polydipsia but denies polyuria. No chest pain or palpitations.     She would also like to discuss heartburn she has been experiencing. Tums used to help, but hasn't helped as much recently.       EXAM:   /86   Pulse 80   Wt 108 kg (238 lb)   LMP 01/30/2024 (Exact Date)   BMI 51.50 kg/m    Body mass index is 51.5 kg/m .    Alert and no distress      LABS:   Component      Latest Ref Rng 3/30/2024  7:08 AM   Cholesterol      <200 mg/dL 197    Triglycerides      <150 mg/dL 439 (H)    HDL Cholesterol      >=50 mg/dL 39 (L)    Non HDL Cholesterol      <130 mg/dL 158 (H)    Patient Fasting? Yes    Hemoglobin A1C      <5.7 % 7.7 (H)    LDL Cholesterol Direct      <100 mg/dL 102 (H)       Component      Latest Ref Rng 3/30/2024  7:11 AM   Creatinine Urine      mg/dL 129.0    Albumin Urine mg/L      mg/L 210.0    Albumin  Urine mg/g Cr      0.00 - 25.00 mg/g Cr 162.79 (H)         ASSESSMENT/PLAN:   1. Diabetes: chronic, not controlled  - A1c goal: 7%  - Medication changes today:  Continue metformin, only tolerated up to 1500 mg daily. Add Jardiance 10 mg daily. I discussed with the patient the risks, benefits, and alternatives to taking this medication as well as common side effects.   - Lifestyle management: Discussed nutrition & physical activity recommendations. Discussed how weight loss may help.   - Labs: 2 months - A1c,urine microalbumin (recheck d/t albuminuria)  - Follow-up: 6 months      2. Lipids:  - Review of recent lipid panel reveals: , triglycerides elevated.  - Treatment recommendations: Lifestyle management. Patient desires pregnancy, will not prescribe statin.    3. Heartburn:   - Treatment recommendations:  Trial of famotidine OTC for 2-4 weeks, if still not improving trial of omeprazole OTC for 2 weeks. If still having symptoms recommend visit for re-evaluation.    Return to clinic:  Return in about 6 months (around 11/3/2024) for diabetes .      Julieta Thompson MD  Family Medicine

## 2024-05-08 ENCOUNTER — ONCOLOGY VISIT (OUTPATIENT)
Dept: ONCOLOGY | Facility: HOSPITAL | Age: 34
End: 2024-05-08
Attending: OBSTETRICS & GYNECOLOGY
Payer: COMMERCIAL

## 2024-05-08 VITALS
SYSTOLIC BLOOD PRESSURE: 133 MMHG | BODY MASS INDEX: 51.94 KG/M2 | TEMPERATURE: 98.4 F | RESPIRATION RATE: 16 BRPM | DIASTOLIC BLOOD PRESSURE: 74 MMHG | WEIGHT: 240 LBS | HEART RATE: 95 BPM | OXYGEN SATURATION: 99 %

## 2024-05-08 DIAGNOSIS — N85.02 ENDOMETRIAL INTRAEPITHELIAL NEOPLASIA (EIN): Primary | ICD-10-CM

## 2024-05-08 PROCEDURE — 99214 OFFICE O/P EST MOD 30 MIN: CPT | Performed by: OBSTETRICS & GYNECOLOGY

## 2024-05-08 PROCEDURE — G0463 HOSPITAL OUTPT CLINIC VISIT: HCPCS | Performed by: OBSTETRICS & GYNECOLOGY

## 2024-05-08 ASSESSMENT — PAIN SCALES - GENERAL: PAINLEVEL: MILD PAIN (2)

## 2024-05-08 NOTE — LETTER
2024         RE: Joie Yadav  [address] [address]        Dear Colleague,    Thank you for referring your patient, Joie Yadav, to the [facility]. Please see a copy of my visit note below.                Follow Up Notes on Referred Patient    Date: 2024       Dr. Aldair Stout MD  [address] [place] [address]       RE: Joie Yadav  : 1990  GEMMA: 2024    Joie Yadav is a 33 year old woman with a diagnosis of EIN s/p mirena IUD placement and removal after 3 normal biopsies. The IUD was removed in May of 2023. She is hoping to become pregnant and states that she has not had any bleeding since her IUD was placed (2 years). She has never been pregnant.   She notes intermittent breast tenderness as well as right-sided pelvic pain monthly though she states she has received progesterone from her gynecologist and had a withdrawal bleeding after that.  She is scheduled to see a fertility specialist this.  She has been doing well no nausea, vomiting fever or chills, normal urinary and bowel function, no lightheadedness or dizziness she is scheduled to see a fertility specialist. She notes that she has been trying to lose weight over the past  2 months and has lost 7 pounds.     Past Medical History:    Past Medical History:   Diagnosis Date     Acute cholecystitis 04/10/2020    Added automatically from request for surgery [id number]       Diabetes (H)     Type 2     Endometrial hyperplasia without atypia, simple 2019     Infection due to 2019 novel coronavirus      Neck pain      Obesity      PCOS (polycystic ovarian syndrome)      TMJ (temporomandibular joint syndrome)      Vitiligo          Past Surgical History:    Past Surgical History:   Procedure Laterality Date     DILATION AND CURETTAGE, HYSTEROSCOPY DIAGNOSTIC, COMBINED N/A 3/11/2022    Procedure: HYSTEROSCOPY, DIAGNOSTIC, WITH DILATION AND  CURETTAGE OF , PLACEMENT OF INTRAUTERINE DEVICE;  Surgeon: Aldair Stout MD;  Location: [facility]     DILATION AND CURETTAGE, OPERATIVE HYSTEROSCOPY, COMBINED N/A 4/16/2024    Procedure: HYSTEROSCOPY, WITH DILATION AND CURETTAGE OF UTERUS;  Surgeon: Aldair Stout MD;  [facility]     INSERT INTRAUTERINE DEVICE N/A 3/11/2022    Procedure: Mirena Intra uterine device placement;  Surgeon: Aldair Stout MD;  [facility]     LAPAROSCOPIC CHOLECYSTECTOMY N/A 04/11/2020    Procedure: CHOLECYSTECTOMY, LAPAROSCOPIC;  Surgeon: Norris Reid MD;  Location: [facility]         Health Maintenance Due   Topic Date Due     YEARLY PREVENTIVE VISIT  Never done     ADVANCE CARE PLANNING  Never done     EYE EXAM  Never done     HIV SCREENING  Never done     HEPATITIS C SCREENING  Never done     PAP  10/22/2021     DTAP/TDAP/TD IMMUNIZATION (5 - Tdap) 01/17/2023     COVID-19 Vaccine (4 - 2023-24 season) 09/01/2023       Current Medications:     Current Outpatient Medications   Medication Sig Dispense Refill     acetaminophen (TYLENOL) 325 MG tablet Take 2 tablets (650 mg) by mouth every 6 hours as needed for mild pain 24 tablet 0     acetaminophen (TYLENOL) 325 MG tablet Take 325-650 mg by mouth every 6 hours as needed for mild pain       calcium carbonate (TUMS) 500 MG chewable tablet Take 1 chew tab by mouth 2 times daily       empagliflozin (JARDIANCE) 10 MG TABS tablet Take 1 tablet (10 mg) by mouth daily 90 tablet 1     ibuprofen (ADVIL/MOTRIN) 600 MG tablet Take 1 tablet (600 mg) by mouth every 6 hours as needed for moderate pain 12 tablet 0     metFORMIN (GLUCOPHAGE) 500 MG tablet Take 1 tablet (500 mg) by mouth daily (with breakfast) AND 2 tablets (1,000 mg) daily (with dinner). 270 tablet 3         Allergies:      No Known Allergies     Social History:     Social History     Tobacco Use     Smoking status: Never     Smokeless tobacco: Never   Substance Use Topics     Alcohol use: Never       History    Drug Use Unknown         Family History:       Family History   Problem Relation Age of Onset     Anesthesia Reaction No family hx of      Bleeding Disorder No family hx of      Clotting Disorder No family hx of          Physical Exam:     /74   Pulse 95   Temp 98.4  F (36.9  C)   Resp 16   Wt 108.9 kg (240 lb)   LMP 01/30/2024 (Exact Date)   SpO2 99%   BMI 51.94 kg/m    Body mass index is 51.94 kg/m .    General appearance: no acute distress, well groomed, sitting comfortably   HEENT: No cervical, submandibular, or supraclavicular lymphadenectomy   GI: abdomen soft, non-tender, non-distended, no appreciable masses          Assessment:  Joie Yadav is a 33 year old woman with a diagnosis of EIN.      A total of 31 minutes was spent with the patient, on documentation, chart review, counseling the patient and/or treatment planning.  Does not include time for procedure.      1.  Endometrial hyperplasia with atypia.  2.  Class 2 obesity.  3.  Fertility preservation       We did review the findings of a D&C which showed benign endometrium no signs of hyperplasia or atypia..    The patient has a known history of PCOS and has been amenorrheic since her IUD was removed.  She is scheduled to see a fertility specialist.  She agrees with this plan she is very appreciative of her care.  All questions were answered.            Aldair Stout MD, MS    Department of Obstetrics and Gynecology   Division of Gynecologic Oncology   [facility]  Phone: [phone]      CC  Patient Care Team:  Julieta Thompson MD as PCP - General (Family Medicine)  Israel Ceron MD as MD (Urology)  Vandana Jarquin MD as Resident (OB/Gyn)  Howie Ward MD as Resident  Murphy Rae MD as MD (OB/Gyn)  Fanta Rodríguez MD as Resident (OB/Gyn)  Aldair Stout MD as Assigned Cancer Care Provider  Juan David Ocasio MD as Resident (Student in organized health care  education/training program)  Julieta Thompson MD as Assigned PCP  ALESIA PRADO  We will follow-up with her in 6 months with another ultrasound if she has not conceived by that time another endometrial biopsy.      Again, thank you for allowing me to participate in the care of your patient.        Sincerely,        Alesia Prado MD

## 2024-05-08 NOTE — PATIENT INSTRUCTIONS
In 6 months   US, Lab and MD visit     If you are pregnant by this time let us know and we will delay all the above appts     Have a beautiful day    Venu

## 2024-05-08 NOTE — NURSING NOTE
"Oncology Rooming Note    May 8, 2024 12:07 PM   Joie Yadav is a 33 year old female who presents for:    Chief Complaint   Patient presents with    Oncology Clinic Visit     Gyn Onc follow up     Initial Vitals: /74   Pulse 95   Temp 98.4  F (36.9  C)   Resp 16   Wt 108.9 kg (240 lb)   LMP 01/30/2024 (Exact Date)   SpO2 99%   BMI 51.94 kg/m   Estimated body mass index is 51.94 kg/m  as calculated from the following:    Height as of 4/16/24: 1.448 m (4' 9\").    Weight as of this encounter: 108.9 kg (240 lb). Body surface area is 2.09 meters squared.  Mild Pain (2) Comment: Data Unavailable   Patient's last menstrual period was 01/30/2024 (exact date).  Allergies reviewed: Yes  Medications reviewed: Yes    Medications: Medication refills not needed today.  Pharmacy name entered into [employer]:    [facility] [place], [place] - [address]  [id number] - [place], [place] - [address] [place] [address] & [id number] - [place], [place] - [address] AT [facility] & [address] [place] [address] DRUG STORE #[id number] - [place], [place] - [address] AT [facility] & [address]    Frailty Screening:   Is the patient here for a new oncology consult visit in cancer care? 2. No      Clinical concerns: Denies bleeding, minimal pain since surgery.   Dr. Stout  was NOT notified.      Estephania Bailey RN              "

## 2024-05-14 ENCOUNTER — MYC MEDICAL ADVICE (OUTPATIENT)
Dept: FAMILY MEDICINE | Facility: CLINIC | Age: 34
End: 2024-05-14
Payer: COMMERCIAL

## 2024-05-22 NOTE — PROGRESS NOTES
Follow Up Notes on Referred Patient    Date: 2024       Dr. Aldair Stout MD  [address] [place] [address]       RE: Joie Yadav  : 1990  GEMMA: 2024    Joei Yadav is a 33 year old woman with a diagnosis of EIN s/p mirena IUD placement and removal after 3 normal biopsies. The IUD was removed in May of 2023. She is hoping to become pregnant and states that she has not had any bleeding since her IUD was placed (2 years). She has never been pregnant.   She notes intermittent breast tenderness as well as right-sided pelvic pain monthly though she states she has received progesterone from her gynecologist and had a withdrawal bleeding after that.  She is scheduled to see a fertility specialist this.  She has been doing well no nausea, vomiting fever or chills, normal urinary and bowel function, no lightheadedness or dizziness she is scheduled to see a fertility specialist. She notes that she has been trying to lose weight over the past  2 months and has lost 7 pounds.     Past Medical History:    Past Medical History:   Diagnosis Date    Acute cholecystitis 04/10/2020    Added automatically from request for surgery [id number]      Diabetes (H)     Type 2    Endometrial hyperplasia without atypia, simple 2019    Infection due to 2019 novel coronavirus     Neck pain     Obesity     PCOS (polycystic ovarian syndrome)     TMJ (temporomandibular joint syndrome)     Vitiligo          Past Surgical History:    Past Surgical History:   Procedure Laterality Date    DILATION AND CURETTAGE, HYSTEROSCOPY DIAGNOSTIC, COMBINED N/A 3/11/2022    Procedure: HYSTEROSCOPY, DIAGNOSTIC, WITH DILATION AND CURETTAGE OF , PLACEMENT OF INTRAUTERINE DEVICE;  Surgeon: Aldair Stout MD;  [facility]    DILATION AND CURETTAGE, OPERATIVE HYSTEROSCOPY, COMBINED N/A 2024    Procedure: HYSTEROSCOPY, WITH DILATION AND CURETTAGE OF UTERUS;  Surgeon: Aldair Stout MD;   [facility]    INSERT INTRAUTERINE DEVICE N/A 3/11/2022    Procedure: Mirena Intra uterine device placement;  Surgeon: Aldair Stout MD;  [facility]    LAPAROSCOPIC CHOLECYSTECTOMY N/A 04/11/2020    Procedure: CHOLECYSTECTOMY, LAPAROSCOPIC;  Surgeon: Norris Reid MD;  [facility]         Health Maintenance Due   Topic Date Due    YEARLY PREVENTIVE VISIT  Never done    ADVANCE CARE PLANNING  Never done    EYE EXAM  Never done    HIV SCREENING  Never done    HEPATITIS C SCREENING  Never done    PAP  10/22/2021    DTAP/TDAP/TD IMMUNIZATION (5 - Tdap) 01/17/2023    COVID-19 Vaccine (4 - 2023-24 season) 09/01/2023       Current Medications:     Current Outpatient Medications   Medication Sig Dispense Refill    acetaminophen (TYLENOL) 325 MG tablet Take 2 tablets (650 mg) by mouth every 6 hours as needed for mild pain 24 tablet 0    acetaminophen (TYLENOL) 325 MG tablet Take 325-650 mg by mouth every 6 hours as needed for mild pain      calcium carbonate (TUMS) 500 MG chewable tablet Take 1 chew tab by mouth 2 times daily      empagliflozin (JARDIANCE) 10 MG TABS tablet Take 1 tablet (10 mg) by mouth daily 90 tablet 1    ibuprofen (ADVIL/MOTRIN) 600 MG tablet Take 1 tablet (600 mg) by mouth every 6 hours as needed for moderate pain 12 tablet 0    metFORMIN (GLUCOPHAGE) 500 MG tablet Take 1 tablet (500 mg) by mouth daily (with breakfast) AND 2 tablets (1,000 mg) daily (with dinner). 270 tablet 3         Allergies:      No Known Allergies     Social History:     Social History     Tobacco Use    Smoking status: Never    Smokeless tobacco: Never   Substance Use Topics    Alcohol use: Never       History   Drug Use Unknown         Family History:       Family History   Problem Relation Age of Onset    Anesthesia Reaction No family hx of     Bleeding Disorder No family hx of     Clotting Disorder No family hx of          Physical Exam:     /74   Pulse 95   Temp 98.4  F (36.9  C)   Resp 16    Wt 108.9 kg (240 lb)   LMP 01/30/2024 (Exact Date)   SpO2 99%   BMI 51.94 kg/m    Body mass index is 51.94 kg/m .    General appearance: no acute distress, well groomed, sitting comfortably   HEENT: No cervical, submandibular, or supraclavicular lymphadenectomy   GI: abdomen soft, non-tender, non-distended, no appreciable masses          Assessment:  Joie Yadav is a 33 year old woman with a diagnosis of EIN.      A total of 31 minutes was spent with the patient, on documentation, chart review, counseling the patient and/or treatment planning.  Does not include time for procedure.      1.  Endometrial hyperplasia with atypia.  2.  Class 2 obesity.  3.  Fertility preservation       We did review the findings of a D&C which showed benign endometrium no signs of hyperplasia or atypia..    The patient has a known history of PCOS and has been amenorrheic since her IUD was removed.  She is scheduled to see a fertility specialist.  She agrees with this plan she is very appreciative of her care.  All questions were answered.            Aldair Prado MD, MS    Department of Obstetrics and Gynecology   Division of Gynecologic Oncology   [facility]  Phone: [phone]      CC  Patient Care Team:  Julieta Thompson MD as PCP - General (Family Medicine)  Israel Ceron MD as MD (Urology)  Vandana Jarquin MD as Resident (OB/Gyn)  Howie Ward MD as Resident  Murphy Rae MD as MD (OB/Gyn)  Fanta Rodríguez MD as Resident (OB/Gyn)  Aldair Prado MD as Assigned Cancer Care Provider  Juan David Ocasio MD as Resident (Student in organized health care education/training program)  Julieta Thompson MD as Assigned PCP  ALDAIR PRADO  We will follow-up with her in 6 months with another ultrasound if she has not conceived by that time another endometrial biopsy.

## 2024-07-07 ENCOUNTER — HEALTH MAINTENANCE LETTER (OUTPATIENT)
Age: 34
End: 2024-07-07

## 2024-08-03 ENCOUNTER — LAB (OUTPATIENT)
Dept: LAB | Facility: CLINIC | Age: 34
End: 2024-08-03
Payer: COMMERCIAL

## 2024-08-03 DIAGNOSIS — E11.9 TYPE 2 DIABETES MELLITUS WITHOUT COMPLICATION, WITHOUT LONG-TERM CURRENT USE OF INSULIN (H): ICD-10-CM

## 2024-08-03 LAB
CREAT UR-MCNC: 84.7 MG/DL
HBA1C MFR BLD: 8.2 %
MICROALBUMIN UR-MCNC: 275 MG/L
MICROALBUMIN/CREAT UR: 324.68 MG/G CR (ref 0–25)

## 2024-08-03 PROCEDURE — 83036 HEMOGLOBIN GLYCOSYLATED A1C: CPT | Performed by: FAMILY MEDICINE

## 2024-08-03 PROCEDURE — 82570 ASSAY OF URINE CREATININE: CPT | Performed by: FAMILY MEDICINE

## 2024-08-03 PROCEDURE — 36415 COLL VENOUS BLD VENIPUNCTURE: CPT | Performed by: PATHOLOGY

## 2024-08-03 PROCEDURE — 99000 SPECIMEN HANDLING OFFICE-LAB: CPT | Performed by: PATHOLOGY

## 2024-08-21 ENCOUNTER — VIRTUAL VISIT (OUTPATIENT)
Dept: PHARMACY | Facility: CLINIC | Age: 34
End: 2024-08-21
Payer: COMMERCIAL

## 2024-08-21 DIAGNOSIS — E11.9 CONTROLLED TYPE 2 DIABETES MELLITUS WITHOUT COMPLICATION, WITHOUT LONG-TERM CURRENT USE OF INSULIN (H): Primary | ICD-10-CM

## 2024-08-21 DIAGNOSIS — Z31.69 ENCOUNTER FOR PRECONCEPTION CONSULTATION: ICD-10-CM

## 2024-08-21 PROCEDURE — 99605 MTMS BY PHARM NP 15 MIN: CPT | Mod: 93 | Performed by: PHARMACIST

## 2024-08-21 PROCEDURE — 99607 MTMS BY PHARM ADDL 15 MIN: CPT | Mod: 93 | Performed by: PHARMACIST

## 2024-08-21 NOTE — PATIENT INSTRUCTIONS
"Recommendations from today's MTM visit:                                                    MTM (medication therapy management) is a service provided by a clinical pharmacist designed to help you get the most of out of your medicines.   Today we reviewed what your medicines are for, how to know if they are working, that your medicines are safe and how to make your medicine regimen as easy as possible.      Start checking blood sugars at home    Fasting goal:   2 hours after eating: less than 180    Try to get about 5 readings each of these times: fasting, 2 hours after breakfast, 2 hours after lunch, 2 hours after dinner.    Follow-up: 2 weeks    It was great speaking with you today.  I value your experience and would be very thankful for your time in providing feedback in our clinic survey. In the next few days, you may receive an email or text message from [employer] with a link to a survey related to your  clinical pharmacist.\"     To schedule another MTM appointment, please call the clinic directly or you may call the MTM scheduling line at [phone] or toll-free at [phone].     My Clinical Pharmacist's contact information:                                                      Please feel free to contact me with any questions or concerns you have.      Tara Pandey, PharmD, BCACP   Medication Therapy Management Pharmacist   [facility]  [phone]    "

## 2024-08-21 NOTE — PROGRESS NOTES
Medication Therapy Management (MTM) Encounter    ASSESSMENT:                            Medication Adherence/Access: No issues identified    Diabetes:   Patient is not meeting A1c goal of < 7%.  Patient would benefit from start home glucose monitoring.    Depending on glucose readings, will consider SFU vs basal insulin since she is trying for pregnancy. GLP-1 not recommended during pregnancy.     Preconception:  Recommend start prenatal MVI     PLAN:                            Start home glucose monitoring    Start prenatal MVI daily    Follow-up: 2 weeks    SUBJECTIVE/OBJECTIVE:                          Joie Yadav is a 33 year old female seen for an initial visit. She was referred to me from Julieta Thompson      Reason for visit: diabetes.    Allergies/ADRs: Reviewed in chart  Past Medical History: Reviewed in chart  Tobacco: She reports that she has never smoked. She has never used smokeless tobacco.  Alcohol: none    Medication Adherence/Access: no issues reported    Diabetes     Metformin 500mg AM and 1000mg PM    Patient is not experiencing side effects.  Jardiance- headache, nausea, dizzy.  Resolved with stopping medication. Tried restarting medication and symptoms resumed.    She is wondering if she needs additional medication. Would like to avoid insulin if possible.     Current diabetes symptoms:  thirsty when sugar is high, not happening very often.   Blood sugar monitoring: never  Diet/Exercise: avoiding juice and pop. Trying to eat more vegetables, stay away from junk food. She is eating better since she found out about her higher A1c.   30 min exercise 4 times a week.      Eye exam in the last 12 months? No  Foot exam is up to date    Preconception:  She is trying for pregnancy. Has PCOS and endometriosis, has never been pregnant before.    Today's Vitals: There were no vitals taken for this visit.  ----------------      I spent 15 minutes with this patient today. All changes were made via  collaborative practice agreement with Julieta Thompson MD. A copy of the visit note was provided to the patient's provider(s).    A summary of these recommendations was sent via [employer].    Tara Pandey, PharmD, BCACP   Medication Therapy Management Pharmacist   [facility]  [phone]     Telemedicine Visit Details  Type of service:  Telephone visit  Start Time: 1:04 PM  End Time: 1:19 PM     Medication Therapy Recommendations  Controlled type 2 diabetes mellitus without complication, without long-term current use of insulin (H)    Current Medication: metFORMIN (GLUCOPHAGE) 500 MG tablet   Rationale: Medication requires monitoring - Needs additional monitoring - Effectiveness   Recommendation: Self-Monitoring - BLOOD GLUCOSE TEST STRIPS 333 VI   Status: Accepted per CPA         Encounter for preconception consultation    Rationale: Preventive therapy - Needs additional medication therapy - Indication   Recommendation: Start Medication - Prenatal Vitamins 28-0.8 MG Tabs   Status: Accepted - no CPA Needed

## 2024-09-19 ENCOUNTER — VIRTUAL VISIT (OUTPATIENT)
Dept: PHARMACY | Facility: CLINIC | Age: 34
End: 2024-09-19
Payer: COMMERCIAL

## 2024-09-19 DIAGNOSIS — E11.9 CONTROLLED TYPE 2 DIABETES MELLITUS WITHOUT COMPLICATION, WITHOUT LONG-TERM CURRENT USE OF INSULIN (H): Primary | ICD-10-CM

## 2024-09-19 PROCEDURE — 99607 MTMS BY PHARM ADDL 15 MIN: CPT | Mod: 95 | Performed by: PHARMACIST

## 2024-09-19 PROCEDURE — 99606 MTMS BY PHARM EST 15 MIN: CPT | Mod: 95 | Performed by: PHARMACIST

## 2024-09-19 RX ORDER — GLIPIZIDE 5 MG/1
5 TABLET, FILM COATED, EXTENDED RELEASE ORAL DAILY
Qty: 30 TABLET | Refills: 2 | Status: SHIPPED | OUTPATIENT
Start: 2024-09-19

## 2024-09-19 NOTE — PROGRESS NOTES
Medication Therapy Management (MTM) Encounter    ASSESSMENT:                            Medication Adherence/Access: No issues identified    Diabetes  Patient is not meeting A1c goal of < 7%.  Self monitoring of blood glucose is not at goal of fasting  mg/dL and post prandial < 180 mg/dL.  Patient would benefit from start glipizide.     PLAN:                            Start glipizide XL 5 mg daily    Follow-up: 3 weeks    SUBJECTIVE/OBJECTIVE:                          Joie Yadav is a 33 year old female seen for a follow-up visit.       Reason for visit: diabetes recheck.    Allergies/ADRs: Reviewed in chart  Past Medical History: Reviewed in chart  Tobacco: She reports that she has never smoked. She has never used smokeless tobacco.  Alcohol: none    Medication Adherence/Access: no issues reported    Diabetes   Metformin 500mg AM and 1000mg PM    Patient is not experiencing side effects.    Medication history:  Jardiance- headache, nausea, dizzy.  Resolved with stopping medication. Tried restarting medication and symptoms resumed.    Would like to avoid insulin if possible.     Current diabetes symptoms:  thirsty when sugar is high, not happening very often.       Diet/Exercise: she is unsure what the difference is with her diet when she has higher vs lower numbers after eating. Noticed high blood sugar after a low carb meal of chicken, veg and 1 tortilla.       Blood sugar monitorin time(s) daily; Ranges: (patient reported)     Date FBG/ 2hours post Lunch/2hours post Dinner /2hours post    16   /247    9/14   /164    9/13   /215    9/12  /194     9/11  /300     9/10 156      9/ 176       Lowest 129 fasting   Eye exam in the last 12 months? No  Foot exam is up to date      Today's Vitals: There were no vitals taken for this visit.  ----------------      I spent 20 minutes with this patient today. All changes were made via collaborative practice agreement with Julieta Thompson MD. A copy of the  visit note was provided to the patient's provider(s).    A summary of these recommendations was sent via [employer].    Tara Pandey, PharmD, BCACP   Medication Therapy Management Pharmacist   [facility]  [phone]     Telemedicine Visit Details  The patient's medications can be safely assessed via a telemedicine encounter.  Type of service:  Video Conference via AmWell  Originating Location (pt. Location): Home    Distant Location (provider location):  On-site  Start Time:  4:05 PM  End Time: 4:26 PM     Medication Therapy Recommendations  Controlled type 2 diabetes mellitus without complication, without long-term current use of insulin (H)    Current Medication: metFORMIN (GLUCOPHAGE) 500 MG tablet   Rationale: Synergistic therapy - Needs additional medication therapy - Indication   Recommendation: Start Medication - glipiZIDE 5 MG 24 hr tablet   Status: Accepted per CPA

## 2024-09-19 NOTE — PATIENT INSTRUCTIONS
"Recommendations from today's MTM visit:                                                         Start glipizide XL 5mg daily in the morning    Follow-up: 3 weeks    It was great speaking with you today.  I value your experience and would be very thankful for your time in providing feedback in our clinic survey. In the next few days, you may receive an email or text message from [facility] [employer] with a link to a survey related to your  clinical pharmacist.\"     To schedule another MTM appointment, please call the clinic directly or you may call the MTM scheduling line at [phone] or toll-free at [phone].     My Clinical Pharmacist's contact information:                                                      Please feel free to contact me with any questions or concerns you have.      Tara Pandey, PharmD, BCACP   Medication Therapy Management Pharmacist   [facility] and [facility]  [phone]    "

## 2024-10-10 ENCOUNTER — VIRTUAL VISIT (OUTPATIENT)
Dept: PHARMACY | Facility: CLINIC | Age: 34
End: 2024-10-10
Payer: COMMERCIAL

## 2024-10-10 DIAGNOSIS — E11.9 TYPE 2 DIABETES MELLITUS WITHOUT COMPLICATION, WITHOUT LONG-TERM CURRENT USE OF INSULIN (H): Primary | ICD-10-CM

## 2024-10-10 PROCEDURE — 99606 MTMS BY PHARM EST 15 MIN: CPT | Mod: 93 | Performed by: PHARMACIST

## 2024-10-10 PROCEDURE — 99607 MTMS BY PHARM ADDL 15 MIN: CPT | Mod: 93 | Performed by: PHARMACIST

## 2024-10-10 NOTE — PATIENT INSTRUCTIONS
"Recommendations from today's MTM visit:                                                         Increase morning metformin dose from 500mg to 1000mg.  You can use up current supply of metformin, refill has been sent in for you to  early.     Follow-up with primary care provider for annual immunizations - she will be back in mid-November.    Please reach out on [employer] or call if you have low blood sugars under 70 or more symptoms.    Follow-up: 1 month    It was great speaking with you today.  I value your experience and would be very thankful for your time in providing feedback in our clinic survey. In the next few days, you may receive an email or text message from [facility] [employer] with a link to a survey related to your  clinical pharmacist.\"     To schedule another MTM appointment, please call the clinic directly or you may call the MTM scheduling line at [phone] or toll-free at [phone].     My Clinical Pharmacist's contact information:                                                      Please feel free to contact me with any questions or concerns you have.      Tara Pandey, PharmD, BCACP   Medication Therapy Management Pharmacist   [facility]  [phone]    "

## 2024-10-10 NOTE — PROGRESS NOTES
Medication Therapy Management (MTM) Encounter    ASSESSMENT:                            Medication Adherence/Access: Advised use of pill boxes to help med adherence.    Diabetes  Patient is not meeting A1c goal of < 7%.  Self monitoring of blood glucose is not at goal of fasting  mg/dL and post prandial < 180 mg/dL.  Patient would benefit from increasing metformin to max dose.     Immunization:   Reviewed vaccines, encouraged her to schedule with PCP and complete immunizations.    PLAN:                            Increase metformin to 1000mg twice daily    Follow-up: 1 month    SUBJECTIVE/OBJECTIVE:                          Joie Yadav is a 33 year old female seen for a follow-up visit.       Reason for visit: recheck diabetes.    Allergies/ADRs: Reviewed in chart  Past Medical History: Reviewed in chart  Tobacco: She reports that she has never smoked. She has never used smokeless tobacco.  Alcohol: none    Medication Adherence/Access: thinks she has missed a couple doses in the last few weeks    Diabetes   Metformin 500mg AM and 1000mg PM  Glipizide 5 mg XL tablet once daily in the morning.  Patient is not experiencing side effects.  Some diarrhea with titrating dose of metformin.     Medication history:  Jardiance- headache, nausea, dizzy.  Resolved with stopping medication. Tried restarting medication and symptoms resumed.    Would like to avoid insulin if possible. She is trying for pregnancy.     Current diabetes symptoms:  today feeling shaky and had glucose of 74.    Sometimes feeling thirsty after eating    Diet/Exercise: eating dinner after 8pm, tends to have higher glucose   Trying to eat more vegetables, less carbohydrate.        Blood sugar monitorin time(s) daily; Ranges: (patient reported)    Date FBG/ 2hours post Lunch/2hours post Dinner /2hours post     10/2  160        10/3  160        10/4  155        10/5  150        10/6  /190       10/7   /198       10/8 186         10/9  /196      10/10  74         Eye exam in the last 12 months? No  Foot exam is up to date    Vaccine  Due for influenza, Covid 19, TDaP    Today's Vitals: There were no vitals taken for this visit.  ----------------      I spent 20 minutes with this patient today. All changes were made via collaborative practice agreement with Julieta Thompson MD. A copy of the visit note was provided to the patient's provider(s).    A summary of these recommendations was sent via [employer].    Max Radha PharmD student    Tara Pandey, PharmD, BCACP   Medication Therapy Management Pharmacist   [facility]  [phone]     Telemedicine Visit Details  The patient's medications can be safely assessed via a telemedicine encounter.  Type of service:  Telephone visit  Originating Location (pt. Location): Home    Distant Location (provider location):  On-site  Start Time: 1:07 PM  End Time: 1:26 PM     Medication Therapy Recommendations  No medication therapy recommendations to display

## 2024-11-07 ENCOUNTER — VIRTUAL VISIT (OUTPATIENT)
Dept: PHARMACY | Facility: CLINIC | Age: 34
End: 2024-11-07
Payer: COMMERCIAL

## 2024-11-07 DIAGNOSIS — E11.9 TYPE 2 DIABETES MELLITUS WITHOUT COMPLICATION, WITHOUT LONG-TERM CURRENT USE OF INSULIN (H): Primary | ICD-10-CM

## 2024-11-07 PROCEDURE — 99606 MTMS BY PHARM EST 15 MIN: CPT | Mod: 93 | Performed by: PHARMACIST

## 2024-11-07 NOTE — PROGRESS NOTES
Medication Therapy Management (MTM) Encounter    ASSESSMENT:                            Medication Adherence/Access: No issues identified.    Diabetes:   Fasting blood sugars are currently not at goal of  mg/dL. 2hr post-prandial blood glucose readings are at goal of <180 mg/dL.   Not meeting A1C goal of <7% but previous A1C due for recheck.  Discussed options including long-acting insulin and DPP-4 inhibitors with patient at this visit. Patient strongly prefers avoiding insulin. Limited data available for DPP-4 and pregnancy.  Animal data is reassuring. If she were to become pregnant, DPP-4 would be discontinued.  Prefer DPP-4 over GLP due to short T1/2 and animal safety data.     PLAN:                            A1c recheck - patient to make lab appointment.    Follow-up: Within the next 2 weeks after A1C results come back.     SUBJECTIVE/OBJECTIVE:                          Joie Yadav is a 33 year old female seen for a follow-up visit from 10/10. She was referred to me from Julieta Thompson.       Reason for visit: Diabetes recheck.    Allergies/ADRs: Reviewed in chart  Past Medical History: Reviewed in chart  Tobacco: She reports that she has never smoked. She has never used smokeless tobacco.  Alcohol: none    Medication Adherence/Access: Patient uses pill box(es).    Diabetes   Metformin 1000 mg twice daily   Glipizide 2.5 mg XL tablet once daily in the morning     Medication history:  Jardiance- headache, nausea, dizzy.  Resolved with stopping medication. Tried restarting medication and symptoms resumed.  Glipizide was lowered from 5 mg due to hypoglycemia.   She is trying for pregnancy.   She would like to avoid insulin if possible.     Current diabetes symptoms: none   No lows since last visit   Diet/Exercise: eating dinner after 8 pm, tends to have higher glucose the next morning.   Trying to eat more vegetables, less carbohydrates     Last A1C on 08/03/24 was 8.2%  Blood sugar monitoring:    Date FBG Lunch/2hours post Dinner /2hours post     11/7 11/6  168       11/5 168        11/4 138        11/3         11/2 152        11/1         10/31         10/30 130      10/29  145     10/28  177     10/25  139     Extra fruit possibly on 11/6     Eye exam in the last 12 months? No  Foot exam is up to date       Today's Vitals: There were no vitals taken for this visit.  ----------------      I spent 18 minutes with this patient today. All changes were made via collaborative practice agreement with Julieta Thompson. A copy of the visit note was provided to the patient's provider(s).    A summary of these recommendations was sent via [employer].    Cresencio Garcia, PharmD [employer] Student    Tara Pandey, PharmD, BCACP   Medication Therapy Management Pharmacist   [facility]  [phone]     Telemedicine Visit Details  The patient's medications can be safely assessed via a telemedicine encounter.  Type of service:  Telephone visit  Originating Location (pt. Location): Home    Distant Location (provider location):  On-site  Start Time:  1:00  PM  End Time:  1:18 PM     Medication Therapy Recommendations  No medication therapy recommendations to display

## 2024-11-07 NOTE — PATIENT INSTRUCTIONS
"Recommendations from today's MTM visit:                                                         I ordered a recheck of your A1c to help decide if you need additional medication adjustment.     Follow-up: touch base on MyChart after A1c result to determine next steps.    It was great speaking with you today.  I value your experience and would be very thankful for your time in providing feedback in our clinic survey. In the next few days, you may receive an email or text message from [facility] [employer] with a link to a survey related to your  clinical pharmacist.\"     To schedule another MTM appointment, please call the clinic directly or you may call the MTM scheduling line at [phone] or toll-free at [phone].     My Clinical Pharmacist's contact information:                                                      Please feel free to contact me with any questions or concerns you have.      Tara Pandey, PharmD, BCACP   Medication Therapy Management Pharmacist   [facility] and [facility]  [phone]    "

## 2024-11-08 ENCOUNTER — ANCILLARY PROCEDURE (OUTPATIENT)
Dept: ULTRASOUND IMAGING | Facility: CLINIC | Age: 34
End: 2024-11-08
Attending: OBSTETRICS & GYNECOLOGY
Payer: COMMERCIAL

## 2024-11-08 DIAGNOSIS — N85.02 ENDOMETRIAL INTRAEPITHELIAL NEOPLASIA (EIN): ICD-10-CM

## 2024-11-08 PROCEDURE — 76856 US EXAM PELVIC COMPLETE: CPT | Mod: GC | Performed by: RADIOLOGY

## 2024-11-08 PROCEDURE — 76830 TRANSVAGINAL US NON-OB: CPT | Mod: GC | Performed by: RADIOLOGY

## 2024-11-16 ENCOUNTER — LAB (OUTPATIENT)
Dept: LAB | Facility: CLINIC | Age: 34
End: 2024-11-16
Payer: COMMERCIAL

## 2024-11-16 DIAGNOSIS — E11.9 TYPE 2 DIABETES MELLITUS WITHOUT COMPLICATION, WITHOUT LONG-TERM CURRENT USE OF INSULIN (H): ICD-10-CM

## 2024-11-16 DIAGNOSIS — N85.02 ENDOMETRIAL INTRAEPITHELIAL NEOPLASIA (EIN): ICD-10-CM

## 2024-11-16 LAB
EST. AVERAGE GLUCOSE BLD GHB EST-MCNC: 169 MG/DL
HBA1C MFR BLD: 7.5 %
HCG UR QL: NEGATIVE

## 2024-11-16 PROCEDURE — 99000 SPECIMEN HANDLING OFFICE-LAB: CPT | Performed by: PATHOLOGY

## 2024-11-16 PROCEDURE — 81025 URINE PREGNANCY TEST: CPT | Performed by: PATHOLOGY

## 2024-11-16 PROCEDURE — 36415 COLL VENOUS BLD VENIPUNCTURE: CPT | Performed by: PATHOLOGY

## 2024-11-16 PROCEDURE — 83036 HEMOGLOBIN GLYCOSYLATED A1C: CPT | Performed by: FAMILY MEDICINE

## 2024-11-19 DIAGNOSIS — N85.02 ENDOMETRIAL INTRAEPITHELIAL NEOPLASIA (EIN): Primary | ICD-10-CM

## 2024-11-19 DIAGNOSIS — Z01.818 PRE-OP EXAM: ICD-10-CM

## 2024-11-20 ENCOUNTER — ONCOLOGY VISIT (OUTPATIENT)
Dept: ONCOLOGY | Facility: HOSPITAL | Age: 34
End: 2024-11-20
Attending: OBSTETRICS & GYNECOLOGY
Payer: COMMERCIAL

## 2024-11-20 VITALS
TEMPERATURE: 98.6 F | SYSTOLIC BLOOD PRESSURE: 150 MMHG | BODY MASS INDEX: 51.72 KG/M2 | HEART RATE: 94 BPM | WEIGHT: 239 LBS | OXYGEN SATURATION: 96 % | DIASTOLIC BLOOD PRESSURE: 94 MMHG | RESPIRATION RATE: 18 BRPM

## 2024-11-20 DIAGNOSIS — Z01.818 PRE-OP EXAM: ICD-10-CM

## 2024-11-20 DIAGNOSIS — N85.02 EIN (ENDOMETRIAL INTRAEPITHELIAL NEOPLASIA): Primary | ICD-10-CM

## 2024-11-20 DIAGNOSIS — N85.02 ENDOMETRIAL INTRAEPITHELIAL NEOPLASIA (EIN): ICD-10-CM

## 2024-11-20 LAB — HCG UR QL: NEGATIVE

## 2024-11-20 PROCEDURE — 88360 TUMOR IMMUNOHISTOCHEM/MANUAL: CPT | Mod: TC | Performed by: OBSTETRICS & GYNECOLOGY

## 2024-11-20 PROCEDURE — 93010 ELECTROCARDIOGRAM REPORT: CPT | Mod: 59 | Performed by: OBSTETRICS & GYNECOLOGY

## 2024-11-20 PROCEDURE — 88341 IMHCHEM/IMCYTCHM EA ADD ANTB: CPT | Mod: TC | Performed by: OBSTETRICS & GYNECOLOGY

## 2024-11-20 PROCEDURE — 58100 BIOPSY OF UTERUS LINING: CPT | Performed by: OBSTETRICS & GYNECOLOGY

## 2024-11-20 PROCEDURE — 88341 IMHCHEM/IMCYTCHM EA ADD ANTB: CPT | Mod: 26 | Performed by: PATHOLOGY

## 2024-11-20 PROCEDURE — 88305 TISSUE EXAM BY PATHOLOGIST: CPT | Mod: 26 | Performed by: PATHOLOGY

## 2024-11-20 PROCEDURE — 88342 IMHCHEM/IMCYTCHM 1ST ANTB: CPT | Mod: TC | Performed by: OBSTETRICS & GYNECOLOGY

## 2024-11-20 PROCEDURE — 88360 TUMOR IMMUNOHISTOCHEM/MANUAL: CPT | Mod: 26 | Performed by: PATHOLOGY

## 2024-11-20 PROCEDURE — G0463 HOSPITAL OUTPT CLINIC VISIT: HCPCS | Mod: 25 | Performed by: OBSTETRICS & GYNECOLOGY

## 2024-11-20 PROCEDURE — 88305 TISSUE EXAM BY PATHOLOGIST: CPT | Mod: TC | Performed by: OBSTETRICS & GYNECOLOGY

## 2024-11-20 PROCEDURE — 88342 IMHCHEM/IMCYTCHM 1ST ANTB: CPT | Mod: 26 | Performed by: PATHOLOGY

## 2024-11-20 PROCEDURE — 81025 URINE PREGNANCY TEST: CPT | Performed by: OBSTETRICS & GYNECOLOGY

## 2024-11-20 PROCEDURE — 99214 OFFICE O/P EST MOD 30 MIN: CPT | Mod: 25 | Performed by: OBSTETRICS & GYNECOLOGY

## 2024-11-20 NOTE — NURSING NOTE
"Oncology Rooming Note    November 20, 2024 11:51 AM   Joie Yadav is a 33 year old female who presents for:    Chief Complaint   Patient presents with    Oncology Clinic Visit     Gyn Onc follow up     Initial Vitals: BP (!) 150/94   Pulse 94   Temp 98.6  F (37  C)   Resp 18   Wt 108.4 kg (239 lb)   SpO2 96%   BMI 51.72 kg/m   Estimated body mass index is 51.72 kg/m  as calculated from the following:    Height as of 4/16/24: 1.448 m (4' 9\").    Weight as of this encounter: 108.4 kg (239 lb). Body surface area is 2.09 meters squared.  Data Unavailable Comment: Data Unavailable   No LMP recorded.  Allergies reviewed: Yes  Medications reviewed: Yes    Medications: Medication refills not needed today.  Pharmacy name entered into [employer]: [id number] - [place], [place] - [address] AT [facility]    Frailty Screening:   Is the patient here for a new oncology consult visit in cancer care? 2. No      Clinical concerns: Occasional pain in area of R ovary, takes tylenol or ibuprofen with relief but pain always returns. Occasional spotting. Bowel and Bladder function normal per pt.   Dr. Stout was NOT notified.      Estephania Bailey RN              "

## 2024-11-20 NOTE — PROGRESS NOTES
"            Follow Up Notes on Referred Patient    Date: 2024       Dr. Aldair Stout MD  [address] [place] [address]       RE: Joie Yadav  : 1990  GEMMA: [id number]    Dear Dr. Aldair Stout:    Joie Yadav is a 33 year old woman with a diagnosis of  ***.   She is here today for ***.     ***    Review of Systems:    Systemic           {yes - /no :606826::\"no weight changes; no fever; no chills; no night sweats; no appetite   changes\"}  Skin           {yes - /no :246489::\"no rashes, or lesions\"}  Eye           {yes - /no :339273::\"no irritation; no changes in vision\"}  Katie-Laryngeal           {yes - /no :960236::\"no dysphagia; no hoarseness\"}   Pulmonary    {yes - /no :222051::\"no cough; no shortness of breath\"}  Cardiovascular    {yes - /no :012979::\"no chest pain; no palpitations\"}  Gastrointestinal    {yes - /no :576921::\"no diarrhea; no constipation; no abdominal pain; no changes in bowel     habits; no blood in stool\"}  Genitourinary   {yes - /no :454951::\"no urinary frequency; no urinary urgency; no dysuria; no pain; no abnormal vaginal discharge; no abnormal vaginal bleeding\"}  Breast   {yes - /no :294397::\"no breast discharge; no breast changes; no breast pain\"}  Musculoskeletal    {yes - /no :410981::\"no myalgias; no arthralgias; no back pain\"}  Psychiatric           {yes - /no :781228::\"no depressed mood; no anxiety\"}    Hematologic           {yes - /no :554419::\"no tender lymph nodes; no noticeable swellings or lumps\"}   Endocrine    {yes - /no :443742::\"no hot flashes; no heat/cold intolerance\"}         Neurological   {yes - /no :271128::\"no tremor; no numbness and tingling; no headaches; no difficulty    sleeping\"}      Past Medical History:    Past Medical History:   Diagnosis Date    Acute cholecystitis 04/10/2020    Added automatically from request for surgery [id number]      Diabetes (H)     Type 2    Endometrial hyperplasia without atypia, simple " 04/08/2019    Infection due to 2019 novel coronavirus     Neck pain     Obesity     PCOS (polycystic ovarian syndrome)     TMJ (temporomandibular joint syndrome)     Vitiligo          Past Surgical History:    Past Surgical History:   Procedure Laterality Date    DILATION AND CURETTAGE, HYSTEROSCOPY DIAGNOSTIC, COMBINED N/A 3/11/2022    Procedure: HYSTEROSCOPY, DIAGNOSTIC, WITH DILATION AND CURETTAGE OF , PLACEMENT OF INTRAUTERINE DEVICE;  Surgeon: Aldair Stout MD;  Location: [facility] OR    DILATION AND CURETTAGE, OPERATIVE HYSTEROSCOPY, COMBINED N/A 4/16/2024    Procedure: HYSTEROSCOPY, WITH DILATION AND CURETTAGE OF UTERUS;  Surgeon: Aldair Stout MD;  Location: [facility]    INSERT INTRAUTERINE DEVICE N/A 3/11/2022    Procedure: Mirena Intra uterine device placement;  Surgeon: Aldair Stout MD;  Location: [facility]    LAPAROSCOPIC CHOLECYSTECTOMY N/A 04/11/2020    Procedure: CHOLECYSTECTOMY, LAPAROSCOPIC;  Surgeon: Norris Reid MD;  Location: [facility] OR         Health Maintenance Due   Topic Date Due    YEARLY PREVENTIVE VISIT  Never done    ADVANCE CARE PLANNING  Never done    EYE EXAM  Never done    HIV SCREENING  Never done    HEPATITIS C SCREENING  Never done    PAP  10/22/2021    DTAP/TDAP/TD IMMUNIZATION (5 - Tdap) 01/17/2023    INFLUENZA VACCINE (1) 09/01/2024    COVID-19 Vaccine (4 - 2024-25 season) 09/01/2024       Current Medications:     Current Outpatient Medications   Medication Sig Dispense Refill    acetaminophen (TYLENOL) 325 MG tablet Take 2 tablets (650 mg) by mouth every 6 hours as needed for mild pain 24 tablet 0    blood glucose (NO BRAND SPECIFIED) lancets standard Use to test blood sugar 1 times daily or as directed. 100 Lancet 3    blood glucose (NO BRAND SPECIFIED) test strip Use to test blood sugar 1 times daily or as directed. 100 strip 3    calcium carbonate (TUMS) 500 MG chewable tablet Take 1 chew tab by mouth 2 times daily      glipiZIDE (GLUCOTROL XL) 2.5 MG 24 hr tablet  Take 1 tablet (2.5 mg) by mouth daily. 30 tablet 2    ibuprofen (ADVIL/MOTRIN) 600 MG tablet Take 1 tablet (600 mg) by mouth every 6 hours as needed for moderate pain 12 tablet 0    metFORMIN (GLUCOPHAGE) 500 MG tablet Take 2 tablets (1,000 mg) by mouth 2 times daily (with meals). 360 tablet 3         Allergies:      No Known Allergies     Social History:     Social History     Tobacco Use    Smoking status: Never    Smokeless tobacco: Never   Substance Use Topics    Alcohol use: Never       History   Drug Use Unknown         Family History:     The patient's family history is notable for ***.    Family History   Problem Relation Age of Onset    Anesthesia Reaction No family hx of     Bleeding Disorder No family hx of     Clotting Disorder No family hx of          Physical Exam:     BP (!) 150/94   Pulse 94   Temp 98.6  F (37  C)   Resp 18   Wt 108.4 kg (239 lb)   SpO2 96%   BMI 51.72 kg/m    Body mass index is 51.72 kg/m .    General Appearance: healthy and alert, no distress     HEENT:  no thyromegaly, no palpable nodules or masses        Cardiovascular: regular rate and rhythm, no gallops, rubs or murmurs     Respiratory: lungs clear, no rales, rhonchi or wheezes, normal diaphragmatic excursion    Musculoskeletal: extremities non tender and without edema    Skin: no lesions or rashes     Neurological: normal gait, no gross defects     Psychiatric: appropriate mood and affect                               Hematological: normal cervical, supraclavicular and inguinal lymph nodes     Gastrointestinal:       abdomen soft, non-tender, non-distended, no organomegaly or masses    Genitourinary: External genitalia and urethral meatus appears normal.  Vagina is smooth without nodularity or masses.  Cervix appears normal and without lesions.  Bimanual exam reveal no masses, nodularity or fullness.  Recto-vaginal exam confirms these findings.      Assessment:    Joie Yadav is a 33 year old woman with a  diagnosis of ***.     A total of *** minutes was spent with the patient, *** minutes of which were spent in counseling the patient and/or treatment planning.      Plan:     1.)   ***     2.) Genetic risk factors were assessed and the patient does not meet the qualifications for a referral.      3.) Labs and/or tests ordered include:  ***.     4.) Health maintenance issues addressed today include ***.      CC  Patient Care Team:  Julieta Thompson MD as PCP - General (Family Medicine)  Israel Ceron MD as MD (Urology)  Vandana Jarquin MD as Resident (OB/Gyn)  Howie Ward MD as Resident  Murphy Rae MD as MD (OB/Gyn)  Fanta Rodríguez MD as Resident (OB/Gyn)  Aldair Prado MD as Assigned Cancer Care Provider  Juan David Ocasio MD as Resident (Student in organized health care education/training program)  Julieta Thompson MD as Assigned PCP  Tara Pandey RPH as Pharmacist (Pharmacist)  Juan David Ocasio MD as Assigned OBGYN Provider  Tara Pandey RPH as Assigned MTM Pharmacist  ALDAIR PRADO

## 2024-11-20 NOTE — LETTER
2024      Joie Yadav  [address] [facility] [address]      Dear Colleague,    Thank you for referring your patient, Joie Yadav, to the [facility]. Please see a copy of my visit note below.                Follow Up Notes on Referred Patient    Date: 2024       Dr. Aldair Stout MD  [address] [place] [address]       RE: Joie Yadav  : 1990  GEMMA: 2024    Joie Yadav is a 33 year old woman with a diagnosis of EIN s/p mirena IUD placement and removal after 3 normal biopsies. The IUD was removed in May of 2023. She is hoping to become pregnant and states that she has not had any bleeding since her IUD was placed (2 years). She has never been pregnant.   She notes intermittent breast tenderness as well as right-sided pelvic pain monthly though she states she has received progesterone from her gynecologist and had a withdrawal bleeding after that.  She is scheduled to see a fertility specialist this.  She has been doing well no nausea, vomiting fever or chills, normal urinary and bowel function, no lightheadedness or dizziness she is scheduled to see a fertility specialist. She notes that she has been trying to lose weight.     Past Medical History:    Past Medical History:   Diagnosis Date     Acute cholecystitis 04/10/2020    Added automatically from request for surgery [id number]       Diabetes (H)     Type 2     Endometrial hyperplasia without atypia, simple 2019     Infection due to 2019 novel coronavirus      Neck pain      Obesity      PCOS (polycystic ovarian syndrome)      TMJ (temporomandibular joint syndrome)      Vitiligo          Past Surgical History:    Past Surgical History:   Procedure Laterality Date     DILATION AND CURETTAGE, HYSTEROSCOPY DIAGNOSTIC, COMBINED N/A 3/11/2022    Procedure: HYSTEROSCOPY, DIAGNOSTIC, WITH DILATION AND CURETTAGE OF , PLACEMENT OF INTRAUTERINE DEVICE;  Surgeon:  Aldair Stout MD;  Location: [facility] OR     DILATION AND CURETTAGE, OPERATIVE HYSTEROSCOPY, COMBINED N/A 4/16/2024    Procedure: HYSTEROSCOPY, WITH DILATION AND CURETTAGE OF UTERUS;  Surgeon: Aldari Stout MD;  [facility]     INSERT INTRAUTERINE DEVICE N/A 3/11/2022    Procedure: Mirena Intra uterine device placement;  Surgeon: Aldair Stout MD;  [facility]     LAPAROSCOPIC CHOLECYSTECTOMY N/A 04/11/2020    Procedure: CHOLECYSTECTOMY, LAPAROSCOPIC;  Surgeon: Norris Reid MD;  [facility]         Health Maintenance Due   Topic Date Due     YEARLY PREVENTIVE VISIT  Never done     ADVANCE CARE PLANNING  Never done     EYE EXAM  Never done     HIV SCREENING  Never done     HEPATITIS C SCREENING  Never done     PAP  10/22/2021     DTAP/TDAP/TD IMMUNIZATION (5 - Tdap) 01/17/2023     INFLUENZA VACCINE (1) 09/01/2024     COVID-19 Vaccine (4 - 2024-25 season) 09/01/2024       Current Medications:     Current Outpatient Medications   Medication Sig Dispense Refill     acetaminophen (TYLENOL) 325 MG tablet Take 2 tablets (650 mg) by mouth every 6 hours as needed for mild pain 24 tablet 0     blood glucose (NO BRAND SPECIFIED) lancets standard Use to test blood sugar 1 times daily or as directed. 100 Lancet 3     blood glucose (NO BRAND SPECIFIED) test strip Use to test blood sugar 1 times daily or as directed. 100 strip 3     calcium carbonate (TUMS) 500 MG chewable tablet Take 1 chew tab by mouth 2 times daily       glipiZIDE (GLUCOTROL XL) 2.5 MG 24 hr tablet Take 1 tablet (2.5 mg) by mouth daily. 30 tablet 2     ibuprofen (ADVIL/MOTRIN) 600 MG tablet Take 1 tablet (600 mg) by mouth every 6 hours as needed for moderate pain 12 tablet 0     metFORMIN (GLUCOPHAGE) 500 MG tablet Take 2 tablets (1,000 mg) by mouth 2 times daily (with meals). 360 tablet 3         Allergies:      No Known Allergies     Social History:     Social History     Tobacco Use     Smoking status: Never     Smokeless  tobacco: Never   Substance Use Topics     Alcohol use: Never       History   Drug Use Unknown         Family History:       Family History   Problem Relation Age of Onset     Anesthesia Reaction No family hx of      Bleeding Disorder No family hx of      Clotting Disorder No family hx of          Physical Exam:     BP (!) 150/94   Pulse 94   Temp 98.6  F (37  C)   Resp 18   Wt 108.4 kg (239 lb)   SpO2 96%   BMI 51.72 kg/m    Body mass index is 51.72 kg/m .    General appearance: no acute distress, well groomed, sitting comfortably   HEENT: No cervical, submandibular, or supraclavicular lymphadenectomy   GI: abdomen soft, non-tender, non-distended, no appreciable masses  :  External: vulva/labia normal in appearance without lesions  Vagina: mucosa is pink, no lesions appreciated  Cervix: nulliparous appearing, without lesions or abnormal vasculature.    Uterus/adnexa: no pelvic masses appreciated     Procedure patient was consented for endometrial biopsy.  Endometrial biopsy was done without difficulties.  Patient tolerated the procedure well.  The patient was in stable conditions at the end of the procedure.           Assessment:  Joie Yadav is a 33 year old woman with a diagnosis of EIN.      A total of 31 minutes was spent with the patient, on documentation, chart review, counseling the patient and/or treatment planning.  Does not include time for procedure.      1.  Endometrial hyperplasia with atypia.  2.  Class 2 obesity.  3.  Fertility preservation       We discussed we will await the biopsy results.  Pending on those we would then place another IUD if she has hyperplasia again versus continue to follow her with ultrasounds and biopsies if she does not have any recurrent hyperplasia.  She is scheduled to see a fertility specialist.  She agrees with this plan she is very appreciative of her care.  All questions were answered.            Aldair Stout MD, MS  Associate  Professor  Department of Obstetrics and Gynecology   Division of Gynecologic Oncology   [facility]  Phone: [phone]    CC  Patient Care Team:  Julieta Thompson MD as PCP - General (Family Medicine)  Israel Ceron MD as MD (Urology)  Vandana Jarquin MD as Resident (OB/Gyn)  Howie Ward MD as Resident  Murphy Rae MD as MD (OB/Gyn)  Fanta Rodríguez MD as Resident (OB/Gyn)  Aldair Prado MD as Assigned Cancer Care Provider  Juan David Ocasio MD as Resident (Student in organized health care education/training program)  Julieta Thompson MD as Assigned PCP  Tara Pandey RPH as Pharmacist (Pharmacist)  Juan David Ocasio MD as Assigned OBGYN Provider  Tara Pandey RPH as Assigned MTM Pharmacist  ALDAIR PRADO      Again, thank you for allowing me to participate in the care of your patient.        Sincerely,        Aldair Prado MD

## 2024-11-20 NOTE — PATIENT INSTRUCTIONS
COMPLETED TODAY  Endometrial Bx     In 3-4 months   US a few days prior to visit with MD (lab right before visit with MD)

## 2024-11-21 LAB
PATH REPORT.COMMENTS IMP SPEC: NORMAL
PATH REPORT.FINAL DX SPEC: NORMAL
PATH REPORT.GROSS SPEC: NORMAL
PATH REPORT.MICROSCOPIC SPEC OTHER STN: NORMAL
PATH REPORT.RELEVANT HX SPEC: NORMAL
PHOTO IMAGE: NORMAL

## 2024-12-02 LAB
PATH REPORT.ADDENDUM SPEC: NORMAL
PATH REPORT.COMMENTS IMP SPEC: NORMAL
PATH REPORT.FINAL DX SPEC: NORMAL
PATH REPORT.GROSS SPEC: NORMAL
PATH REPORT.MICROSCOPIC SPEC OTHER STN: NORMAL
PATH REPORT.RELEVANT HX SPEC: NORMAL
PHOTO IMAGE: NORMAL

## 2024-12-11 ENCOUNTER — PATIENT OUTREACH (OUTPATIENT)
Dept: ONCOLOGY | Facility: CLINIC | Age: 34
End: 2024-12-11
Payer: COMMERCIAL

## 2024-12-11 RX ORDER — HEPARIN SODIUM 5000 [USP'U]/.5ML
5000 INJECTION, SOLUTION INTRAVENOUS; SUBCUTANEOUS
OUTPATIENT
Start: 2024-12-11

## 2024-12-11 RX ORDER — PHENAZOPYRIDINE HYDROCHLORIDE 100 MG/1
200 TABLET, FILM COATED ORAL ONCE
OUTPATIENT
Start: 2024-12-11 | End: 2024-12-11

## 2024-12-11 RX ORDER — CEFAZOLIN SODIUM 2 G/100ML
2 INJECTION, SOLUTION INTRAVENOUS SEE ADMIN INSTRUCTIONS
OUTPATIENT
Start: 2024-12-11

## 2024-12-11 RX ORDER — METRONIDAZOLE 500 MG/100ML
500 INJECTION, SOLUTION INTRAVENOUS
OUTPATIENT
Start: 2024-12-11

## 2024-12-11 RX ORDER — CEFAZOLIN SODIUM 2 G/100ML
2 INJECTION, SOLUTION INTRAVENOUS
OUTPATIENT
Start: 2024-12-11

## 2024-12-11 NOTE — PROGRESS NOTES
MD reviewed the updated pathology     Surgery orders placed and patient updated     PLAN: Exam under anesthesia, HYSTEROSCOPY, WITH DILATION AND CURETTAGE OF UTERUS, Mirena IUD placement     Patient agrees with the plan and has no questions     Dianna Hutchins RN

## 2024-12-16 NOTE — TELEPHONE ENCOUNTER
FUTURE VISIT INFORMATION      SURGERY INFORMATION:  Date: 25  [facility] or  Surgeon:  Aldair Stout MD   Anesthesia Type:  general  Procedure: Exam under anesthesia, HYSTEROSCOPY, WITH DILATION AND CURETTAGE OF UTERUS Insert Mirena intrauterine device   Consult: ov 24    RECORDS REQUESTED FROM:       Primary Care Provider: MHealtori    Most recent EKG+ Tracin24

## 2024-12-20 ENCOUNTER — PRE VISIT (OUTPATIENT)
Dept: SURGERY | Facility: CLINIC | Age: 34
End: 2024-12-20

## 2024-12-20 ENCOUNTER — ANESTHESIA EVENT (OUTPATIENT)
Dept: SURGERY | Facility: CLINIC | Age: 34
End: 2024-12-20
Payer: MEDICAID

## 2025-01-14 ENCOUNTER — ANESTHESIA (OUTPATIENT)
Dept: SURGERY | Facility: CLINIC | Age: 35
End: 2025-01-14
Payer: MEDICAID

## 2025-01-14 ENCOUNTER — HOSPITAL ENCOUNTER (OUTPATIENT)
Facility: CLINIC | Age: 35
Discharge: HOME | End: 2025-01-14
Attending: OBSTETRICS & GYNECOLOGY | Admitting: OBSTETRICS & GYNECOLOGY
Payer: MEDICAID

## 2025-01-14 VITALS
OXYGEN SATURATION: 97 % | RESPIRATION RATE: 16 BRPM | HEART RATE: 71 BPM | SYSTOLIC BLOOD PRESSURE: 140 MMHG | WEIGHT: 239.42 LBS | DIASTOLIC BLOOD PRESSURE: 90 MMHG | TEMPERATURE: 98.4 F | BODY MASS INDEX: 51.65 KG/M2 | HEIGHT: 57 IN

## 2025-01-14 DIAGNOSIS — Z98.890 STATUS POST HYSTEROSCOPY: Primary | ICD-10-CM

## 2025-01-14 DIAGNOSIS — N85.02 ENDOMETRIAL INTRAEPITHELIAL NEOPLASIA (EIN): ICD-10-CM

## 2025-01-14 LAB
GLUCOSE BLDC GLUCOMTR-MCNC: 132 MG/DL (ref 70–99)
GLUCOSE BLDC GLUCOMTR-MCNC: 178 MG/DL (ref 70–99)
HCG UR QL: NEGATIVE

## 2025-01-14 PROCEDURE — 258N000003 HC RX IP 258 OP 636: Performed by: INTERNAL MEDICINE

## 2025-01-14 PROCEDURE — 250N000009 HC RX 250: Performed by: INTERNAL MEDICINE

## 2025-01-14 PROCEDURE — 82962 GLUCOSE BLOOD TEST: CPT

## 2025-01-14 PROCEDURE — 272N000001 HC OR GENERAL SUPPLY STERILE: Performed by: OBSTETRICS & GYNECOLOGY

## 2025-01-14 PROCEDURE — 250N000011 HC RX IP 250 OP 636: Performed by: INTERNAL MEDICINE

## 2025-01-14 PROCEDURE — 250N000025 HC SEVOFLURANE, PER MIN: Performed by: OBSTETRICS & GYNECOLOGY

## 2025-01-14 PROCEDURE — 360N000076 HC SURGERY LEVEL 3, PER MIN: Performed by: OBSTETRICS & GYNECOLOGY

## 2025-01-14 PROCEDURE — 81025 URINE PREGNANCY TEST: CPT | Performed by: OBSTETRICS & GYNECOLOGY

## 2025-01-14 PROCEDURE — 999N000141 HC STATISTIC PRE-PROCEDURE NURSING ASSESSMENT: Performed by: OBSTETRICS & GYNECOLOGY

## 2025-01-14 PROCEDURE — 250N000013 HC RX MED GY IP 250 OP 250 PS 637: Performed by: OBSTETRICS & GYNECOLOGY

## 2025-01-14 PROCEDURE — 250N000011 HC RX IP 250 OP 636: Performed by: OBSTETRICS & GYNECOLOGY

## 2025-01-14 PROCEDURE — 250N000013 HC RX MED GY IP 250 OP 250 PS 637: Performed by: INTERNAL MEDICINE

## 2025-01-14 PROCEDURE — 88305 TISSUE EXAM BY PATHOLOGIST: CPT | Mod: TC | Performed by: OBSTETRICS & GYNECOLOGY

## 2025-01-14 PROCEDURE — 370N000017 HC ANESTHESIA TECHNICAL FEE, PER MIN: Performed by: OBSTETRICS & GYNECOLOGY

## 2025-01-14 PROCEDURE — 710N000012 HC RECOVERY PHASE 2, PER MINUTE: Performed by: OBSTETRICS & GYNECOLOGY

## 2025-01-14 PROCEDURE — 88305 TISSUE EXAM BY PATHOLOGIST: CPT | Mod: 26 | Performed by: PATHOLOGY

## 2025-01-14 PROCEDURE — 710N000010 HC RECOVERY PHASE 1, LEVEL 2, PER MIN: Performed by: OBSTETRICS & GYNECOLOGY

## 2025-01-14 DEVICE — IUD CONTRACEPTIVE DEVICE MIRENA 50419-4230-01: Type: IMPLANTABLE DEVICE | Site: UTERUS | Status: FUNCTIONAL

## 2025-01-14 RX ORDER — HEPARIN SODIUM 5000 [USP'U]/.5ML
5000 INJECTION, SOLUTION INTRAVENOUS; SUBCUTANEOUS
Status: COMPLETED | OUTPATIENT
Start: 2025-01-14 | End: 2025-01-14

## 2025-01-14 RX ORDER — ONDANSETRON 2 MG/ML
4 INJECTION INTRAMUSCULAR; INTRAVENOUS EVERY 30 MIN PRN
Status: DISCONTINUED | OUTPATIENT
Start: 2025-01-14 | End: 2025-01-14 | Stop reason: HOSPADM

## 2025-01-14 RX ORDER — FENTANYL CITRATE 50 UG/ML
50 INJECTION, SOLUTION INTRAMUSCULAR; INTRAVENOUS EVERY 5 MIN PRN
Status: DISCONTINUED | OUTPATIENT
Start: 2025-01-14 | End: 2025-01-14 | Stop reason: HOSPADM

## 2025-01-14 RX ORDER — CEFAZOLIN SODIUM/WATER 2 G/20 ML
2 SYRINGE (ML) INTRAVENOUS SEE ADMIN INSTRUCTIONS
Status: DISCONTINUED | OUTPATIENT
Start: 2025-01-14 | End: 2025-01-14 | Stop reason: HOSPADM

## 2025-01-14 RX ORDER — SODIUM CHLORIDE, SODIUM LACTATE, POTASSIUM CHLORIDE, CALCIUM CHLORIDE 600; 310; 30; 20 MG/100ML; MG/100ML; MG/100ML; MG/100ML
INJECTION, SOLUTION INTRAVENOUS CONTINUOUS PRN
Status: DISCONTINUED | OUTPATIENT
Start: 2025-01-14 | End: 2025-01-14

## 2025-01-14 RX ORDER — LIDOCAINE 40 MG/G
CREAM TOPICAL
Status: DISCONTINUED | OUTPATIENT
Start: 2025-01-14 | End: 2025-01-14 | Stop reason: HOSPADM

## 2025-01-14 RX ORDER — HYDROMORPHONE HCL IN WATER/PF 6 MG/30 ML
0.2 PATIENT CONTROLLED ANALGESIA SYRINGE INTRAVENOUS EVERY 5 MIN PRN
Status: DISCONTINUED | OUTPATIENT
Start: 2025-01-14 | End: 2025-01-14 | Stop reason: HOSPADM

## 2025-01-14 RX ORDER — HYDROMORPHONE HCL IN WATER/PF 6 MG/30 ML
0.4 PATIENT CONTROLLED ANALGESIA SYRINGE INTRAVENOUS EVERY 5 MIN PRN
Status: DISCONTINUED | OUTPATIENT
Start: 2025-01-14 | End: 2025-01-14 | Stop reason: HOSPADM

## 2025-01-14 RX ORDER — LIDOCAINE HYDROCHLORIDE 20 MG/ML
INJECTION, SOLUTION INFILTRATION; PERINEURAL PRN
Status: DISCONTINUED | OUTPATIENT
Start: 2025-01-14 | End: 2025-01-14

## 2025-01-14 RX ORDER — ACETAMINOPHEN 325 MG/1
650 TABLET ORAL EVERY 6 HOURS PRN
Qty: 24 TABLET | Refills: 0 | Status: SHIPPED | OUTPATIENT
Start: 2025-01-14

## 2025-01-14 RX ORDER — ACETAMINOPHEN 325 MG/1
975 TABLET ORAL ONCE
Status: DISCONTINUED | OUTPATIENT
Start: 2025-01-14 | End: 2025-01-14 | Stop reason: HOSPADM

## 2025-01-14 RX ORDER — ACETAMINOPHEN 325 MG/1
975 TABLET ORAL ONCE
Status: COMPLETED | OUTPATIENT
Start: 2025-01-14 | End: 2025-01-14

## 2025-01-14 RX ORDER — OXYCODONE HYDROCHLORIDE 5 MG/1
5 TABLET ORAL
Status: DISCONTINUED | OUTPATIENT
Start: 2025-01-14 | End: 2025-01-14 | Stop reason: HOSPADM

## 2025-01-14 RX ORDER — NALOXONE HYDROCHLORIDE 0.4 MG/ML
0.1 INJECTION, SOLUTION INTRAMUSCULAR; INTRAVENOUS; SUBCUTANEOUS
Status: DISCONTINUED | OUTPATIENT
Start: 2025-01-14 | End: 2025-01-14 | Stop reason: HOSPADM

## 2025-01-14 RX ORDER — CEFAZOLIN SODIUM/WATER 2 G/20 ML
2 SYRINGE (ML) INTRAVENOUS
Status: COMPLETED | OUTPATIENT
Start: 2025-01-14 | End: 2025-01-14

## 2025-01-14 RX ORDER — ONDANSETRON 4 MG/1
4 TABLET, ORALLY DISINTEGRATING ORAL EVERY 30 MIN PRN
Status: DISCONTINUED | OUTPATIENT
Start: 2025-01-14 | End: 2025-01-14 | Stop reason: HOSPADM

## 2025-01-14 RX ORDER — SCOPOLAMINE 1 MG/3D
1 PATCH, EXTENDED RELEASE TRANSDERMAL ONCE
Status: DISCONTINUED | OUTPATIENT
Start: 2025-01-14 | End: 2025-01-14 | Stop reason: HOSPADM

## 2025-01-14 RX ORDER — DEXAMETHASONE SODIUM PHOSPHATE 4 MG/ML
INJECTION, SOLUTION INTRA-ARTICULAR; INTRALESIONAL; INTRAMUSCULAR; INTRAVENOUS; SOFT TISSUE PRN
Status: DISCONTINUED | OUTPATIENT
Start: 2025-01-14 | End: 2025-01-14

## 2025-01-14 RX ORDER — FENTANYL CITRATE 50 UG/ML
INJECTION, SOLUTION INTRAMUSCULAR; INTRAVENOUS PRN
Status: DISCONTINUED | OUTPATIENT
Start: 2025-01-14 | End: 2025-01-14

## 2025-01-14 RX ORDER — PROPOFOL 10 MG/ML
INJECTION, EMULSION INTRAVENOUS PRN
Status: DISCONTINUED | OUTPATIENT
Start: 2025-01-14 | End: 2025-01-14

## 2025-01-14 RX ORDER — PHENAZOPYRIDINE HYDROCHLORIDE 200 MG/1
200 TABLET, FILM COATED ORAL ONCE
Status: COMPLETED | OUTPATIENT
Start: 2025-01-14 | End: 2025-01-14

## 2025-01-14 RX ORDER — IBUPROFEN 200 MG
800 TABLET ORAL ONCE
Status: DISCONTINUED | OUTPATIENT
Start: 2025-01-14 | End: 2025-01-14 | Stop reason: HOSPADM

## 2025-01-14 RX ORDER — IBUPROFEN 600 MG/1
600 TABLET, FILM COATED ORAL EVERY 6 HOURS PRN
Qty: 12 TABLET | Refills: 0 | Status: SHIPPED | OUTPATIENT
Start: 2025-01-14

## 2025-01-14 RX ORDER — METRONIDAZOLE 500 MG/100ML
500 INJECTION, SOLUTION INTRAVENOUS
Status: COMPLETED | OUTPATIENT
Start: 2025-01-14 | End: 2025-01-14

## 2025-01-14 RX ORDER — FENTANYL CITRATE 50 UG/ML
25 INJECTION, SOLUTION INTRAMUSCULAR; INTRAVENOUS EVERY 5 MIN PRN
Status: DISCONTINUED | OUTPATIENT
Start: 2025-01-14 | End: 2025-01-14 | Stop reason: HOSPADM

## 2025-01-14 RX ORDER — ONDANSETRON 2 MG/ML
INJECTION INTRAMUSCULAR; INTRAVENOUS PRN
Status: DISCONTINUED | OUTPATIENT
Start: 2025-01-14 | End: 2025-01-14

## 2025-01-14 RX ORDER — OXYCODONE HYDROCHLORIDE 10 MG/1
10 TABLET ORAL
Status: DISCONTINUED | OUTPATIENT
Start: 2025-01-14 | End: 2025-01-14 | Stop reason: HOSPADM

## 2025-01-14 RX ADMIN — ONDANSETRON 4 MG: 2 INJECTION INTRAMUSCULAR; INTRAVENOUS at 08:32

## 2025-01-14 RX ADMIN — PROPOFOL 200 MG: 10 INJECTION, EMULSION INTRAVENOUS at 08:04

## 2025-01-14 RX ADMIN — SCOPOLAMINE 1 PATCH: 1.5 PATCH, EXTENDED RELEASE TRANSDERMAL at 07:18

## 2025-01-14 RX ADMIN — PHENAZOPYRIDINE 200 MG: 200 TABLET ORAL at 06:24

## 2025-01-14 RX ADMIN — LIDOCAINE HYDROCHLORIDE 100 MG: 20 INJECTION, SOLUTION INFILTRATION; PERINEURAL at 08:03

## 2025-01-14 RX ADMIN — SODIUM CHLORIDE, POTASSIUM CHLORIDE, SODIUM LACTATE AND CALCIUM CHLORIDE: 600; 310; 30; 20 INJECTION, SOLUTION INTRAVENOUS at 07:59

## 2025-01-14 RX ADMIN — HEPARIN SODIUM 5000 UNITS: 5000 INJECTION, SOLUTION INTRAVENOUS; SUBCUTANEOUS at 06:30

## 2025-01-14 RX ADMIN — ACETAMINOPHEN 975 MG: 325 TABLET, FILM COATED ORAL at 07:18

## 2025-01-14 RX ADMIN — MIDAZOLAM 2 MG: 1 INJECTION INTRAMUSCULAR; INTRAVENOUS at 08:01

## 2025-01-14 RX ADMIN — DEXAMETHASONE SODIUM PHOSPHATE 4 MG: 4 INJECTION, SOLUTION INTRA-ARTICULAR; INTRALESIONAL; INTRAMUSCULAR; INTRAVENOUS; SOFT TISSUE at 08:16

## 2025-01-14 RX ADMIN — FENTANYL CITRATE 50 MCG: 50 INJECTION INTRAMUSCULAR; INTRAVENOUS at 08:28

## 2025-01-14 RX ADMIN — Medication 2 G: at 07:59

## 2025-01-14 RX ADMIN — METRONIDAZOLE 500 MG: 500 INJECTION, SOLUTION INTRAVENOUS at 06:27

## 2025-01-14 RX ADMIN — FENTANYL CITRATE 25 MCG: 50 INJECTION, SOLUTION INTRAMUSCULAR; INTRAVENOUS at 09:05

## 2025-01-14 RX ADMIN — FENTANYL CITRATE 50 MCG: 50 INJECTION INTRAMUSCULAR; INTRAVENOUS at 08:03

## 2025-01-14 ASSESSMENT — ACTIVITIES OF DAILY LIVING (ADL)
ADLS_ACUITY_SCORE: 40

## 2025-01-14 NOTE — ANESTHESIA CARE TRANSFER NOTE
Patient: Joie Yadav    Procedure: Procedure(s):  Exam under anesthesia, HYSTEROSCOPY, WITH DILATION AND CURETTAGE OF UTERUS  Insert Mirena intrauterine device       Diagnosis: EIN (endometrial intraepithelial neoplasia) [N85.02]  Diagnosis Additional Information: No value filed.    Anesthesia Type:   General     Note:    Oropharynx: oropharynx clear of all foreign objects and spontaneously breathing  Level of Consciousness: awake  Oxygen Supplementation: room air    Independent Airway: airway patency satisfactory and stable  Dentition: dentition unchanged  Vital Signs Stable: post-procedure vital signs reviewed and stable  Report to RN Given: handoff report given  Patient transferred to: PACU    Handoff Report: Identifed the Patient, Identified the Reponsible Provider, Reviewed the pertinent medical history, Discussed the surgical course, Reviewed Intra-OP anesthesia mangement and issues during anesthesia, Set expectations for post-procedure period and Allowed opportunity for questions and acknowledgement of understanding      Vitals:  Vitals Value Taken Time   /98    Temp     Pulse 81 01/14/25 0900   Resp 22    SpO2 97 % 01/14/25 0900   Vitals shown include unfiled device data.    Electronically Signed By: MOIZ Levy CRNA  January 14, 2025  9:01 AM

## 2025-01-14 NOTE — ANESTHESIA PROCEDURE NOTES
Airway       Patient location during procedure: OR  Staff -        CRNA: Desiree San APRN CRNA       Other Anesthesia Staff: Jeniffer Medina       Performed By: ANH and with CRNAs       Procedure performed by resident/fellow/CRNA in presence of a teaching physician.    Consent for Airway        Urgency: elective  Indications and Patient Condition       Indications for airway management: ramon-procedural       Induction type:intravenous       Mask difficulty assessment: 1 - vent by mask    Final Airway Details       Final airway type: supraglottic airway    Supraglottic Airway Details        Type: LMA       Brand: I-Gel       LMA size: 4    Post intubation assessment        Placement verified by: capnometry and chest rise        Number of attempts at approach: 1       Number of other approaches attempted: 0       Secured with: tape       Ease of procedure: easy       Dentition: Intact and Unchanged

## 2025-01-14 NOTE — DISCHARGE INSTRUCTIONS
Contacting your Doctor -   To contact a doctor, call Dr Stout at the Gynecology/Oncology clinic at [phone]  or:  [phone] and ask for the resident on call for Gynecology (answered 24 hours a day)   Emergency Department:  [facility]: [phone] 911 if you are in need of immediate or emergent help

## 2025-01-14 NOTE — OP NOTE
[facility]  Gynecology Oncology Operative Note    Date of service: 2025    Preoperative diagnosis:    - Endometrial Intraepithelial Neoplasia with atypia  - Class II Obesity    Postoperative diagnosis:  Same, s/p procedure    Procedure:  EUA, hysteroscopy, dilation and curettage, Mirena IUD placement    Surgeon:  Aldair Stout MD    Assistant:   Luz Grimes DO, MS PGY-4    Anesthesia:  GETA  EBL:  10mL  IVF:  600mL  UOP:  not measured     Specimens: Morcellation products  ID Type Source Tests Collected by Time Destination   1 : uterine curretings Curettings Uterus SURGICAL PATHOLOGY EXAM Aldair Stout MD 2025  8:42 AM      Implant  Mirena IUD  Lot# [id number]  Exp: 2027    Complications: None    Findings:    Exam under anesthesia revealed smooth cervix without lesions or masses. Difficult to palpate uterus secondary to body habitus. No grossly palpable adnexal masses.   Speculum exam revealed normal nulliparous cervix with no lesions.   Hysteroscopy revealed shaggy endometrium. No identifiable polyps. Normal uterine cavity, and normal tubal ostia visualized bilaterally.   D&C performed with good return of tissue, gritty texture throughout.       Indications:  Joie Yadav is a 34 year old  at  who presents today for hysteroscopy, dilation and curettage, and Mirena IUD placement for management of EIN. Risks, benefits, and alternatives to the procedure were discussed with the patient who elected to proceed.  All questions were answered and an informed consent was obtained.    Procedure:  The patient was taken to the operating room where she underwent general anesthesia without difficulty. She received ancef and flagyl for perioperative chemoprophylaxis. She was placed in a dorsal lithotomy position using Parish stirrups. The patient was examined for the above noted findings and then prepped and draped in the usual sterile fashion.   A medium  graves speculum was inserted into the vagina. A tenaculum was placed on the anterior cervical lip.  The endocervical canal was serially dilated to 6 mm using Hegar dilators.  The uterus sounded to 9 cm  The hysteroscope was inserted without difficulty with the above findings noted. The hysteroscope was removed and sharp curettage was performed and sent for pathology.     Attention was then turned to the IUD insertion. Mirena IUD insertion apparatus was prepared and placed through the cervix without significant resistance and deployed at the fundus in the usual fashion.  The strings were trimmed to mid vagina    All instruments were then removed.  The tenaculum was removed from the cervix and the puncture sites were made hemostatic with pressure.  The patient was repositioned to the supine position. All instruments were removed from the vagina.     The sponge, needle, and instrument counts were correct. The patient tolerated the procedure well and was transferred to recovery in stable condition.     Dr. Stout was present for the entirety of the procedure.     Luz Grimes DO, MS  Obstetrics, Gynecology & Women's Health   Resident, PGY-4  01/14/2025 8:49 AM

## 2025-01-14 NOTE — ANESTHESIA PREPROCEDURE EVALUATION
Anesthesia Pre-Procedure Evaluation    Patient: Joie Yadav   MRN: [id number] : 1990        Procedure : Procedure(s):  Exam under anesthesia, HYSTEROSCOPY, WITH DILATION AND CURETTAGE OF UTERUS  Insert Mirena intrauterine device          Past Medical History:   Diagnosis Date    Acute cholecystitis 04/10/2020    Added automatically from request for surgery [id number]      Diabetes (H)     Type 2    EIN (endometrial intraepithelial neoplasia)     Endometrial hyperplasia without atypia, simple 2019    Infection due to 2019 novel coronavirus     Neck pain     Obesity     PCOS (polycystic ovarian syndrome)     TMJ (temporomandibular joint syndrome)     Vitiligo       Past Surgical History:   Procedure Laterality Date    DILATION AND CURETTAGE, HYSTEROSCOPY DIAGNOSTIC, COMBINED N/A 3/11/2022    Procedure: HYSTEROSCOPY, DIAGNOSTIC, WITH DILATION AND CURETTAGE OF , PLACEMENT OF INTRAUTERINE DEVICE;  Surgeon: Aldair Stout MD;  [facility]    DILATION AND CURETTAGE, OPERATIVE HYSTEROSCOPY, COMBINED N/A 2024    Procedure: HYSTEROSCOPY, WITH DILATION AND CURETTAGE OF UTERUS;  Surgeon: Aldair Stout MD;  [facility]    INSERT INTRAUTERINE DEVICE N/A 3/11/2022    Procedure: Mirena Intra uterine device placement;  Surgeon: Aldair Stout MD;  [facility]    LAPAROSCOPIC CHOLECYSTECTOMY N/A 2020    Procedure: CHOLECYSTECTOMY, LAPAROSCOPIC;  Surgeon: Norris Reid MD;  [facility]      No Known Allergies   Social History     Tobacco Use    Smoking status: Never     Passive exposure: Never    Smokeless tobacco: Never   Substance Use Topics    Alcohol use: Never      Wt Readings from Last 1 Encounters:   25 108.6 kg (239 lb 6.7 oz)        Anesthesia Evaluation   Pt has had prior anesthetic. Type: General.    No history of anesthetic complications       ROS/MED HX  ENT/Pulmonary: Comment: Hx of TMJ       Neurologic:       Cardiovascular:     (+)  - -   -  -  -                                 Previous cardiac testing     METS/Exercise Tolerance:     Hematologic:       Musculoskeletal: Comment: Hx of neck pain       GI/Hepatic:       Renal/Genitourinary:     (+) renal disease,             Endo: Comment:   On metformin and glipiZIDE (GLUCOTROL XL) 2.5 MG 24 hr tablet        (+)  type II DM,             Obesity,       Psychiatric/Substance Use:       Infectious Disease:       Malignancy:       Other: Comment: EIN (endometrial intraepithelial neoplasia)           Physical Exam    Airway        Mallampati: III   TM distance: > 3 FB   Neck ROM: full   Mouth opening: > 3 cm    Respiratory Devices and Support         Dental       (+) Completely normal teeth      Cardiovascular   cardiovascular exam normal          Pulmonary   pulmonary exam normal                OUTSIDE LABS:  CBC:   Lab Results   Component Value Date    WBC 8.8 03/30/2024    WBC 9.9 06/22/2023    HGB 12.6 03/30/2024    HGB 13.5 06/22/2023    HCT 38.0 03/30/2024    HCT 41.2 06/22/2023     03/30/2024     06/22/2023     BMP:   Lab Results   Component Value Date     03/30/2024     06/22/2023    POTASSIUM 4.0 03/30/2024    POTASSIUM 4.0 06/22/2023    CHLORIDE 106 03/30/2024    CHLORIDE 100 06/22/2023    CO2 21 (L) 03/30/2024    CO2 25 06/22/2023    BUN 14.2 03/30/2024    BUN 10.7 06/22/2023    CR 0.66 03/30/2024    CR 0.71 06/22/2023     (H) 01/14/2025     (H) 04/16/2024     COAGS:   Lab Results   Component Value Date    INR 1.05 04/10/2020     POC:   Lab Results   Component Value Date     (H) 04/11/2020    HCG Negative 01/14/2025    HCGS Negative 06/22/2023     HEPATIC:   Lab Results   Component Value Date    ALBUMIN 3.7 05/14/2020    PROTTOTAL 8.6 05/14/2020    ALT 42 05/14/2020    AST 18 05/14/2020    ALKPHOS 104 05/14/2020    BILITOTAL 0.4 05/14/2020     OTHER:   Lab Results   Component Value Date    LACT 1.2 05/14/2020    A1C 7.5 (H) 11/16/2024    LAYLA 8.6  "03/30/2024    MAG 1.9 06/22/2023    LIPASE 71 (L) 05/14/2020    TSH 1.92 01/09/2024       Anesthesia Plan    ASA Status:  3    NPO Status:  NPO Appropriate    Anesthesia Type: General.     - Airway: LMA   Induction: Intravenous.   Maintenance: Balanced.   Techniques and Equipment:     - Airway: Video-Laryngoscope       Consents    Anesthesia Plan(s) and associated risks, benefits, and realistic alternatives discussed. Questions answered and patient/representative(s) expressed understanding.     - Discussed: Risks, Benefits and Alternatives for BOTH SEDATION and the PROCEDURE were discussed     - Discussed with:  Patient            Postoperative Care    Pain management: IV analgesics, Oral pain medications, Multi-modal analgesia.   PONV prophylaxis: Ondansetron (or other 5HT-3), Dexamethasone or Solumedrol, Scopolamine patch     Comments:               Danuta Matos MD    I have reviewed the pertinent notes and labs in the chart from the past 30 days and (re)examined the patient.  Any updates or changes from those notes are reflected in this note.                        # DMII: A1C = 7.5 % (Ref range: <5.7 %) within past 6 months    # Severe Obesity: Estimated body mass index is 51.81 kg/m  as calculated from the following:    Height as of this encounter: 1.448 m (4' 9\").    Weight as of this encounter: 108.6 kg (239 lb 6.7 oz).             "

## 2025-01-14 NOTE — ANESTHESIA POSTPROCEDURE EVALUATION
Patient: Joie Yadav    Procedure: Procedure(s):  Exam under anesthesia, HYSTEROSCOPY, WITH DILATION AND CURETTAGE OF UTERUS  Insert Mirena intrauterine device       Anesthesia Type:  General    Note:  Disposition: Outpatient   Postop Pain Control: Uneventful            Sign Out: Well controlled pain   PONV: No   Neuro/Psych: Uneventful            Sign Out: Acceptable/Baseline neuro status   Airway/Respiratory: Uneventful            Sign Out: Acceptable/Baseline resp. status   CV/Hemodynamics: Uneventful            Sign Out: Acceptable CV status; No obvious hypovolemia; No obvious fluid overload   Other NRE: NONE   DID A NON-ROUTINE EVENT OCCUR? No           Last vitals:  Vitals Value Taken Time   /98 01/14/25 0900   Temp     Pulse 78 01/14/25 0908   Resp 23 01/14/25 0908   SpO2 97 % 01/14/25 0908   Vitals shown include unfiled device data.    Electronically Signed By: Crow Regalado MD  January 14, 2025  9:08 AM

## 2025-01-24 DIAGNOSIS — E11.9 TYPE 2 DIABETES MELLITUS WITHOUT COMPLICATION, WITHOUT LONG-TERM CURRENT USE OF INSULIN (H): ICD-10-CM

## 2025-01-28 ENCOUNTER — OFFICE VISIT (OUTPATIENT)
Dept: FAMILY MEDICINE | Facility: CLINIC | Age: 35
End: 2025-01-28
Attending: FAMILY MEDICINE
Payer: COMMERCIAL

## 2025-01-28 VITALS
DIASTOLIC BLOOD PRESSURE: 86 MMHG | HEART RATE: 84 BPM | BODY MASS INDEX: 51.83 KG/M2 | SYSTOLIC BLOOD PRESSURE: 127 MMHG | WEIGHT: 239.5 LBS

## 2025-01-28 DIAGNOSIS — Z23 NEED FOR VACCINATION: ICD-10-CM

## 2025-01-28 DIAGNOSIS — R23.4 SKIN TEXTURE CHANGES: ICD-10-CM

## 2025-01-28 DIAGNOSIS — E11.9 CONTROLLED TYPE 2 DIABETES MELLITUS WITHOUT COMPLICATION, WITHOUT LONG-TERM CURRENT USE OF INSULIN (H): Primary | ICD-10-CM

## 2025-01-28 DIAGNOSIS — R12 HEARTBURN: ICD-10-CM

## 2025-01-28 PROCEDURE — G0008 ADMIN INFLUENZA VIRUS VAC: HCPCS

## 2025-01-28 PROCEDURE — 90471 IMMUNIZATION ADMIN: CPT

## 2025-01-28 PROCEDURE — G0463 HOSPITAL OUTPT CLINIC VISIT: HCPCS | Performed by: FAMILY MEDICINE

## 2025-01-28 PROCEDURE — 250N000011 HC RX IP 250 OP 636

## 2025-01-28 PROCEDURE — 90472 IMMUNIZATION ADMIN EACH ADD: CPT

## 2025-01-28 PROCEDURE — 90656 IIV3 VACC NO PRSV 0.5 ML IM: CPT

## 2025-01-28 PROCEDURE — 90715 TDAP VACCINE 7 YRS/> IM: CPT

## 2025-01-28 RX ORDER — GLIPIZIDE 2.5 MG/1
2.5 TABLET, EXTENDED RELEASE ORAL DAILY
Qty: 30 TABLET | Refills: 2 | Status: SHIPPED | OUTPATIENT
Start: 2025-01-28

## 2025-01-28 RX ORDER — GLIPIZIDE 2.5 MG/1
2.5 TABLET, EXTENDED RELEASE ORAL DAILY
Qty: 30 TABLET | Refills: 2 | OUTPATIENT
Start: 2025-01-28

## 2025-01-28 NOTE — PATIENT INSTRUCTIONS
Thank you for trusting us with your care!   Please be aware, if you are on Mychart, you may see your results prior to your providers review. If labs are abnormal, we will call or message you on Xirrus with a follow up plan.    If you need to contact us for questions about:  Symptoms, Scheduling & Medical Questions; Non-urgent (2-3 day response) Xirrus message, Urgent (needing response today) [phone] (if after 3:30pm next day response)   Prescriptions: Please call your Pharmacy   Billing: [facility] [phone] or  Physicians:[phone]    Try famotidine (Pepcid) daily for up to 4 weeks  If not improving, let me know and then we can try a different medication

## 2025-01-28 NOTE — TELEPHONE ENCOUNTER
Received refill request for glipizide . Pt last seen in clinic on 11/7/24 by Tara Pandey. Will pend and send to her, note says to follow up after 2 weeks from 11/7/24 and after finding out her Hemoglobin A1C.

## 2025-01-28 NOTE — PROGRESS NOTES
CC: Follow Up (Diabetes follow up)      SUBJECTIVE:  Joie Yadav presents for management of Type 2 Diabetes. She was diagnosed with diabetes in 2018 with A1c 7.6%, was able to bring A1c down to normal range with metformin 500 mg daily and lifestyle changes. Highest A1c was 7.7%, and she had A1cs in the 5% range throughout 2021. Now, she has gained weight again and A1c was up to 7.7% in 3/2024. She was resumed on metformin.     COMPLIANCE/SURVEILLANCE:     Medication compliance: taking metformin 1000 mg twice daily and glipizide 2.5 mg faraz.   Jardiance caused headache, nausea, dizziness. Glipizide was lowered from 5 mg due to hypoglycemia.  Does have some stomach upset and heartburn which Tums doesn't always help with.   Diabetic diet compliance: monitoring carbohydrates, met with diabetes education, has trying to cut back on pop and increase her exercise   Home glucose monitoring: highest fasting 150, 2 hours post-prandial 160-170   Eye exam: Nov 2023, overdue   Nephropathy: none  Peripheral neuropathy: none   Sees podiatry:  no   History of foot sores/infections: yes - Super itchy and hard to walk on the feet because she will have fluid filled blisters - clear liquid, but no numbness tingling or foot infection     History of amputations:  no  Autonomic neuropathy:  no  Macrovascular disease:    CAD: no   PVD: no   Stroke: no    Hypertension: no    Dyslipidemia: yes, high triglycerides   Tobacco use:  no   ASA prophylaxis: no   Routine dental care: yes     Immunizations current: will update today      EXAM:   /86   Pulse 84   Wt 108.6 kg (239 lb 8 oz)   LMP 12/07/2024 (Approximate)   BMI 51.83 kg/m    Body mass index is 51.83 kg/m .    Alert and no distress  Heart regular rate and rhythm  Diabetic foot exam: normal DP and PT pulses, normal monofilament exam, and peeling skin over the soles       LABS:   Component      Latest Ref Rng 11/16/2024  8:39 AM   Estimated Average Glucose      <117 mg/dL  169 (H)    Hemoglobin A1C      <5.7 % 7.5 (H)         ASSESSMENT/PLAN:   1. Diabetes: chronic, improving control  - A1c goal: 7%  - Medication changes today:  Continue metformin 2000 mg daily and glipizide 2.5 mg daily   - Lifestyle management: Discussed nutrition & physical activity recommendations. Discussed how weight loss may help.   - Labs: 1 months - if A1c remains elevated consider GLP-1 which was discussed today and she would be agreeable to starting that then stopping 2 months before trying to conceive  - Follow-up: 6 months  - Vaccines updated today       2. Lipids:  - Review of recent lipid panel reveals: , triglycerides elevated.  - Treatment recommendations: Lifestyle management. Patient desires pregnancy, will not prescribe statin.    3. Heartburn:   - Treatment recommendations:  Trial of famotidine OTC for 2-4 weeks, if still not improving trial of omeprazole OTC for 4 weeks. If still having symptoms recommend visit for re-evaluation.    4. Skin changes of the feet:   - Podiatry referral  - Recommend checking feet each night    Return to clinic:  Return in about 6 months (around 7/28/2025).    The longitudinal plan of care for the diagnosis(es)/condition(s) as documented were addressed during this visit. Due to the added complexity in care, I will continue to support Joie in the subsequent management and with ongoing continuity of care.    Julieta Thompson MD  Family Medicine

## 2025-01-29 ENCOUNTER — PATIENT OUTREACH (OUTPATIENT)
Dept: CARE COORDINATION | Facility: CLINIC | Age: 35
End: 2025-01-29
Payer: COMMERCIAL

## 2025-02-05 ENCOUNTER — OFFICE VISIT (OUTPATIENT)
Dept: PODIATRY | Facility: CLINIC | Age: 35
End: 2025-02-05
Attending: FAMILY MEDICINE
Payer: COMMERCIAL

## 2025-02-05 VITALS — DIASTOLIC BLOOD PRESSURE: 80 MMHG | WEIGHT: 239 LBS | SYSTOLIC BLOOD PRESSURE: 123 MMHG | BODY MASS INDEX: 51.72 KG/M2

## 2025-02-05 DIAGNOSIS — B35.3 TINEA PEDIS OF BOTH FEET: Primary | ICD-10-CM

## 2025-02-05 DIAGNOSIS — E11.9 CONTROLLED TYPE 2 DIABETES MELLITUS WITHOUT COMPLICATION, WITHOUT LONG-TERM CURRENT USE OF INSULIN (H): ICD-10-CM

## 2025-02-05 PROCEDURE — 99243 OFF/OP CNSLTJ NEW/EST LOW 30: CPT | Performed by: PODIATRIST

## 2025-02-05 RX ORDER — CICLOPIROX 7.7 MG/G
GEL TOPICAL 2 TIMES DAILY
Qty: 45 G | Refills: 2 | Status: SHIPPED | OUTPATIENT
Start: 2025-02-05

## 2025-02-05 NOTE — PROGRESS NOTES
"ASSESSMENT:  Encounter Diagnoses   Name Primary?    Tinea pedis of both feet Yes    Controlled type 2 diabetes mellitus without complication, without long-term current use of insulin (H)      MEDICAL DECISION MAKING:  She describes small \"bumps \"that formed and then drain, dry up in the process repeats.  Given this history and the clinical exam, I suspect a vesicular type tinea pedis.    Due to the involvement of the webspaces, I elected to prescribe a gel.    (B35.3) Tinea pedis of both feet  (primary encounter diagnosis)  Plan: ciclopirox 0.77 % GEL, Adult Dermatology          Referral            Disclaimer: This note consists of symbols derived from keyboarding, dictation and/or voice recognition software. As a result, there may be errors in the script that have gone undetected. Please consider this when interpreting information found in this chart.    Chris Ling DPM, FACFAS, MS    [facility]/Foot & Ankle Surgery      ____________________________________________________________________    HPI:       I was asked by Julieta Thompson MD  to evaluate Joie Yadav in consultation for foot concerns in the setting of type 2 DM.       Joie Yadav presents today reporting blisters at the bottom of both feet and skin coming off.  This involves the plantar forefoot and around the toes, left greater than right.  Problem started 4 months ago.  Burning discomfort rated is much as an 8 out of 10    Type 2 diabetes  She denies numbness in her feet  Last hemoglobin A1c 7.5%  *  Past Medical History:   Diagnosis Date    Acute cholecystitis 04/10/2020    Added automatically from request for surgery [id number]      Diabetes (H)     Type 2    EIN (endometrial intraepithelial neoplasia)     Endometrial hyperplasia without atypia, simple 04/08/2019    Infection due to 2019 novel coronavirus     Neck pain     Obesity     PCOS (polycystic ovarian syndrome)     TMJ (temporomandibular joint " syndrome)     Vitiligo    *  *  Past Surgical History:   Procedure Laterality Date    DILATION AND CURETTAGE, HYSTEROSCOPY DIAGNOSTIC, COMBINED N/A 3/11/2022    Procedure: HYSTEROSCOPY, DIAGNOSTIC, WITH DILATION AND CURETTAGE OF , PLACEMENT OF INTRAUTERINE DEVICE;  Surgeon: Aldair Sotut MD;  Location[facility]    DILATION AND CURETTAGE, OPERATIVE HYSTEROSCOPY, COMBINED N/A 4/16/2024    Procedure: HYSTEROSCOPY, WITH DILATION AND CURETTAGE OF UTERUS;  Surgeon: Aldair Stout MD;  [facility]    DILATION AND CURETTAGE, OPERATIVE HYSTEROSCOPY, COMBINED N/A 1/14/2025    Procedure: Exam under anesthesia, HYSTEROSCOPY, WITH DILATION AND CURETTAGE OF UTERUS;  Surgeon: Aldair Stout MD;  [facility]    INSERT INTRAUTERINE DEVICE N/A 3/11/2022    Procedure: Mirena Intra uterine device placement;  Surgeon: Aldair Stout MD;  [facility]    INSERT INTRAUTERINE DEVICE N/A 1/14/2025    Procedure: Insert Mirena intrauterine device;  Surgeon: Aldair Stout MD;  [facility]    LAPAROSCOPIC CHOLECYSTECTOMY N/A 04/11/2020    Procedure: CHOLECYSTECTOMY, LAPAROSCOPIC;  Surgeon: Norris Reid MD;  [facility]   *  *  Current Outpatient Medications   Medication Sig Dispense Refill    acetaminophen (TYLENOL) 325 MG tablet Take 2 tablets (650 mg) by mouth every 6 hours as needed for mild pain. 24 tablet 0    acetaminophen (TYLENOL) 325 MG tablet Take 2 tablets (650 mg) by mouth every 6 hours as needed for mild pain 24 tablet 0    blood glucose (NO BRAND SPECIFIED) lancets standard Use to test blood sugar 1 times daily or as directed. 100 Lancet 3    blood glucose (NO BRAND SPECIFIED) test strip USE TO TEST BLOOD SUGAR ONCE DAILY OR AS DIRECTED      blood glucose (NO BRAND SPECIFIED) test strip USE TO TEST BLOOD SUGAR ONCE DAILY OR AS DIRECTED      blood glucose (NO BRAND SPECIFIED) test strip Use to test blood sugar 1 times daily or as directed. 100 strip 3    calcium carbonate  (TUMS) 500 MG chewable tablet Take 1 chew tab by mouth 2 times daily as needed.      glipiZIDE (GLUCOTROL XL) 2.5 MG 24 hr tablet Take 1 tablet (2.5 mg) by mouth daily. 30 tablet 2    ibuprofen (ADVIL/MOTRIN) 600 MG tablet Take 1 tablet (600 mg) by mouth every 6 hours as needed for other (mild and/or inflammatory pain.). 12 tablet 0    metFORMIN (GLUCOPHAGE) 500 MG tablet Take 2 tablets (1,000 mg) by mouth 2 times daily (with meals). 360 tablet 3         EXAM:    Vitals: [facility] 12/07/2024 (Approximate)   BMI: There is no height or weight on file to calculate BMI.    Diabetic Foot Exam (details below):  normal DP and PT pulses, no trophic changes or ulcerative lesions, and normal sensory exam    Vasc:      Pedal pulses are palpable for the dorsalis pedis posterior tibial artery, bilateral foot.  Capillary fill time </= 3 seconds  Pedal skin appears well-perfused  Neuro:      Light touch sensation intact to all sensory nerve distributions, bilateral foot.  No apparent spastic contractures or other deformity secondary to neurologic compromise.  Derm:      Evidence of exfoliation, loss of skin bilateral plantar forefoot and around the toes, left greater than right.  No wounds   No worrisome lesions  MSK:      Bilateral lower extremity muscle strength presents is normal.  No gross deformities  Adequate ankle and subtalar joint range of motion  Calf:    Neg for redness, swelling or tenderness

## 2025-02-05 NOTE — PATIENT INSTRUCTIONS
Thank you for choosing [facility] Podiatry / Foot & Ankle Surgery!    DR. LONDONO'S CLINIC LOCATIONS:     [facility] TRIAGE LINE: [phone]   [address] [place] [address] APPOINTMENTS: [phone]   [place] [address] RADIOLOGY: [phone]   (Every other Tues - Wed - Fri PM) SET UP SURGERY: [phone]    PHYSICAL THERAPY: [phone]   [facility] BILLING QUESTIONS: [phone] [address] FAX: [phone]   [place] [address]    (Thurs & Fri AM)         ATHLETE'S FOOT (TINEA PEDIS)  It is a rash that is caused by a fungus (most commonly Dermatophytes) in the outer layer of skin on the foot or in the nails. It can occur between the toes or on the instep and heel areas of the feet. Fungus will grow in warm and moist environments. The fungus that causes athlete's foot is contagious and you can get it from touching someone who has it of walking around bare foot around swimming pools, gyms, saunas, communal baths and showers, fitness centers or locker rooms.     SYMPTOMS  The symptoms of athlete's foot are numerous and include; itching, burning or stinging between the toes or on the soles of the feet, blisters, cracked and peeling skin, excessive dryness or excessive scaling on the bottom or sides of the feet, redness and softness with breaking down of the skin, especially between the toes, foot odor and thick, crumbly nails. Older males or people who live in warm humid environments are more prone to get it but it can develop at any age.    TREATMENT OPTIONS  Typically it can be treated with antifungal creams, lotions, ointments, sprays, or gels.  Many are available over the counter, some are prescription. It is important that you use them long enough, typically 4 weeks, to clear the rash. If an infection is particularly bad, your provider may prescribe oral medication. It is important that you follow the directions for any medication carefully to prevent recurrence of the  rash.    HOME TREATMENT  1.  Keep feet clean and dry  2.  Dry between your toes any time your feet become wet  3.  Wear socks that are made of cotton, wool, or fibers designed to wick moisture away  from skin. Nylon, lycra, and spandex tend to trap moisture.  4.  If you have blistering, you may soak your feet in Burow's solution (aluminum acetate is active ingredient. Domeboro is a brand sold at [employer]). This is available over the counter.  5.  Treat shows with antifungal powder  6.  Tea Tree oil may help reduce symptoms but does not cure the rash.    PREVENTION  1.  Change socks or stockings regularly.  2.  Use talcum powder on your feet if they sweat a lot.  3.  Do not borrow shoes.  4.  Let shoes dry out for 24 hours before wearing them again.  5.  Treat shoes with fungal powder  6.  Wear shoes that are well ventilated such as leather shoes or sandals.  Avoid shoes  made of synthetic materials such as vinyl or rubber.  7.  Wear water proof sandals when you are in public areas such as locker rooms, pools, communal baths, showers, saunas, and fitness centers.    FYI: Call or seek medical attention IMMEDIATELY if:  - You develop a fever over 100.4F for more than 24 hrs.  - There is a discharge of pus from infected areas.  - Red streaks develop from the affected areas  - Increase in redness, warmth, pain, swelling, or tenderness in the affected areas.

## 2025-02-05 NOTE — LETTER
"2/5/2025      Joie Yadav  [address]  [place] [address]      Dear Colleague,    Thank you for referring your patient, Joie Yadav, to the [facility]. Please see a copy of my visit note below.    ASSESSMENT:  Encounter Diagnoses   Name Primary?     Tinea pedis of both feet Yes     Controlled type 2 diabetes mellitus without complication, without long-term current use of insulin (H)      MEDICAL DECISION MAKING:  She describes small \"bumps \"that formed and then drain, dry up in the process repeats.  Given this history and the clinical exam, I suspect a vesicular type tinea pedis.    Due to the involvement of the webspaces, I elected to prescribe a gel.    (B35.3) Tinea pedis of both feet  (primary encounter diagnosis)  Plan: ciclopirox 0.77 % GEL, Adult Dermatology          Referral            Disclaimer: This note consists of symbols derived from keyboarding, dictation and/or voice recognition software. As a result, there may be errors in the script that have gone undetected. Please consider this when interpreting information found in this chart.    Chris Ling DPM, FACFAS, MS    [place] Department of Podiatry/Foot & Ankle Surgery      ____________________________________________________________________    HPI:       I was asked by Julieta Thompson MD  to evaluate Joie Yadav in consultation for foot concerns in the setting of type 2 DM.       Joie Yadav presents today reporting blisters at the bottom of both feet and skin coming off.  This involves the plantar forefoot and around the toes, left greater than right.  Problem started 4 months ago.  Burning discomfort rated is much as an 8 out of 10    Type 2 diabetes  She denies numbness in her feet  Last hemoglobin A1c 7.5%  *  Past Medical History:   Diagnosis Date     Acute cholecystitis 04/10/2020    Added automatically from request for surgery [id number]       Diabetes (H)     Type " 2     EIN (endometrial intraepithelial neoplasia)      Endometrial hyperplasia without atypia, simple 04/08/2019     Infection due to 2019 novel coronavirus      Neck pain      Obesity      PCOS (polycystic ovarian syndrome)      TMJ (temporomandibular joint syndrome)      Vitiligo    *  *  Past Surgical History:   Procedure Laterality Date     DILATION AND CURETTAGE, HYSTEROSCOPY DIAGNOSTIC, COMBINED N/A 3/11/2022    Procedure: HYSTEROSCOPY, DIAGNOSTIC, WITH DILATION AND CURETTAGE OF , PLACEMENT OF INTRAUTERINE DEVICE;  Surgeon: Aldair Stout MD;  [facility]     DILATION AND CURETTAGE, OPERATIVE HYSTEROSCOPY, COMBINED N/A 4/16/2024    Procedure: HYSTEROSCOPY, WITH DILATION AND CURETTAGE OF UTERUS;  Surgeon: Aldair Stout MD;  [facility]     DILATION AND CURETTAGE, OPERATIVE HYSTEROSCOPY, COMBINED N/A 1/14/2025    Procedure: Exam under anesthesia, HYSTEROSCOPY, WITH DILATION AND CURETTAGE OF UTERUS;  Surgeon: Aldair Stout MD;  [facility]     INSERT INTRAUTERINE DEVICE N/A 3/11/2022    Procedure: Mirena Intra uterine device placement;  Surgeon: Aldair Stout MD;  [facility]     INSERT INTRAUTERINE DEVICE N/A 1/14/2025    Procedure: Insert Mirena intrauterine device;  Surgeon: Aldair Stout MD;  [facility]     LAPAROSCOPIC CHOLECYSTECTOMY N/A 04/11/2020    Procedure: CHOLECYSTECTOMY, LAPAROSCOPIC;  Surgeon: Norris Reid MD;  [facility]   *  *  Current Outpatient Medications   Medication Sig Dispense Refill     acetaminophen (TYLENOL) 325 MG tablet Take 2 tablets (650 mg) by mouth every 6 hours as needed for mild pain. 24 tablet 0     acetaminophen (TYLENOL) 325 MG tablet Take 2 tablets (650 mg) by mouth every 6 hours as needed for mild pain 24 tablet 0     blood glucose (NO BRAND SPECIFIED) lancets standard Use to test blood sugar 1 times daily or as directed. 100 Lancet 3     blood glucose (NO BRAND SPECIFIED) test strip USE TO TEST BLOOD SUGAR  ONCE DAILY OR AS DIRECTED       blood glucose (NO BRAND SPECIFIED) test strip USE TO TEST BLOOD SUGAR ONCE DAILY OR AS DIRECTED       blood glucose (NO BRAND SPECIFIED) test strip Use to test blood sugar 1 times daily or as directed. 100 strip 3     calcium carbonate (TUMS) 500 MG chewable tablet Take 1 chew tab by mouth 2 times daily as needed.       glipiZIDE (GLUCOTROL XL) 2.5 MG 24 hr tablet Take 1 tablet (2.5 mg) by mouth daily. 30 tablet 2     ibuprofen (ADVIL/MOTRIN) 600 MG tablet Take 1 tablet (600 mg) by mouth every 6 hours as needed for other (mild and/or inflammatory pain.). 12 tablet 0     metFORMIN (GLUCOPHAGE) 500 MG tablet Take 2 tablets (1,000 mg) by mouth 2 times daily (with meals). 360 tablet 3         EXAM:    Vitals: [facility] 12/07/2024 (Approximate)   BMI: There is no height or weight on file to calculate BMI.    Diabetic Foot Exam (details below):  normal DP and PT pulses, no trophic changes or ulcerative lesions, and normal sensory exam    Vasc:      Pedal pulses are palpable for the dorsalis pedis posterior tibial artery, bilateral foot.  Capillary fill time </= 3 seconds  Pedal skin appears well-perfused  Neuro:      Light touch sensation intact to all sensory nerve distributions, bilateral foot.  No apparent spastic contractures or other deformity secondary to neurologic compromise.  Derm:      Evidence of exfoliation, loss of skin bilateral plantar forefoot and around the toes, left greater than right.  No wounds   No worrisome lesions  MSK:      Bilateral lower extremity muscle strength presents is normal.  No gross deformities  Adequate ankle and subtalar joint range of motion  Calf:    Neg for redness, swelling or tenderness        Again, thank you for allowing me to participate in the care of your patient.        Sincerely,        Chris Ling DPM    Electronically signed

## 2025-02-12 ENCOUNTER — OFFICE VISIT (OUTPATIENT)
Dept: DERMATOLOGY | Facility: CLINIC | Age: 35
End: 2025-02-12
Attending: PODIATRIST
Payer: COMMERCIAL

## 2025-02-12 ENCOUNTER — VIRTUAL VISIT (OUTPATIENT)
Dept: ONCOLOGY | Facility: HOSPITAL | Age: 35
End: 2025-02-12
Attending: OBSTETRICS & GYNECOLOGY
Payer: COMMERCIAL

## 2025-02-12 VITALS — HEIGHT: 57 IN | BODY MASS INDEX: 51.56 KG/M2 | WEIGHT: 239 LBS

## 2025-02-12 DIAGNOSIS — B99.9 INFECTION: ICD-10-CM

## 2025-02-12 DIAGNOSIS — N85.02 EIN (ENDOMETRIAL INTRAEPITHELIAL NEOPLASIA): Primary | ICD-10-CM

## 2025-02-12 DIAGNOSIS — L13.8 OTHER SPECIFIED BULLOUS DISORDERS: ICD-10-CM

## 2025-02-12 DIAGNOSIS — B35.3 TINEA PEDIS OF BOTH FEET: Primary | ICD-10-CM

## 2025-02-12 PROCEDURE — 98005 SYNCH AUDIO-VIDEO EST LOW 20: CPT | Performed by: OBSTETRICS & GYNECOLOGY

## 2025-02-12 PROCEDURE — 87101 SKIN FUNGI CULTURE: CPT | Performed by: STUDENT IN AN ORGANIZED HEALTH CARE EDUCATION/TRAINING PROGRAM

## 2025-02-12 RX ORDER — TERBINAFINE HYDROCHLORIDE 250 MG/1
250 TABLET ORAL DAILY
Qty: 14 TABLET | Refills: 0 | Status: SHIPPED | OUTPATIENT
Start: 2025-02-12

## 2025-02-12 ASSESSMENT — PAIN SCALES - GENERAL
PAINLEVEL_OUTOF10: NO PAIN (0)
PAINLEVEL_OUTOF10: NO PAIN (0)

## 2025-02-12 NOTE — PROGRESS NOTES
[facility] Dermatology Note  Encounter Date: Feb 12, 2025  Office Visit     Reviewed patients past medical history and pertinent chart review prior to patients visit today.     Dermatology Problem List:  # Bullous tinea pedis - oral and topical terbinafine  # Vitiligo - no treatment    Pertinent Medical History:  PCOS, T2DM  ____________________________________________    Assessment & Plan:     # Bullous tinea pedis in setting of T2DM (new undiagnosed problem with uncertain prognosis)  Reassuring condition is improving with topical anti-fungal, likely need oral antifungal given bullous variant  Reviewed strategies to reduce moisture around feet  - Continue terbinafine 1% cream BID to plantar and interdigital spaces until resolution. Then continue treating for at least 2 additional weeks.   - Begin terbinafine 250 mg QD x 2 weeks    R FOOT         L FOOT      Follow-up:   Mychart in 2 weeks to update us on progress    All risks, benefits and alternatives were discussed with patient.  Patient is in agreement and understands the assessment and plan.  All questions were answered.    STAFF  Ben Conklin PA-C  [facility] Dermatology    _______________________________________    CC: Derm Problem    HPI:  Ms. Joie Yadav is a(n) 34 year old female who presents as a new patient.    # Blistering on bilateral feet (L > R)  Onset October 2024 (4 months ago)  Affects plantar surface and between toes  Will form blisters, drain and leave behind peeling skin  Associated itching, pain with walking, pain is worst between the toes  Started terbinafine cream BID one week ago, helps with peeling a bit, but itching is unchanged  Rates itching 10/10 prior to initiating cream, today rates 8/10.  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    Physical Exam:  SKIN: Focused examination of feet was performed.  - Maceration between toes on both feet, worst between 3-5th digits  - Peeling skin plantar surfaces both feet  - Tense bullae on plantar surface of toes both feet  - hypopigmented patches on dorsal feet  No other lesions of concern on areas examined.     Medications:  Current Outpatient Medications   Medication Sig Dispense Refill    terbinafine (LAMISIL) 1 % external cream Apply topically 2 times daily. 42 g 1    acetaminophen (TYLENOL) 325 MG tablet Take 2 tablets (650 mg) by mouth every 6 hours as needed for mild pain. 24 tablet 0    acetaminophen (TYLENOL) 325 MG tablet Take 2 tablets (650 mg) by mouth every 6 hours as needed for mild pain 24 tablet 0    blood glucose (NO BRAND SPECIFIED) lancets standard Use to test blood sugar 1 times daily or as directed. 100 Lancet 3    blood glucose (NO BRAND SPECIFIED) test strip USE TO TEST BLOOD SUGAR ONCE DAILY OR AS DIRECTED      blood glucose (NO BRAND SPECIFIED) test strip USE TO TEST BLOOD SUGAR ONCE DAILY OR AS DIRECTED      blood glucose (NO BRAND SPECIFIED) test strip Use to test blood sugar 1 times daily or as directed. 100 strip 3    calcium carbonate (TUMS) 500 MG chewable tablet Take 1 chew tab by mouth 2 times daily as needed.      ciclopirox 0.77 % GEL Externally apply topically 2 times daily. (Patient not taking: Reported on 2/12/2025) 45 g 2    glipiZIDE (GLUCOTROL XL) 2.5 MG 24 hr tablet Take 1 tablet (2.5 mg) by mouth daily. 30 tablet 2    ibuprofen (ADVIL/MOTRIN) 600 MG tablet Take 1  tablet (600 mg) by mouth every 6 hours as needed for other (mild and/or inflammatory pain.). 12 tablet 0    metFORMIN (GLUCOPHAGE) 500 MG tablet Take 2 tablets (1,000 mg) by mouth 2 times daily (with meals). 360 tablet 3     No current facility-administered medications for this visit.      Past Medical History:   Patient Active Problem List   Diagnosis    Endometrial hyperplasia without atypia, simple    PCOS (polycystic ovarian syndrome)    Alopecia areata    Controlled type 2 diabetes mellitus without complication, without long-term current use of insulin (H)    Dermatitis    Elevated cholesterol with elevated triglycerides    Leukocytosis    Lung granuloma (H)    Neck pain    Obese    Short stature    Vitamin D deficiency    Vitiligo    Kidney stone    Cyst of cervix    Complex atypical endometrial hyperplasia    RUQ pain    Morbid obesity (H)    Endometrial intraepithelial neoplasia (EIN)     Past Medical History:   Diagnosis Date    Acute cholecystitis 04/10/2020    Added automatically from request for surgery [id number]      Diabetes (H)     Type 2    EIN (endometrial intraepithelial neoplasia)     Endometrial hyperplasia without atypia, simple 04/08/2019    Infection due to 2019 novel coronavirus     Neck pain     Obesity     PCOS (polycystic ovarian syndrome)     TMJ (temporomandibular joint syndrome)     Vitiligo        CC Chris Ling, DPM  [address]  [place] [address] on close of this encounter.

## 2025-02-12 NOTE — PATIENT INSTRUCTIONS
Tinea Pedis (Athlete's Foot) - blistering verison  Caused by a fungus that likes to grow in warm/moist places (like our feet!)    Fungal culture taken today in office, will MyChart you with results.     Start oral terbinafine antifungal pill once daily x 2 weeks. If not resolved after 2 weeks, send [employer] message and we may have you come back in.     Continue terbinafine anti-fungal cream from ankles down onto tops and bottoms of feet and between all toes of both feet. Continue treatment until all the peeling has fully resolved plus an additional 2 weeks to kill any dormant spores. This will likely take 6-8 weeks of treatment. Repeat with flares of peeling or itching skin on the feet.     To keep the fungus away, it's important to keep the feet dry as possible. Recommend anti-fungal powder in your shoes and wearing Lamb's Wool between the toes when wearing occlusive shoes. These can be purchased at any pharmacy, below are some examples of what the products looks like:      Example Lamb's Wool      Example anti-fungal powders and sprays for shoes    ___________________________________________________________

## 2025-02-12 NOTE — PATIENT INSTRUCTIONS
Follow up 3 months   US a few days prior to visit with dr Stout   ~ Go to lab right before visit with Dr Stout for urine pregnancy test

## 2025-02-12 NOTE — PROGRESS NOTES
Virtual Visit Details    Type of service:  Video Visit   16 minutes    Originating Location (pt. Location): Home    Distant Location (provider location):  Off   Platform used for Video Visit: Pierre                Follow Up Notes on Referred Patient    Date: 2025       Dr. Delfina Augustine MD  No address on file       RE: Joie Yadav  : 1990  GEMMA: 2025    Joie Yadav is a 33 year old woman with a diagnosis of EIN s/p mirena IUD placement and removal after 3 normal biopsies. The IUD was removed in May of 2023. She is hoping to become pregnant and states that she has not had any bleeding since her IUD was placed (2 years). She has never been pregnant.She has been doing well since the surgery when we repeated a CT as well as placements.  She has no nausea vomiting fever chills normal urinary bowel function.  She did have some spotting for the first 11 days that has not subsided and she just started yesterday active little spotting but no kaylin bleeding.    Past Medical History:    Past Medical History:   Diagnosis Date    Acute cholecystitis 04/10/2020    Added automatically from request for surgery [id number]      Diabetes (H)     Type 2    EIN (endometrial intraepithelial neoplasia)     Endometrial hyperplasia without atypia, simple 2019    Infection due to 2019 novel coronavirus     Neck pain     Obesity     PCOS (polycystic ovarian syndrome)     TMJ (temporomandibular joint syndrome)     Vitiligo          Past Surgical History:    Past Surgical History:   Procedure Laterality Date    DILATION AND CURETTAGE, HYSTEROSCOPY DIAGNOSTIC, COMBINED N/A 3/11/2022    Procedure: HYSTEROSCOPY, DIAGNOSTIC, WITH DILATION AND CURETTAGE OF , PLACEMENT OF INTRAUTERINE DEVICE;  Surgeon: Aldair Stout MD;  Location: [facility]    DILATION AND CURETTAGE, OPERATIVE HYSTEROSCOPY, COMBINED N/A 2024    Procedure: HYSTEROSCOPY, WITH DILATION AND CURETTAGE OF UTERUS;  Surgeon: Aldair Stout  MD;  Location: [facility]    DILATION AND CURETTAGE, OPERATIVE HYSTEROSCOPY, COMBINED N/A 1/14/2025    Procedure: Exam under anesthesia, HYSTEROSCOPY, WITH DILATION AND CURETTAGE OF UTERUS;  Surgeon: Aldair Stout MD;  Location: [facility]    INSERT INTRAUTERINE DEVICE N/A 3/11/2022    Procedure: Mirena Intra uterine device placement;  Surgeon: Aldair Stout MD;  Location: [facility]    INSERT INTRAUTERINE DEVICE N/A 1/14/2025    Procedure: Insert Mirena intrauterine device;  Surgeon: Aldair Stout MD;  Location[facility]    LAPAROSCOPIC CHOLECYSTECTOMY N/A 04/11/2020    Procedure: CHOLECYSTECTOMY, LAPAROSCOPIC;  Surgeon: Norris Reid MD;  Location[facility]         Health Maintenance Due   Topic Date Due    ADVANCE CARE PLANNING  Never done    EYE EXAM  Never done    YEARLY PREVENTIVE VISIT  Never done    HIV SCREENING  Never done    HEPATITIS C SCREENING  Never done    PAP  10/22/2021    COVID-19 Vaccine (4 - 2024-25 season) 09/01/2024       Current Medications:     Current Outpatient Medications   Medication Sig Dispense Refill    acetaminophen (TYLENOL) 325 MG tablet Take 2 tablets (650 mg) by mouth every 6 hours as needed for mild pain. 24 tablet 0    acetaminophen (TYLENOL) 325 MG tablet Take 2 tablets (650 mg) by mouth every 6 hours as needed for mild pain 24 tablet 0    blood glucose (NO BRAND SPECIFIED) lancets standard Use to test blood sugar 1 times daily or as directed. 100 Lancet 3    blood glucose (NO BRAND SPECIFIED) test strip USE TO TEST BLOOD SUGAR ONCE DAILY OR AS DIRECTED      blood glucose (NO BRAND SPECIFIED) test strip USE TO TEST BLOOD SUGAR ONCE DAILY OR AS DIRECTED      blood glucose (NO BRAND SPECIFIED) test strip Use to test blood sugar 1 times daily or as directed. 100 strip 3    calcium carbonate (TUMS) 500 MG chewable tablet Take 1 chew tab by mouth 2 times daily as needed.      ciclopirox 0.77 % GEL Externally apply topically 2 times daily. 45 g 2    glipiZIDE (GLUCOTROL XL)  "2.5 MG 24 hr tablet Take 1 tablet (2.5 mg) by mouth daily. 30 tablet 2    ibuprofen (ADVIL/MOTRIN) 600 MG tablet Take 1 tablet (600 mg) by mouth every 6 hours as needed for other (mild and/or inflammatory pain.). 12 tablet 0    metFORMIN (GLUCOPHAGE) 500 MG tablet Take 2 tablets (1,000 mg) by mouth 2 times daily (with meals). 360 tablet 3    terbinafine (LAMISIL) 1 % external cream Apply topically 2 times daily. 42 g 1         Allergies:      No Known Allergies     Social History:     Social History     Tobacco Use    Smoking status: Never     Passive exposure: Never    Smokeless tobacco: Never   Substance Use Topics    Alcohol use: Never       History   Drug Use Unknown         Family History:       Family History   Problem Relation Age of Onset    Anesthesia Reaction No family hx of     Bleeding Disorder No family hx of     Clotting Disorder No family hx of          Physical Exam:     Ht 1.448 m (4' 9\")   Wt 108.4 kg (239 lb)   LMP 12/07/2024 (Approximate)   BMI 51.72 kg/m    Body mass index is 51.72 kg/m .    General Appearance: healthy and alert, no distress      Psychiatric: appropriate mood and affect                                   Assessment:    Joie Yadav is a 32 year old woman with a diagnosis of EIN.      A total of 26 minutes was spent with the patient, on documentation, chart review, counseling the patient and/or treatment planning.  Does not include time for procedure.      1.  Endometrial hyperplasia with atypia.  2.  Class 2 obesity.  3.  Fertility preservation      We did review the recent pathology which unfortunately showed EIN again.  We discussed we will repeat TVUS and visit with biopsy in 3 month.  We discussed again with need 3 negative biopsies total of 9 months before we could consider removing the T if she would like to proceed with also pregnancy at that time.  All questions were answered.       Aldair Stout MD, MS    Department of Obstetrics " and Gynecology   Division of Gynecologic Oncology   [facility]  Phone: [phone]      CC  Patient Care Team:  Julieta Thompson MD as PCP - General (Family Medicine)  Israel Ceron MD as MD (Urology)  Vandana Jarquin MD as Resident (OB/Gyn)  Howie Ward MD as Resident  Murphy Rae MD as MD (OB/Gyn)  Fanta Rodríguez MD as Resident (OB/Gyn)  Aldair Stout MD as Assigned Cancer Care Provider  Juan David Ocasio MD as Resident (Student in organized health care education/training program)  Julieta Thompson MD as Assigned PCP  Tara Pandey RPH as Pharmacist (Pharmacist)  Juan David Ocasio MD as Assigned OBGYN Provider  Tara Pandey RPH as Assigned MTM Pharmacist  Lázaro, Chris Rollins MD as MD (Dermatology)  SELF, REFERRED

## 2025-02-12 NOTE — LETTER
2/12/2025      Joie Yadav  [address]  [place] [address]      Dear Colleague,    Thank you for referring your patient, Joie Yadav, to the [facility]. Please see a copy of my visit note below.    [facility] Dermatology Note  Encounter Date: Feb 12, 2025  Office Visit     Reviewed patients past medical history and pertinent chart review prior to patients visit today.     Dermatology Problem List:  # Bullous tinea pedis - oral and topical terbinafine  # Vitiligo - no treatment    Pertinent Medical History:  PCOS, T2DM  ____________________________________________    Assessment & Plan:     # Bullous tinea pedis in setting of T2DM (new undiagnosed problem with uncertain prognosis)  Reassuring condition is improving with topical anti-fungal, likely need oral antifungal given bullous variant  Reviewed strategies to reduce moisture around feet  - Continue terbinafine 1% cream BID to plantar and interdigital spaces until resolution. Then continue treating for at least 2 additional weeks.   - Begin terbinafine 250 mg QD x 2 weeks    R FOOT         L FOOT      Follow-up:   Mychart in 2 weeks to update us on progress    All risks, benefits and alternatives were discussed with patient.  Patient is in agreement and understands the assessment and plan.  All questions were answered.    STAFF  Ben Conklin PA-C  [facility] Dermatology    _______________________________________    CC: Derm Problem    HPI:  Ms. Joie Yadav is a(n) 34 year old female who presents as a new patient.    # Blistering on bilateral feet (L > R)  Onset October 2024 (4 months ago)  Affects plantar surface and between toes  Will form blisters, drain and leave behind peeling skin  Associated itching, pain with walking, pain is worst between the toes  Started terbinafine cream BID one week ago, helps with peeling a bit, but itching is unchanged  Rates itching 10/10  prior to initiating cream, today rates 8/10.  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    Physical Exam:  SKIN: Focused examination of feet was performed.  - Maceration between toes on both feet, worst between 3-5th digits  - Peeling skin plantar surfaces both feet  - Tense bullae on plantar surface of toes both feet  - hypopigmented patches on dorsal feet  No other lesions of concern on areas examined.     Medications:  Current Outpatient Medications   Medication Sig Dispense Refill     terbinafine (LAMISIL) 1 % external cream Apply topically 2 times daily. 42 g 1     acetaminophen (TYLENOL) 325 MG tablet Take 2 tablets (650 mg) by mouth every 6 hours as needed for mild pain. 24 tablet 0     acetaminophen (TYLENOL) 325 MG tablet Take 2 tablets (650 mg) by mouth every 6 hours as needed for mild pain 24 tablet 0     blood glucose (NO BRAND SPECIFIED) lancets standard Use to test blood sugar 1 times daily or as directed. 100 Lancet 3     blood glucose (NO BRAND SPECIFIED) test strip USE TO TEST BLOOD SUGAR ONCE DAILY OR AS DIRECTED       blood glucose (NO BRAND SPECIFIED) test strip USE TO TEST BLOOD SUGAR ONCE DAILY OR AS DIRECTED       blood glucose (NO BRAND SPECIFIED) test strip Use to test blood sugar 1 times daily or as directed. 100 strip 3     calcium carbonate (TUMS) 500 MG chewable tablet Take 1 chew tab by mouth 2 times daily as needed.        ciclopirox 0.77 % GEL Externally apply topically 2 times daily. (Patient not taking: Reported on 2/12/2025) 45 g 2     glipiZIDE (GLUCOTROL XL) 2.5 MG 24 hr tablet Take 1 tablet (2.5 mg) by mouth daily. 30 tablet 2     ibuprofen (ADVIL/MOTRIN) 600 MG tablet Take 1 tablet (600 mg) by mouth every 6 hours as needed for other (mild and/or inflammatory pain.). 12 tablet 0     metFORMIN (GLUCOPHAGE) 500 MG tablet Take 2 tablets (1,000 mg) by mouth 2 times daily (with meals). 360 tablet 3     No current facility-administered medications for this visit.      Past Medical History:   Patient Active Problem List   Diagnosis     Endometrial hyperplasia without atypia, simple     PCOS (polycystic ovarian syndrome)     Alopecia areata     Controlled type 2 diabetes mellitus without complication, without long-term current use of insulin (H)     Dermatitis     Elevated cholesterol with elevated triglycerides     Leukocytosis     Lung granuloma (H)     Neck pain     Obese     Short stature     Vitamin D deficiency     Vitiligo     Kidney stone     Cyst of cervix     Complex atypical endometrial hyperplasia     RUQ pain     Morbid obesity (H)     Endometrial intraepithelial neoplasia (EIN)     Past Medical History:   Diagnosis Date     Acute cholecystitis 04/10/2020    Added automatically from request for surgery [id number]       Diabetes (H)     Type 2     EIN (endometrial intraepithelial neoplasia)      Endometrial hyperplasia without atypia, simple 04/08/2019     Infection due to 2019 novel coronavirus      Neck pain      Obesity      PCOS (polycystic ovarian syndrome)      TMJ (temporomandibular joint syndrome)      Vitiligo        CC Chris Ling, DPM  [address]  [place] [id number] on close of this encounter.     Attestation signed by Prakash Flores MD at 2/12/2025 12:52 PM:  I have personally examined this patient and agree with the PA's documentation and plan of care. I have reviewed and amended  the PA's note. The documentation accurately reflects my clinical observations, diagnoses, treatment and follow-up plans.     Prakash Flores MD  Dermatology Staff      Again, thank you for allowing me to participate in the care of your patient.        Sincerely,        Prakash Flores MD    Electronically signed

## 2025-02-12 NOTE — NURSING NOTE
Current patient location: [address] [place] [id number]    Is the patient currently in the state of [place]? YES    Visit mode: VIDEO    If the visit is dropped, the patient can be reconnected by:VIDEO VISIT: Text to cell phone:   Telephone Information:   Mobile [phone]       Will anyone else be joining the visit? NO  (If patient encounters technical issues they should call [phone]    Are changes needed to the allergy or medication list? Pt stated no changes to allergies and Pt stated no med changes    Are refills needed on medications prescribed by this physician? NO    Rooming Documentation:  Unable to complete questionnaire(s) due to time    Reason for visit: CASSIE VILLAF

## 2025-02-13 LAB — BACTERIA SKIN AEROBE CULT: NORMAL

## 2025-02-18 ENCOUNTER — MYC MEDICAL ADVICE (OUTPATIENT)
Dept: DERMATOLOGY | Facility: CLINIC | Age: 35
End: 2025-02-18
Payer: COMMERCIAL

## 2025-02-18 ENCOUNTER — TELEPHONE (OUTPATIENT)
Dept: DERMATOLOGY | Facility: CLINIC | Age: 35
End: 2025-02-18
Payer: COMMERCIAL

## 2025-02-18 ENCOUNTER — MYC MEDICAL ADVICE (OUTPATIENT)
Dept: PODIATRY | Facility: CLINIC | Age: 35
End: 2025-02-18
Payer: COMMERCIAL

## 2025-02-18 DIAGNOSIS — B35.3 TINEA PEDIS OF BOTH FEET: ICD-10-CM

## 2025-02-18 RX ORDER — PRENATAL VIT 91/IRON/FOLIC/DHA 28-975-200
COMBINATION PACKAGE (EA) ORAL 2 TIMES DAILY
Qty: 42 G | Refills: 11 | Status: SHIPPED | OUTPATIENT
Start: 2025-02-18

## 2025-02-18 NOTE — TELEPHONE ENCOUNTER
[facility] Call Center    Phone Message    May a detailed message be left on voicemail: yes     Reason for Call: Medication Refill Request    Has the patient contacted the pharmacy for the refill? Yes   Name of medication being requested: terbinafine (LAMISIL) 250 MG tablet    Provider who prescribed the medication: Sandra  Pharmacy: [id number] [place], [place] - [address] AT [facility] [address]  Date medication is needed: asap       Action Taken: Other: OX DERM    Travel Screening: Not Applicable     Date of Service:

## 2025-02-20 LAB — BACTERIA SKIN AEROBE CULT: NORMAL

## 2025-02-27 LAB — BACTERIA SKIN AEROBE CULT: NORMAL

## 2025-03-06 LAB — BACTERIA SKIN AEROBE CULT: NORMAL

## 2025-03-12 LAB — BACTERIA SKIN AEROBE CULT: NO GROWTH

## 2025-04-30 NOTE — TELEPHONE ENCOUNTER
Prior Authorization Not Needed per Insurance    Medication: medroxyPROGESTERone (PROVERA) 10 MG tablet-PA Not Needed  Insurance Company: [employer] - Phone [phone] Fax [phone]  Expected CoPay:      Pharmacy Filling the Rx: [place], [place] - [address]  Pharmacy Notified: No  Patient Notified: No            Pharmacy Paid Claim below. Co-pay $7.      
Prior Authorization Retail Medication Request    Medication/Dose: provera 10mg BID  ICD code (if different than what is on RX):  Abnormal uterine bleeding      Insurance Name:  Insurance ID:        Pharmacy Information (if different than what is on RX)  Name: [facility]  Phone:     
Declines

## 2025-05-19 ENCOUNTER — LAB (OUTPATIENT)
Dept: LAB | Facility: CLINIC | Age: 35
End: 2025-05-19
Payer: COMMERCIAL

## 2025-05-19 ENCOUNTER — ANCILLARY PROCEDURE (OUTPATIENT)
Dept: ULTRASOUND IMAGING | Facility: CLINIC | Age: 35
End: 2025-05-19
Attending: OBSTETRICS & GYNECOLOGY
Payer: COMMERCIAL

## 2025-05-19 ENCOUNTER — RESULTS FOLLOW-UP (OUTPATIENT)
Dept: FAMILY MEDICINE | Facility: CLINIC | Age: 35
End: 2025-05-19

## 2025-05-19 DIAGNOSIS — N85.02 ENDOMETRIAL INTRAEPITHELIAL NEOPLASIA (EIN): ICD-10-CM

## 2025-05-19 DIAGNOSIS — E11.9 CONTROLLED TYPE 2 DIABETES MELLITUS WITHOUT COMPLICATION, WITHOUT LONG-TERM CURRENT USE OF INSULIN (H): ICD-10-CM

## 2025-05-19 DIAGNOSIS — N85.02 EIN (ENDOMETRIAL INTRAEPITHELIAL NEOPLASIA): ICD-10-CM

## 2025-05-19 LAB
EST. AVERAGE GLUCOSE BLD GHB EST-MCNC: 186 MG/DL
HBA1C MFR BLD: 8.1 %

## 2025-05-19 PROCEDURE — 3052F HG A1C>EQUAL 8.0%<EQUAL 9.0%: CPT

## 2025-05-19 PROCEDURE — 83036 HEMOGLOBIN GLYCOSYLATED A1C: CPT | Performed by: FAMILY MEDICINE

## 2025-05-19 PROCEDURE — 36415 COLL VENOUS BLD VENIPUNCTURE: CPT | Performed by: PATHOLOGY

## 2025-05-19 PROCEDURE — 99000 SPECIMEN HANDLING OFFICE-LAB: CPT | Performed by: PATHOLOGY

## 2025-05-20 ENCOUNTER — VIRTUAL VISIT (OUTPATIENT)
Dept: PHARMACY | Facility: CLINIC | Age: 35
End: 2025-05-20
Payer: COMMERCIAL

## 2025-05-20 DIAGNOSIS — E11.9 CONTROLLED TYPE 2 DIABETES MELLITUS WITHOUT COMPLICATION, WITHOUT LONG-TERM CURRENT USE OF INSULIN (H): Primary | ICD-10-CM

## 2025-05-20 PROCEDURE — 99605 MTMS BY PHARM NP 15 MIN: CPT | Mod: 93 | Performed by: PHARMACIST

## 2025-05-20 PROCEDURE — 99607 MTMS BY PHARM ADDL 15 MIN: CPT | Mod: 93 | Performed by: PHARMACIST

## 2025-05-20 RX ORDER — ORAL SEMAGLUTIDE 3 MG/1
3 TABLET ORAL DAILY
Qty: 30 TABLET | Refills: 0 | Status: SHIPPED | OUTPATIENT
Start: 2025-05-20

## 2025-05-20 RX ORDER — GLIPIZIDE 2.5 MG/1
2.5 TABLET, EXTENDED RELEASE ORAL DAILY
Qty: 30 TABLET | Refills: 2 | Status: SHIPPED | OUTPATIENT
Start: 2025-05-20

## 2025-05-20 NOTE — PROGRESS NOTES
Medication Therapy Management (MTM) Encounter    ASSESSMENT:                            Medication Adherence/Access: See below for considerations.    Diabetes  Patient is not meeting A1c goal of < 7%.  Patient would benefit from GLP-1 preferred over SFU now that she is holding off on pregnancy. She is hesitant to use injectable GLP, but agrees to try oral.  Reviewed potential side effects and expected effect of new medication.  Will continue glipizide with lowest dose of Rybelsus, then continue discontinuation when dose is increased to 7mg.          PLAN:                            Pending insurance coverage:  Start Rybelsus (semaglutide) 3mg daily x30 days    Follow-up: 1 month    SUBJECTIVE/OBJECTIVE:                          Joie Yadav is a 34 year old female seen for a follow-up visit.       Reason for visit: diabetes.    Allergies/ADRs: Reviewed in chart  Past Medical History: Reviewed in chart  Tobacco: She reports that she has never smoked. She has never been exposed to tobacco smoke. She has never used smokeless tobacco.  Alcohol: none    Medication Adherence/Access: no issues reported currently.  Her father had been ill and passed away, during that time she stopped taking care of her diabetes and did not take medications either. Has since resumed her medications.     Diabetes   Metformin 1000 mg twice daily - restarted in April   Glipizide 2.5 mg XL tablet once daily in the morning - restarted in April    Medication history:  Jardiance- headache, nausea, dizzy.  Resolved with stopping medication. Tried restarting medication and symptoms resumed.  Glipizide was lowered from 5 mg due to hypoglycemia.     She is holding off on trying for pregnancy now.  Diagnosed with EIN.     Current diabetes symptoms: none. No hypoglycemia.      Diet/Exercise:   About 3 weeks ago stopped drinking pop, drinking more water. Cutting down on carbs.  Walking more for exercise, 4 times per week in the afternoon after  work.     eating dinner after 8 pm, tends to have higher glucose the next morning.   Trying to eat more vegetables, less carbohydrates       Blood sugar monitorin time(s) daily; Ranges: (patient reported)    Fasting 180-190  After lunch 160-180  Eye exam in the last 12 months? No  Foot exam is up to date      Today's Vitals: There were no vitals taken for this visit.  ----------------      I spent 15 minutes with this patient today. All changes were made via collaborative practice agreement with Julieta Thompson MD.     A summary of these recommendations was sent via [employer].    Tara Pandey, PharmD, BCACP   Medication Therapy Management Pharmacist   [facility]  [phone]     Telemedicine Visit Details  The patient's medications can be safely assessed via a telemedicine encounter.  Type of service:  Telephone visit  Originating Location (pt. Location): Home    Distant Location (provider location):  On-site  Start Time: 3:35 PM  End Time: 3:50 PM     Medication Therapy Recommendations  Controlled type 2 diabetes mellitus without complication, without long-term current use of insulin (H)   1 Current Medication: Semaglutide (RYBELSUS) 3 MG tablet   Current Medication Sig: Take 3 mg by mouth daily.   Rationale: Synergistic therapy - Needs additional medication therapy - Indication   Recommendation: Start Medication   Status: Accepted per CPA   Identified Date: 2025 Completed Date: 2025

## 2025-05-20 NOTE — PATIENT INSTRUCTIONS
"Recommendations from today's MTM visit:                                                         Pending insurance coverage:  Start Rybelsus (semaglutide) 3mg daily x30 days    Take 1 pill every morning with only water.  Wait 30 minutes before eating or drinking anything else.    Follow-up: 1 month    It was great speaking with you today.  I value your experience and would be very thankful for your time in providing feedback in our clinic survey. In the next few days, you may receive an email or text message from [facility] with a link to a survey related to your  clinical pharmacist.\"     To schedule another MTM appointment, please call the clinic directly or you may call the MTM scheduling line at [phone] or toll-free at [phone].     My Clinical Pharmacist's contact information:                                                      Please feel free to contact me with any questions or concerns you have.      Tara Pandey, PharmD, [facility]   Medication Therapy Management Pharmacist   [facility]  [phone]    "

## 2025-05-21 ENCOUNTER — LAB (OUTPATIENT)
Dept: INFUSION THERAPY | Facility: HOSPITAL | Age: 35
End: 2025-05-21
Attending: OBSTETRICS & GYNECOLOGY
Payer: COMMERCIAL

## 2025-05-21 ENCOUNTER — ONCOLOGY VISIT (OUTPATIENT)
Dept: ONCOLOGY | Facility: HOSPITAL | Age: 35
End: 2025-05-21
Attending: OBSTETRICS & GYNECOLOGY
Payer: COMMERCIAL

## 2025-05-21 VITALS
BODY MASS INDEX: 50.42 KG/M2 | WEIGHT: 233 LBS | OXYGEN SATURATION: 99 % | SYSTOLIC BLOOD PRESSURE: 146 MMHG | RESPIRATION RATE: 16 BRPM | HEART RATE: 77 BPM | TEMPERATURE: 98.2 F | DIASTOLIC BLOOD PRESSURE: 83 MMHG

## 2025-05-21 DIAGNOSIS — N85.02 ENDOMETRIAL INTRAEPITHELIAL NEOPLASIA (EIN): ICD-10-CM

## 2025-05-21 DIAGNOSIS — N85.02 ENDOMETRIAL INTRAEPITHELIAL NEOPLASIA (EIN): Primary | ICD-10-CM

## 2025-05-21 LAB — HCG UR QL: NEGATIVE

## 2025-05-21 PROCEDURE — 88305 TISSUE EXAM BY PATHOLOGIST: CPT | Mod: 26 | Performed by: PATHOLOGY

## 2025-05-21 PROCEDURE — 99214 OFFICE O/P EST MOD 30 MIN: CPT | Mod: 25 | Performed by: OBSTETRICS & GYNECOLOGY

## 2025-05-21 PROCEDURE — 88305 TISSUE EXAM BY PATHOLOGIST: CPT | Mod: TC | Performed by: OBSTETRICS & GYNECOLOGY

## 2025-05-21 PROCEDURE — G0463 HOSPITAL OUTPT CLINIC VISIT: HCPCS | Performed by: OBSTETRICS & GYNECOLOGY

## 2025-05-21 PROCEDURE — 81025 URINE PREGNANCY TEST: CPT

## 2025-05-21 PROCEDURE — 58100 BIOPSY OF UTERUS LINING: CPT | Performed by: OBSTETRICS & GYNECOLOGY

## 2025-05-21 ASSESSMENT — PAIN SCALES - GENERAL: PAINLEVEL_OUTOF10: NO PAIN (0)

## 2025-05-21 NOTE — LETTER
2025      Joie Yadav  [address] [facility] [address]      Dear Colleague,    Thank you for referring your patient, Joie Yadav, to the [facility]. Please see a copy of my visit note below.                Follow Up Notes on Referred Patient    Date: 2025       Dr. Aldair Stout MD  [address] [place] [address]       RE: Joie Yadav  : 1990  GEMMA: 2025    Joie Yadav is a 34 year old woman with a diagnosis of EIN s/p mirena IUD placement and removal after 3 normal biopsies. The IUD was removed in May of 2023. She is hoping to become pregnant and states that she has not had any bleeding since her IUD was placed (2 years). She has never been pregnant.She has been doing well since the surgery when we repeated a CT as well as placements.  She has no nausea vomiting fever chills normal urinary bowel function.  She no vaginal bleeding.     Past Medical History:    Past Medical History:   Diagnosis Date     Acute cholecystitis 04/10/2020    Added automatically from request for surgery [id number]       Diabetes (H)     Type 2     EIN (endometrial intraepithelial neoplasia)      Endometrial hyperplasia without atypia, simple 2019     Infection due to 2019 novel coronavirus      Neck pain      Obesity      PCOS (polycystic ovarian syndrome)      TMJ (temporomandibular joint syndrome)      Vitiligo          Past Surgical History:    Past Surgical History:   Procedure Laterality Date     DILATION AND CURETTAGE, HYSTEROSCOPY DIAGNOSTIC, COMBINED N/A 3/11/2022    Procedure: HYSTEROSCOPY, DIAGNOSTIC, WITH DILATION AND CURETTAGE OF , PLACEMENT OF INTRAUTERINE DEVICE;  Surgeon: Aldair Stout MD;  [facility]     DILATION AND CURETTAGE, OPERATIVE HYSTEROSCOPY, COMBINED N/A 2024    Procedure: HYSTEROSCOPY, WITH DILATION AND CURETTAGE OF UTERUS;  Surgeon: Aldair Stout MD;  [facility]     DILATION AND  CURETTAGE, OPERATIVE HYSTEROSCOPY, COMBINED N/A 1/14/2025    Procedure: Exam under anesthesia, HYSTEROSCOPY, WITH DILATION AND CURETTAGE OF UTERUS;  Surgeon: Aldair Stout MD;  [facility]     INSERT INTRAUTERINE DEVICE N/A 3/11/2022    Procedure: Mirena Intra uterine device placement;  Surgeon: Aldair Stout MD;  [facility]     INSERT INTRAUTERINE DEVICE N/A 1/14/2025    Procedure: Insert Mirena intrauterine device;  Surgeon: Aldair Stout MD;  [facility]     LAPAROSCOPIC CHOLECYSTECTOMY N/A 04/11/2020    Procedure: CHOLECYSTECTOMY, LAPAROSCOPIC;  Surgeon: Norris Reid MD;  [facility]         Health Maintenance Due   Topic Date Due     ADVANCE CARE PLANNING  Never done     EYE EXAM  Never done     YEARLY PREVENTIVE VISIT  Never done     HIV SCREENING  Never done     HEPATITIS C SCREENING  Never done     PAP  10/22/2021     COVID-19 Vaccine (4 - 2024-25 season) 09/01/2024     BMP  03/30/2025     LIPID  03/30/2025       Current Medications:     Current Outpatient Medications   Medication Sig Dispense Refill     acetaminophen (TYLENOL) 325 MG tablet Take 2 tablets (650 mg) by mouth every 6 hours as needed for mild pain. 24 tablet 0     blood glucose (NO BRAND SPECIFIED) lancets standard Use to test blood sugar 1 times daily or as directed. 100 Lancet 3     blood glucose (NO BRAND SPECIFIED) test strip Use to test blood sugar 1 times daily or as directed. 100 strip 3     calcium carbonate (TUMS) 500 MG chewable tablet Take 1 chew tab by mouth 2 times daily as needed.       glipiZIDE (GLUCOTROL XL) 2.5 MG 24 hr tablet Take 1 tablet (2.5 mg) by mouth daily. 30 tablet 2     ibuprofen (ADVIL/MOTRIN) 600 MG tablet Take 1 tablet (600 mg) by mouth every 6 hours as needed for other (mild and/or inflammatory pain.). 12 tablet 0     metFORMIN (GLUCOPHAGE) 500 MG tablet Take 2 tablets (1,000 mg) by mouth 2 times daily (with meals). 360 tablet 3     Semaglutide (RYBELSUS) 3 MG tablet  Take 3 mg by mouth daily. 30 tablet 0     terbinafine (LAMISIL) 1 % external cream Apply topically 2 times daily. 42 g 11         Allergies:      No Known Allergies     Social History:     Social History     Tobacco Use     Smoking status: Never     Passive exposure: Never     Smokeless tobacco: Never   Substance Use Topics     Alcohol use: Never       History   Drug Use Unknown         Family History:       Family History   Problem Relation Age of Onset     Anesthesia Reaction No family hx of      Bleeding Disorder No family hx of      Clotting Disorder No family hx of          Physical Exam:     BP (!) 146/83   Pulse 77   Temp 98.2  F (36.8  C)   Resp 16   Wt 105.7 kg (233 lb)   SpO2 99%   BMI 50.42 kg/m    Body mass index is 50.42 kg/m .    General appearance: no acute distress, well groomed, sitting comfortably   HEENT: No cervical, submandibular, or supraclavicular lymphadenectomy   GI: abdomen soft, non-tender, non-distended, no appreciable masses  :  External: vulva/labia normal in appearance without lesions  Vagina: mucosa is pink, no lesions appreciated  Cervix: nulliparous appearing, without lesions or abnormal vasculature.    Uterus/adnexa: no pelvic masses appreciated, IUD strings visible about 2 cm long.     Procedure patient was consented for endometrial biopsy.  Endometrial biopsy was done with using a single-tooth tenaculum without difficulties.  Patient tolerated the procedure well.  The patient was in stable conditions at the end of the procedure.           Assessment:  Joie Yadav is a 34 year old woman with a diagnosis of EIN.      A total of 34 minutes was spent with the patient, on documentation, chart review, counseling the patient and/or treatment planning.  Does not include time for procedure.      1.  Endometrial hyperplasia with atypia.  2.  Class 2 obesity.  3.  Fertility preservation       We discussed we will await the biopsy results.  Her recent ultrasound still is has  an endometrial stripe of 16 mm of note we just did a D&C in January prior to that the stripe was 19 mm in November of last year.  Based on this we will obtain another pelvic MRI to look for any discrete lesions or signs of invasion or enlarged lymph nodes.  We will follow-up after those results.  She is scheduled to see a fertility specialist.  She agrees with this plan she is very appreciative of her care.  All questions were answered.             Aldair Prado MD, MS    Department of Obstetrics and Gynecology   Division of Gynecologic Oncology   [facility]  Phone: [phone]    CC  Patient Care Team:  Julieta Thompson MD as PCP - General (Family Medicine)  Israel Ceron MD as MD (Urology)  Vandana Jarquin MD as Resident (OB/Gyn)  Howie Ward MD as Resident  Murphy Rae MD as MD (OB/Gyn)  Fanta Rodríguez MD as Resident (OB/Gyn)  Aldair Prado MD as Assigned Cancer Care Provider  Juan David Ocasio MD as Resident (Student in organized health care education/training program)  Julieta Thompson MD as Assigned PCP  Tara Pandey RPH as Pharmacist (Pharmacist)  Juan David Ocasio MD as Assigned OBGYN Provider  Tara Pandey RPH as Assigned MTM Pharmacist  Chris Sesay MD as MD (Dermatology)  Prakash Flores MD as Assigned Dermatology Provider  Chris Ling DPM as Assigned Surgical Provider  ALDAIR PRADO      Again, thank you for allowing me to participate in the care of your patient.        Sincerely,        Aldair Prado MD    Electronically signed

## 2025-05-21 NOTE — NURSING NOTE
"Oncology Rooming Note    May 21, 2025 11:48 AM   Joie Yadav is a 34 year old female who presents for:    Chief Complaint   Patient presents with    Oncology Clinic Visit     Gyn Onc follow up     Initial Vitals: BP (!) 146/83   Pulse 77   Temp 98.2  F (36.8  C)   Resp 16   Wt 105.7 kg (233 lb)   SpO2 99%   BMI 50.42 kg/m   Estimated body mass index is 50.42 kg/m  as calculated from the following:    Height as of 2/12/25: 1.448 m (4' 9\").    Weight as of this encounter: 105.7 kg (233 lb). Body surface area is 2.06 meters squared.  No Pain (0) Comment: Data Unavailable   No LMP recorded. (Menstrual status: IUD).  Allergies reviewed: Yes  Medications reviewed: Yes    Medications: Medication refills not needed today.  Pharmacy name entered into [employer]: [id number] [address] AT [facility] & [facility]    Frailty Screening:   Is the patient here for a new oncology consult visit in cancer care? 2. No    PHQ9:  Did this patient require a PHQ9?: No      Clinical concerns: No new concerns. Has IUD, no breakthrough bleeding.  Dr. Stout was NOT notified.      Estephania Bailey RN              "

## 2025-05-21 NOTE — PROGRESS NOTES
Follow Up Notes on Referred Patient    Date: 2025       Dr. Aldair Stout MD  [address] [place] [address]       RE: Joie Yadav  : 1990  GEMMA: 2025    Joie Yadav is a 34 year old woman with a diagnosis of EIN s/p mirena IUD placement and removal after 3 normal biopsies. The IUD was removed in May of 2023. She is hoping to become pregnant and states that she has not had any bleeding since her IUD was placed (2 years). She has never been pregnant.She has been doing well since the surgery when we repeated a CT as well as placements.  She has no nausea vomiting fever chills normal urinary bowel function.  She no vaginal bleeding.     Past Medical History:    Past Medical History:   Diagnosis Date    Acute cholecystitis 04/10/2020    Added automatically from request for surgery [id number]      Diabetes (H)     Type 2    EIN (endometrial intraepithelial neoplasia)     Endometrial hyperplasia without atypia, simple 2019    Infection due to  novel coronavirus     Neck pain     Obesity     PCOS (polycystic ovarian syndrome)     TMJ (temporomandibular joint syndrome)     Vitiligo          Past Surgical History:    Past Surgical History:   Procedure Laterality Date    DILATION AND CURETTAGE, HYSTEROSCOPY DIAGNOSTIC, COMBINED N/A 3/11/2022    Procedure: HYSTEROSCOPY, DIAGNOSTIC, WITH DILATION AND CURETTAGE OF , PLACEMENT OF INTRAUTERINE DEVICE;  Surgeon: Aldair Stout MD;  [facility]    DILATION AND CURETTAGE, OPERATIVE HYSTEROSCOPY, COMBINED N/A 2024    Procedure: HYSTEROSCOPY, WITH DILATION AND CURETTAGE OF UTERUS;  Surgeon: Aldair Stout MD;  [facility]    DILATION AND CURETTAGE, OPERATIVE HYSTEROSCOPY, COMBINED N/A 2025    Procedure: Exam under anesthesia, HYSTEROSCOPY, WITH DILATION AND CURETTAGE OF UTERUS;  Surgeon: Aldair Stout MD;  [facility]    INSERT INTRAUTERINE DEVICE N/A 3/11/2022    Procedure: Mirena  Intra uterine device placement;  Surgeon: Aldair Stout MD;  [facility]    INSERT INTRAUTERINE DEVICE N/A 1/14/2025    Procedure: Insert Mirena intrauterine device;  Surgeon: Aldair Stout MD;  [facility]    LAPAROSCOPIC CHOLECYSTECTOMY N/A 04/11/2020    Procedure: CHOLECYSTECTOMY, LAPAROSCOPIC;  Surgeon: Norris Reid MD;  [facility]         Health Maintenance Due   Topic Date Due    ADVANCE CARE PLANNING  Never done    EYE EXAM  Never done    YEARLY PREVENTIVE VISIT  Never done    HIV SCREENING  Never done    HEPATITIS C SCREENING  Never done    PAP  10/22/2021    COVID-19 Vaccine (4 - 2024-25 season) 09/01/2024    BMP  03/30/2025    LIPID  03/30/2025       Current Medications:     Current Outpatient Medications   Medication Sig Dispense Refill    acetaminophen (TYLENOL) 325 MG tablet Take 2 tablets (650 mg) by mouth every 6 hours as needed for mild pain. 24 tablet 0    blood glucose (NO BRAND SPECIFIED) lancets standard Use to test blood sugar 1 times daily or as directed. 100 Lancet 3    blood glucose (NO BRAND SPECIFIED) test strip Use to test blood sugar 1 times daily or as directed. 100 strip 3    calcium carbonate (TUMS) 500 MG chewable tablet Take 1 chew tab by mouth 2 times daily as needed.      glipiZIDE (GLUCOTROL XL) 2.5 MG 24 hr tablet Take 1 tablet (2.5 mg) by mouth daily. 30 tablet 2    ibuprofen (ADVIL/MOTRIN) 600 MG tablet Take 1 tablet (600 mg) by mouth every 6 hours as needed for other (mild and/or inflammatory pain.). 12 tablet 0    metFORMIN (GLUCOPHAGE) 500 MG tablet Take 2 tablets (1,000 mg) by mouth 2 times daily (with meals). 360 tablet 3    Semaglutide (RYBELSUS) 3 MG tablet Take 3 mg by mouth daily. 30 tablet 0    terbinafine (LAMISIL) 1 % external cream Apply topically 2 times daily. 42 g 11         Allergies:      No Known Allergies     Social History:     Social History     Tobacco Use    Smoking status: Never     Passive exposure: Never    Smokeless  tobacco: Never   Substance Use Topics    Alcohol use: Never       History   Drug Use Unknown         Family History:       Family History   Problem Relation Age of Onset    Anesthesia Reaction No family hx of     Bleeding Disorder No family hx of     Clotting Disorder No family hx of          Physical Exam:     BP (!) 146/83   Pulse 77   Temp 98.2  F (36.8  C)   Resp 16   Wt 105.7 kg (233 lb)   SpO2 99%   BMI 50.42 kg/m    Body mass index is 50.42 kg/m .    General appearance: no acute distress, well groomed, sitting comfortably   HEENT: No cervical, submandibular, or supraclavicular lymphadenectomy   GI: abdomen soft, non-tender, non-distended, no appreciable masses  :  External: vulva/labia normal in appearance without lesions  Vagina: mucosa is pink, no lesions appreciated  Cervix: nulliparous appearing, without lesions or abnormal vasculature.    Uterus/adnexa: no pelvic masses appreciated, IUD strings visible about 2 cm long.     Procedure patient was consented for endometrial biopsy.  Endometrial biopsy was done with using a single-tooth tenaculum without difficulties.  Patient tolerated the procedure well.  The patient was in stable conditions at the end of the procedure.           Assessment:  Joie Yadav is a 34 year old woman with a diagnosis of EIN.      A total of 34 minutes was spent with the patient, on documentation, chart review, counseling the patient and/or treatment planning.  Does not include time for procedure.      1.  Endometrial hyperplasia with atypia.  2.  Class 2 obesity.  3.  Fertility preservation       We discussed we will await the biopsy results.  Her recent ultrasound still is has an endometrial stripe of 16 mm of note we just did a D&C in January prior to that the stripe was 19 mm in November of last year.  Based on this we will obtain another pelvic MRI to look for any discrete lesions or signs of invasion or enlarged lymph nodes.  We will follow-up after those  results.  She is scheduled to see a fertility specialist.  She agrees with this plan she is very appreciative of her care.  All questions were answered.             Aldair Prado MD, MS    Department of Obstetrics and Gynecology   Division of Gynecologic Oncology   [facility]  Phone: [phone]    CC  Patient Care Team:  Julieta Thompson MD as PCP - General (Family Medicine)  Israel Ceron MD as MD (Urology)  Vandana Jarquin MD as Resident (OB/Gyn)  Howie Ward MD as Resident  Murphy Rae MD as MD (OB/Gyn)  Fanta Rodríguez MD as Resident (OB/Gyn)  Aldair Prado MD as Assigned Cancer Care Provider  Juan David Ocasio MD as Resident (Student in organized health care education/training program)  Julieta Thompson MD as Assigned PCP  Tara Pandey RP as Pharmacist (Pharmacist)  Juan David Ocasio MD as Assigned OBGYN Provider  Tara Pandey RP as Assigned MTM Pharmacist  Chris Sesay MD as MD (Dermatology)  Prakash Flores MD as Assigned Dermatology Provider  Chris Ling DPM as Assigned Surgical Provider  ALDAIR PRADO

## 2025-05-21 NOTE — PATIENT INSTRUCTIONS
Pelvic MRI when we can   Virtual visit with Dr Stout to review the results     COMPLETED TODAY  Endometrial Bx

## 2025-06-09 ENCOUNTER — TELEPHONE (OUTPATIENT)
Dept: OBGYN | Facility: CLINIC | Age: 35
End: 2025-06-09
Payer: COMMERCIAL

## 2025-06-09 NOTE — TELEPHONE ENCOUNTER
"Fax received from the following pharmacy indicating need for Prior Authorization for Rybelsus:   [facility] #[id number] - [place], [place] - [address] AT [facility] [address]  [facility] [address]  Phone: [phone] Fax: [phone]           Last written prescription date: 5/20/2025        Last fill quantity: 30 tabs, # refills: 0        Last office visit: 1/28/2025        Future office visit: None        Is this a controlled substance?  No     Provider noted for patient to \"Return in about 6 months (around 7/28/2025).\"        Prior Authorization submitted in other telephone encounter.  "

## 2025-06-09 NOTE — TELEPHONE ENCOUNTER
Prior Authorization Retail Medication Request    Medication/Dose: Rybelsus  Diagnosis and ICD code (if different than what is on RX):    New/renewal/insurance change PA/secondary ins. PA:  Previously Tried and Failed:    Rationale:      Insurance   Primary:   Insurance ID:      Secondary (if applicable):  Insurance ID:      Pharmacy Information (if different than what is on RX)  Name:    Phone:    Fax:    Clinic Information  Preferred routing pool for dept communication:

## 2025-06-10 NOTE — TELEPHONE ENCOUNTER
PA Initiation    Medication: RYBELSUS 3 MG PO TABS  Insurance Company: [employer] - Phone [phone] Fax [phone]  Pharmacy Filling the Rx: [id number] - [facility] [address] AT [facility] [address] [facility]  Filling Pharmacy Phone:    Filling Pharmacy Fax: [phone]  Start Date: 6/10/2025

## 2025-06-11 NOTE — TELEPHONE ENCOUNTER
PRIOR AUTHORIZATION DENIED    Medication: RYBELSUS 3 MG PO TABS  Insurance Company: [employer] - Phone [phone] Fax [phone]  Denial Date: 6/11/2025  Denial Reason(s): NOT A PREFERRED DRUG ON PLAN -  SEE ATTACHED  Appeal Information: SEE ATTACHED  Patient Notified: NO

## 2025-06-18 ENCOUNTER — VIRTUAL VISIT (OUTPATIENT)
Dept: ONCOLOGY | Facility: HOSPITAL | Age: 35
End: 2025-06-18
Attending: OBSTETRICS & GYNECOLOGY
Payer: COMMERCIAL

## 2025-06-18 VITALS — BODY MASS INDEX: 50.48 KG/M2 | HEIGHT: 57 IN | WEIGHT: 234 LBS

## 2025-06-18 DIAGNOSIS — N85.02 EIN (ENDOMETRIAL INTRAEPITHELIAL NEOPLASIA): Primary | ICD-10-CM

## 2025-06-18 PROCEDURE — 98005 SYNCH AUDIO-VIDEO EST LOW 20: CPT | Performed by: OBSTETRICS & GYNECOLOGY

## 2025-06-18 NOTE — PROGRESS NOTES
"Virtual Visit Details    Type of service:  Video Visit   Video Start Time: {video visit start/end time for provider to select:842040}  Video End Time:{video visit start/end time for provider to select:178371}    Originating Location (pt. Location): {video visit patient location:217986::\"Home\"}  {PROVIDER LOCATION On-site should be selected for visits conducted from your clinic location or adjoining [facility] hospital, academic office, or other nearby [facility] building. Off-site should be selected for all other provider locations, including home:[address]}  Distant Location (provider location):  {virtual location provider:821104}  Platform used for Video Visit: {Virtual Visit Platforms:301434::\"Loom\"}  " N/A 1/14/2025    Procedure: Exam under anesthesia, HYSTEROSCOPY, WITH DILATION AND CURETTAGE OF UTERUS;  Surgeon: Aldair Stout MD;  [facility]    INSERT INTRAUTERINE DEVICE N/A 3/11/2022    Procedure: Mirena Intra uterine device placement;  Surgeon: Aldair Stout MD;  [facility]    INSERT INTRAUTERINE DEVICE N/A 1/14/2025    Procedure: Insert Mirena intrauterine device;  Surgeon: Aldair Stout MD;  [facility]    LAPAROSCOPIC CHOLECYSTECTOMY N/A 04/11/2020    Procedure: CHOLECYSTECTOMY, LAPAROSCOPIC;  Surgeon: Norris Reid MD;  [facility]         Health Maintenance Due   Topic Date Due    ADVANCE CARE PLANNING  Never done    EYE EXAM  Never done    YEARLY PREVENTIVE VISIT  Never done    HIV SCREENING  Never done    HEPATITIS C SCREENING  Never done    PAP  10/22/2021    COVID-19 VACCINE (4 - 2024-25 season) 09/01/2024    BMP  03/30/2025    LIPID  03/30/2025    MICROALBUMIN  08/03/2025       Current Medications:     Current Outpatient Medications   Medication Sig Dispense Refill    acetaminophen (TYLENOL) 325 MG tablet Take 2 tablets (650 mg) by mouth every 6 hours as needed for mild pain. 24 tablet 0    blood glucose (CONTOUR NEXT TEST) test strip USE TO TEST BLOOD SUGAR ONCE DAILY OR AS DIRECTED 100 strip 0    blood glucose (NO BRAND SPECIFIED) lancets standard Use to test blood sugar 1 times daily or as directed. 100 Lancet 3    calcium carbonate (TUMS) 500 MG chewable tablet Take 1 chew tab by mouth 2 times daily as needed.      glipiZIDE (GLUCOTROL XL) 2.5 MG 24 hr tablet Take 1 tablet (2.5 mg) by mouth daily. 30 tablet 2    ibuprofen (ADVIL/MOTRIN) 600 MG tablet Take 1 tablet (600 mg) by mouth every 6 hours as needed for other (mild and/or inflammatory pain.). 12 tablet 0    metFORMIN (GLUCOPHAGE) 500 MG tablet Take 2 tablets (1,000 mg) by mouth 2 times daily (with meals). 360 tablet 3    sitagliptin (JANUVIA) 100 MG tablet Take 1 tablet (100 mg) by mouth daily.  "90 tablet 1    terbinafine (LAMISIL) 1 % external cream Apply topically 2 times daily. 42 g 11         Allergies:      No Known Allergies     Social History:     Social History     Tobacco Use    Smoking status: Never     Passive exposure: Never    Smokeless tobacco: Never   Substance Use Topics    Alcohol use: Never       History   Drug Use Unknown         Family History:       Family History   Problem Relation Age of Onset    Anesthesia Reaction No family hx of     Bleeding Disorder No family hx of     Clotting Disorder No family hx of          Physical Exam:     Ht 1.448 m (4' 9\")   Wt 106.1 kg (234 lb)   BMI 50.64 kg/m    Body mass index is 50.64 kg/m .    General appearance: no acute distress, well groomed, sitting comfortably            Assessment:  Joie Yadav is a 34 year old woman with a diagnosis of EIN.      A total of 24 minutes was spent with the patient, on documentation, chart review, counseling the patient and/or treatment planning.       1.  Endometrial hyperplasia with atypia.  2.  Class 2 obesity.  3.  Fertility preservation       We discussed recent ultrasound still shows a thickened stripe of 16 mm.  We will obtain a another MRI to ensure no distinct lesion or signs of invasion.  Will follow-up with her after that result.  Depending on his findings we would continue with another ultrasound and endometrial biopsy in 3 months.  We again discussed to have 3 negative biopsies before we would be able to remove the IUD before fertility purposes.  She is scheduled to see a fertility specialist.  She agrees with this plan she is very appreciative of her care.  All questions were answered.             Aldair Stout MD, MS    Department of Obstetrics and Gynecology   Division of Gynecologic Oncology   [facility]  Phone: [phone]       CC  Patient Care Team:  Julieta Thompson MD as PCP - General (Family Medicine)  Israel Ceron MD as MD " (Urology)  Vandana Jarquin MD as Resident (OB/Gyn)  Howie Ward MD as Resident  Murphy Rae MD as MD (OB/Gyn)  Fanta Rodríguez MD as Resident (OB/Gyn)  Aldair Prado MD as Assigned Cancer Care Provider  Juan David Ocasio MD as Resident (Student in organized health care education/training program)  Julieta Thompson MD as Assigned PCP  Tara Pandey [facility] as Pharmacist (Pharmacist)  Juan David Ocasio MD as Assigned OBGYN Provider  Tara Pandey [facility] as Assigned MTM Pharmacist  Chris Sesay MD as MD (Dermatology)  Prakash Flores MD as Assigned Dermatology Provider  Chris Ling DPM as Assigned Surgical Provider  ALDAIR PRADO

## 2025-06-18 NOTE — NURSING NOTE
Patient confirms medications and allergies are accurate via patients echeck in completion, and or denies any changes since last reviewed/verified.     Current patient location: [address] [place] [address]    Is the patient currently in the state of [place]? YES    Visit mode: VIDEO    If the visit is dropped, the patient can be reconnected by:VIDEO VISIT: Text to cell phone:   Telephone Information:   Mobile [phone]       Will anyone else be joining the visit? NO  (If patient encounters technical issues they should call [phone])    Are changes needed to the allergy or medication list? No    Are refills needed on medications prescribed by this physician? NO    Rooming Documentation:  Questionnaire(s) not done per department protocol    Reason for visit: RECHECK    Karely BAUTISTA

## 2025-06-22 DIAGNOSIS — E11.9 CONTROLLED TYPE 2 DIABETES MELLITUS WITHOUT COMPLICATION, WITHOUT LONG-TERM CURRENT USE OF INSULIN (H): ICD-10-CM

## 2025-06-23 NOTE — TELEPHONE ENCOUNTER
"Prescription refill request received via [employer] for medication: Contour test strips     Patient's preferred pharmacy documented in chart: [id number] - [facility] [address] [facility]     Last Written Prescription Date:  8/21/2024  Last Fill Quantity: 100  # refills: 3   Last office visit: 1/28/2025    Prescribing provider: Julieta Thompson MD  Future Office Visit Scheduled:     Future Appointments 6/23/2025 - 12/20/2025        Date Visit Type Length Department Provider     7/1/2025  3:00 PM MR PELVIS (GYN) WWO CONTRAST 80 min [facility] MRI [id number]    Location Instructions:     Due to [place] [address], travel times to this location may be longer than usual. Please plan for extra travel time and check the [place] Department of [employer] [address] [place] website for delay, closure, and detour information.  The [facility] ([facility]) is in a dense urban area with multiple transportation and parking options. You may wish to review options for  service and self-parking in more detail on the [facility] s website at [email or web address].                   Next appointment is due: Per provider at last visit: \"Return in about 6 months (around 7/28/2025).\"    Per RN protocol, refill is authorized for 90 days. [employer] message sent to patient requesting appointment.    "

## 2025-06-30 ENCOUNTER — MYC MEDICAL ADVICE (OUTPATIENT)
Dept: PHARMACY | Facility: CLINIC | Age: 35
End: 2025-06-30
Payer: COMMERCIAL

## 2025-06-30 DIAGNOSIS — E11.9 CONTROLLED TYPE 2 DIABETES MELLITUS WITHOUT COMPLICATION, WITHOUT LONG-TERM CURRENT USE OF INSULIN (H): Primary | ICD-10-CM

## 2025-06-30 NOTE — TELEPHONE ENCOUNTER
Hi, I have a question about januvia I don't know if it normal I just been have a lot of headaches, feel dizzy and my kidneys also hurts. I don't know if I should continue taking this medication or should I stop taking it. Thank you         Mtm notes STOP med today --when mariel is back can f/up with patient to discuss alternatives.    Lab Results   Component Value Date    A1C 8.1 05/19/2025    A1C 7.5 11/16/2024    A1C 8.2 08/03/2024    A1C 7.7 03/30/2024    A1C 7.5 01/09/2024    A1C 6.7 04/10/2020     Di Henriquez [facility].  Medication Therapy Management Provider  [phone]

## 2025-07-01 ENCOUNTER — ANCILLARY PROCEDURE (OUTPATIENT)
Dept: MRI IMAGING | Facility: CLINIC | Age: 35
End: 2025-07-01
Attending: OBSTETRICS & GYNECOLOGY
Payer: COMMERCIAL

## 2025-07-01 DIAGNOSIS — N85.02 ENDOMETRIAL INTRAEPITHELIAL NEOPLASIA (EIN): ICD-10-CM

## 2025-07-01 PROCEDURE — 72197 MRI PELVIS W/O & W/DYE: CPT | Mod: GC | Performed by: STUDENT IN AN ORGANIZED HEALTH CARE EDUCATION/TRAINING PROGRAM

## 2025-07-01 PROCEDURE — A9585 GADOBUTROL INJECTION: HCPCS | Mod: JZ | Performed by: STUDENT IN AN ORGANIZED HEALTH CARE EDUCATION/TRAINING PROGRAM

## 2025-07-01 RX ORDER — GADOBUTROL 604.72 MG/ML
10 INJECTION INTRAVENOUS ONCE
Status: COMPLETED | OUTPATIENT
Start: 2025-07-01 | End: 2025-07-01

## 2025-07-01 RX ADMIN — GADOBUTROL 10 ML: 604.72 INJECTION INTRAVENOUS at 15:46

## 2025-07-01 NOTE — DISCHARGE INSTRUCTIONS
MRI Contrast Discharge Instructions    The IV contrast you received today will pass out of your body in your  urine. This will happen in the next 24 hours. You will not feel this process.  Your urine will not change color.    Drink at least 4 extra glasses of water or juice today (unless your doctor  has restricted your fluids). This reduces the stress on your kidneys.  You may take your regular medicines.    If you are on dialysis: It is best to have dialysis today.    If you have a reaction: Most reactions happen right away. If you have  any new symptoms after leaving the hospital (such as hives or swelling),  call your hospital at the correct number below. Or call your family doctor.  If you have breathing distress or wheezing, call 911.    Special instructions: ***    I have read and understand the above information.    Signature:______________________________________ Date:___________    Staff:__________________________________________ Date:___________     Time:__________    [facility] Departments:    ___[place]: [phone]  ___[facility]: [phone]  ___[place]: [phone] ___[facility]: [phone]  ___[facility]: [phone]  ___[facility]: [phone]  ___[phone]  ___[facility]: [phone]  ___[phone]

## 2025-07-13 ENCOUNTER — HEALTH MAINTENANCE LETTER (OUTPATIENT)
Age: 35
End: 2025-07-13

## 2025-07-23 DIAGNOSIS — E11.9 CONTROLLED TYPE 2 DIABETES MELLITUS WITHOUT COMPLICATION, WITHOUT LONG-TERM CURRENT USE OF INSULIN (H): ICD-10-CM

## 2025-07-24 RX ORDER — GLIPIZIDE 2.5 MG/1
2.5 TABLET, EXTENDED RELEASE ORAL DAILY
Qty: 30 TABLET | Refills: 0 | Status: SHIPPED | OUTPATIENT
Start: 2025-07-24

## 2025-07-24 NOTE — TELEPHONE ENCOUNTER
Prescription refill request received via [employer] for medication: Glipizide     Patient's preferred pharmacy documented in chart: [id number] - [facility] [address] AT [facility] [address]     Last Written Prescription Date:  5/20/2025  Last Fill Quantity: 30  # refills: 2   Last office visit:     Prescribing provider: Tara Pandey RPH/Dr. Julieta Thompson  Future Office Visit Scheduled:   Future Appointments 7/24/2025 - 1/20/2026      None           No appointment in clinic if not listed above.    Next appointment is due: August 2025 for repeat A1c.     Refill pended to Dr. Thompson.

## 2025-08-04 DIAGNOSIS — N85.02 EIN (ENDOMETRIAL INTRAEPITHELIAL NEOPLASIA): Primary | ICD-10-CM

## 2025-08-25 ENCOUNTER — LAB (OUTPATIENT)
Dept: LAB | Facility: CLINIC | Age: 35
End: 2025-08-25
Payer: COMMERCIAL

## 2025-08-25 ENCOUNTER — ANCILLARY PROCEDURE (OUTPATIENT)
Dept: ULTRASOUND IMAGING | Facility: CLINIC | Age: 35
End: 2025-08-25
Attending: OBSTETRICS & GYNECOLOGY
Payer: COMMERCIAL

## 2025-08-25 DIAGNOSIS — N85.02 EIN (ENDOMETRIAL INTRAEPITHELIAL NEOPLASIA): Primary | ICD-10-CM

## 2025-08-25 DIAGNOSIS — N85.02 EIN (ENDOMETRIAL INTRAEPITHELIAL NEOPLASIA): ICD-10-CM

## 2025-08-25 DIAGNOSIS — E11.9 CONTROLLED TYPE 2 DIABETES MELLITUS WITHOUT COMPLICATION, WITHOUT LONG-TERM CURRENT USE OF INSULIN (H): ICD-10-CM

## 2025-08-25 LAB
EST. AVERAGE GLUCOSE BLD GHB EST-MCNC: 197 MG/DL
HBA1C MFR BLD: 8.5 %

## 2025-08-25 PROCEDURE — 99000 SPECIMEN HANDLING OFFICE-LAB: CPT | Performed by: PATHOLOGY

## 2025-08-25 PROCEDURE — 36415 COLL VENOUS BLD VENIPUNCTURE: CPT | Performed by: PATHOLOGY

## 2025-08-25 PROCEDURE — 76856 US EXAM PELVIC COMPLETE: CPT | Mod: GC | Performed by: RADIOLOGY

## 2025-08-25 PROCEDURE — 76830 TRANSVAGINAL US NON-OB: CPT | Mod: GC | Performed by: RADIOLOGY

## 2025-08-25 PROCEDURE — 83036 HEMOGLOBIN GLYCOSYLATED A1C: CPT | Performed by: FAMILY MEDICINE

## 2025-08-27 ENCOUNTER — LAB (OUTPATIENT)
Dept: LAB | Facility: HOSPITAL | Age: 35
End: 2025-08-27
Attending: OBSTETRICS & GYNECOLOGY
Payer: COMMERCIAL

## 2025-08-27 ENCOUNTER — ONCOLOGY VISIT (OUTPATIENT)
Dept: ONCOLOGY | Facility: HOSPITAL | Age: 35
End: 2025-08-27
Attending: OBSTETRICS & GYNECOLOGY
Payer: COMMERCIAL

## 2025-08-27 VITALS
WEIGHT: 234 LBS | HEART RATE: 85 BPM | BODY MASS INDEX: 50.64 KG/M2 | OXYGEN SATURATION: 97 % | DIASTOLIC BLOOD PRESSURE: 90 MMHG | SYSTOLIC BLOOD PRESSURE: 144 MMHG | RESPIRATION RATE: 16 BRPM

## 2025-08-27 DIAGNOSIS — N85.02 EIN (ENDOMETRIAL INTRAEPITHELIAL NEOPLASIA): ICD-10-CM

## 2025-08-27 DIAGNOSIS — N85.02 EIN (ENDOMETRIAL INTRAEPITHELIAL NEOPLASIA): Primary | ICD-10-CM

## 2025-08-27 LAB — HCG UR QL: NEGATIVE

## 2025-08-27 PROCEDURE — 58100 BIOPSY OF UTERUS LINING: CPT | Performed by: OBSTETRICS & GYNECOLOGY

## 2025-08-27 PROCEDURE — 81025 URINE PREGNANCY TEST: CPT

## 2025-08-27 PROCEDURE — G0463 HOSPITAL OUTPT CLINIC VISIT: HCPCS | Mod: 25 | Performed by: OBSTETRICS & GYNECOLOGY

## 2025-08-27 PROCEDURE — 99214 OFFICE O/P EST MOD 30 MIN: CPT | Mod: 25 | Performed by: OBSTETRICS & GYNECOLOGY

## 2025-08-27 RX ORDER — PREGABALIN 75 MG/1
75 CAPSULE ORAL AT BEDTIME
COMMUNITY

## 2025-08-27 RX ORDER — MEDROXYPROGESTERONE ACETATE 10 MG
10 TABLET ORAL DAILY
COMMUNITY

## 2025-08-27 RX ORDER — LANCETS
EACH MISCELLANEOUS DAILY
COMMUNITY
Start: 2025-06-20

## 2025-08-27 ASSESSMENT — PAIN SCALES - GENERAL: PAINLEVEL_OUTOF10: NO PAIN (0)

## 2025-08-29 ENCOUNTER — LAB REQUISITION (OUTPATIENT)
Dept: LAB | Facility: CLINIC | Age: 35
End: 2025-08-29
Payer: COMMERCIAL

## 2025-09-02 LAB
PATH REPORT.COMMENTS IMP SPEC: ABNORMAL
PATH REPORT.COMMENTS IMP SPEC: YES
PATH REPORT.FINAL DX SPEC: ABNORMAL
PATH REPORT.GROSS SPEC: ABNORMAL
PATH REPORT.MICROSCOPIC SPEC OTHER STN: ABNORMAL
PATH REPORT.RELEVANT HX SPEC: ABNORMAL
PATH REPORT.RELEVANT HX SPEC: ABNORMAL
PATH REPORT.SITE OF ORIGIN SPEC: ABNORMAL

## 2025-09-03 LAB
PATH REPORT.COMMENTS IMP SPEC: ABNORMAL
PATH REPORT.COMMENTS IMP SPEC: YES
PATH REPORT.FINAL DX SPEC: ABNORMAL
PATH REPORT.GROSS SPEC: ABNORMAL
PATH REPORT.MICROSCOPIC SPEC OTHER STN: ABNORMAL
PATH REPORT.RELEVANT HX SPEC: ABNORMAL
PHOTO IMAGE: ABNORMAL

## (undated) DEVICE — NDL SPINAL 22GA 3.5" QUINCKE 405181

## (undated) DEVICE — NDL BLUNT 18GA 1" W/O FILTER 305181

## (undated) DEVICE — SUCTION MANIFOLD NEPTUNE 2 SYS 4 PORT 0702-020-000

## (undated) DEVICE — SOL NACL 0.9% INJ 1000ML BAG 2B1324X

## (undated) DEVICE — LINEN TOWEL PACK X5 5464

## (undated) DEVICE — PAD CHUX UNDERPAD 23X24" 7136

## (undated) DEVICE — PREP DYNA-HEX 4% CHG SCRUB 4OZ BOTTLE MDS098710

## (undated) DEVICE — CONNECTOR WATER VALVE PERFUSION PACK STR 020272801

## (undated) DEVICE — PREP CHLORAPREP 26ML TINTED ORANGE  260815

## (undated) DEVICE — ENDO POUCH UNIVERSAL RETRIEVAL SYSTEM INZII 12/15MM CD004

## (undated) DEVICE — Device

## (undated) DEVICE — ENDO POUCH UNIV RETRIEVAL SYSTEM INZII 10MM CD001

## (undated) DEVICE — DRAPE SHEET REV FOLD 3/4 9349

## (undated) DEVICE — DRAPE PERI/OB W/POUCH

## (undated) DEVICE — GLOVE BIOGEL PI MICRO SZ 7.5 48575

## (undated) DEVICE — DRSG TELFA 3X8" 1238

## (undated) DEVICE — ENDO TROCAR SLEEVE KII ADV FIXATION 05X100MM CFS02

## (undated) DEVICE — GLOVE PROTEXIS BLUE W/NEU-THERA 8.0  2D73EB80

## (undated) DEVICE — DEVICE SUTURE GRASPER TROCAR CLOSURE 14GA PMITCSG

## (undated) DEVICE — ADH SKIN CLOSURE PREMIERPRO EXOFIN 1.0ML 3470

## (undated) DEVICE — NDL INSUFFLATION 13GA 150MM C2202

## (undated) DEVICE — TUBING SUCTION 10'X3/16" N510

## (undated) DEVICE — DRAPE POUCH IRR 1016

## (undated) DEVICE — DRAPE LEGGINGS CLEAR 8430

## (undated) DEVICE — PREP SCRUB SOL EXIDINE 4% CHG 4OZ 29002-404

## (undated) DEVICE — ENDO DISSECTOR BLUNT 05MM  BTD05

## (undated) DEVICE — ENDO TROCAR FIRST ENTRY KII FIOS ADV FIX 12X100MM CFF73

## (undated) DEVICE — SUCTION TIP YANKAUER W/O VENT K86

## (undated) DEVICE — GLOVE PROTEXIS MICRO 7.5  2D73PM75

## (undated) DEVICE — GLOVE BIOGEL PI MICRO INDICATOR UNDERGLOVE SZ 8.0 48980

## (undated) DEVICE — ESU GROUND PAD ADULT W/CORD E7507

## (undated) DEVICE — SPONGE RAY-TEC 4X8" 7318

## (undated) DEVICE — LINEN TOWEL PACK X6 WHITE 5487

## (undated) DEVICE — SOL GLYCINE 1.5% 3000ML BAG 2B7317

## (undated) DEVICE — SOL NACL 0.9% IRRIG 1000ML BOTTLE 2F7124

## (undated) DEVICE — SOL NACL 0.9% IRRIG 3000ML BAG 2B7477

## (undated) DEVICE — SUCTION IRR STRYKERFLOW II W/TIP 250-070-520

## (undated) DEVICE — SU MONOCRYL 4-0 PS-2 27" UND Y426H

## (undated) DEVICE — ESU ENDO SCISSORS 5MM CVD 5DCS

## (undated) DEVICE — ANTIFOG SOLUTION W/FOAM PAD 31142527

## (undated) DEVICE — CLIP APPLIER ENDO 5MM M/L LIGAMAX EL5ML

## (undated) DEVICE — TUBING IRRIG CYSTO/BLADDER SET 81" LF 2C4040

## (undated) DEVICE — ENDO TROCAR SLEEVE KII ADV FIXATION 12X100MM CFS22

## (undated) DEVICE — DRAPE SHEET MED 44X70" 9355

## (undated) DEVICE — SOL WATER IRRIG 1000ML BOTTLE 2F7114

## (undated) DEVICE — LINEN TOWEL PACK X30 5481

## (undated) DEVICE — DRSG PRIMAPORE 02X3" 7133

## (undated) RX ORDER — ACETAMINOPHEN 325 MG/1
TABLET ORAL
Status: DISPENSED
Start: 2025-01-14

## (undated) RX ORDER — PHENAZOPYRIDINE HYDROCHLORIDE 200 MG/1
TABLET, FILM COATED ORAL
Status: DISPENSED
Start: 2022-03-11

## (undated) RX ORDER — LIDOCAINE HYDROCHLORIDE 20 MG/ML
INJECTION, SOLUTION EPIDURAL; INFILTRATION; INTRACAUDAL; PERINEURAL
Status: DISPENSED
Start: 2022-03-11

## (undated) RX ORDER — CEFAZOLIN SODIUM/WATER 2 G/20 ML
SYRINGE (ML) INTRAVENOUS
Status: DISPENSED
Start: 2024-04-16

## (undated) RX ORDER — METRONIDAZOLE 500 MG/100ML
INJECTION, SOLUTION INTRAVENOUS
Status: DISPENSED
Start: 2024-04-16

## (undated) RX ORDER — HYDROMORPHONE HYDROCHLORIDE 1 MG/ML
INJECTION, SOLUTION INTRAMUSCULAR; INTRAVENOUS; SUBCUTANEOUS
Status: DISPENSED
Start: 2020-04-11

## (undated) RX ORDER — PROPOFOL 10 MG/ML
INJECTION, EMULSION INTRAVENOUS
Status: DISPENSED
Start: 2025-01-14

## (undated) RX ORDER — CEFAZOLIN SODIUM 2 G/100ML
INJECTION, SOLUTION INTRAVENOUS
Status: DISPENSED
Start: 2020-04-11

## (undated) RX ORDER — LIDOCAINE HYDROCHLORIDE 20 MG/ML
INJECTION, SOLUTION EPIDURAL; INFILTRATION; INTRACAUDAL; PERINEURAL
Status: DISPENSED
Start: 2020-04-11

## (undated) RX ORDER — FENTANYL CITRATE-0.9 % NACL/PF 10 MCG/ML
PLASTIC BAG, INJECTION (ML) INTRAVENOUS
Status: DISPENSED
Start: 2022-03-11

## (undated) RX ORDER — BUPIVACAINE HYDROCHLORIDE AND EPINEPHRINE 2.5; 5 MG/ML; UG/ML
INJECTION, SOLUTION EPIDURAL; INFILTRATION; INTRACAUDAL; PERINEURAL
Status: DISPENSED
Start: 2020-04-11

## (undated) RX ORDER — HEPARIN SODIUM 5000 [USP'U]/.5ML
INJECTION, SOLUTION INTRAVENOUS; SUBCUTANEOUS
Status: DISPENSED
Start: 2025-01-14

## (undated) RX ORDER — DEXAMETHASONE SODIUM PHOSPHATE 4 MG/ML
INJECTION, SOLUTION INTRA-ARTICULAR; INTRALESIONAL; INTRAMUSCULAR; INTRAVENOUS; SOFT TISSUE
Status: DISPENSED
Start: 2025-01-14

## (undated) RX ORDER — ACETAMINOPHEN 325 MG/1
TABLET ORAL
Status: DISPENSED
Start: 2024-04-16

## (undated) RX ORDER — BUPIVACAINE HYDROCHLORIDE AND EPINEPHRINE 5; 5 MG/ML; UG/ML
INJECTION, SOLUTION EPIDURAL; INTRACAUDAL; PERINEURAL
Status: DISPENSED
Start: 2020-04-11

## (undated) RX ORDER — ONDANSETRON 2 MG/ML
INJECTION INTRAMUSCULAR; INTRAVENOUS
Status: DISPENSED
Start: 2020-04-11

## (undated) RX ORDER — FENTANYL CITRATE 50 UG/ML
INJECTION, SOLUTION INTRAMUSCULAR; INTRAVENOUS
Status: DISPENSED
Start: 2022-03-11

## (undated) RX ORDER — FENTANYL CITRATE 50 UG/ML
INJECTION, SOLUTION INTRAMUSCULAR; INTRAVENOUS
Status: DISPENSED
Start: 2020-04-11

## (undated) RX ORDER — PHENAZOPYRIDINE HYDROCHLORIDE 200 MG/1
TABLET, FILM COATED ORAL
Status: DISPENSED
Start: 2024-04-16

## (undated) RX ORDER — PROPOFOL 10 MG/ML
INJECTION, EMULSION INTRAVENOUS
Status: DISPENSED
Start: 2020-04-11

## (undated) RX ORDER — METRONIDAZOLE 500 MG/100ML
INJECTION, SOLUTION INTRAVENOUS
Status: DISPENSED
Start: 2025-01-14

## (undated) RX ORDER — FENTANYL CITRATE 50 UG/ML
INJECTION, SOLUTION INTRAMUSCULAR; INTRAVENOUS
Status: DISPENSED
Start: 2025-01-14

## (undated) RX ORDER — GLYCOPYRROLATE 0.2 MG/ML
INJECTION, SOLUTION INTRAMUSCULAR; INTRAVENOUS
Status: DISPENSED
Start: 2020-04-11

## (undated) RX ORDER — HEPARIN SODIUM 5000 [USP'U]/.5ML
INJECTION, SOLUTION INTRAVENOUS; SUBCUTANEOUS
Status: DISPENSED
Start: 2022-03-11

## (undated) RX ORDER — ROCURONIUM BROMIDE 50 MG/5 ML
SYRINGE (ML) INTRAVENOUS
Status: DISPENSED
Start: 2022-03-11

## (undated) RX ORDER — EPHEDRINE SULFATE 50 MG/ML
INJECTION, SOLUTION INTRAMUSCULAR; INTRAVENOUS; SUBCUTANEOUS
Status: DISPENSED
Start: 2022-03-11

## (undated) RX ORDER — FENTANYL CITRATE 50 UG/ML
INJECTION, SOLUTION INTRAMUSCULAR; INTRAVENOUS
Status: DISPENSED
Start: 2024-04-16

## (undated) RX ORDER — CEFAZOLIN SODIUM/WATER 2 G/20 ML
SYRINGE (ML) INTRAVENOUS
Status: DISPENSED
Start: 2025-01-14

## (undated) RX ORDER — HEPARIN SODIUM 5000 [USP'U]/.5ML
INJECTION, SOLUTION INTRAVENOUS; SUBCUTANEOUS
Status: DISPENSED
Start: 2024-04-16

## (undated) RX ORDER — ACETAMINOPHEN 325 MG/1
TABLET ORAL
Status: DISPENSED
Start: 2020-04-11

## (undated) RX ORDER — DEXAMETHASONE SODIUM PHOSPHATE 4 MG/ML
INJECTION, SOLUTION INTRA-ARTICULAR; INTRALESIONAL; INTRAMUSCULAR; INTRAVENOUS; SOFT TISSUE
Status: DISPENSED
Start: 2020-04-11

## (undated) RX ORDER — ONDANSETRON 2 MG/ML
INJECTION INTRAMUSCULAR; INTRAVENOUS
Status: DISPENSED
Start: 2025-01-14

## (undated) RX ORDER — PHENYLEPHRINE HCL IN 0.9% NACL 1 MG/10 ML
SYRINGE (ML) INTRAVENOUS
Status: DISPENSED
Start: 2020-04-11

## (undated) RX ORDER — LIDOCAINE HYDROCHLORIDE 10 MG/ML
INJECTION, SOLUTION EPIDURAL; INFILTRATION; INTRACAUDAL; PERINEURAL
Status: DISPENSED
Start: 2020-04-11

## (undated) RX ORDER — PROPOFOL 10 MG/ML
INJECTION, EMULSION INTRAVENOUS
Status: DISPENSED
Start: 2022-03-11

## (undated) RX ORDER — EPHEDRINE SULFATE 50 MG/ML
INJECTION, SOLUTION INTRAMUSCULAR; INTRAVENOUS; SUBCUTANEOUS
Status: DISPENSED
Start: 2020-04-11

## (undated) RX ORDER — OXYCODONE HYDROCHLORIDE 5 MG/1
TABLET ORAL
Status: DISPENSED
Start: 2022-03-11

## (undated) RX ORDER — PHENAZOPYRIDINE HYDROCHLORIDE 200 MG/1
TABLET, FILM COATED ORAL
Status: DISPENSED
Start: 2025-01-14

## (undated) RX ORDER — SCOPOLAMINE 1 MG/3D
PATCH, EXTENDED RELEASE TRANSDERMAL
Status: DISPENSED
Start: 2025-01-14